# Patient Record
Sex: FEMALE | Race: WHITE | Employment: OTHER | URBAN - NONMETROPOLITAN AREA
[De-identification: names, ages, dates, MRNs, and addresses within clinical notes are randomized per-mention and may not be internally consistent; named-entity substitution may affect disease eponyms.]

---

## 2017-01-31 ENCOUNTER — APPOINTMENT (OUTPATIENT)
Dept: GENERAL RADIOLOGY | Age: 82
DRG: 308 | End: 2017-01-31
Payer: MEDICARE

## 2017-01-31 ENCOUNTER — APPOINTMENT (OUTPATIENT)
Dept: CT IMAGING | Age: 82
DRG: 308 | End: 2017-01-31
Payer: MEDICARE

## 2017-01-31 ENCOUNTER — HOSPITAL ENCOUNTER (INPATIENT)
Age: 82
LOS: 3 days | Discharge: HOME HEALTH | DRG: 308 | End: 2017-02-03
Attending: EMERGENCY MEDICINE | Admitting: INTERNAL MEDICINE
Payer: MEDICARE

## 2017-01-31 DIAGNOSIS — I50.9 ACUTE ON CHRONIC CONGESTIVE HEART FAILURE, UNSPECIFIED CONGESTIVE HEART FAILURE TYPE: Primary | ICD-10-CM

## 2017-01-31 DIAGNOSIS — E87.1 HYPONATREMIA: ICD-10-CM

## 2017-01-31 DIAGNOSIS — I48.0 PAROXYSMAL ATRIAL FIBRILLATION (HCC): ICD-10-CM

## 2017-01-31 PROBLEM — I48.19 PERSISTENT ATRIAL FIBRILLATION (HCC): Status: ACTIVE | Noted: 2017-01-31

## 2017-01-31 LAB
ALBUMIN SERPL-MCNC: 3.5 G/DL (ref 3.5–5.2)
ALP BLD-CCNC: 41 U/L (ref 35–104)
ALT SERPL-CCNC: 33 U/L (ref 5–33)
ANION GAP SERPL CALCULATED.3IONS-SCNC: 14 MMOL/L (ref 7–19)
APTT: 34.9 SEC (ref 26–36.2)
AST SERPL-CCNC: 45 U/L (ref 5–32)
BASOPHILS ABSOLUTE: 0 K/UL (ref 0–0.2)
BASOPHILS RELATIVE PERCENT: 0.2 % (ref 0–1)
BILIRUB SERPL-MCNC: 1.4 MG/DL (ref 0.2–1.2)
BILIRUBIN URINE: NEGATIVE
BLOOD, URINE: NEGATIVE
BUN BLDV-MCNC: 26 MG/DL (ref 8–23)
CALCIUM SERPL-MCNC: 9.4 MG/DL (ref 8.8–10.2)
CHLORIDE BLD-SCNC: 90 MMOL/L (ref 98–111)
CLARITY: CLEAR
CO2: 24 MMOL/L (ref 22–29)
COLOR: YELLOW
CREAT SERPL-MCNC: 1.3 MG/DL (ref 0.5–0.9)
EKG P AXIS: NORMAL DEGREES
EKG P-R INTERVAL: NORMAL MS
EKG Q-T INTERVAL: 316 MS
EKG QRS DURATION: 92 MS
EKG QTC CALCULATION (BAZETT): 407 MS
EKG T AXIS: 59 DEGREES
EOSINOPHILS ABSOLUTE: 0 K/UL (ref 0–0.6)
EOSINOPHILS RELATIVE PERCENT: 0 % (ref 0–5)
GFR NON-AFRICAN AMERICAN: 39
GLOBULIN: 3.3 G/DL
GLUCOSE BLD-MCNC: 106 MG/DL (ref 74–109)
GLUCOSE URINE: NEGATIVE MG/DL
HCT VFR BLD CALC: 41.7 % (ref 37–47)
HEMOGLOBIN: 14.3 G/DL (ref 12–16)
INR BLD: 1.15 (ref 0.88–1.18)
KETONES, URINE: NEGATIVE MG/DL
LEUKOCYTE ESTERASE, URINE: NEGATIVE
LYMPHOCYTES ABSOLUTE: 0.5 K/UL (ref 1.1–4.5)
LYMPHOCYTES RELATIVE PERCENT: 7.3 % (ref 20–40)
MCH RBC QN AUTO: 29.7 PG (ref 27–31)
MCHC RBC AUTO-ENTMCNC: 34.3 G/DL (ref 33–37)
MCV RBC AUTO: 86.5 FL (ref 81–99)
MONOCYTES ABSOLUTE: 0.6 K/UL (ref 0–0.9)
MONOCYTES RELATIVE PERCENT: 10.3 % (ref 0–10)
NEUTROPHILS ABSOLUTE: 5.1 K/UL (ref 1.5–7.5)
NEUTROPHILS RELATIVE PERCENT: 82.2 % (ref 50–65)
NITRITE, URINE: NEGATIVE
PDW BLD-RTO: 15 % (ref 11.5–14.5)
PH UA: 5
PLATELET # BLD: 207 K/UL (ref 130–400)
PMV BLD AUTO: 11.8 FL (ref 7.4–10.4)
POTASSIUM SERPL-SCNC: 4 MMOL/L (ref 3.5–5)
PRO-BNP: ABNORMAL PG/ML (ref 0–1800)
PROTEIN UA: NEGATIVE MG/DL
PROTHROMBIN TIME: 14.7 SEC (ref 12–14.6)
RBC # BLD: 4.82 M/UL (ref 4.2–5.4)
SODIUM BLD-SCNC: 128 MMOL/L (ref 136–145)
SPECIFIC GRAVITY UA: 1.01
T4 FREE: 2.2 NG/ML (ref 0.9–1.7)
TOTAL PROTEIN: 6.8 G/DL (ref 6.6–8.7)
TROPONIN: 0.01 NG/ML (ref 0–0.03)
TSH SERPL DL<=0.05 MIU/L-ACNC: 4.51 UIU/ML (ref 0.27–4.2)
UROBILINOGEN, URINE: 0.2 E.U./DL
WBC # BLD: 6.1 K/UL (ref 4.8–10.8)

## 2017-01-31 PROCEDURE — 93005 ELECTROCARDIOGRAM TRACING: CPT

## 2017-01-31 PROCEDURE — 2700000000 HC OXYGEN THERAPY PER DAY

## 2017-01-31 PROCEDURE — 2500000003 HC RX 250 WO HCPCS: Performed by: EMERGENCY MEDICINE

## 2017-01-31 PROCEDURE — 85730 THROMBOPLASTIN TIME PARTIAL: CPT

## 2017-01-31 PROCEDURE — 84439 ASSAY OF FREE THYROXINE: CPT

## 2017-01-31 PROCEDURE — 96374 THER/PROPH/DIAG INJ IV PUSH: CPT

## 2017-01-31 PROCEDURE — 74150 CT ABDOMEN W/O CONTRAST: CPT

## 2017-01-31 PROCEDURE — 84484 ASSAY OF TROPONIN QUANT: CPT

## 2017-01-31 PROCEDURE — 2580000003 HC RX 258: Performed by: EMERGENCY MEDICINE

## 2017-01-31 PROCEDURE — 6360000002 HC RX W HCPCS: Performed by: INTERNAL MEDICINE

## 2017-01-31 PROCEDURE — 99284 EMERGENCY DEPT VISIT MOD MDM: CPT | Performed by: EMERGENCY MEDICINE

## 2017-01-31 PROCEDURE — 6370000000 HC RX 637 (ALT 250 FOR IP): Performed by: INTERNAL MEDICINE

## 2017-01-31 PROCEDURE — 36415 COLL VENOUS BLD VENIPUNCTURE: CPT

## 2017-01-31 PROCEDURE — 85610 PROTHROMBIN TIME: CPT

## 2017-01-31 PROCEDURE — 99285 EMERGENCY DEPT VISIT HI MDM: CPT

## 2017-01-31 PROCEDURE — 81003 URINALYSIS AUTO W/O SCOPE: CPT

## 2017-01-31 PROCEDURE — 2140000000 HC CCU INTERMEDIATE R&B

## 2017-01-31 PROCEDURE — 84443 ASSAY THYROID STIM HORMONE: CPT

## 2017-01-31 PROCEDURE — 85025 COMPLETE CBC W/AUTO DIFF WBC: CPT

## 2017-01-31 PROCEDURE — 71010 XR CHEST PORTABLE: CPT

## 2017-01-31 PROCEDURE — 83880 ASSAY OF NATRIURETIC PEPTIDE: CPT

## 2017-01-31 PROCEDURE — 99223 1ST HOSP IP/OBS HIGH 75: CPT | Performed by: INTERNAL MEDICINE

## 2017-01-31 PROCEDURE — 80053 COMPREHEN METABOLIC PANEL: CPT

## 2017-01-31 RX ORDER — BUMETANIDE 0.25 MG/ML
1 INJECTION, SOLUTION INTRAMUSCULAR; INTRAVENOUS ONCE
Status: COMPLETED | OUTPATIENT
Start: 2017-01-31 | End: 2017-01-31

## 2017-01-31 RX ORDER — ACETAMINOPHEN 325 MG/1
650 TABLET ORAL EVERY 4 HOURS PRN
Status: DISCONTINUED | OUTPATIENT
Start: 2017-01-31 | End: 2017-02-03 | Stop reason: HOSPADM

## 2017-01-31 RX ORDER — ATORVASTATIN CALCIUM 10 MG/1
10 TABLET, FILM COATED ORAL NIGHTLY
Status: DISCONTINUED | OUTPATIENT
Start: 2017-01-31 | End: 2017-02-03 | Stop reason: HOSPADM

## 2017-01-31 RX ORDER — ONDANSETRON 2 MG/ML
4 INJECTION INTRAMUSCULAR; INTRAVENOUS EVERY 6 HOURS PRN
Status: DISCONTINUED | OUTPATIENT
Start: 2017-01-31 | End: 2017-02-03 | Stop reason: HOSPADM

## 2017-01-31 RX ORDER — LOSARTAN POTASSIUM 50 MG/1
50 TABLET ORAL DAILY
Status: ON HOLD | COMMUNITY
End: 2017-02-03 | Stop reason: HOSPADM

## 2017-01-31 RX ORDER — SODIUM CHLORIDE 0.9 % (FLUSH) 0.9 %
10 SYRINGE (ML) INJECTION EVERY 12 HOURS SCHEDULED
Status: DISCONTINUED | OUTPATIENT
Start: 2017-01-31 | End: 2017-02-03 | Stop reason: HOSPADM

## 2017-01-31 RX ORDER — FUROSEMIDE 20 MG/1
20 TABLET ORAL 2 TIMES DAILY
Status: ON HOLD | COMMUNITY
End: 2017-02-03

## 2017-01-31 RX ORDER — MORPHINE SULFATE 4 MG/ML
4 INJECTION, SOLUTION INTRAMUSCULAR; INTRAVENOUS ONCE
Status: DISCONTINUED | OUTPATIENT
Start: 2017-01-31 | End: 2017-01-31

## 2017-01-31 RX ORDER — DILTIAZEM HYDROCHLORIDE 5 MG/ML
10 INJECTION INTRAVENOUS ONCE
Status: COMPLETED | OUTPATIENT
Start: 2017-01-31 | End: 2017-01-31

## 2017-01-31 RX ORDER — SODIUM CHLORIDE 0.9 % (FLUSH) 0.9 %
10 SYRINGE (ML) INJECTION PRN
Status: DISCONTINUED | OUTPATIENT
Start: 2017-01-31 | End: 2017-02-03 | Stop reason: HOSPADM

## 2017-01-31 RX ORDER — LOSARTAN POTASSIUM 50 MG/1
25 TABLET ORAL DAILY
Status: DISCONTINUED | OUTPATIENT
Start: 2017-02-01 | End: 2017-02-01

## 2017-01-31 RX ADMIN — BUMETANIDE 1 MG: 0.25 INJECTION, SOLUTION INTRAMUSCULAR; INTRAVENOUS at 13:48

## 2017-01-31 RX ADMIN — DILTIAZEM HYDROCHLORIDE 10 MG: 5 INJECTION INTRAVENOUS at 12:48

## 2017-01-31 RX ADMIN — METOPROLOL TARTRATE 25 MG: 25 TABLET ORAL at 20:47

## 2017-01-31 RX ADMIN — ENOXAPARIN SODIUM 30 MG: 30 INJECTION SUBCUTANEOUS at 20:47

## 2017-01-31 RX ADMIN — ATORVASTATIN CALCIUM 10 MG: 10 TABLET, FILM COATED ORAL at 20:56

## 2017-01-31 RX ADMIN — DILTIAZEM HYDROCHLORIDE 5 MG/HR: 5 INJECTION INTRAVENOUS at 12:49

## 2017-01-31 ASSESSMENT — ENCOUNTER SYMPTOMS
DIARRHEA: 0
EYE PAIN: 0
BACK PAIN: 0
SORE THROAT: 0
TROUBLE SWALLOWING: 0
COUGH: 0
CHEST TIGHTNESS: 0
WHEEZING: 0
NAUSEA: 0
BLOOD IN STOOL: 0
VOMITING: 0
SINUS PRESSURE: 0
FACIAL SWELLING: 0
STRIDOR: 0
CONSTIPATION: 0
PHOTOPHOBIA: 0
ABDOMINAL PAIN: 0
SHORTNESS OF BREATH: 1
SPUTUM PRODUCTION: 0
ABDOMINAL DISTENTION: 0
RHINORRHEA: 0

## 2017-01-31 ASSESSMENT — PAIN SCALES - GENERAL: PAINLEVEL_OUTOF10: 0

## 2017-02-01 LAB
ALBUMIN SERPL-MCNC: 2.5 G/DL (ref 3.5–5.2)
ALP BLD-CCNC: 33 U/L (ref 35–104)
ALT SERPL-CCNC: 24 U/L (ref 5–33)
ANION GAP SERPL CALCULATED.3IONS-SCNC: 14 MMOL/L (ref 7–19)
AST SERPL-CCNC: 32 U/L (ref 5–32)
BASOPHILS ABSOLUTE: 0 K/UL (ref 0–0.2)
BASOPHILS RELATIVE PERCENT: 0.4 % (ref 0–1)
BILIRUB SERPL-MCNC: 1 MG/DL (ref 0.2–1.2)
BUN BLDV-MCNC: 25 MG/DL (ref 8–23)
CALCIUM SERPL-MCNC: 8.4 MG/DL (ref 8.8–10.2)
CHLORIDE BLD-SCNC: 92 MMOL/L (ref 98–111)
CHOLESTEROL, TOTAL: 113 MG/DL (ref 160–199)
CO2: 25 MMOL/L (ref 22–29)
CREAT SERPL-MCNC: 1.3 MG/DL (ref 0.5–0.9)
EKG P AXIS: NORMAL DEGREES
EKG P-R INTERVAL: NORMAL MS
EKG Q-T INTERVAL: 392 MS
EKG QRS DURATION: 98 MS
EKG QTC CALCULATION (BAZETT): 451 MS
EKG T AXIS: 85 DEGREES
EOSINOPHILS ABSOLUTE: 0.1 K/UL (ref 0–0.6)
EOSINOPHILS RELATIVE PERCENT: 1.3 % (ref 0–5)
GFR NON-AFRICAN AMERICAN: 39
GLOBULIN: 2.8 G/DL
GLUCOSE BLD-MCNC: 90 MG/DL (ref 74–109)
HCT VFR BLD CALC: 39.2 % (ref 37–47)
HDLC SERPL-MCNC: 43 MG/DL (ref 65–121)
HEMOGLOBIN: 12.4 G/DL (ref 12–16)
INR BLD: 1.31 (ref 0.88–1.18)
LDL CHOLESTEROL CALCULATED: 58 MG/DL
LV EF: 20 %
LVEF MODALITY: NORMAL
LYMPHOCYTES ABSOLUTE: 0.7 K/UL (ref 1.1–4.5)
LYMPHOCYTES RELATIVE PERCENT: 13.6 % (ref 20–40)
MCH RBC QN AUTO: 28.5 PG (ref 27–31)
MCHC RBC AUTO-ENTMCNC: 31.6 G/DL (ref 33–37)
MCV RBC AUTO: 90.1 FL (ref 81–99)
MONOCYTES ABSOLUTE: 0.7 K/UL (ref 0–0.9)
MONOCYTES RELATIVE PERCENT: 13 % (ref 0–10)
NEUTROPHILS ABSOLUTE: 3.9 K/UL (ref 1.5–7.5)
NEUTROPHILS RELATIVE PERCENT: 71.5 % (ref 50–65)
PDW BLD-RTO: 15.2 % (ref 11.5–14.5)
PLATELET # BLD: 192 K/UL (ref 130–400)
PMV BLD AUTO: 11.9 FL (ref 7.4–10.4)
POTASSIUM SERPL-SCNC: 3.4 MMOL/L (ref 3.5–5)
PROTHROMBIN TIME: 16.2 SEC (ref 12–14.6)
RBC # BLD: 4.35 M/UL (ref 4.2–5.4)
SODIUM BLD-SCNC: 131 MMOL/L (ref 136–145)
TOTAL PROTEIN: 5.3 G/DL (ref 6.6–8.7)
TRIGL SERPL-MCNC: 60 MG/DL (ref 150–199)
TROPONIN: <0.01 NG/ML (ref 0–0.03)
WBC # BLD: 5.4 K/UL (ref 4.8–10.8)

## 2017-02-01 PROCEDURE — 6370000000 HC RX 637 (ALT 250 FOR IP): Performed by: INTERNAL MEDICINE

## 2017-02-01 PROCEDURE — 2580000003 HC RX 258: Performed by: INTERNAL MEDICINE

## 2017-02-01 PROCEDURE — 99232 SBSQ HOSP IP/OBS MODERATE 35: CPT | Performed by: INTERNAL MEDICINE

## 2017-02-01 PROCEDURE — 93306 TTE W/DOPPLER COMPLETE: CPT

## 2017-02-01 PROCEDURE — 85025 COMPLETE CBC W/AUTO DIFF WBC: CPT

## 2017-02-01 PROCEDURE — 6360000002 HC RX W HCPCS: Performed by: INTERNAL MEDICINE

## 2017-02-01 PROCEDURE — 80061 LIPID PANEL: CPT

## 2017-02-01 PROCEDURE — G8978 MOBILITY CURRENT STATUS: HCPCS

## 2017-02-01 PROCEDURE — 80053 COMPREHEN METABOLIC PANEL: CPT

## 2017-02-01 PROCEDURE — 97162 PT EVAL MOD COMPLEX 30 MIN: CPT

## 2017-02-01 PROCEDURE — 2500000003 HC RX 250 WO HCPCS: Performed by: INTERNAL MEDICINE

## 2017-02-01 PROCEDURE — G8979 MOBILITY GOAL STATUS: HCPCS

## 2017-02-01 PROCEDURE — 84484 ASSAY OF TROPONIN QUANT: CPT

## 2017-02-01 PROCEDURE — 93005 ELECTROCARDIOGRAM TRACING: CPT

## 2017-02-01 PROCEDURE — 2140000000 HC CCU INTERMEDIATE R&B

## 2017-02-01 PROCEDURE — 36415 COLL VENOUS BLD VENIPUNCTURE: CPT

## 2017-02-01 PROCEDURE — 2700000000 HC OXYGEN THERAPY PER DAY

## 2017-02-01 PROCEDURE — 85610 PROTHROMBIN TIME: CPT

## 2017-02-01 RX ORDER — LANOLIN ALCOHOL/MO/W.PET/CERES
3 CREAM (GRAM) TOPICAL NIGHTLY PRN
Status: DISCONTINUED | OUTPATIENT
Start: 2017-02-01 | End: 2017-02-03 | Stop reason: HOSPADM

## 2017-02-01 RX ORDER — METOPROLOL TARTRATE 50 MG/1
50 TABLET, FILM COATED ORAL 2 TIMES DAILY
Status: DISCONTINUED | OUTPATIENT
Start: 2017-02-01 | End: 2017-02-03 | Stop reason: HOSPADM

## 2017-02-01 RX ORDER — BUMETANIDE 0.25 MG/ML
1 INJECTION, SOLUTION INTRAMUSCULAR; INTRAVENOUS ONCE
Status: COMPLETED | OUTPATIENT
Start: 2017-02-01 | End: 2017-02-01

## 2017-02-01 RX ORDER — POTASSIUM CHLORIDE 20 MEQ/1
40 TABLET, EXTENDED RELEASE ORAL 2 TIMES DAILY
Status: COMPLETED | OUTPATIENT
Start: 2017-02-01 | End: 2017-02-01

## 2017-02-01 RX ADMIN — ATORVASTATIN CALCIUM 10 MG: 10 TABLET, FILM COATED ORAL at 20:38

## 2017-02-01 RX ADMIN — BUMETANIDE 1 MG: 0.25 INJECTION, SOLUTION INTRAMUSCULAR; INTRAVENOUS at 11:04

## 2017-02-01 RX ADMIN — Medication 10 ML: at 20:39

## 2017-02-01 RX ADMIN — ENOXAPARIN SODIUM 30 MG: 30 INJECTION SUBCUTANEOUS at 18:36

## 2017-02-01 RX ADMIN — METOPROLOL TARTRATE 25 MG: 25 TABLET ORAL at 11:04

## 2017-02-01 RX ADMIN — METOPROLOL TARTRATE 50 MG: 50 TABLET ORAL at 20:39

## 2017-02-01 RX ADMIN — MELATONIN 3 MG ORAL TABLET 3 MG: 3 TABLET ORAL at 21:53

## 2017-02-01 RX ADMIN — Medication 10 ML: at 09:06

## 2017-02-01 RX ADMIN — ATORVASTATIN CALCIUM 10 MG: 10 TABLET, FILM COATED ORAL at 11:06

## 2017-02-01 RX ADMIN — METOPROLOL TARTRATE 25 MG: 25 TABLET ORAL at 09:06

## 2017-02-01 RX ADMIN — POTASSIUM CHLORIDE 40 MEQ: 20 TABLET, EXTENDED RELEASE ORAL at 11:04

## 2017-02-01 RX ADMIN — POTASSIUM CHLORIDE 40 MEQ: 20 TABLET, EXTENDED RELEASE ORAL at 20:39

## 2017-02-01 ASSESSMENT — PAIN SCALES - GENERAL
PAINLEVEL_OUTOF10: 0
PAINLEVEL_OUTOF10: 0

## 2017-02-02 LAB
ANION GAP SERPL CALCULATED.3IONS-SCNC: 11 MMOL/L (ref 7–19)
BUN BLDV-MCNC: 22 MG/DL (ref 8–23)
CALCIUM SERPL-MCNC: 8.4 MG/DL (ref 8.8–10.2)
CHLORIDE BLD-SCNC: 96 MMOL/L (ref 98–111)
CO2: 26 MMOL/L (ref 22–29)
CREAT SERPL-MCNC: 1.3 MG/DL (ref 0.5–0.9)
GFR NON-AFRICAN AMERICAN: 39
GLUCOSE BLD-MCNC: 95 MG/DL (ref 74–109)
POTASSIUM SERPL-SCNC: 4 MMOL/L (ref 3.5–5)
PRO-BNP: ABNORMAL PG/ML (ref 0–1800)
SODIUM BLD-SCNC: 133 MMOL/L (ref 136–145)

## 2017-02-02 PROCEDURE — 80048 BASIC METABOLIC PNL TOTAL CA: CPT

## 2017-02-02 PROCEDURE — 97116 GAIT TRAINING THERAPY: CPT

## 2017-02-02 PROCEDURE — 2140000000 HC CCU INTERMEDIATE R&B

## 2017-02-02 PROCEDURE — 94761 N-INVAS EAR/PLS OXIMETRY MLT: CPT

## 2017-02-02 PROCEDURE — 2500000003 HC RX 250 WO HCPCS: Performed by: INTERNAL MEDICINE

## 2017-02-02 PROCEDURE — 6370000000 HC RX 637 (ALT 250 FOR IP): Performed by: INTERNAL MEDICINE

## 2017-02-02 PROCEDURE — 97110 THERAPEUTIC EXERCISES: CPT

## 2017-02-02 PROCEDURE — 99233 SBSQ HOSP IP/OBS HIGH 50: CPT | Performed by: INTERNAL MEDICINE

## 2017-02-02 PROCEDURE — 83880 ASSAY OF NATRIURETIC PEPTIDE: CPT

## 2017-02-02 PROCEDURE — 36415 COLL VENOUS BLD VENIPUNCTURE: CPT

## 2017-02-02 PROCEDURE — 2700000000 HC OXYGEN THERAPY PER DAY

## 2017-02-02 PROCEDURE — 2580000003 HC RX 258: Performed by: INTERNAL MEDICINE

## 2017-02-02 RX ORDER — LISINOPRIL 2.5 MG/1
2.5 TABLET ORAL DAILY
Status: DISCONTINUED | OUTPATIENT
Start: 2017-02-02 | End: 2017-02-03 | Stop reason: HOSPADM

## 2017-02-02 RX ORDER — BUMETANIDE 0.25 MG/ML
1 INJECTION, SOLUTION INTRAMUSCULAR; INTRAVENOUS 2 TIMES DAILY
Status: DISCONTINUED | OUTPATIENT
Start: 2017-02-02 | End: 2017-02-03 | Stop reason: HOSPADM

## 2017-02-02 RX ORDER — POTASSIUM CHLORIDE 20 MEQ/1
20 TABLET, EXTENDED RELEASE ORAL 2 TIMES DAILY
Status: COMPLETED | OUTPATIENT
Start: 2017-02-02 | End: 2017-02-02

## 2017-02-02 RX ADMIN — APIXABAN 2.5 MG: 2.5 TABLET, FILM COATED ORAL at 09:41

## 2017-02-02 RX ADMIN — BUMETANIDE 1 MG: 0.25 INJECTION, SOLUTION INTRAMUSCULAR; INTRAVENOUS at 09:41

## 2017-02-02 RX ADMIN — POTASSIUM CHLORIDE 20 MEQ: 20 TABLET, EXTENDED RELEASE ORAL at 20:30

## 2017-02-02 RX ADMIN — Medication 10 ML: at 20:30

## 2017-02-02 RX ADMIN — BUMETANIDE 1 MG: 0.25 INJECTION, SOLUTION INTRAMUSCULAR; INTRAVENOUS at 20:30

## 2017-02-02 RX ADMIN — Medication 10 ML: at 09:41

## 2017-02-02 RX ADMIN — METOPROLOL TARTRATE 50 MG: 50 TABLET ORAL at 20:30

## 2017-02-02 RX ADMIN — METOPROLOL TARTRATE 50 MG: 50 TABLET ORAL at 09:41

## 2017-02-02 RX ADMIN — POTASSIUM CHLORIDE 20 MEQ: 20 TABLET, EXTENDED RELEASE ORAL at 09:41

## 2017-02-02 RX ADMIN — APIXABAN 2.5 MG: 2.5 TABLET, FILM COATED ORAL at 20:30

## 2017-02-02 RX ADMIN — ATORVASTATIN CALCIUM 10 MG: 10 TABLET, FILM COATED ORAL at 20:31

## 2017-02-02 RX ADMIN — LISINOPRIL 2.5 MG: 2.5 TABLET ORAL at 09:40

## 2017-02-02 ASSESSMENT — PAIN SCALES - GENERAL
PAINLEVEL_OUTOF10: 0

## 2017-02-02 ASSESSMENT — ENCOUNTER SYMPTOMS
SHORTNESS OF BREATH: 1
EYES NEGATIVE: 1
GASTROINTESTINAL NEGATIVE: 1
ORTHOPNEA: 1

## 2017-02-03 ENCOUNTER — APPOINTMENT (OUTPATIENT)
Dept: HOSPICE | Age: 82
DRG: 308 | End: 2017-02-03
Payer: MEDICARE

## 2017-02-03 VITALS
WEIGHT: 122.3 LBS | DIASTOLIC BLOOD PRESSURE: 66 MMHG | HEIGHT: 63 IN | OXYGEN SATURATION: 96 % | HEART RATE: 110 BPM | TEMPERATURE: 97.3 F | BODY MASS INDEX: 21.67 KG/M2 | RESPIRATION RATE: 20 BRPM | SYSTOLIC BLOOD PRESSURE: 106 MMHG

## 2017-02-03 LAB
ANION GAP SERPL CALCULATED.3IONS-SCNC: 14 MMOL/L (ref 7–19)
BUN BLDV-MCNC: 25 MG/DL (ref 8–23)
CALCIUM SERPL-MCNC: 8.6 MG/DL (ref 8.8–10.2)
CHLORIDE BLD-SCNC: 97 MMOL/L (ref 98–111)
CO2: 27 MMOL/L (ref 22–29)
CREAT SERPL-MCNC: 1.5 MG/DL (ref 0.5–0.9)
GFR NON-AFRICAN AMERICAN: 33
GLUCOSE BLD-MCNC: 103 MG/DL (ref 74–109)
POTASSIUM SERPL-SCNC: 4.3 MMOL/L (ref 3.5–5)
SODIUM BLD-SCNC: 138 MMOL/L (ref 136–145)

## 2017-02-03 PROCEDURE — 97116 GAIT TRAINING THERAPY: CPT

## 2017-02-03 PROCEDURE — 80048 BASIC METABOLIC PNL TOTAL CA: CPT

## 2017-02-03 PROCEDURE — 6370000000 HC RX 637 (ALT 250 FOR IP): Performed by: INTERNAL MEDICINE

## 2017-02-03 PROCEDURE — 94762 N-INVAS EAR/PLS OXIMTRY CONT: CPT

## 2017-02-03 PROCEDURE — 36415 COLL VENOUS BLD VENIPUNCTURE: CPT

## 2017-02-03 PROCEDURE — 2500000003 HC RX 250 WO HCPCS: Performed by: INTERNAL MEDICINE

## 2017-02-03 PROCEDURE — 2580000003 HC RX 258: Performed by: INTERNAL MEDICINE

## 2017-02-03 PROCEDURE — 99238 HOSP IP/OBS DSCHRG MGMT 30/<: CPT | Performed by: INTERNAL MEDICINE

## 2017-02-03 PROCEDURE — 99231 SBSQ HOSP IP/OBS SF/LOW 25: CPT | Performed by: UROLOGY

## 2017-02-03 RX ORDER — LISINOPRIL 2.5 MG/1
2.5 TABLET ORAL DAILY
Qty: 30 TABLET | Refills: 3 | Status: SHIPPED | OUTPATIENT
Start: 2017-02-03 | End: 2017-02-17 | Stop reason: ALTCHOICE

## 2017-02-03 RX ORDER — METOPROLOL TARTRATE 50 MG/1
50 TABLET, FILM COATED ORAL 2 TIMES DAILY
Qty: 60 TABLET | Refills: 3 | Status: SHIPPED | OUTPATIENT
Start: 2017-02-03 | End: 2017-02-17 | Stop reason: DRUGHIGH

## 2017-02-03 RX ORDER — FUROSEMIDE 20 MG/1
40 TABLET ORAL DAILY
Qty: 60 TABLET | Refills: 3 | Status: SHIPPED | OUTPATIENT
Start: 2017-02-03 | End: 2017-02-24 | Stop reason: DRUGHIGH

## 2017-02-03 RX ADMIN — Medication 10 ML: at 08:25

## 2017-02-03 RX ADMIN — METOPROLOL TARTRATE 50 MG: 50 TABLET ORAL at 08:24

## 2017-02-03 RX ADMIN — APIXABAN 2.5 MG: 2.5 TABLET, FILM COATED ORAL at 08:25

## 2017-02-03 RX ADMIN — LISINOPRIL 2.5 MG: 2.5 TABLET ORAL at 08:24

## 2017-02-03 RX ADMIN — BUMETANIDE 1 MG: 0.25 INJECTION, SOLUTION INTRAMUSCULAR; INTRAVENOUS at 08:25

## 2017-02-03 ASSESSMENT — PAIN SCALES - GENERAL
PAINLEVEL_OUTOF10: 0
PAINLEVEL_OUTOF10: 0

## 2017-02-03 ASSESSMENT — ENCOUNTER SYMPTOMS
GASTROINTESTINAL NEGATIVE: 1
EYES NEGATIVE: 1
SHORTNESS OF BREATH: 1
ORTHOPNEA: 1

## 2017-02-06 ENCOUNTER — TELEPHONE (OUTPATIENT)
Dept: CARDIOLOGY | Age: 82
End: 2017-02-06

## 2017-02-06 DIAGNOSIS — I50.41 ACUTE COMBINED SYSTOLIC AND DIASTOLIC CONGESTIVE HEART FAILURE (HCC): Primary | ICD-10-CM

## 2017-02-07 ENCOUNTER — TELEPHONE (OUTPATIENT)
Dept: CARDIOLOGY | Age: 82
End: 2017-02-07

## 2017-02-08 ENCOUNTER — TELEPHONE (OUTPATIENT)
Dept: CARDIOLOGY | Age: 82
End: 2017-02-08

## 2017-02-17 ENCOUNTER — OFFICE VISIT (OUTPATIENT)
Dept: CARDIOLOGY | Age: 82
End: 2017-02-17
Payer: MEDICARE

## 2017-02-17 ENCOUNTER — HOSPITAL ENCOUNTER (OUTPATIENT)
Dept: NON INVASIVE DIAGNOSTICS | Age: 82
Discharge: HOME | End: 2017-02-17
Payer: MEDICARE

## 2017-02-17 VITALS
SYSTOLIC BLOOD PRESSURE: 102 MMHG | BODY MASS INDEX: 18.43 KG/M2 | WEIGHT: 104 LBS | HEIGHT: 63 IN | HEART RATE: 143 BPM | DIASTOLIC BLOOD PRESSURE: 80 MMHG

## 2017-02-17 DIAGNOSIS — I50.22 CHRONIC SYSTOLIC CHF (CONGESTIVE HEART FAILURE), NYHA CLASS 3 (HCC): ICD-10-CM

## 2017-02-17 DIAGNOSIS — I48.19 PERSISTENT ATRIAL FIBRILLATION (HCC): ICD-10-CM

## 2017-02-17 DIAGNOSIS — I50.41 ACUTE COMBINED SYSTOLIC AND DIASTOLIC CONGESTIVE HEART FAILURE (HCC): ICD-10-CM

## 2017-02-17 DIAGNOSIS — N18.30 CHRONIC KIDNEY DISEASE, STAGE III (MODERATE) (HCC): ICD-10-CM

## 2017-02-17 DIAGNOSIS — R53.1 WEAKNESS: ICD-10-CM

## 2017-02-17 DIAGNOSIS — I48.19 PERSISTENT ATRIAL FIBRILLATION (HCC): Primary | ICD-10-CM

## 2017-02-17 LAB
ANION GAP SERPL CALCULATED.3IONS-SCNC: 13 MMOL/L (ref 7–19)
BUN BLDV-MCNC: 30 MG/DL (ref 8–23)
CALCIUM SERPL-MCNC: 8.8 MG/DL (ref 8.8–10.2)
CHLORIDE BLD-SCNC: 100 MMOL/L (ref 98–111)
CO2: 28 MMOL/L (ref 22–29)
CREAT SERPL-MCNC: 1.4 MG/DL (ref 0.5–0.9)
GFR NON-AFRICAN AMERICAN: 36
GLUCOSE BLD-MCNC: 103 MG/DL (ref 74–109)
LV EF: 28 %
LVEF MODALITY: NORMAL
POTASSIUM SERPL-SCNC: 3.9 MMOL/L (ref 3.5–5)
SODIUM BLD-SCNC: 141 MMOL/L (ref 136–145)

## 2017-02-17 PROCEDURE — 1090F PRES/ABSN URINE INCON ASSESS: CPT | Performed by: CLINICAL NURSE SPECIALIST

## 2017-02-17 PROCEDURE — 99214 OFFICE O/P EST MOD 30 MIN: CPT | Performed by: CLINICAL NURSE SPECIALIST

## 2017-02-17 PROCEDURE — 1111F DSCHRG MED/CURRENT MED MERGE: CPT | Performed by: CLINICAL NURSE SPECIALIST

## 2017-02-17 PROCEDURE — 93000 ELECTROCARDIOGRAM COMPLETE: CPT | Performed by: CLINICAL NURSE SPECIALIST

## 2017-02-17 PROCEDURE — 1036F TOBACCO NON-USER: CPT | Performed by: CLINICAL NURSE SPECIALIST

## 2017-02-17 PROCEDURE — 4040F PNEUMOC VAC/ADMIN/RCVD: CPT | Performed by: CLINICAL NURSE SPECIALIST

## 2017-02-17 PROCEDURE — G8400 PT W/DXA NO RESULTS DOC: HCPCS | Performed by: CLINICAL NURSE SPECIALIST

## 2017-02-17 PROCEDURE — G8427 DOCREV CUR MEDS BY ELIG CLIN: HCPCS | Performed by: CLINICAL NURSE SPECIALIST

## 2017-02-17 PROCEDURE — 1123F ACP DISCUSS/DSCN MKR DOCD: CPT | Performed by: CLINICAL NURSE SPECIALIST

## 2017-02-17 PROCEDURE — G8419 CALC BMI OUT NRM PARAM NOF/U: HCPCS | Performed by: CLINICAL NURSE SPECIALIST

## 2017-02-17 PROCEDURE — G8484 FLU IMMUNIZE NO ADMIN: HCPCS | Performed by: CLINICAL NURSE SPECIALIST

## 2017-02-17 PROCEDURE — 93306 TTE W/DOPPLER COMPLETE: CPT

## 2017-02-17 RX ORDER — LOSARTAN POTASSIUM 25 MG/1
25 TABLET ORAL DAILY
Qty: 30 TABLET | Refills: 5 | Status: SHIPPED | OUTPATIENT
Start: 2017-02-17 | End: 2017-02-24 | Stop reason: ALTCHOICE

## 2017-02-17 RX ORDER — METOPROLOL TARTRATE 50 MG/1
75 TABLET, FILM COATED ORAL 2 TIMES DAILY
Qty: 90 TABLET | Refills: 5 | Status: SHIPPED | OUTPATIENT
Start: 2017-02-17 | End: 2017-02-24 | Stop reason: DRUGHIGH

## 2017-02-17 RX ORDER — LOSARTAN POTASSIUM 50 MG/1
TABLET ORAL
COMMUNITY
Start: 2017-02-15 | End: 2017-02-17 | Stop reason: DRUGHIGH

## 2017-02-17 ASSESSMENT — ENCOUNTER SYMPTOMS
NAUSEA: 0
ORTHOPNEA: 0
COUGH: 0
HEARTBURN: 0
SHORTNESS OF BREATH: 1
VOMITING: 0
ABDOMINAL PAIN: 0
BLURRED VISION: 0

## 2017-02-24 ENCOUNTER — OFFICE VISIT (OUTPATIENT)
Dept: CARDIOLOGY | Age: 82
End: 2017-02-24
Payer: MEDICARE

## 2017-02-24 ENCOUNTER — TELEPHONE (OUTPATIENT)
Dept: CARDIOLOGY | Age: 82
End: 2017-02-24

## 2017-02-24 VITALS
HEIGHT: 63 IN | WEIGHT: 106.8 LBS | BODY MASS INDEX: 18.92 KG/M2 | HEART RATE: 104 BPM | DIASTOLIC BLOOD PRESSURE: 58 MMHG | SYSTOLIC BLOOD PRESSURE: 100 MMHG

## 2017-02-24 DIAGNOSIS — N18.30 CHRONIC RENAL FAILURE, STAGE 3 (MODERATE) (HCC): ICD-10-CM

## 2017-02-24 DIAGNOSIS — I50.21 ACUTE SYSTOLIC CHF (CONGESTIVE HEART FAILURE), NYHA CLASS 3 (HCC): ICD-10-CM

## 2017-02-24 DIAGNOSIS — I48.19 PERSISTENT ATRIAL FIBRILLATION (HCC): ICD-10-CM

## 2017-02-24 DIAGNOSIS — I48.19 PERSISTENT ATRIAL FIBRILLATION (HCC): Primary | ICD-10-CM

## 2017-02-24 LAB
ANION GAP SERPL CALCULATED.3IONS-SCNC: 16 MMOL/L (ref 7–19)
BUN BLDV-MCNC: 32 MG/DL (ref 8–23)
CALCIUM SERPL-MCNC: 9 MG/DL (ref 8.8–10.2)
CHLORIDE BLD-SCNC: 94 MMOL/L (ref 98–111)
CO2: 26 MMOL/L (ref 22–29)
CREAT SERPL-MCNC: 1.4 MG/DL (ref 0.5–0.9)
GFR NON-AFRICAN AMERICAN: 36
GLUCOSE BLD-MCNC: 63 MG/DL (ref 74–109)
POTASSIUM SERPL-SCNC: 4.7 MMOL/L (ref 3.5–5)
PRO-BNP: ABNORMAL PG/ML (ref 0–1800)
SODIUM BLD-SCNC: 136 MMOL/L (ref 136–145)

## 2017-02-24 PROCEDURE — 1111F DSCHRG MED/CURRENT MED MERGE: CPT | Performed by: CLINICAL NURSE SPECIALIST

## 2017-02-24 PROCEDURE — G8484 FLU IMMUNIZE NO ADMIN: HCPCS | Performed by: CLINICAL NURSE SPECIALIST

## 2017-02-24 PROCEDURE — G8419 CALC BMI OUT NRM PARAM NOF/U: HCPCS | Performed by: CLINICAL NURSE SPECIALIST

## 2017-02-24 PROCEDURE — 4040F PNEUMOC VAC/ADMIN/RCVD: CPT | Performed by: CLINICAL NURSE SPECIALIST

## 2017-02-24 PROCEDURE — 93000 ELECTROCARDIOGRAM COMPLETE: CPT | Performed by: CLINICAL NURSE SPECIALIST

## 2017-02-24 PROCEDURE — G8400 PT W/DXA NO RESULTS DOC: HCPCS | Performed by: CLINICAL NURSE SPECIALIST

## 2017-02-24 PROCEDURE — 1090F PRES/ABSN URINE INCON ASSESS: CPT | Performed by: CLINICAL NURSE SPECIALIST

## 2017-02-24 PROCEDURE — G8427 DOCREV CUR MEDS BY ELIG CLIN: HCPCS | Performed by: CLINICAL NURSE SPECIALIST

## 2017-02-24 PROCEDURE — 1123F ACP DISCUSS/DSCN MKR DOCD: CPT | Performed by: CLINICAL NURSE SPECIALIST

## 2017-02-24 PROCEDURE — 99214 OFFICE O/P EST MOD 30 MIN: CPT | Performed by: CLINICAL NURSE SPECIALIST

## 2017-02-24 PROCEDURE — 1036F TOBACCO NON-USER: CPT | Performed by: CLINICAL NURSE SPECIALIST

## 2017-02-24 RX ORDER — METOPROLOL TARTRATE 100 MG/1
100 TABLET ORAL 2 TIMES DAILY
Qty: 60 TABLET | Refills: 5 | Status: SHIPPED | OUTPATIENT
Start: 2017-02-24 | End: 2017-03-03 | Stop reason: DRUGHIGH

## 2017-02-24 RX ORDER — FUROSEMIDE 20 MG/1
20 TABLET ORAL 2 TIMES DAILY
Qty: 60 TABLET | Refills: 5 | Status: SHIPPED | OUTPATIENT
Start: 2017-02-24 | End: 2017-08-09 | Stop reason: ALTCHOICE

## 2017-02-24 ASSESSMENT — ENCOUNTER SYMPTOMS
ABDOMINAL PAIN: 0
VOMITING: 0
HEARTBURN: 0
COUGH: 0
ORTHOPNEA: 0
BLURRED VISION: 0
SHORTNESS OF BREATH: 1
NAUSEA: 0

## 2017-03-03 ENCOUNTER — OFFICE VISIT (OUTPATIENT)
Dept: CARDIOLOGY | Age: 82
End: 2017-03-03
Payer: MEDICARE

## 2017-03-03 VITALS
HEART RATE: 106 BPM | WEIGHT: 102 LBS | DIASTOLIC BLOOD PRESSURE: 62 MMHG | BODY MASS INDEX: 18.07 KG/M2 | SYSTOLIC BLOOD PRESSURE: 94 MMHG

## 2017-03-03 DIAGNOSIS — I48.19 PERSISTENT ATRIAL FIBRILLATION (HCC): Primary | ICD-10-CM

## 2017-03-03 DIAGNOSIS — I50.22 CHRONIC SYSTOLIC CHF (CONGESTIVE HEART FAILURE), NYHA CLASS 3 (HCC): ICD-10-CM

## 2017-03-03 DIAGNOSIS — N18.30 CHRONIC KIDNEY DISEASE, STAGE III (MODERATE) (HCC): ICD-10-CM

## 2017-03-03 PROCEDURE — G8484 FLU IMMUNIZE NO ADMIN: HCPCS | Performed by: CLINICAL NURSE SPECIALIST

## 2017-03-03 PROCEDURE — 1123F ACP DISCUSS/DSCN MKR DOCD: CPT | Performed by: CLINICAL NURSE SPECIALIST

## 2017-03-03 PROCEDURE — 1111F DSCHRG MED/CURRENT MED MERGE: CPT | Performed by: CLINICAL NURSE SPECIALIST

## 2017-03-03 PROCEDURE — 93000 ELECTROCARDIOGRAM COMPLETE: CPT | Performed by: CLINICAL NURSE SPECIALIST

## 2017-03-03 PROCEDURE — G8419 CALC BMI OUT NRM PARAM NOF/U: HCPCS | Performed by: CLINICAL NURSE SPECIALIST

## 2017-03-03 PROCEDURE — 1036F TOBACCO NON-USER: CPT | Performed by: CLINICAL NURSE SPECIALIST

## 2017-03-03 PROCEDURE — 1090F PRES/ABSN URINE INCON ASSESS: CPT | Performed by: CLINICAL NURSE SPECIALIST

## 2017-03-03 PROCEDURE — G8400 PT W/DXA NO RESULTS DOC: HCPCS | Performed by: CLINICAL NURSE SPECIALIST

## 2017-03-03 PROCEDURE — 4040F PNEUMOC VAC/ADMIN/RCVD: CPT | Performed by: CLINICAL NURSE SPECIALIST

## 2017-03-03 PROCEDURE — 99213 OFFICE O/P EST LOW 20 MIN: CPT | Performed by: CLINICAL NURSE SPECIALIST

## 2017-03-03 PROCEDURE — G8427 DOCREV CUR MEDS BY ELIG CLIN: HCPCS | Performed by: CLINICAL NURSE SPECIALIST

## 2017-03-03 RX ORDER — METOPROLOL TARTRATE 50 MG/1
50 TABLET, FILM COATED ORAL 2 TIMES DAILY
COMMUNITY
End: 2017-03-03 | Stop reason: DRUGHIGH

## 2017-03-03 RX ORDER — LISINOPRIL 2.5 MG/1
TABLET ORAL
COMMUNITY
Start: 2017-02-03 | End: 2017-03-03 | Stop reason: ALTCHOICE

## 2017-03-03 RX ORDER — METOPROLOL TARTRATE 100 MG/1
100 TABLET ORAL 2 TIMES DAILY
Qty: 60 TABLET | Refills: 5 | Status: SHIPPED
Start: 2017-03-03 | End: 2017-10-23 | Stop reason: SDUPTHER

## 2017-03-03 RX ORDER — POTASSIUM CHLORIDE 1.5 G/1.77G
20 POWDER, FOR SOLUTION ORAL 2 TIMES DAILY
COMMUNITY
End: 2017-03-16 | Stop reason: ALTCHOICE

## 2017-03-03 ASSESSMENT — ENCOUNTER SYMPTOMS
HEARTBURN: 0
VOMITING: 0
COUGH: 0
NAUSEA: 0
ABDOMINAL PAIN: 0
SHORTNESS OF BREATH: 1
ORTHOPNEA: 0
BLURRED VISION: 0

## 2017-03-16 ENCOUNTER — PROCEDURE VISIT (OUTPATIENT)
Dept: UROLOGY | Age: 82
End: 2017-03-16
Payer: MEDICARE

## 2017-03-16 VITALS — BODY MASS INDEX: 16.5 KG/M2 | HEIGHT: 65 IN | WEIGHT: 99 LBS | TEMPERATURE: 97.6 F

## 2017-03-16 DIAGNOSIS — R31.9 HEMATURIA: Primary | ICD-10-CM

## 2017-03-16 LAB
BACTERIA URINE, POC: ABNORMAL
BILIRUBIN URINE: 0 MG/DL
BLOOD, URINE: NEGATIVE
CASTS URINE, POC: ABNORMAL
CLARITY: CLEAR
COLOR: YELLOW
CRYSTALS URINE, POC: ABNORMAL
EPI CELLS URINE, POC: ABNORMAL
GLUCOSE URINE: ABNORMAL
KETONES, URINE: NEGATIVE
LEUKOCYTE EST, POC: ABNORMAL
NITRITE, URINE: POSITIVE
PH UA: 6 (ref 4.5–8)
PROTEIN UA: POSITIVE
RBC URINE, POC: ABNORMAL
SPECIFIC GRAVITY UA: 1.01 (ref 1–1.03)
UROBILINOGEN, URINE: NORMAL
WBC URINE, POC: ABNORMAL
YEAST URINE, POC: ABNORMAL

## 2017-03-16 PROCEDURE — 1036F TOBACCO NON-USER: CPT | Performed by: UROLOGY

## 2017-03-16 PROCEDURE — 81000 URINALYSIS NONAUTO W/SCOPE: CPT | Performed by: UROLOGY

## 2017-03-16 PROCEDURE — 52000 CYSTOURETHROSCOPY: CPT | Performed by: UROLOGY

## 2017-03-16 RX ORDER — POTASSIUM CHLORIDE 20 MEQ/1
TABLET, EXTENDED RELEASE ORAL
Refills: 6 | COMMUNITY
Start: 2017-02-24 | End: 2017-03-16 | Stop reason: ALTCHOICE

## 2017-03-16 RX ORDER — SODIUM POLYSTYRENE SULFONATE 15 G/60ML
SUSPENSION ORAL; RECTAL
COMMUNITY
Start: 2017-03-14 | End: 2017-03-16 | Stop reason: ALTCHOICE

## 2017-03-16 ASSESSMENT — ENCOUNTER SYMPTOMS
HEARTBURN: 0
SHORTNESS OF BREATH: 1
NAUSEA: 0
DOUBLE VISION: 0
BLURRED VISION: 0
BACK PAIN: 1
SORE THROAT: 0
COUGH: 1

## 2017-03-20 ENCOUNTER — OFFICE VISIT (OUTPATIENT)
Dept: CARDIOLOGY | Age: 82
End: 2017-03-20
Payer: MEDICARE

## 2017-03-20 VITALS
HEART RATE: 118 BPM | BODY MASS INDEX: 17.72 KG/M2 | HEIGHT: 63 IN | WEIGHT: 100 LBS | DIASTOLIC BLOOD PRESSURE: 70 MMHG | SYSTOLIC BLOOD PRESSURE: 96 MMHG

## 2017-03-20 DIAGNOSIS — I50.22 CHRONIC SYSTOLIC CONGESTIVE HEART FAILURE (HCC): ICD-10-CM

## 2017-03-20 DIAGNOSIS — I48.19 PERSISTENT ATRIAL FIBRILLATION (HCC): Primary | ICD-10-CM

## 2017-03-20 PROCEDURE — 93000 ELECTROCARDIOGRAM COMPLETE: CPT | Performed by: INTERNAL MEDICINE

## 2017-03-20 PROCEDURE — G8427 DOCREV CUR MEDS BY ELIG CLIN: HCPCS | Performed by: INTERNAL MEDICINE

## 2017-03-20 PROCEDURE — G8400 PT W/DXA NO RESULTS DOC: HCPCS | Performed by: INTERNAL MEDICINE

## 2017-03-20 PROCEDURE — 4040F PNEUMOC VAC/ADMIN/RCVD: CPT | Performed by: INTERNAL MEDICINE

## 2017-03-20 PROCEDURE — G8484 FLU IMMUNIZE NO ADMIN: HCPCS | Performed by: INTERNAL MEDICINE

## 2017-03-20 PROCEDURE — 99214 OFFICE O/P EST MOD 30 MIN: CPT | Performed by: INTERNAL MEDICINE

## 2017-03-20 PROCEDURE — 1036F TOBACCO NON-USER: CPT | Performed by: INTERNAL MEDICINE

## 2017-03-20 PROCEDURE — 1123F ACP DISCUSS/DSCN MKR DOCD: CPT | Performed by: INTERNAL MEDICINE

## 2017-03-20 PROCEDURE — G8419 CALC BMI OUT NRM PARAM NOF/U: HCPCS | Performed by: INTERNAL MEDICINE

## 2017-03-20 PROCEDURE — 1090F PRES/ABSN URINE INCON ASSESS: CPT | Performed by: INTERNAL MEDICINE

## 2017-03-21 ENCOUNTER — TELEPHONE (OUTPATIENT)
Dept: CARDIOLOGY | Age: 82
End: 2017-03-21

## 2017-03-22 ENCOUNTER — TELEPHONE (OUTPATIENT)
Dept: CARDIOLOGY | Age: 82
End: 2017-03-22

## 2017-04-25 ENCOUNTER — TELEPHONE (OUTPATIENT)
Dept: CARDIOLOGY | Age: 82
End: 2017-04-25

## 2017-05-01 ENCOUNTER — OFFICE VISIT (OUTPATIENT)
Dept: CARDIOLOGY | Age: 82
End: 2017-05-01
Payer: MEDICARE

## 2017-05-01 ENCOUNTER — HOSPITAL ENCOUNTER (OUTPATIENT)
Dept: NON INVASIVE DIAGNOSTICS | Age: 82
Discharge: HOME | End: 2017-05-01
Payer: MEDICARE

## 2017-05-01 VITALS
HEIGHT: 64 IN | DIASTOLIC BLOOD PRESSURE: 78 MMHG | HEART RATE: 91 BPM | BODY MASS INDEX: 17.93 KG/M2 | WEIGHT: 105 LBS | SYSTOLIC BLOOD PRESSURE: 128 MMHG

## 2017-05-01 DIAGNOSIS — I50.22 CHRONIC SYSTOLIC CONGESTIVE HEART FAILURE (HCC): ICD-10-CM

## 2017-05-01 DIAGNOSIS — I48.19 PERSISTENT ATRIAL FIBRILLATION (HCC): Primary | ICD-10-CM

## 2017-05-01 LAB
LV EF: 35 %
LVEF MODALITY: NORMAL

## 2017-05-01 PROCEDURE — G8400 PT W/DXA NO RESULTS DOC: HCPCS | Performed by: INTERNAL MEDICINE

## 2017-05-01 PROCEDURE — 1090F PRES/ABSN URINE INCON ASSESS: CPT | Performed by: INTERNAL MEDICINE

## 2017-05-01 PROCEDURE — G8419 CALC BMI OUT NRM PARAM NOF/U: HCPCS | Performed by: INTERNAL MEDICINE

## 2017-05-01 PROCEDURE — 99214 OFFICE O/P EST MOD 30 MIN: CPT | Performed by: INTERNAL MEDICINE

## 2017-05-01 PROCEDURE — 93000 ELECTROCARDIOGRAM COMPLETE: CPT | Performed by: INTERNAL MEDICINE

## 2017-05-01 PROCEDURE — 93306 TTE W/DOPPLER COMPLETE: CPT

## 2017-05-01 PROCEDURE — 1123F ACP DISCUSS/DSCN MKR DOCD: CPT | Performed by: INTERNAL MEDICINE

## 2017-05-01 PROCEDURE — 1036F TOBACCO NON-USER: CPT | Performed by: INTERNAL MEDICINE

## 2017-05-01 PROCEDURE — 4040F PNEUMOC VAC/ADMIN/RCVD: CPT | Performed by: INTERNAL MEDICINE

## 2017-05-01 PROCEDURE — G8427 DOCREV CUR MEDS BY ELIG CLIN: HCPCS | Performed by: INTERNAL MEDICINE

## 2017-05-01 RX ORDER — DIGOXIN 125 MCG
125 TABLET ORAL DAILY
Qty: 90 TABLET | Refills: 3 | Status: SHIPPED | OUTPATIENT
Start: 2017-05-01 | End: 2019-06-03 | Stop reason: SDUPTHER

## 2017-05-02 ENCOUNTER — ANTI-COAG VISIT (OUTPATIENT)
Dept: CARDIOLOGY | Age: 82
End: 2017-05-02

## 2017-05-02 DIAGNOSIS — I48.19 PERSISTENT ATRIAL FIBRILLATION (HCC): ICD-10-CM

## 2017-05-02 RX ORDER — WARFARIN SODIUM 5 MG/1
5 TABLET ORAL DAILY
Qty: 90 TABLET | Refills: 3 | Status: SHIPPED | OUTPATIENT
Start: 2017-05-02 | End: 2019-09-12 | Stop reason: SDUPTHER

## 2017-05-04 ENCOUNTER — ANTI-COAG VISIT (OUTPATIENT)
Dept: CARDIOLOGY | Age: 82
End: 2017-05-04
Payer: MEDICARE

## 2017-05-04 ENCOUNTER — TELEPHONE (OUTPATIENT)
Dept: CARDIOLOGY | Age: 82
End: 2017-05-04

## 2017-05-04 DIAGNOSIS — I48.19 PERSISTENT ATRIAL FIBRILLATION (HCC): ICD-10-CM

## 2017-05-04 LAB
INTERNATIONAL NORMALIZATION RATIO, POC: 1.3
PROTHROMBIN TIME, POC: NORMAL

## 2017-05-04 PROCEDURE — 85610 PROTHROMBIN TIME: CPT | Performed by: NURSE PRACTITIONER

## 2017-05-08 ENCOUNTER — TELEPHONE (OUTPATIENT)
Dept: CARDIOLOGY | Age: 82
End: 2017-05-08

## 2017-05-08 ENCOUNTER — ANTI-COAG VISIT (OUTPATIENT)
Dept: CARDIOLOGY | Age: 82
End: 2017-05-08
Payer: MEDICARE

## 2017-05-08 DIAGNOSIS — I48.19 PERSISTENT ATRIAL FIBRILLATION (HCC): ICD-10-CM

## 2017-05-08 DIAGNOSIS — I48.0 PAROXYSMAL ATRIAL FIBRILLATION (HCC): Primary | ICD-10-CM

## 2017-05-08 LAB
INTERNATIONAL NORMALIZATION RATIO, POC: 3.8
PROTHROMBIN TIME, POC: NORMAL

## 2017-05-08 PROCEDURE — 85610 PROTHROMBIN TIME: CPT | Performed by: NURSE PRACTITIONER

## 2017-05-08 PROCEDURE — 1036F TOBACCO NON-USER: CPT | Performed by: NURSE PRACTITIONER

## 2017-05-11 ENCOUNTER — TELEPHONE (OUTPATIENT)
Dept: CARDIOLOGY | Age: 82
End: 2017-05-11

## 2017-05-11 DIAGNOSIS — I48.19 PERSISTENT ATRIAL FIBRILLATION (HCC): ICD-10-CM

## 2017-05-11 DIAGNOSIS — I50.23 ACUTE ON CHRONIC SYSTOLIC CONGESTIVE HEART FAILURE (HCC): Primary | ICD-10-CM

## 2017-05-11 LAB
ANION GAP SERPL CALCULATED.3IONS-SCNC: 14 MMOL/L (ref 7–19)
BUN BLDV-MCNC: 52 MG/DL (ref 8–23)
CALCIUM SERPL-MCNC: 9.5 MG/DL (ref 8.8–10.2)
CHLORIDE BLD-SCNC: 102 MMOL/L (ref 98–111)
CO2: 27 MMOL/L (ref 22–29)
CREAT SERPL-MCNC: 1.8 MG/DL (ref 0.5–0.9)
DIGOXIN LEVEL: 2.4 NG/ML (ref 0.6–1.2)
GFR NON-AFRICAN AMERICAN: 27
GLUCOSE BLD-MCNC: 85 MG/DL (ref 74–109)
POTASSIUM SERPL-SCNC: 4.8 MMOL/L (ref 3.5–5)
SODIUM BLD-SCNC: 143 MMOL/L (ref 136–145)

## 2017-05-15 ENCOUNTER — ANTI-COAG VISIT (OUTPATIENT)
Dept: CARDIOLOGY | Age: 82
End: 2017-05-15
Payer: MEDICARE

## 2017-05-15 DIAGNOSIS — I48.19 PERSISTENT ATRIAL FIBRILLATION (HCC): ICD-10-CM

## 2017-05-15 LAB
INTERNATIONAL NORMALIZATION RATIO, POC: 2.5
PROTHROMBIN TIME, POC: NORMAL

## 2017-05-15 PROCEDURE — 1036F TOBACCO NON-USER: CPT | Performed by: CLINICAL NURSE SPECIALIST

## 2017-05-15 PROCEDURE — 85610 PROTHROMBIN TIME: CPT | Performed by: CLINICAL NURSE SPECIALIST

## 2017-05-17 ENCOUNTER — HOSPITAL ENCOUNTER (OUTPATIENT)
Dept: CARDIAC CATH/INVASIVE PROCEDURES | Age: 82
Setting detail: SPECIMEN
Discharge: HOME | End: 2017-05-17
Payer: MEDICARE

## 2017-05-17 PROCEDURE — 2580000003 HC RX 258

## 2017-05-17 PROCEDURE — 2500000003 HC RX 250 WO HCPCS

## 2017-05-18 ENCOUNTER — TELEPHONE (OUTPATIENT)
Dept: CARDIOLOGY | Age: 82
End: 2017-05-18

## 2017-05-18 DIAGNOSIS — I50.23 ACUTE ON CHRONIC SYSTOLIC CONGESTIVE HEART FAILURE (HCC): ICD-10-CM

## 2017-05-18 LAB
ANION GAP SERPL CALCULATED.3IONS-SCNC: 20 MMOL/L (ref 7–19)
BUN BLDV-MCNC: 56 MG/DL (ref 8–23)
CALCIUM SERPL-MCNC: 10.1 MG/DL (ref 8.8–10.2)
CHLORIDE BLD-SCNC: 101 MMOL/L (ref 98–111)
CO2: 22 MMOL/L (ref 22–29)
CREAT SERPL-MCNC: 1.7 MG/DL (ref 0.5–0.9)
DIGOXIN LEVEL: 0.7 NG/ML (ref 0.6–1.2)
GFR NON-AFRICAN AMERICAN: 29
GLUCOSE BLD-MCNC: 90 MG/DL (ref 74–109)
POTASSIUM SERPL-SCNC: 4.8 MMOL/L (ref 3.5–5)
SODIUM BLD-SCNC: 143 MMOL/L (ref 136–145)

## 2017-05-31 ENCOUNTER — ANTI-COAG VISIT (OUTPATIENT)
Dept: CARDIOLOGY | Age: 82
End: 2017-05-31
Payer: MEDICARE

## 2017-05-31 DIAGNOSIS — I48.19 PERSISTENT ATRIAL FIBRILLATION (HCC): ICD-10-CM

## 2017-05-31 LAB
INTERNATIONAL NORMALIZATION RATIO, POC: 2.4
PROTHROMBIN TIME, POC: NORMAL

## 2017-05-31 PROCEDURE — 85610 PROTHROMBIN TIME: CPT | Performed by: CLINICAL NURSE SPECIALIST

## 2017-05-31 PROCEDURE — 1036F TOBACCO NON-USER: CPT | Performed by: CLINICAL NURSE SPECIALIST

## 2017-06-01 DIAGNOSIS — I48.19 PERSISTENT ATRIAL FIBRILLATION (HCC): ICD-10-CM

## 2017-06-29 ENCOUNTER — OFFICE VISIT (OUTPATIENT)
Dept: CARDIOLOGY | Age: 82
End: 2017-06-29
Payer: MEDICARE

## 2017-06-29 VITALS
SYSTOLIC BLOOD PRESSURE: 132 MMHG | BODY MASS INDEX: 19.67 KG/M2 | HEIGHT: 63 IN | DIASTOLIC BLOOD PRESSURE: 84 MMHG | WEIGHT: 111 LBS

## 2017-06-29 DIAGNOSIS — I48.19 PERSISTENT ATRIAL FIBRILLATION (HCC): Primary | ICD-10-CM

## 2017-06-29 DIAGNOSIS — I50.22 CHRONIC SYSTOLIC CONGESTIVE HEART FAILURE (HCC): ICD-10-CM

## 2017-06-29 LAB
INTERNATIONAL NORMALIZATION RATIO, POC: 1.9
PROTHROMBIN TIME, POC: NORMAL

## 2017-06-29 PROCEDURE — 1090F PRES/ABSN URINE INCON ASSESS: CPT | Performed by: INTERNAL MEDICINE

## 2017-06-29 PROCEDURE — 99214 OFFICE O/P EST MOD 30 MIN: CPT | Performed by: INTERNAL MEDICINE

## 2017-06-29 PROCEDURE — 1036F TOBACCO NON-USER: CPT | Performed by: INTERNAL MEDICINE

## 2017-06-29 PROCEDURE — 85610 PROTHROMBIN TIME: CPT | Performed by: INTERNAL MEDICINE

## 2017-06-29 PROCEDURE — 93000 ELECTROCARDIOGRAM COMPLETE: CPT | Performed by: INTERNAL MEDICINE

## 2017-06-29 PROCEDURE — 4040F PNEUMOC VAC/ADMIN/RCVD: CPT | Performed by: INTERNAL MEDICINE

## 2017-06-29 PROCEDURE — 1123F ACP DISCUSS/DSCN MKR DOCD: CPT | Performed by: INTERNAL MEDICINE

## 2017-06-29 PROCEDURE — G8428 CUR MEDS NOT DOCUMENT: HCPCS | Performed by: INTERNAL MEDICINE

## 2017-06-29 PROCEDURE — G8420 CALC BMI NORM PARAMETERS: HCPCS | Performed by: INTERNAL MEDICINE

## 2017-06-29 PROCEDURE — G8400 PT W/DXA NO RESULTS DOC: HCPCS | Performed by: INTERNAL MEDICINE

## 2017-06-29 RX ORDER — LORATADINE 10 MG/1
TABLET ORAL
Refills: 4 | COMMUNITY
Start: 2017-05-25 | End: 2018-11-20

## 2017-06-29 RX ORDER — CETIRIZINE HYDROCHLORIDE 10 MG/1
TABLET ORAL
Refills: 5 | COMMUNITY
Start: 2017-06-13 | End: 2017-06-29 | Stop reason: ALTCHOICE

## 2017-06-29 RX ORDER — ALLOPURINOL 100 MG/1
50 TABLET ORAL DAILY
Refills: 0 | COMMUNITY
Start: 2017-06-22 | End: 2018-12-06 | Stop reason: SDUPTHER

## 2017-08-03 ENCOUNTER — TELEPHONE (OUTPATIENT)
Dept: CARDIOLOGY | Age: 82
End: 2017-08-03

## 2017-08-09 ENCOUNTER — OFFICE VISIT (OUTPATIENT)
Dept: CARDIOLOGY | Age: 82
End: 2017-08-09
Payer: MEDICARE

## 2017-08-09 VITALS
HEART RATE: 60 BPM | HEIGHT: 61 IN | SYSTOLIC BLOOD PRESSURE: 140 MMHG | DIASTOLIC BLOOD PRESSURE: 70 MMHG | WEIGHT: 109 LBS | BODY MASS INDEX: 20.58 KG/M2

## 2017-08-09 DIAGNOSIS — N18.30 CHRONIC KIDNEY DISEASE, STAGE III (MODERATE) (HCC): ICD-10-CM

## 2017-08-09 DIAGNOSIS — I50.22 CHRONIC SYSTOLIC CONGESTIVE HEART FAILURE (HCC): Primary | ICD-10-CM

## 2017-08-09 DIAGNOSIS — I48.19 PERSISTENT ATRIAL FIBRILLATION (HCC): ICD-10-CM

## 2017-08-09 LAB
INTERNATIONAL NORMALIZATION RATIO, POC: NORMAL
PROTHROMBIN TIME, POC: NORMAL

## 2017-08-09 PROCEDURE — G8400 PT W/DXA NO RESULTS DOC: HCPCS | Performed by: CLINICAL NURSE SPECIALIST

## 2017-08-09 PROCEDURE — G8420 CALC BMI NORM PARAMETERS: HCPCS | Performed by: CLINICAL NURSE SPECIALIST

## 2017-08-09 PROCEDURE — 1036F TOBACCO NON-USER: CPT | Performed by: CLINICAL NURSE SPECIALIST

## 2017-08-09 PROCEDURE — G8427 DOCREV CUR MEDS BY ELIG CLIN: HCPCS | Performed by: CLINICAL NURSE SPECIALIST

## 2017-08-09 PROCEDURE — 1090F PRES/ABSN URINE INCON ASSESS: CPT | Performed by: CLINICAL NURSE SPECIALIST

## 2017-08-09 PROCEDURE — 1123F ACP DISCUSS/DSCN MKR DOCD: CPT | Performed by: CLINICAL NURSE SPECIALIST

## 2017-08-09 PROCEDURE — 99214 OFFICE O/P EST MOD 30 MIN: CPT | Performed by: CLINICAL NURSE SPECIALIST

## 2017-08-09 PROCEDURE — 85610 PROTHROMBIN TIME: CPT | Performed by: CLINICAL NURSE SPECIALIST

## 2017-08-09 PROCEDURE — 4040F PNEUMOC VAC/ADMIN/RCVD: CPT | Performed by: CLINICAL NURSE SPECIALIST

## 2017-08-09 RX ORDER — FUROSEMIDE 20 MG/1
20 TABLET ORAL EVERY OTHER DAY
Status: ON HOLD | COMMUNITY
End: 2019-12-09 | Stop reason: HOSPADM

## 2017-08-09 RX ORDER — CETIRIZINE HYDROCHLORIDE 10 MG/1
5 TABLET ORAL DAILY
COMMUNITY
Start: 2017-07-20 | End: 2018-11-20

## 2017-08-09 ASSESSMENT — ENCOUNTER SYMPTOMS
COUGH: 0
VOMITING: 0
BLURRED VISION: 0
SHORTNESS OF BREATH: 1
HEARTBURN: 0
NAUSEA: 0
ORTHOPNEA: 0

## 2017-09-18 ENCOUNTER — TELEPHONE (OUTPATIENT)
Dept: CARDIOLOGY | Age: 82
End: 2017-09-18

## 2017-09-28 ENCOUNTER — OFFICE VISIT (OUTPATIENT)
Dept: CARDIOLOGY | Age: 82
End: 2017-09-28
Payer: MEDICARE

## 2017-09-28 VITALS
HEIGHT: 64 IN | BODY MASS INDEX: 17.93 KG/M2 | DIASTOLIC BLOOD PRESSURE: 64 MMHG | SYSTOLIC BLOOD PRESSURE: 124 MMHG | WEIGHT: 105 LBS | HEART RATE: 62 BPM

## 2017-09-28 DIAGNOSIS — I50.22 CHRONIC SYSTOLIC CONGESTIVE HEART FAILURE (HCC): ICD-10-CM

## 2017-09-28 DIAGNOSIS — I48.20 CHRONIC ATRIAL FIBRILLATION (HCC): ICD-10-CM

## 2017-09-28 DIAGNOSIS — N18.30 CHRONIC RENAL FAILURE, STAGE 3 (MODERATE) (HCC): ICD-10-CM

## 2017-09-28 DIAGNOSIS — I50.22 CHRONIC SYSTOLIC CHF (CONGESTIVE HEART FAILURE), NYHA CLASS 3 (HCC): Primary | ICD-10-CM

## 2017-09-28 LAB
ANION GAP SERPL CALCULATED.3IONS-SCNC: 15 MMOL/L (ref 7–19)
BUN BLDV-MCNC: 42 MG/DL (ref 8–23)
CALCIUM SERPL-MCNC: 9.9 MG/DL (ref 8.8–10.2)
CHLORIDE BLD-SCNC: 104 MMOL/L (ref 98–111)
CO2: 27 MMOL/L (ref 22–29)
CREAT SERPL-MCNC: 1.7 MG/DL (ref 0.5–0.9)
DIGOXIN LEVEL: 0.9 NG/ML (ref 0.6–1.2)
GFR NON-AFRICAN AMERICAN: 29
GLUCOSE BLD-MCNC: 102 MG/DL (ref 74–109)
INTERNATIONAL NORMALIZATION RATIO, POC: 2.1
POTASSIUM SERPL-SCNC: 4.5 MMOL/L (ref 3.5–5)
PRO-BNP: 4820 PG/ML (ref 0–1800)
PROTHROMBIN TIME, POC: NORMAL
SODIUM BLD-SCNC: 146 MMOL/L (ref 136–145)

## 2017-09-28 PROCEDURE — 1123F ACP DISCUSS/DSCN MKR DOCD: CPT | Performed by: CLINICAL NURSE SPECIALIST

## 2017-09-28 PROCEDURE — 4040F PNEUMOC VAC/ADMIN/RCVD: CPT | Performed by: CLINICAL NURSE SPECIALIST

## 2017-09-28 PROCEDURE — 1090F PRES/ABSN URINE INCON ASSESS: CPT | Performed by: CLINICAL NURSE SPECIALIST

## 2017-09-28 PROCEDURE — 85610 PROTHROMBIN TIME: CPT | Performed by: CLINICAL NURSE SPECIALIST

## 2017-09-28 PROCEDURE — 1036F TOBACCO NON-USER: CPT | Performed by: CLINICAL NURSE SPECIALIST

## 2017-09-28 PROCEDURE — G8400 PT W/DXA NO RESULTS DOC: HCPCS | Performed by: CLINICAL NURSE SPECIALIST

## 2017-09-28 PROCEDURE — 99214 OFFICE O/P EST MOD 30 MIN: CPT | Performed by: CLINICAL NURSE SPECIALIST

## 2017-09-28 PROCEDURE — G8427 DOCREV CUR MEDS BY ELIG CLIN: HCPCS | Performed by: CLINICAL NURSE SPECIALIST

## 2017-09-28 PROCEDURE — G8418 CALC BMI BLW LOW PARAM F/U: HCPCS | Performed by: CLINICAL NURSE SPECIALIST

## 2017-09-28 RX ORDER — DONEPEZIL HYDROCHLORIDE 5 MG/1
2.5 TABLET, FILM COATED ORAL NIGHTLY
COMMUNITY
End: 2019-01-07 | Stop reason: DRUGHIGH

## 2017-09-28 ASSESSMENT — ENCOUNTER SYMPTOMS
NAUSEA: 0
SHORTNESS OF BREATH: 1
BLURRED VISION: 0
HEARTBURN: 0
ORTHOPNEA: 0
VOMITING: 0
COUGH: 0

## 2017-10-23 DIAGNOSIS — I48.19 PERSISTENT ATRIAL FIBRILLATION (HCC): Primary | ICD-10-CM

## 2017-10-23 RX ORDER — METOPROLOL TARTRATE 100 MG/1
TABLET ORAL
Qty: 60 TABLET | Refills: 5 | Status: SHIPPED | OUTPATIENT
Start: 2017-10-23 | End: 2018-10-04 | Stop reason: SDUPTHER

## 2017-10-26 ENCOUNTER — TELEPHONE (OUTPATIENT)
Dept: CARDIOLOGY | Age: 82
End: 2017-10-26

## 2017-10-26 NOTE — TELEPHONE ENCOUNTER
Daughter states patient has a rash on legs. Asked if patient has seen PCP for this. States patient has and was advised to put lotion on the rash. Daughter states rash is getting worse. Advised to call PCP to let him know that it is changing. Understanding voiced.

## 2017-10-31 ENCOUNTER — TELEPHONE (OUTPATIENT)
Dept: CARDIOLOGY | Age: 82
End: 2017-10-31

## 2017-11-01 ENCOUNTER — ANTI-COAG VISIT (OUTPATIENT)
Dept: CARDIOLOGY | Age: 82
End: 2017-11-01
Payer: MEDICARE

## 2017-11-01 DIAGNOSIS — I48.19 PERSISTENT ATRIAL FIBRILLATION (HCC): ICD-10-CM

## 2017-11-01 LAB
INTERNATIONAL NORMALIZATION RATIO, POC: 1.9
PROTHROMBIN TIME, POC: NORMAL

## 2017-11-01 PROCEDURE — 85610 PROTHROMBIN TIME: CPT | Performed by: CLINICAL NURSE SPECIALIST

## 2017-11-30 ENCOUNTER — ANTI-COAG VISIT (OUTPATIENT)
Dept: CARDIOLOGY | Age: 82
End: 2017-11-30
Payer: MEDICARE

## 2017-11-30 DIAGNOSIS — I48.19 PERSISTENT ATRIAL FIBRILLATION (HCC): ICD-10-CM

## 2017-11-30 LAB
INTERNATIONAL NORMALIZATION RATIO, POC: 1.3
PROTHROMBIN TIME, POC: NORMAL

## 2017-11-30 PROCEDURE — 85610 PROTHROMBIN TIME: CPT | Performed by: CLINICAL NURSE SPECIALIST

## 2017-11-30 RX ORDER — MUPIROCIN CALCIUM 20 MG/G
CREAM TOPICAL 3 TIMES DAILY
Status: ON HOLD | COMMUNITY
End: 2019-12-09 | Stop reason: HOSPADM

## 2017-11-30 RX ORDER — AMOXICILLIN AND CLAVULANATE POTASSIUM 875; 125 MG/1; MG/1
1 TABLET, FILM COATED ORAL 2 TIMES DAILY
COMMUNITY
End: 2018-03-29 | Stop reason: ALTCHOICE

## 2017-12-06 ENCOUNTER — ANTI-COAG VISIT (OUTPATIENT)
Dept: CARDIOLOGY | Age: 82
End: 2017-12-06
Payer: MEDICARE

## 2017-12-06 DIAGNOSIS — I48.19 PERSISTENT ATRIAL FIBRILLATION (HCC): ICD-10-CM

## 2017-12-06 LAB
INTERNATIONAL NORMALIZATION RATIO, POC: 1.6
PROTHROMBIN TIME, POC: NORMAL

## 2017-12-06 PROCEDURE — 85610 PROTHROMBIN TIME: CPT | Performed by: CLINICAL NURSE SPECIALIST

## 2017-12-14 ENCOUNTER — OFFICE VISIT (OUTPATIENT)
Dept: CARDIOLOGY | Age: 82
End: 2017-12-14
Payer: MEDICARE

## 2017-12-14 VITALS
DIASTOLIC BLOOD PRESSURE: 82 MMHG | BODY MASS INDEX: 18.95 KG/M2 | HEART RATE: 80 BPM | HEIGHT: 62 IN | SYSTOLIC BLOOD PRESSURE: 124 MMHG | WEIGHT: 103 LBS

## 2017-12-14 DIAGNOSIS — I48.19 PERSISTENT ATRIAL FIBRILLATION (HCC): ICD-10-CM

## 2017-12-14 DIAGNOSIS — I48.19 PERSISTENT ATRIAL FIBRILLATION (HCC): Primary | ICD-10-CM

## 2017-12-14 DIAGNOSIS — I50.22 CHRONIC SYSTOLIC CONGESTIVE HEART FAILURE (HCC): ICD-10-CM

## 2017-12-14 LAB
ANION GAP SERPL CALCULATED.3IONS-SCNC: 12 MMOL/L (ref 7–19)
BUN BLDV-MCNC: 34 MG/DL (ref 8–23)
CALCIUM SERPL-MCNC: 9.4 MG/DL (ref 8.8–10.2)
CHLORIDE BLD-SCNC: 103 MMOL/L (ref 98–111)
CO2: 28 MMOL/L (ref 22–29)
CREAT SERPL-MCNC: 1.5 MG/DL (ref 0.5–0.9)
DIGOXIN LEVEL: 0.4 NG/ML (ref 0.6–1.2)
GFR NON-AFRICAN AMERICAN: 33
GLUCOSE BLD-MCNC: 140 MG/DL (ref 74–109)
INTERNATIONAL NORMALIZATION RATIO, POC: 3.7
POTASSIUM SERPL-SCNC: 3.9 MMOL/L (ref 3.5–5)
PRO-BNP: 3101 PG/ML (ref 0–1800)
PROTHROMBIN TIME, POC: ABNORMAL
SODIUM BLD-SCNC: 143 MMOL/L (ref 136–145)

## 2017-12-14 PROCEDURE — 85610 PROTHROMBIN TIME: CPT | Performed by: INTERNAL MEDICINE

## 2017-12-14 PROCEDURE — 1123F ACP DISCUSS/DSCN MKR DOCD: CPT | Performed by: INTERNAL MEDICINE

## 2017-12-14 PROCEDURE — G8420 CALC BMI NORM PARAMETERS: HCPCS | Performed by: INTERNAL MEDICINE

## 2017-12-14 PROCEDURE — 4040F PNEUMOC VAC/ADMIN/RCVD: CPT | Performed by: INTERNAL MEDICINE

## 2017-12-14 PROCEDURE — 99214 OFFICE O/P EST MOD 30 MIN: CPT | Performed by: INTERNAL MEDICINE

## 2017-12-14 PROCEDURE — G8427 DOCREV CUR MEDS BY ELIG CLIN: HCPCS | Performed by: INTERNAL MEDICINE

## 2017-12-14 PROCEDURE — 1036F TOBACCO NON-USER: CPT | Performed by: INTERNAL MEDICINE

## 2017-12-14 PROCEDURE — 93000 ELECTROCARDIOGRAM COMPLETE: CPT | Performed by: INTERNAL MEDICINE

## 2017-12-14 PROCEDURE — G8484 FLU IMMUNIZE NO ADMIN: HCPCS | Performed by: INTERNAL MEDICINE

## 2017-12-14 PROCEDURE — 1090F PRES/ABSN URINE INCON ASSESS: CPT | Performed by: INTERNAL MEDICINE

## 2017-12-14 PROCEDURE — G8400 PT W/DXA NO RESULTS DOC: HCPCS | Performed by: INTERNAL MEDICINE

## 2017-12-14 RX ORDER — FLUTICASONE PROPIONATE 50 MCG
1 SPRAY, SUSPENSION (ML) NASAL DAILY
COMMUNITY
End: 2018-11-12 | Stop reason: CLARIF

## 2017-12-21 ENCOUNTER — TELEPHONE (OUTPATIENT)
Dept: CARDIOLOGY | Age: 82
End: 2017-12-21

## 2017-12-21 NOTE — TELEPHONE ENCOUNTER
Patient daughter abigail left v/m to see when patient next INR is. Returned call, no answer, left v/m to let daughter know patient had INR on 12/14 and is due for another INR check in 2 weeks from that date. Advised to call appointment line to schedule.

## 2017-12-28 ENCOUNTER — ANTI-COAG VISIT (OUTPATIENT)
Dept: CARDIOLOGY | Age: 82
End: 2017-12-28
Payer: MEDICARE

## 2017-12-28 DIAGNOSIS — I48.19 PERSISTENT ATRIAL FIBRILLATION (HCC): ICD-10-CM

## 2017-12-28 LAB
INTERNATIONAL NORMALIZATION RATIO, POC: 4.5
PROTHROMBIN TIME, POC: NORMAL

## 2017-12-28 PROCEDURE — 85610 PROTHROMBIN TIME: CPT | Performed by: CLINICAL NURSE SPECIALIST

## 2018-01-11 ENCOUNTER — TELEPHONE (OUTPATIENT)
Dept: CARDIOLOGY | Age: 83
End: 2018-01-11

## 2018-01-25 ENCOUNTER — ANTI-COAG VISIT (OUTPATIENT)
Dept: CARDIOLOGY | Age: 83
End: 2018-01-25
Payer: MEDICARE

## 2018-01-25 DIAGNOSIS — I48.19 PERSISTENT ATRIAL FIBRILLATION (HCC): ICD-10-CM

## 2018-01-25 LAB
INTERNATIONAL NORMALIZATION RATIO, POC: 2
PROTHROMBIN TIME, POC: NORMAL

## 2018-01-25 PROCEDURE — 85610 PROTHROMBIN TIME: CPT | Performed by: CLINICAL NURSE SPECIALIST

## 2018-02-28 ENCOUNTER — ANTI-COAG VISIT (OUTPATIENT)
Dept: CARDIOLOGY | Age: 83
End: 2018-02-28
Payer: MEDICARE

## 2018-02-28 DIAGNOSIS — I48.19 PERSISTENT ATRIAL FIBRILLATION (HCC): ICD-10-CM

## 2018-02-28 LAB
INTERNATIONAL NORMALIZATION RATIO, POC: 2
PROTHROMBIN TIME, POC: NORMAL

## 2018-02-28 PROCEDURE — 85610 PROTHROMBIN TIME: CPT | Performed by: NURSE PRACTITIONER

## 2018-03-29 ENCOUNTER — OFFICE VISIT (OUTPATIENT)
Dept: CARDIOLOGY | Age: 83
End: 2018-03-29
Payer: MEDICARE

## 2018-03-29 VITALS
WEIGHT: 114 LBS | SYSTOLIC BLOOD PRESSURE: 110 MMHG | BODY MASS INDEX: 20.85 KG/M2 | DIASTOLIC BLOOD PRESSURE: 52 MMHG | HEART RATE: 77 BPM

## 2018-03-29 DIAGNOSIS — I48.19 PERSISTENT ATRIAL FIBRILLATION (HCC): Primary | ICD-10-CM

## 2018-03-29 DIAGNOSIS — I50.22 CHRONIC SYSTOLIC CHF (CONGESTIVE HEART FAILURE), NYHA CLASS 3 (HCC): ICD-10-CM

## 2018-03-29 LAB
INTERNATIONAL NORMALIZATION RATIO, POC: 2.6
PROTHROMBIN TIME, POC: NORMAL

## 2018-03-29 PROCEDURE — G8420 CALC BMI NORM PARAMETERS: HCPCS | Performed by: CLINICAL NURSE SPECIALIST

## 2018-03-29 PROCEDURE — 1036F TOBACCO NON-USER: CPT | Performed by: CLINICAL NURSE SPECIALIST

## 2018-03-29 PROCEDURE — 93000 ELECTROCARDIOGRAM COMPLETE: CPT | Performed by: CLINICAL NURSE SPECIALIST

## 2018-03-29 PROCEDURE — 99213 OFFICE O/P EST LOW 20 MIN: CPT | Performed by: CLINICAL NURSE SPECIALIST

## 2018-03-29 PROCEDURE — 1123F ACP DISCUSS/DSCN MKR DOCD: CPT | Performed by: CLINICAL NURSE SPECIALIST

## 2018-03-29 PROCEDURE — G8484 FLU IMMUNIZE NO ADMIN: HCPCS | Performed by: CLINICAL NURSE SPECIALIST

## 2018-03-29 PROCEDURE — G8400 PT W/DXA NO RESULTS DOC: HCPCS | Performed by: CLINICAL NURSE SPECIALIST

## 2018-03-29 PROCEDURE — G8427 DOCREV CUR MEDS BY ELIG CLIN: HCPCS | Performed by: CLINICAL NURSE SPECIALIST

## 2018-03-29 PROCEDURE — 4040F PNEUMOC VAC/ADMIN/RCVD: CPT | Performed by: CLINICAL NURSE SPECIALIST

## 2018-03-29 PROCEDURE — 1090F PRES/ABSN URINE INCON ASSESS: CPT | Performed by: CLINICAL NURSE SPECIALIST

## 2018-03-29 PROCEDURE — 85610 PROTHROMBIN TIME: CPT | Performed by: CLINICAL NURSE SPECIALIST

## 2018-03-29 RX ORDER — MONTELUKAST SODIUM 10 MG/1
10 TABLET ORAL NIGHTLY
COMMUNITY
End: 2019-06-18 | Stop reason: SDUPTHER

## 2018-03-29 NOTE — PROGRESS NOTES
9/28/2017    Chronic back pain     Chronic kidney disease, stage III (moderate) 2/17/2017    Chronic systolic CHF (congestive heart failure), NYHA class 3 ([facility] [address] 2/17/2017    Glaucoma     Hearing loss     Hypertension     Osteoarthritis     Osteoporosis     Sinus problem     Wears glasses      Past Surgical History:   Procedure Laterality Date    BREAST BIOPSY      Left-benign    BREAST BIOPSY  feb 2014    Left    HYSTERECTOMY      OVARY SURGERY      tumor removed      Family History   Problem Relation Age of Onset    Asthma Mother     Emphysema Mother     Alzheimer's Disease Sister     Heart Disease Brother     Heart Disease Sister     No Known Problems Sister     Alzheimer's Disease Sister      Social History   Substance Use Topics    Smoking status: Never Smoker    Smokeless tobacco: Never Used    Alcohol use No      Current Outpatient Prescriptions   Medication Sig Dispense Refill    montelukast (SINGULAIR) 10 MG tablet Take 10 mg by mouth nightly      fluticasone (FLONASE) 50 MCG/ACT nasal spray 1 spray by Nasal route daily      mupirocin (BACTROBAN) 2 % cream Apply topically 3 times daily Apply topically 3 times daily.  metoprolol (LOPRESSOR) 100 MG tablet TAKE ONE TABLET BY MOUTH TWO TIMES A DAY.  60 tablet 5    donepezil (ARICEPT) 5 MG tablet Take 5 mg by mouth nightly      furosemide (LASIX) 20 MG tablet Take 20 mg by mouth daily      cetirizine (ZYRTEC) 10 MG tablet Take 10 mg by mouth daily       allopurinol (ZYLOPRIM) 100 MG tablet Take 100 mg by mouth daily   0    CLEAR-ATADINE 10 MG tablet   4    warfarin (COUMADIN) 5 MG tablet Take 1 tablet by mouth daily 90 tablet 3    digoxin (LANOXIN) 125 MCG tablet Take 1 tablet by mouth daily (Patient taking differently: Take 125 mcg by mouth every other day ) 90 tablet 3    atorvastatin (LIPITOR) 10 MG tablet Take 10 mg by mouth daily       vitamin D (CHOLECALCIFEROL) 1000 UNIT TABS tablet Take 1,000 Units by mouth daily - this is for your safety!        Annemarie List, APRN

## 2018-03-29 NOTE — PATIENT INSTRUCTIONS
Continue same coumadin dosing and recheck in 4 weeks  Follow up in 6 mos  Call with any questions or concerns  Follow up with Alessandra Schneider MD for non cardiac problems  Report any new problems  Cardiovascular Fitness-Exercise as tolerated. Strive for 15 minutes of exercise most days of the week. Cardiac / Healthy Diet- low salt  Take extra fluid pill if having extra swelling   Continue current medications as directed  Continue plan of treatment  It is always recommended that you bring your medications bottles with you to each visit - this is for your safety!

## 2018-05-03 ENCOUNTER — ANTI-COAG VISIT (OUTPATIENT)
Dept: CARDIOLOGY | Age: 83
End: 2018-05-03
Payer: MEDICARE

## 2018-05-03 DIAGNOSIS — I48.19 PERSISTENT ATRIAL FIBRILLATION (HCC): ICD-10-CM

## 2018-05-03 LAB
INTERNATIONAL NORMALIZATION RATIO, POC: 2.9
PROTHROMBIN TIME, POC: NORMAL

## 2018-05-03 PROCEDURE — 85610 PROTHROMBIN TIME: CPT | Performed by: CLINICAL NURSE SPECIALIST

## 2018-06-01 ENCOUNTER — ANTI-COAG VISIT (OUTPATIENT)
Dept: CARDIOLOGY | Age: 83
End: 2018-06-01
Payer: MEDICARE

## 2018-06-01 DIAGNOSIS — I48.19 PERSISTENT ATRIAL FIBRILLATION (HCC): ICD-10-CM

## 2018-06-01 LAB
INTERNATIONAL NORMALIZATION RATIO, POC: 2.1
PROTHROMBIN TIME, POC: NORMAL

## 2018-06-01 PROCEDURE — 85610 PROTHROMBIN TIME: CPT | Performed by: CLINICAL NURSE SPECIALIST

## 2018-06-28 ENCOUNTER — ANTI-COAG VISIT (OUTPATIENT)
Dept: CARDIOLOGY | Age: 83
End: 2018-06-28
Payer: MEDICARE

## 2018-06-28 DIAGNOSIS — I48.19 PERSISTENT ATRIAL FIBRILLATION (HCC): Primary | ICD-10-CM

## 2018-06-28 LAB
INTERNATIONAL NORMALIZATION RATIO, POC: 2.4
PROTHROMBIN TIME, POC: NORMAL

## 2018-06-28 PROCEDURE — 85610 PROTHROMBIN TIME: CPT | Performed by: CLINICAL NURSE SPECIALIST

## 2018-08-02 ENCOUNTER — ANTI-COAG VISIT (OUTPATIENT)
Dept: CARDIOLOGY | Age: 83
End: 2018-08-02
Payer: MEDICARE

## 2018-08-02 DIAGNOSIS — I48.19 PERSISTENT ATRIAL FIBRILLATION (HCC): Primary | ICD-10-CM

## 2018-08-02 LAB
INTERNATIONAL NORMALIZATION RATIO, POC: 2.2
PROTHROMBIN TIME, POC: NORMAL

## 2018-08-02 PROCEDURE — 85610 PROTHROMBIN TIME: CPT | Performed by: CLINICAL NURSE SPECIALIST

## 2018-09-06 ENCOUNTER — ANTI-COAG VISIT (OUTPATIENT)
Dept: CARDIOLOGY | Age: 83
End: 2018-09-06
Payer: MEDICARE

## 2018-09-06 DIAGNOSIS — I48.19 PERSISTENT ATRIAL FIBRILLATION (HCC): Primary | ICD-10-CM

## 2018-09-06 LAB
INTERNATIONAL NORMALIZATION RATIO, POC: 1.9
PROTHROMBIN TIME, POC: NORMAL

## 2018-09-06 PROCEDURE — 85610 PROTHROMBIN TIME: CPT | Performed by: CLINICAL NURSE SPECIALIST

## 2018-10-04 ENCOUNTER — OFFICE VISIT (OUTPATIENT)
Dept: CARDIOLOGY | Age: 83
End: 2018-10-04
Payer: MEDICARE

## 2018-10-04 VITALS
DIASTOLIC BLOOD PRESSURE: 80 MMHG | HEIGHT: 64 IN | WEIGHT: 114 LBS | BODY MASS INDEX: 19.46 KG/M2 | SYSTOLIC BLOOD PRESSURE: 138 MMHG | HEART RATE: 82 BPM | RESPIRATION RATE: 16 BRPM

## 2018-10-04 DIAGNOSIS — I48.19 PERSISTENT ATRIAL FIBRILLATION (HCC): Primary | ICD-10-CM

## 2018-10-04 DIAGNOSIS — I50.22 CHRONIC SYSTOLIC CHF (CONGESTIVE HEART FAILURE), NYHA CLASS 3 (HCC): ICD-10-CM

## 2018-10-04 PROBLEM — I50.21 ACUTE SYSTOLIC CHF (CONGESTIVE HEART FAILURE), NYHA CLASS 3 (HCC): Status: RESOLVED | Noted: 2017-02-24 | Resolved: 2018-10-04

## 2018-10-04 LAB
INTERNATIONAL NORMALIZATION RATIO, POC: 2.1
PROTHROMBIN TIME, POC: NORMAL

## 2018-10-04 PROCEDURE — 99213 OFFICE O/P EST LOW 20 MIN: CPT | Performed by: CLINICAL NURSE SPECIALIST

## 2018-10-04 PROCEDURE — 1036F TOBACCO NON-USER: CPT | Performed by: CLINICAL NURSE SPECIALIST

## 2018-10-04 PROCEDURE — G8484 FLU IMMUNIZE NO ADMIN: HCPCS | Performed by: CLINICAL NURSE SPECIALIST

## 2018-10-04 PROCEDURE — G8400 PT W/DXA NO RESULTS DOC: HCPCS | Performed by: CLINICAL NURSE SPECIALIST

## 2018-10-04 PROCEDURE — G8427 DOCREV CUR MEDS BY ELIG CLIN: HCPCS | Performed by: CLINICAL NURSE SPECIALIST

## 2018-10-04 PROCEDURE — 1101F PT FALLS ASSESS-DOCD LE1/YR: CPT | Performed by: CLINICAL NURSE SPECIALIST

## 2018-10-04 PROCEDURE — G8420 CALC BMI NORM PARAMETERS: HCPCS | Performed by: CLINICAL NURSE SPECIALIST

## 2018-10-04 PROCEDURE — 4040F PNEUMOC VAC/ADMIN/RCVD: CPT | Performed by: CLINICAL NURSE SPECIALIST

## 2018-10-04 PROCEDURE — 1123F ACP DISCUSS/DSCN MKR DOCD: CPT | Performed by: CLINICAL NURSE SPECIALIST

## 2018-10-04 PROCEDURE — 85610 PROTHROMBIN TIME: CPT | Performed by: CLINICAL NURSE SPECIALIST

## 2018-10-04 PROCEDURE — 1090F PRES/ABSN URINE INCON ASSESS: CPT | Performed by: CLINICAL NURSE SPECIALIST

## 2018-10-04 RX ORDER — METOPROLOL TARTRATE 50 MG/1
50 TABLET, FILM COATED ORAL 2 TIMES DAILY
Qty: 180 TABLET | Refills: 3 | Status: SHIPPED | OUTPATIENT
Start: 2018-10-04 | End: 2019-06-12 | Stop reason: ALTCHOICE

## 2018-10-04 NOTE — PROGRESS NOTES
 Osteoarthritis     Osteoporosis     Sinus problem     Wears glasses      Past Surgical History:   Procedure Laterality Date    BREAST BIOPSY      Left-benign    BREAST BIOPSY  feb 2014    Left    HYSTERECTOMY      OVARY SURGERY      tumor removed      Family History   Problem Relation Age of Onset    Asthma Mother     Emphysema Mother     Alzheimer's Disease Sister     Heart Disease Brother     Heart Disease Sister     No Known Problems Sister     Alzheimer's Disease Sister      Social History   Substance Use Topics    Smoking status: Never Smoker    Smokeless tobacco: Never Used    Alcohol use No      Current Outpatient Prescriptions   Medication Sig Dispense Refill    montelukast (SINGULAIR) 10 MG tablet Take 10 mg by mouth nightly      fluticasone (FLONASE) 50 MCG/ACT nasal spray 1 spray by Nasal route daily      mupirocin (BACTROBAN) 2 % cream Apply topically 3 times daily Apply topically 3 times daily.  metoprolol (LOPRESSOR) 100 MG tablet TAKE ONE TABLET BY MOUTH TWO TIMES A DAY. (Patient taking differently: TAKE ONE HALF TABLET BY MOUTH TWO TIMES A DAY. ) 60 tablet 5    donepezil (ARICEPT) 5 MG tablet Take 2.5 mg by mouth nightly       furosemide (LASIX) 20 MG tablet Take 20 mg by mouth daily      cetirizine (ZYRTEC) 10 MG tablet Take 5 mg by mouth daily       allopurinol (ZYLOPRIM) 100 MG tablet Take 50 mg by mouth daily   0    CLEAR-ATADINE 10 MG tablet   4    warfarin (COUMADIN) 5 MG tablet Take 1 tablet by mouth daily 90 tablet 3    digoxin (LANOXIN) 125 MCG tablet Take 1 tablet by mouth daily (Patient taking differently: Take 125 mcg by mouth every other day ) 90 tablet 3    atorvastatin (LIPITOR) 10 MG tablet Take 10 mg by mouth daily       vitamin D (CHOLECALCIFEROL) 1000 UNIT TABS tablet Take 1,000 Units by mouth daily      bimatoprost 0.01 % SOLN Place 1 drop into both eyes nightly        No current facility-administered medications for this visit. Allergies: Prolia [denosumab] and Lisinopril    Review of Systems  Constitutional - no significant activity change, appetite change, or unexpected weight change. No fever, chills or diaphoresis. No fatigue. HEENT - no significant rhinorrhea or epistaxis. No tinnitus or significant hearing loss. Eyes - no sudden vision change or amaurosis. Respiratory - no significant wheezing, stridor, apnea or cough. No dyspnea on exertion or shortness of breath. Cardiovascular - no exertional chest pain, orthopnea or PND. No sensation of arrhythmia or slow heart rate. No claudication or leg edema. Gastrointestinal - no abdominal swelling or pain. No blood in stool. No severe constipation, diarrhea, nausea, or vomiting. Genitourinary - no difficulty urinating, dysuria, frequency, or urgency. No flank pain or hematuria. Musculoskeletal -  uses cane. Some unsteadiness at times on feet. .   Skin - no color change or rash. No pallor. No new surgical incision. Neurologic - no speech difficulty, facial asymmetry or lateralizing weakness. No seizures, presyncope, syncope, or significant dizziness. Hematologic - no easy bruising or excessive bleeding. Psychiatric - no severe anxiety or insomnia. No confusion. All other review of systems are negative. Objective  Vital Signs - /80   Pulse 82   Resp 16   Ht 5' 4\" (1.626 m)   Wt 114 lb (51.7 kg)   BMI 19.57 kg/m²   General - Pia is alert, cooperative, and pleasant. Well groomed. No acute distress. Body habitus is thin. HEENT - The head is normocephalic. No circumoral cyanosis. Dentition is normal.   EYES -  No Xanthelasma, no arcus senilis, no conjunctival hemorrhages or discharge. Neck - Supple, without increased jugular venous pressures. No carotid bruits. No mass. Respiratory - Lungs are clear bilaterally. No wheezes or rales. Normal effort without use of accessory muscles. Cardiovascular - Heart has regular rhythm and rate. No murmurs, rubs or gallops. + pedal pulses and no varicosities. Abdominal -  Soft, nontender, nondistended. Bowel sounds are intact. Extremities - No clubbing, cyanosis, or  edema. Musculoskeletal -  No clubbing . No Osler's nodes. Gait- slow and cautious using cane  + kyphosis+ scoliosis. Skin -  no statis ulcers or dermatitis. Neurological - No focal signs are identified. Oriented to person, place and time. Psychiatric -  Appropriate affect and mood. Assessment:     Diagnosis Orders   1. Persistent atrial fibrillation (HCC)  POCT INR   2. Chronic systolic CHF (congestive heart failure), NYHA class 3 (HCC)       Data:  BP Readings from Last 3 Encounters:   10/04/18 138/80   03/29/18 (!) 110/52   12/14/17 124/82    Pulse Readings from Last 3 Encounters:   10/04/18 82   03/29/18 77   12/14/17 80        Wt Readings from Last 3 Encounters:   10/04/18 114 lb (51.7 kg)   03/29/18 114 lb (51.7 kg)   12/14/17 103 lb (46.7 kg)        Patient anticoagulated on Coumadin. INR therapeutic today at 2.1  Blood pressure heart rate controlled. Medical management includes digoxin and beta blocker, Lasix daily and statin  Discussed fall precautions being anticoagulated with age and unsteadiness   States taking medications as prescribed  Stable cardiovascular status. No evidence of overt heart failure, angina   Plan    Continue current Coumadin dosing and recheck in 4 weeks  Follow up in 6 months   Call with any questions or concerns  Follow up with Fariba Diane MD for non cardiac problems  Report any new problems  Cardiovascular Fitness-Exercise as tolerated. Fall precautions  Cardiac / Healthy Diet- eat regulrly  Continue current medications as directed  Continue plan of treatment  It is always recommended that you bring your medications bottles with you to each visit - this is for your safety!        NONI Hayden

## 2018-11-01 ENCOUNTER — ANTI-COAG VISIT (OUTPATIENT)
Dept: CARDIOLOGY | Age: 83
End: 2018-11-01
Payer: MEDICARE

## 2018-11-01 DIAGNOSIS — I48.19 PERSISTENT ATRIAL FIBRILLATION (HCC): Primary | ICD-10-CM

## 2018-11-01 LAB
INTERNATIONAL NORMALIZATION RATIO, POC: 2.1
PROTHROMBIN TIME, POC: NORMAL

## 2018-11-01 PROCEDURE — 85610 PROTHROMBIN TIME: CPT | Performed by: CLINICAL NURSE SPECIALIST

## 2018-11-12 ENCOUNTER — OFFICE VISIT (OUTPATIENT)
Dept: URGENT CARE | Age: 83
End: 2018-11-12
Payer: MEDICARE

## 2018-11-12 ENCOUNTER — TELEPHONE (OUTPATIENT)
Dept: CARDIOLOGY | Age: 83
End: 2018-11-12

## 2018-11-12 ENCOUNTER — HOSPITAL ENCOUNTER (OUTPATIENT)
Dept: GENERAL RADIOLOGY | Age: 83
Discharge: HOME | End: 2018-11-12
Payer: MEDICARE

## 2018-11-12 VITALS
HEIGHT: 64 IN | OXYGEN SATURATION: 96 % | DIASTOLIC BLOOD PRESSURE: 94 MMHG | HEART RATE: 81 BPM | SYSTOLIC BLOOD PRESSURE: 156 MMHG | TEMPERATURE: 98.6 F | WEIGHT: 114 LBS | BODY MASS INDEX: 19.46 KG/M2 | RESPIRATION RATE: 18 BRPM

## 2018-11-12 DIAGNOSIS — R05.9 COUGH: Primary | ICD-10-CM

## 2018-11-12 DIAGNOSIS — R05.9 COUGH: ICD-10-CM

## 2018-11-12 DIAGNOSIS — Z00.00 ENCOUNTER FOR MEDICAL EXAMINATION TO ESTABLISH CARE: ICD-10-CM

## 2018-11-12 PROCEDURE — 1090F PRES/ABSN URINE INCON ASSESS: CPT | Performed by: NURSE PRACTITIONER

## 2018-11-12 PROCEDURE — 1036F TOBACCO NON-USER: CPT | Performed by: NURSE PRACTITIONER

## 2018-11-12 PROCEDURE — 1123F ACP DISCUSS/DSCN MKR DOCD: CPT | Performed by: NURSE PRACTITIONER

## 2018-11-12 PROCEDURE — G8427 DOCREV CUR MEDS BY ELIG CLIN: HCPCS | Performed by: NURSE PRACTITIONER

## 2018-11-12 PROCEDURE — 1101F PT FALLS ASSESS-DOCD LE1/YR: CPT | Performed by: NURSE PRACTITIONER

## 2018-11-12 PROCEDURE — G8420 CALC BMI NORM PARAMETERS: HCPCS | Performed by: NURSE PRACTITIONER

## 2018-11-12 PROCEDURE — G8400 PT W/DXA NO RESULTS DOC: HCPCS | Performed by: NURSE PRACTITIONER

## 2018-11-12 PROCEDURE — G8484 FLU IMMUNIZE NO ADMIN: HCPCS | Performed by: NURSE PRACTITIONER

## 2018-11-12 PROCEDURE — 99214 OFFICE O/P EST MOD 30 MIN: CPT | Performed by: NURSE PRACTITIONER

## 2018-11-12 PROCEDURE — 4040F PNEUMOC VAC/ADMIN/RCVD: CPT | Performed by: NURSE PRACTITIONER

## 2018-11-12 PROCEDURE — 71046 X-RAY EXAM CHEST 2 VIEWS: CPT

## 2018-11-12 RX ORDER — FEXOFENADINE HCL 180 MG/1
180 TABLET ORAL DAILY
Qty: 30 TABLET | Refills: 0 | Status: SHIPPED | OUTPATIENT
Start: 2018-11-12 | End: 2018-12-12

## 2018-11-12 RX ORDER — FLUTICASONE PROPIONATE 50 MCG
1 SPRAY, SUSPENSION (ML) NASAL DAILY
Qty: 2 BOTTLE | Refills: 1 | Status: SHIPPED | OUTPATIENT
Start: 2018-11-12 | End: 2020-02-29 | Stop reason: SDUPTHER

## 2018-11-12 ASSESSMENT — ENCOUNTER SYMPTOMS
RHINORRHEA: 0
STRIDOR: 0
WHEEZING: 0
COLOR CHANGE: 0
SORE THROAT: 0
SHORTNESS OF BREATH: 1
ABDOMINAL PAIN: 0
VOICE CHANGE: 0
TROUBLE SWALLOWING: 0
EYES NEGATIVE: 1
CHEST TIGHTNESS: 0
CHOKING: 0
SINUS PRESSURE: 0
COUGH: 1
APNEA: 0
NAUSEA: 0

## 2018-11-12 NOTE — PATIENT INSTRUCTIONS
1. Start back on flonase  2. Stop zyrtec and start allegra  3. Follow up with Dr Jesus Manuel Mosher  4. Return to clinic if symptoms worsen or fail to improve  5. Use humidifier as needed  6.  Increase fluids on average 50oz daily or until urine is clear

## 2018-11-20 ENCOUNTER — OFFICE VISIT (OUTPATIENT)
Dept: PRIMARY CARE CLINIC | Age: 83
End: 2018-11-20
Payer: MEDICARE

## 2018-11-20 VITALS
HEART RATE: 72 BPM | TEMPERATURE: 97.6 F | BODY MASS INDEX: 18.96 KG/M2 | WEIGHT: 113.8 LBS | HEIGHT: 65 IN | DIASTOLIC BLOOD PRESSURE: 83 MMHG | OXYGEN SATURATION: 96 % | SYSTOLIC BLOOD PRESSURE: 143 MMHG

## 2018-11-20 DIAGNOSIS — J43.1 PANLOBULAR EMPHYSEMA (HCC): ICD-10-CM

## 2018-11-20 DIAGNOSIS — I50.22 CHRONIC SYSTOLIC CHF (CONGESTIVE HEART FAILURE), NYHA CLASS 3 (HCC): ICD-10-CM

## 2018-11-20 DIAGNOSIS — I48.20 CHRONIC ATRIAL FIBRILLATION (HCC): ICD-10-CM

## 2018-11-20 DIAGNOSIS — N18.30 CHRONIC RENAL FAILURE, STAGE 3 (MODERATE) (HCC): ICD-10-CM

## 2018-11-20 DIAGNOSIS — R09.89 DECREASED PEDAL PULSES: ICD-10-CM

## 2018-11-20 DIAGNOSIS — J30.89 ALLERGIC RHINITIS DUE TO OTHER ALLERGIC TRIGGER, UNSPECIFIED SEASONALITY: ICD-10-CM

## 2018-11-20 DIAGNOSIS — J44.1 COPD EXACERBATION (HCC): ICD-10-CM

## 2018-11-20 DIAGNOSIS — R60.9 EDEMA, UNSPECIFIED TYPE: ICD-10-CM

## 2018-11-20 DIAGNOSIS — R09.02 HYPOXEMIA: ICD-10-CM

## 2018-11-20 DIAGNOSIS — R06.00 DYSPNEA, UNSPECIFIED TYPE: Primary | ICD-10-CM

## 2018-11-20 DIAGNOSIS — R41.3 DECLINE IN VERBAL MEMORY: ICD-10-CM

## 2018-11-20 LAB
ALBUMIN SERPL-MCNC: 4.2 G/DL (ref 3.5–5.2)
ALP BLD-CCNC: 43 U/L (ref 35–104)
ALT SERPL-CCNC: 20 U/L (ref 5–33)
ANION GAP SERPL CALCULATED.3IONS-SCNC: 13 MMOL/L (ref 7–19)
AST SERPL-CCNC: 40 U/L (ref 5–32)
BASOPHILS ABSOLUTE: 0.1 K/UL (ref 0–0.2)
BASOPHILS RELATIVE PERCENT: 1.4 % (ref 0–1)
BILIRUB SERPL-MCNC: 1 MG/DL (ref 0.2–1.2)
BUN BLDV-MCNC: 33 MG/DL (ref 8–23)
CALCIUM SERPL-MCNC: 10.2 MG/DL (ref 8.8–10.2)
CHLORIDE BLD-SCNC: 104 MMOL/L (ref 98–111)
CO2: 27 MMOL/L (ref 22–29)
CREAT SERPL-MCNC: 1.6 MG/DL (ref 0.5–0.9)
DIGOXIN LEVEL: 0.7 NG/ML (ref 0.6–1.2)
EOSINOPHILS ABSOLUTE: 0.1 K/UL (ref 0–0.6)
EOSINOPHILS RELATIVE PERCENT: 2.3 % (ref 0–5)
GFR NON-AFRICAN AMERICAN: 31
GLUCOSE BLD-MCNC: 96 MG/DL (ref 74–109)
HCT VFR BLD CALC: 46.7 % (ref 37–47)
HEMOGLOBIN: 14.7 G/DL (ref 12–16)
LYMPHOCYTES ABSOLUTE: 1.1 K/UL (ref 1.1–4.5)
LYMPHOCYTES RELATIVE PERCENT: 25 % (ref 20–40)
MCH RBC QN AUTO: 31.3 PG (ref 27–31)
MCHC RBC AUTO-ENTMCNC: 31.5 G/DL (ref 33–37)
MCV RBC AUTO: 99.4 FL (ref 81–99)
MONOCYTES ABSOLUTE: 0.7 K/UL (ref 0–0.9)
MONOCYTES RELATIVE PERCENT: 16.3 % (ref 0–10)
NEUTROPHILS ABSOLUTE: 2.4 K/UL (ref 1.5–7.5)
NEUTROPHILS RELATIVE PERCENT: 54.8 % (ref 50–65)
PDW BLD-RTO: 13.3 % (ref 11.5–14.5)
PLATELET # BLD: 151 K/UL (ref 130–400)
PMV BLD AUTO: 12 FL (ref 9.4–12.3)
POTASSIUM SERPL-SCNC: 4.6 MMOL/L (ref 3.5–5)
PRO-BNP: 5337 PG/ML (ref 0–1800)
RBC # BLD: 4.7 M/UL (ref 4.2–5.4)
SODIUM BLD-SCNC: 144 MMOL/L (ref 136–145)
TOTAL PROTEIN: 8.4 G/DL (ref 6.6–8.7)
URIC ACID, SERUM: 7.7 MG/DL (ref 2.4–5.7)
VITAMIN B-12: 502 PG/ML (ref 211–946)
WBC # BLD: 4.4 K/UL (ref 4.8–10.8)

## 2018-11-20 PROCEDURE — 1036F TOBACCO NON-USER: CPT | Performed by: NURSE PRACTITIONER

## 2018-11-20 PROCEDURE — G8427 DOCREV CUR MEDS BY ELIG CLIN: HCPCS | Performed by: NURSE PRACTITIONER

## 2018-11-20 PROCEDURE — 4040F PNEUMOC VAC/ADMIN/RCVD: CPT | Performed by: NURSE PRACTITIONER

## 2018-11-20 PROCEDURE — 99214 OFFICE O/P EST MOD 30 MIN: CPT | Performed by: NURSE PRACTITIONER

## 2018-11-20 PROCEDURE — 1123F ACP DISCUSS/DSCN MKR DOCD: CPT | Performed by: NURSE PRACTITIONER

## 2018-11-20 PROCEDURE — 1101F PT FALLS ASSESS-DOCD LE1/YR: CPT | Performed by: NURSE PRACTITIONER

## 2018-11-20 PROCEDURE — 93000 ELECTROCARDIOGRAM COMPLETE: CPT | Performed by: NURSE PRACTITIONER

## 2018-11-20 PROCEDURE — G8484 FLU IMMUNIZE NO ADMIN: HCPCS | Performed by: NURSE PRACTITIONER

## 2018-11-20 PROCEDURE — G8400 PT W/DXA NO RESULTS DOC: HCPCS | Performed by: NURSE PRACTITIONER

## 2018-11-20 PROCEDURE — G8926 SPIRO NO PERF OR DOC: HCPCS | Performed by: NURSE PRACTITIONER

## 2018-11-20 PROCEDURE — 1090F PRES/ABSN URINE INCON ASSESS: CPT | Performed by: NURSE PRACTITIONER

## 2018-11-20 PROCEDURE — 3023F SPIROM DOC REV: CPT | Performed by: NURSE PRACTITIONER

## 2018-11-20 PROCEDURE — G8420 CALC BMI NORM PARAMETERS: HCPCS | Performed by: NURSE PRACTITIONER

## 2018-11-20 RX ORDER — DOXYCYCLINE HYCLATE 100 MG/1
100 CAPSULE ORAL 2 TIMES DAILY
Qty: 20 CAPSULE | Refills: 0 | Status: SHIPPED | OUTPATIENT
Start: 2018-11-20 | End: 2018-11-30

## 2018-11-20 RX ORDER — METHYLPREDNISOLONE 4 MG/1
TABLET ORAL
Qty: 1 KIT | Refills: 0 | Status: SHIPPED | OUTPATIENT
Start: 2018-11-20 | End: 2018-11-26

## 2018-11-20 ASSESSMENT — ENCOUNTER SYMPTOMS
VOMITING: 0
BLOOD IN STOOL: 0
SORE THROAT: 1
CONSTIPATION: 0
CHEST TIGHTNESS: 1
DIARRHEA: 1
TROUBLE SWALLOWING: 1
VOICE CHANGE: 0
WHEEZING: 0
COUGH: 1
RHINORRHEA: 1
SHORTNESS OF BREATH: 0
ABDOMINAL PAIN: 0
NAUSEA: 0
EYE REDNESS: 0

## 2018-11-20 ASSESSMENT — PATIENT HEALTH QUESTIONNAIRE - PHQ9
1. LITTLE INTEREST OR PLEASURE IN DOING THINGS: 0
SUM OF ALL RESPONSES TO PHQ QUESTIONS 1-9: 0
SUM OF ALL RESPONSES TO PHQ QUESTIONS 1-9: 0
SUM OF ALL RESPONSES TO PHQ9 QUESTIONS 1 & 2: 0
2. FEELING DOWN, DEPRESSED OR HOPELESS: 0

## 2018-11-20 NOTE — PROGRESS NOTES
Endocrine: Positive for cold intolerance and polyuria. Negative for polydipsia and polyphagia. Genitourinary: Negative for dysuria, frequency and urgency. Musculoskeletal: Positive for arthralgias (bilateral knee pain). Negative for myalgias. Skin: Negative for rash and wound. Neurological: Positive for tremors. Negative for dizziness, speech difficulty, weakness, light-headedness and headaches. Psychiatric/Behavioral: Positive for agitation. Negative for confusion, self-injury and suicidal ideas. The patient is not nervous/anxious. Objective:     Physical Exam   Constitutional: She appears well-developed and well-nourished. No distress. HENT:   Head: Normocephalic and atraumatic. Right Ear: External ear normal. Decreased hearing is noted. Left Ear: External ear normal. Decreased hearing is noted. Nose: Nose normal.   Mouth/Throat: Oropharynx is clear and moist. No oropharyngeal exudate. Pt wears hearing aids. Eyes: Pupils are equal, round, and reactive to light. Conjunctivae are normal. Right eye exhibits no discharge. Left eye exhibits no discharge. Neck: Normal range of motion. Neck supple. Cardiovascular: Normal rate and intact distal pulses. An irregularly irregular rhythm present. No murmur heard. Pulses:       Posterior tibial pulses are 1+ on the right side, and 1+ on the left side. Decreased pedal pulses 1+   Pulmonary/Chest: Effort normal. No stridor. No respiratory distress. She has decreased breath sounds. She has wheezes in the right lower field. She has no rales. She exhibits no tenderness. Right breast exhibits no inverted nipple, no mass, no nipple discharge, no skin change and no tenderness. Left breast exhibits no inverted nipple, no mass, no nipple discharge, no skin change and no tenderness. Abdominal: Soft. Bowel sounds are normal. She exhibits no distension. There is no tenderness. Musculoskeletal: Normal range of motion.  She exhibits edema (1+ in

## 2018-11-27 LAB
VITAMIN D2 AND D3, TOTAL: 96.2 NG/ML (ref 30–80)
VITAMIN D2, 25 HYDROXY: <1 NG/ML
VITAMIN D3,25 HYDROXY: 96.2 NG/ML

## 2018-11-28 PROBLEM — R60.9 EDEMA: Status: ACTIVE | Noted: 2018-11-28

## 2018-11-28 PROBLEM — J43.1 PANLOBULAR EMPHYSEMA (HCC): Status: ACTIVE | Noted: 2018-11-28

## 2018-11-28 PROBLEM — R09.89 DECREASED PEDAL PULSES: Status: ACTIVE | Noted: 2018-11-28

## 2018-11-28 PROBLEM — R41.3 DECLINE IN VERBAL MEMORY: Status: ACTIVE | Noted: 2018-11-28

## 2018-11-28 PROBLEM — R09.02 HYPOXEMIA: Status: ACTIVE | Noted: 2018-11-28

## 2018-11-28 PROBLEM — J30.9 ALLERGIC RHINITIS: Status: ACTIVE | Noted: 2018-11-28

## 2018-11-28 PROBLEM — J44.1 COPD EXACERBATION (HCC): Status: ACTIVE | Noted: 2018-11-28

## 2018-11-28 PROBLEM — R06.00 DYSPNEA: Status: ACTIVE | Noted: 2018-11-28

## 2018-11-29 ENCOUNTER — ANTI-COAG VISIT (OUTPATIENT)
Dept: CARDIOLOGY | Age: 83
End: 2018-11-29
Payer: MEDICARE

## 2018-11-29 DIAGNOSIS — I48.19 PERSISTENT ATRIAL FIBRILLATION (HCC): Primary | ICD-10-CM

## 2018-11-29 LAB
INTERNATIONAL NORMALIZATION RATIO, POC: 2.9
PROTHROMBIN TIME, POC: NORMAL

## 2018-11-29 PROCEDURE — 85610 PROTHROMBIN TIME: CPT | Performed by: NURSE PRACTITIONER

## 2018-11-30 ENCOUNTER — TELEPHONE (OUTPATIENT)
Dept: PRIMARY CARE CLINIC | Age: 83
End: 2018-11-30

## 2018-11-30 NOTE — TELEPHONE ENCOUNTER
Vitamin D 25 Hydrox, D2 & D3     Notes recorded by NONI De Souza on 11/28/2018 at 6:24 PM CST  Stop vitamin d. Comprehensive Metabolic Panel   Uric Acid   Brain Natriuretic Peptide  CBC Auto Differential     NONI De Souza             Critical results return to discuss. Contacted pt's daughter and informed of above who verbalized understanding. Made pt appt to return to office next week to discuss. Daughter agreed.  PP, LPN

## 2018-12-03 ENCOUNTER — HOSPITAL ENCOUNTER (OUTPATIENT)
Dept: VASCULAR LAB | Age: 83
Discharge: HOME | End: 2018-12-03
Payer: MEDICARE

## 2018-12-03 DIAGNOSIS — R09.89 DECREASED PEDAL PULSES: ICD-10-CM

## 2018-12-03 DIAGNOSIS — R60.9 EDEMA, UNSPECIFIED TYPE: ICD-10-CM

## 2018-12-03 PROCEDURE — 93923 UPR/LXTR ART STDY 3+ LVLS: CPT

## 2018-12-06 ENCOUNTER — OFFICE VISIT (OUTPATIENT)
Dept: PRIMARY CARE CLINIC | Age: 83
End: 2018-12-06
Payer: MEDICARE

## 2018-12-06 VITALS
HEART RATE: 76 BPM | WEIGHT: 109 LBS | OXYGEN SATURATION: 98 % | HEIGHT: 65 IN | DIASTOLIC BLOOD PRESSURE: 72 MMHG | SYSTOLIC BLOOD PRESSURE: 123 MMHG | BODY MASS INDEX: 18.16 KG/M2 | TEMPERATURE: 96.9 F

## 2018-12-06 DIAGNOSIS — N18.9 CHRONIC KIDNEY DISEASE, UNSPECIFIED CKD STAGE: Primary | ICD-10-CM

## 2018-12-06 DIAGNOSIS — R71.8 ELEVATED MCV: ICD-10-CM

## 2018-12-06 DIAGNOSIS — Z63.8 FAMILY DISCORD: ICD-10-CM

## 2018-12-06 DIAGNOSIS — J43.1 PANLOBULAR EMPHYSEMA (HCC): ICD-10-CM

## 2018-12-06 DIAGNOSIS — R26.89 DECREASED MOBILITY: ICD-10-CM

## 2018-12-06 DIAGNOSIS — I50.9 ACUTE ON CHRONIC CONGESTIVE HEART FAILURE, UNSPECIFIED HEART FAILURE TYPE (HCC): ICD-10-CM

## 2018-12-06 DIAGNOSIS — I48.19 PERSISTENT ATRIAL FIBRILLATION (HCC): ICD-10-CM

## 2018-12-06 DIAGNOSIS — M10.9 GOUT, UNSPECIFIED CAUSE, UNSPECIFIED CHRONICITY, UNSPECIFIED SITE: ICD-10-CM

## 2018-12-06 PROCEDURE — G8419 CALC BMI OUT NRM PARAM NOF/U: HCPCS | Performed by: NURSE PRACTITIONER

## 2018-12-06 PROCEDURE — G8400 PT W/DXA NO RESULTS DOC: HCPCS | Performed by: NURSE PRACTITIONER

## 2018-12-06 PROCEDURE — 99497 ADVNCD CARE PLAN 30 MIN: CPT | Performed by: NURSE PRACTITIONER

## 2018-12-06 PROCEDURE — 3023F SPIROM DOC REV: CPT | Performed by: NURSE PRACTITIONER

## 2018-12-06 PROCEDURE — 99214 OFFICE O/P EST MOD 30 MIN: CPT | Performed by: NURSE PRACTITIONER

## 2018-12-06 PROCEDURE — 4040F PNEUMOC VAC/ADMIN/RCVD: CPT | Performed by: NURSE PRACTITIONER

## 2018-12-06 PROCEDURE — G8926 SPIRO NO PERF OR DOC: HCPCS | Performed by: NURSE PRACTITIONER

## 2018-12-06 PROCEDURE — 1090F PRES/ABSN URINE INCON ASSESS: CPT | Performed by: NURSE PRACTITIONER

## 2018-12-06 PROCEDURE — 1101F PT FALLS ASSESS-DOCD LE1/YR: CPT | Performed by: NURSE PRACTITIONER

## 2018-12-06 PROCEDURE — G8427 DOCREV CUR MEDS BY ELIG CLIN: HCPCS | Performed by: NURSE PRACTITIONER

## 2018-12-06 PROCEDURE — 1036F TOBACCO NON-USER: CPT | Performed by: NURSE PRACTITIONER

## 2018-12-06 PROCEDURE — G8484 FLU IMMUNIZE NO ADMIN: HCPCS | Performed by: NURSE PRACTITIONER

## 2018-12-06 PROCEDURE — 1123F ACP DISCUSS/DSCN MKR DOCD: CPT | Performed by: NURSE PRACTITIONER

## 2018-12-06 RX ORDER — ALLOPURINOL 100 MG/1
100 TABLET ORAL DAILY
Qty: 30 TABLET | Refills: 1 | Status: ON HOLD | OUTPATIENT
Start: 2018-12-06 | End: 2021-09-02 | Stop reason: HOSPADM

## 2018-12-06 SDOH — SOCIAL STABILITY - SOCIAL INSECURITY: OTHER SPECIFIED PROBLEMS RELATED TO PRIMARY SUPPORT GROUP: Z63.8

## 2018-12-06 ASSESSMENT — ENCOUNTER SYMPTOMS
TROUBLE SWALLOWING: 1
EYE REDNESS: 0
WHEEZING: 0
CONSTIPATION: 0
CHEST TIGHTNESS: 1
SORE THROAT: 1
COUGH: 1
SHORTNESS OF BREATH: 0
ABDOMINAL PAIN: 0
NAUSEA: 0
RHINORRHEA: 1
BLOOD IN STOOL: 0
VOMITING: 0
DIARRHEA: 1
VOICE CHANGE: 0

## 2018-12-06 NOTE — PROGRESS NOTES
Social History Main Topics    Smoking status: Never Smoker    Smokeless tobacco: Never Used    Alcohol use No    Drug use: No    Sexual activity: No     Other Topics Concern    None     Social History Narrative    None       Family History   Problem Relation Age of Onset    Asthma Mother     Emphysema Mother     Alzheimer's Disease Sister     Heart Disease Brother     Heart Disease Sister     No Known Problems Sister     Alzheimer's Disease Sister        Current Outpatient Prescriptions   Medication Sig Dispense Refill    allopurinol (ZYLOPRIM) 100 MG tablet Take 1 tablet by mouth daily 30 tablet 1    tiotropium (SPIRIVA RESPIMAT) 1.25 MCG/ACT AERS inhaler Inhale 2 puffs into the lungs daily 1 Inhaler 1    fexofenadine (ALLEGRA) 180 MG tablet Take 1 tablet by mouth daily 30 tablet 0    fluticasone (FLONASE) 50 MCG/ACT nasal spray 1 spray by Each Nare route daily 1 Spray in each nostril 2 Bottle 1    metoprolol (LOPRESSOR) 50 MG tablet Take 1 tablet by mouth 2 times daily 180 tablet 3    montelukast (SINGULAIR) 10 MG tablet Take 10 mg by mouth nightly      mupirocin (BACTROBAN) 2 % cream Apply topically 3 times daily Apply topically 3 times daily.  donepezil (ARICEPT) 5 MG tablet Take 2.5 mg by mouth nightly       furosemide (LASIX) 20 MG tablet Take 20 mg by mouth daily      warfarin (COUMADIN) 5 MG tablet Take 1 tablet by mouth daily 90 tablet 3    digoxin (LANOXIN) 125 MCG tablet Take 1 tablet by mouth daily (Patient taking differently: Take 125 mcg by mouth every other day ) 90 tablet 3    atorvastatin (LIPITOR) 10 MG tablet Take 10 mg by mouth daily       bimatoprost 0.01 % SOLN Place 1 drop into both eyes nightly        No current facility-administered medications for this visit.         Allergies   Allergen Reactions    Prolia [Denosumab] Other (See Comments)     after had shot got blood in urine--not sure if associated    Lisinopril      cough       Lab Review   Anti-coag nipple, no mass, no nipple discharge, no skin change and no tenderness. Left breast exhibits no inverted nipple, no mass, no nipple discharge, no skin change and no tenderness. Abdominal: Soft. Bowel sounds are normal. She exhibits no distension. There is no tenderness. Musculoskeletal: Normal range of motion. She exhibits edema (1+ in lower extremities. ). She exhibits no tenderness or deformity. Back:    Neurological: She is alert. She has normal reflexes. No cranial nerve deficit. Coordination normal.   Oriented to person, and place, cannot recall last holiday or who the . Skin: Skin is warm and dry. No rash noted. She is not diaphoretic. There is erythema. Psychiatric: She has a normal mood and affect. Her behavior is normal. Thought content normal.   Nursing note and vitals reviewed. /72   Pulse 76   Temp 96.9 °F (36.1 °C)   Ht 5' 5\" (1.651 m)   Wt 109 lb (49.4 kg)   SpO2 98%   BMI 18.14 kg/m²     Assessment:      Diagnosis Orders   1. Chronic kidney disease, unspecified CKD stage  [facility]- Anish Vallecillo MD ([facility])    Internal Referral to [address] [place] [address] Pancho Billings   2. Persistent atrial fibrillation [facility] Ant Christie MD    Internal Referral to [address] [place] [address] Pancho Billings   3. Acute on chronic congestive heart failure, unspecified heart failure type [facility] Ant Christie MD    Internal Referral to [address] [place] [address] Pancho Billings   4. Panlobular emphysema [facility])  Internal Referral to Home Health   5. Gout, unspecified cause, unspecified chronicity, unspecified site  [place] [facility]- Anish Vallecillo MD ([facility])    allopurinol (ZYLOPRIM) 100 MG tablet    Internal Referral to Home Health   6. Elevated MCV  Vitamin B12    Internal Referral to Home Health   7. Decreased mobility  Internal Referral to Home Health   8.  Family discord       No results found for this visit on

## 2018-12-07 DIAGNOSIS — I99.9 VASCULAR ABNORMALITY: Primary | ICD-10-CM

## 2018-12-07 NOTE — PROGRESS NOTES
Refer to vascular. Results are abnormal. Based on ankle-brachial indices and doppler waveforms, the patient has   relatively normal flow to the bilateral lower extremity arterial system at   rest. Note made of low great toe pressures which may be indicative or foot   level disease.

## 2018-12-10 PROBLEM — Z63.8 FAMILY DISCORD: Status: ACTIVE | Noted: 2018-12-10

## 2018-12-11 ENCOUNTER — TELEPHONE (OUTPATIENT)
Dept: PRIMARY CARE CLINIC | Age: 83
End: 2018-12-11

## 2018-12-11 ENCOUNTER — TELEPHONE (OUTPATIENT)
Dept: PHARMACY | Facility: CLINIC | Age: 83
End: 2018-12-11

## 2018-12-11 ENCOUNTER — OFFICE VISIT (OUTPATIENT)
Dept: CARDIOLOGY | Age: 83
End: 2018-12-11
Payer: MEDICARE

## 2018-12-11 VITALS
BODY MASS INDEX: 18.33 KG/M2 | SYSTOLIC BLOOD PRESSURE: 138 MMHG | WEIGHT: 110 LBS | HEART RATE: 70 BPM | DIASTOLIC BLOOD PRESSURE: 86 MMHG | HEIGHT: 65 IN

## 2018-12-11 DIAGNOSIS — I10 ESSENTIAL HYPERTENSION: ICD-10-CM

## 2018-12-11 DIAGNOSIS — I50.22 CHRONIC SYSTOLIC CONGESTIVE HEART FAILURE (HCC): ICD-10-CM

## 2018-12-11 DIAGNOSIS — R53.83 OTHER FATIGUE: ICD-10-CM

## 2018-12-11 DIAGNOSIS — I48.19 PERSISTENT ATRIAL FIBRILLATION (HCC): Primary | ICD-10-CM

## 2018-12-11 PROCEDURE — 99213 OFFICE O/P EST LOW 20 MIN: CPT | Performed by: NURSE PRACTITIONER

## 2018-12-11 PROCEDURE — 93000 ELECTROCARDIOGRAM COMPLETE: CPT | Performed by: NURSE PRACTITIONER

## 2018-12-11 RX ORDER — NITROGLYCERIN 0.4 MG/1
0.4 TABLET SUBLINGUAL EVERY 5 MIN PRN
Qty: 25 TABLET | Refills: 3 | Status: ON HOLD | OUTPATIENT
Start: 2018-12-11 | End: 2021-09-02 | Stop reason: HOSPADM

## 2018-12-18 ENCOUNTER — TELEPHONE (OUTPATIENT)
Dept: PRIMARY CARE CLINIC | Age: 83
End: 2018-12-18

## 2018-12-21 ENCOUNTER — ANTI-COAG VISIT (OUTPATIENT)
Dept: CARDIOLOGY | Age: 83
End: 2018-12-21

## 2018-12-21 DIAGNOSIS — I48.19 PERSISTENT ATRIAL FIBRILLATION (HCC): ICD-10-CM

## 2018-12-21 LAB — INR BLD: 2.6

## 2018-12-26 ENCOUNTER — HOSPITAL ENCOUNTER (OUTPATIENT)
Dept: NON INVASIVE DIAGNOSTICS | Age: 83
Discharge: HOME | End: 2018-12-26
Payer: MEDICARE

## 2018-12-26 ENCOUNTER — TELEPHONE (OUTPATIENT)
Dept: PRIMARY CARE CLINIC | Age: 83
End: 2018-12-26

## 2018-12-26 DIAGNOSIS — I50.22 CHRONIC SYSTOLIC CONGESTIVE HEART FAILURE (HCC): ICD-10-CM

## 2018-12-26 PROBLEM — I10 ESSENTIAL HYPERTENSION: Status: ACTIVE | Noted: 2018-12-26

## 2018-12-26 PROBLEM — R53.83 OTHER FATIGUE: Status: ACTIVE | Noted: 2018-12-26

## 2018-12-26 LAB
LV EF: 60 %
LVEF MODALITY: NORMAL

## 2018-12-26 PROCEDURE — 93306 TTE W/DOPPLER COMPLETE: CPT

## 2018-12-26 NOTE — TELEPHONE ENCOUNTER
Dale Vazquez from [facility] called and reports that the information Christine Hills was faxing last week did not all come through and needs information faxed again regarding referral

## 2018-12-28 ENCOUNTER — OFFICE VISIT (OUTPATIENT)
Dept: PRIMARY CARE CLINIC | Age: 83
End: 2018-12-28
Payer: MEDICARE

## 2018-12-28 VITALS
DIASTOLIC BLOOD PRESSURE: 62 MMHG | BODY MASS INDEX: 18.99 KG/M2 | TEMPERATURE: 97.4 F | HEIGHT: 65 IN | WEIGHT: 114 LBS | SYSTOLIC BLOOD PRESSURE: 114 MMHG | OXYGEN SATURATION: 97 % | HEART RATE: 80 BPM

## 2018-12-28 DIAGNOSIS — I50.9 ACUTE ON CHRONIC CONGESTIVE HEART FAILURE, UNSPECIFIED HEART FAILURE TYPE (HCC): ICD-10-CM

## 2018-12-28 DIAGNOSIS — R06.00 DYSPNEA, UNSPECIFIED TYPE: ICD-10-CM

## 2018-12-28 DIAGNOSIS — R60.9 EDEMA, UNSPECIFIED TYPE: ICD-10-CM

## 2018-12-28 DIAGNOSIS — J44.1 COPD EXACERBATION (HCC): ICD-10-CM

## 2018-12-28 DIAGNOSIS — R41.3 DECLINE IN VERBAL MEMORY: ICD-10-CM

## 2018-12-28 DIAGNOSIS — N18.30 CHRONIC KIDNEY DISEASE, STAGE III (MODERATE) (HCC): ICD-10-CM

## 2018-12-28 DIAGNOSIS — J30.89 ALLERGIC RHINITIS DUE TO OTHER ALLERGIC TRIGGER, UNSPECIFIED SEASONALITY: ICD-10-CM

## 2018-12-28 DIAGNOSIS — R53.83 OTHER FATIGUE: ICD-10-CM

## 2018-12-28 DIAGNOSIS — R09.02 HYPOXEMIA: ICD-10-CM

## 2018-12-28 DIAGNOSIS — R09.89 DECREASED PEDAL PULSES: ICD-10-CM

## 2018-12-28 DIAGNOSIS — J43.1 PANLOBULAR EMPHYSEMA (HCC): Primary | ICD-10-CM

## 2018-12-28 DIAGNOSIS — Z63.8 FAMILY DISCORD: ICD-10-CM

## 2018-12-28 PROCEDURE — G8926 SPIRO NO PERF OR DOC: HCPCS | Performed by: NURSE PRACTITIONER

## 2018-12-28 PROCEDURE — 99497 ADVNCD CARE PLAN 30 MIN: CPT | Performed by: NURSE PRACTITIONER

## 2018-12-28 PROCEDURE — G8484 FLU IMMUNIZE NO ADMIN: HCPCS | Performed by: NURSE PRACTITIONER

## 2018-12-28 PROCEDURE — G8400 PT W/DXA NO RESULTS DOC: HCPCS | Performed by: NURSE PRACTITIONER

## 2018-12-28 PROCEDURE — 1101F PT FALLS ASSESS-DOCD LE1/YR: CPT | Performed by: NURSE PRACTITIONER

## 2018-12-28 PROCEDURE — 1090F PRES/ABSN URINE INCON ASSESS: CPT | Performed by: NURSE PRACTITIONER

## 2018-12-28 PROCEDURE — 3023F SPIROM DOC REV: CPT | Performed by: NURSE PRACTITIONER

## 2018-12-28 PROCEDURE — 1123F ACP DISCUSS/DSCN MKR DOCD: CPT | Performed by: NURSE PRACTITIONER

## 2018-12-28 PROCEDURE — 1036F TOBACCO NON-USER: CPT | Performed by: NURSE PRACTITIONER

## 2018-12-28 PROCEDURE — 4040F PNEUMOC VAC/ADMIN/RCVD: CPT | Performed by: NURSE PRACTITIONER

## 2018-12-28 PROCEDURE — G8427 DOCREV CUR MEDS BY ELIG CLIN: HCPCS | Performed by: NURSE PRACTITIONER

## 2018-12-28 PROCEDURE — G8420 CALC BMI NORM PARAMETERS: HCPCS | Performed by: NURSE PRACTITIONER

## 2018-12-28 PROCEDURE — 99214 OFFICE O/P EST MOD 30 MIN: CPT | Performed by: NURSE PRACTITIONER

## 2018-12-28 SDOH — SOCIAL STABILITY - SOCIAL INSECURITY: OTHER SPECIFIED PROBLEMS RELATED TO PRIMARY SUPPORT GROUP: Z63.8

## 2018-12-28 ASSESSMENT — ENCOUNTER SYMPTOMS
SHORTNESS OF BREATH: 0
CHEST TIGHTNESS: 1
CONSTIPATION: 0
RHINORRHEA: 1
COUGH: 1
DIARRHEA: 0
VOMITING: 0
NAUSEA: 0
VOICE CHANGE: 0
TROUBLE SWALLOWING: 1
EYE REDNESS: 0
SORE THROAT: 0
BLOOD IN STOOL: 0
WHEEZING: 0
ABDOMINAL PAIN: 0

## 2018-12-31 ENCOUNTER — TELEPHONE (OUTPATIENT)
Dept: PRIMARY CARE CLINIC | Age: 83
End: 2018-12-31

## 2019-01-03 PROCEDURE — G0008 ADMIN INFLUENZA VIRUS VAC: HCPCS | Performed by: NURSE PRACTITIONER

## 2019-01-03 PROCEDURE — 90686 IIV4 VACC NO PRSV 0.5 ML IM: CPT | Performed by: NURSE PRACTITIONER

## 2019-01-07 ENCOUNTER — ANTI-COAG VISIT (OUTPATIENT)
Dept: PRIMARY CARE CLINIC | Age: 84
End: 2019-01-07

## 2019-01-07 DIAGNOSIS — I48.19 PERSISTENT ATRIAL FIBRILLATION (HCC): ICD-10-CM

## 2019-01-07 LAB — INR BLD: 1.8

## 2019-01-07 RX ORDER — DONEPEZIL HYDROCHLORIDE 5 MG/1
5 TABLET, FILM COATED ORAL NIGHTLY
Qty: 30 TABLET | Refills: 0 | Status: SHIPPED
Start: 2019-01-07 | End: 2019-08-14 | Stop reason: SDUPTHER

## 2019-01-08 ENCOUNTER — OFFICE VISIT (OUTPATIENT)
Dept: VASCULAR SURGERY | Age: 84
End: 2019-01-08
Payer: MEDICARE

## 2019-01-08 VITALS
OXYGEN SATURATION: 99 % | DIASTOLIC BLOOD PRESSURE: 70 MMHG | HEART RATE: 86 BPM | SYSTOLIC BLOOD PRESSURE: 120 MMHG | RESPIRATION RATE: 18 BRPM

## 2019-01-08 DIAGNOSIS — I73.9 PVD (PERIPHERAL VASCULAR DISEASE) (HCC): ICD-10-CM

## 2019-01-08 PROCEDURE — 4040F PNEUMOC VAC/ADMIN/RCVD: CPT | Performed by: NURSE PRACTITIONER

## 2019-01-08 PROCEDURE — 1036F TOBACCO NON-USER: CPT | Performed by: NURSE PRACTITIONER

## 2019-01-08 PROCEDURE — G8427 DOCREV CUR MEDS BY ELIG CLIN: HCPCS | Performed by: NURSE PRACTITIONER

## 2019-01-08 PROCEDURE — G8420 CALC BMI NORM PARAMETERS: HCPCS | Performed by: NURSE PRACTITIONER

## 2019-01-08 PROCEDURE — 1101F PT FALLS ASSESS-DOCD LE1/YR: CPT | Performed by: NURSE PRACTITIONER

## 2019-01-08 PROCEDURE — G8400 PT W/DXA NO RESULTS DOC: HCPCS | Performed by: NURSE PRACTITIONER

## 2019-01-08 PROCEDURE — 1090F PRES/ABSN URINE INCON ASSESS: CPT | Performed by: NURSE PRACTITIONER

## 2019-01-08 PROCEDURE — G8482 FLU IMMUNIZE ORDER/ADMIN: HCPCS | Performed by: NURSE PRACTITIONER

## 2019-01-08 PROCEDURE — 1123F ACP DISCUSS/DSCN MKR DOCD: CPT | Performed by: NURSE PRACTITIONER

## 2019-01-08 PROCEDURE — 99213 OFFICE O/P EST LOW 20 MIN: CPT | Performed by: NURSE PRACTITIONER

## 2019-01-08 RX ORDER — FEXOFENADINE HCL 180 MG/1
180 TABLET ORAL EVERY MORNING
COMMUNITY
End: 2021-01-04 | Stop reason: ALTCHOICE

## 2019-01-09 PROBLEM — I73.9 PVD (PERIPHERAL VASCULAR DISEASE) (HCC): Status: ACTIVE | Noted: 2019-01-09

## 2019-01-11 ENCOUNTER — OFFICE VISIT (OUTPATIENT)
Dept: CARDIOLOGY | Age: 84
End: 2019-01-11
Payer: MEDICARE

## 2019-01-11 VITALS
SYSTOLIC BLOOD PRESSURE: 122 MMHG | DIASTOLIC BLOOD PRESSURE: 84 MMHG | WEIGHT: 111.4 LBS | HEIGHT: 64 IN | BODY MASS INDEX: 19.02 KG/M2

## 2019-01-11 DIAGNOSIS — I50.9 ACUTE ON CHRONIC CONGESTIVE HEART FAILURE, UNSPECIFIED HEART FAILURE TYPE (HCC): ICD-10-CM

## 2019-01-11 DIAGNOSIS — R06.00 DYSPNEA, UNSPECIFIED TYPE: ICD-10-CM

## 2019-01-11 DIAGNOSIS — I48.19 PERSISTENT ATRIAL FIBRILLATION (HCC): Primary | ICD-10-CM

## 2019-01-11 PROCEDURE — 99213 OFFICE O/P EST LOW 20 MIN: CPT | Performed by: NURSE PRACTITIONER

## 2019-01-11 PROCEDURE — 93000 ELECTROCARDIOGRAM COMPLETE: CPT | Performed by: NURSE PRACTITIONER

## 2019-01-18 ENCOUNTER — ANTI-COAG VISIT (OUTPATIENT)
Dept: CARDIOLOGY | Age: 84
End: 2019-01-18

## 2019-01-18 DIAGNOSIS — I48.19 PERSISTENT ATRIAL FIBRILLATION (HCC): ICD-10-CM

## 2019-01-18 LAB — INR BLD: 2.1

## 2019-01-23 ENCOUNTER — HOSPITAL ENCOUNTER (OUTPATIENT)
Dept: NON INVASIVE DIAGNOSTICS | Age: 84
Discharge: HOME | End: 2019-01-23
Payer: MEDICARE

## 2019-01-23 ENCOUNTER — HOSPITAL ENCOUNTER (OUTPATIENT)
Dept: ULTRASOUND IMAGING | Age: 84
Discharge: HOME | End: 2019-01-23
Payer: MEDICARE

## 2019-01-23 DIAGNOSIS — R06.00 DYSPNEA, UNSPECIFIED TYPE: ICD-10-CM

## 2019-01-23 DIAGNOSIS — N18.30 CHRONIC KIDNEY DISEASE, STAGE III (MODERATE) (HCC): ICD-10-CM

## 2019-01-23 LAB
LV EF: 48 %
LVEF MODALITY: NORMAL

## 2019-01-23 PROCEDURE — 76770 US EXAM ABDO BACK WALL COMP: CPT

## 2019-01-23 PROCEDURE — 93306 TTE W/DOPPLER COMPLETE: CPT

## 2019-01-24 ENCOUNTER — ANTI-COAG VISIT (OUTPATIENT)
Dept: CARDIOLOGY | Age: 84
End: 2019-01-24

## 2019-01-24 DIAGNOSIS — I48.19 PERSISTENT ATRIAL FIBRILLATION (HCC): ICD-10-CM

## 2019-01-24 LAB — INR BLD: 2.2

## 2019-01-25 ENCOUNTER — ANTI-COAG VISIT (OUTPATIENT)
Dept: CARDIOLOGY | Age: 84
End: 2019-01-25

## 2019-01-25 DIAGNOSIS — I48.19 PERSISTENT ATRIAL FIBRILLATION (HCC): ICD-10-CM

## 2019-01-25 LAB — INR BLD: 2.4

## 2019-01-30 ENCOUNTER — OFFICE VISIT (OUTPATIENT)
Dept: PRIMARY CARE CLINIC | Age: 84
End: 2019-01-30
Payer: MEDICARE

## 2019-01-30 VITALS
TEMPERATURE: 97.3 F | HEART RATE: 72 BPM | WEIGHT: 107.8 LBS | BODY MASS INDEX: 18.4 KG/M2 | SYSTOLIC BLOOD PRESSURE: 116 MMHG | OXYGEN SATURATION: 99 % | HEIGHT: 64 IN | DIASTOLIC BLOOD PRESSURE: 84 MMHG

## 2019-01-30 DIAGNOSIS — R53.1 WEAKNESS: ICD-10-CM

## 2019-01-30 DIAGNOSIS — I48.19 PERSISTENT ATRIAL FIBRILLATION (HCC): ICD-10-CM

## 2019-01-30 DIAGNOSIS — N18.30 CHRONIC RENAL FAILURE, STAGE 3 (MODERATE) (HCC): ICD-10-CM

## 2019-01-30 DIAGNOSIS — I50.22 CHRONIC SYSTOLIC CHF (CONGESTIVE HEART FAILURE), NYHA CLASS 3 (HCC): ICD-10-CM

## 2019-01-30 DIAGNOSIS — J30.89 ALLERGIC RHINITIS DUE TO OTHER ALLERGIC TRIGGER, UNSPECIFIED SEASONALITY: ICD-10-CM

## 2019-01-30 DIAGNOSIS — J43.1 PANLOBULAR EMPHYSEMA (HCC): Primary | ICD-10-CM

## 2019-01-30 DIAGNOSIS — I73.9 PVD (PERIPHERAL VASCULAR DISEASE) (HCC): ICD-10-CM

## 2019-01-30 DIAGNOSIS — R63.4 WEIGHT LOSS: ICD-10-CM

## 2019-01-30 PROCEDURE — 99497 ADVNCD CARE PLAN 30 MIN: CPT | Performed by: NURSE PRACTITIONER

## 2019-01-30 PROCEDURE — G8926 SPIRO NO PERF OR DOC: HCPCS | Performed by: NURSE PRACTITIONER

## 2019-01-30 PROCEDURE — 1036F TOBACCO NON-USER: CPT | Performed by: NURSE PRACTITIONER

## 2019-01-30 PROCEDURE — 4040F PNEUMOC VAC/ADMIN/RCVD: CPT | Performed by: NURSE PRACTITIONER

## 2019-01-30 PROCEDURE — 1123F ACP DISCUSS/DSCN MKR DOCD: CPT | Performed by: NURSE PRACTITIONER

## 2019-01-30 PROCEDURE — G8400 PT W/DXA NO RESULTS DOC: HCPCS | Performed by: NURSE PRACTITIONER

## 2019-01-30 PROCEDURE — 99215 OFFICE O/P EST HI 40 MIN: CPT | Performed by: NURSE PRACTITIONER

## 2019-01-30 PROCEDURE — 1101F PT FALLS ASSESS-DOCD LE1/YR: CPT | Performed by: NURSE PRACTITIONER

## 2019-01-30 PROCEDURE — 3023F SPIROM DOC REV: CPT | Performed by: NURSE PRACTITIONER

## 2019-01-30 PROCEDURE — G8420 CALC BMI NORM PARAMETERS: HCPCS | Performed by: NURSE PRACTITIONER

## 2019-01-30 PROCEDURE — 1090F PRES/ABSN URINE INCON ASSESS: CPT | Performed by: NURSE PRACTITIONER

## 2019-01-30 PROCEDURE — G8482 FLU IMMUNIZE ORDER/ADMIN: HCPCS | Performed by: NURSE PRACTITIONER

## 2019-01-30 PROCEDURE — G8427 DOCREV CUR MEDS BY ELIG CLIN: HCPCS | Performed by: NURSE PRACTITIONER

## 2019-01-30 ASSESSMENT — ENCOUNTER SYMPTOMS
VOICE CHANGE: 0
BLOOD IN STOOL: 0
RHINORRHEA: 0
DIARRHEA: 0
NAUSEA: 0
CONSTIPATION: 0
VOMITING: 0
ABDOMINAL PAIN: 0
CHEST TIGHTNESS: 0
EYE REDNESS: 0
SORE THROAT: 0
COUGH: 1
SHORTNESS OF BREATH: 0
WHEEZING: 0
TROUBLE SWALLOWING: 0

## 2019-02-08 ENCOUNTER — ANTI-COAG VISIT (OUTPATIENT)
Dept: CARDIOLOGY | Age: 84
End: 2019-02-08

## 2019-02-08 DIAGNOSIS — I48.19 PERSISTENT ATRIAL FIBRILLATION (HCC): ICD-10-CM

## 2019-02-08 LAB — INR BLD: 2.7

## 2019-02-15 ENCOUNTER — OFFICE VISIT (OUTPATIENT)
Dept: CARDIOLOGY | Age: 84
End: 2019-02-15
Payer: MEDICARE

## 2019-02-15 VITALS
WEIGHT: 110 LBS | SYSTOLIC BLOOD PRESSURE: 118 MMHG | BODY MASS INDEX: 18.33 KG/M2 | DIASTOLIC BLOOD PRESSURE: 62 MMHG | HEIGHT: 65 IN | HEART RATE: 68 BPM

## 2019-02-15 DIAGNOSIS — I10 ESSENTIAL HYPERTENSION: ICD-10-CM

## 2019-02-15 DIAGNOSIS — I50.22 CHRONIC SYSTOLIC CHF (CONGESTIVE HEART FAILURE), NYHA CLASS 3 (HCC): ICD-10-CM

## 2019-02-15 DIAGNOSIS — I48.20 CHRONIC ATRIAL FIBRILLATION (HCC): ICD-10-CM

## 2019-02-15 DIAGNOSIS — R07.89 OTHER CHEST PAIN: Primary | ICD-10-CM

## 2019-02-15 DIAGNOSIS — J44.1 COPD EXACERBATION (HCC): ICD-10-CM

## 2019-02-15 PROCEDURE — 99213 OFFICE O/P EST LOW 20 MIN: CPT | Performed by: NURSE PRACTITIONER

## 2019-02-15 RX ORDER — ISOSORBIDE MONONITRATE 30 MG/1
30 TABLET, EXTENDED RELEASE ORAL DAILY
Qty: 30 TABLET | Refills: 3 | Status: SHIPPED | OUTPATIENT
Start: 2019-02-15 | End: 2019-06-27 | Stop reason: SDUPTHER

## 2019-02-21 ENCOUNTER — OFFICE VISIT (OUTPATIENT)
Dept: PRIMARY CARE CLINIC | Age: 84
End: 2019-02-21
Payer: MEDICARE

## 2019-02-21 VITALS
SYSTOLIC BLOOD PRESSURE: 102 MMHG | DIASTOLIC BLOOD PRESSURE: 70 MMHG | BODY MASS INDEX: 18.64 KG/M2 | WEIGHT: 112 LBS | TEMPERATURE: 98.2 F | HEART RATE: 103 BPM | RESPIRATION RATE: 20 BRPM | OXYGEN SATURATION: 98 %

## 2019-02-21 DIAGNOSIS — R04.0 BLEEDING NOSE: ICD-10-CM

## 2019-02-21 DIAGNOSIS — Z79.01 ANTICOAGULATED: ICD-10-CM

## 2019-02-21 DIAGNOSIS — J06.9 UPPER RESPIRATORY TRACT INFECTION, UNSPECIFIED TYPE: Primary | ICD-10-CM

## 2019-02-21 DIAGNOSIS — J34.89 NASAL LESION: ICD-10-CM

## 2019-02-21 DIAGNOSIS — H66.90 ACUTE OTITIS MEDIA, UNSPECIFIED OTITIS MEDIA TYPE: ICD-10-CM

## 2019-02-21 PROCEDURE — 99213 OFFICE O/P EST LOW 20 MIN: CPT | Performed by: NURSE PRACTITIONER

## 2019-02-21 PROCEDURE — 1101F PT FALLS ASSESS-DOCD LE1/YR: CPT | Performed by: NURSE PRACTITIONER

## 2019-02-21 PROCEDURE — G8482 FLU IMMUNIZE ORDER/ADMIN: HCPCS | Performed by: NURSE PRACTITIONER

## 2019-02-21 PROCEDURE — G8420 CALC BMI NORM PARAMETERS: HCPCS | Performed by: NURSE PRACTITIONER

## 2019-02-21 PROCEDURE — G8427 DOCREV CUR MEDS BY ELIG CLIN: HCPCS | Performed by: NURSE PRACTITIONER

## 2019-02-21 PROCEDURE — G8400 PT W/DXA NO RESULTS DOC: HCPCS | Performed by: NURSE PRACTITIONER

## 2019-02-21 PROCEDURE — 1123F ACP DISCUSS/DSCN MKR DOCD: CPT | Performed by: NURSE PRACTITIONER

## 2019-02-21 PROCEDURE — 4040F PNEUMOC VAC/ADMIN/RCVD: CPT | Performed by: NURSE PRACTITIONER

## 2019-02-21 PROCEDURE — 1090F PRES/ABSN URINE INCON ASSESS: CPT | Performed by: NURSE PRACTITIONER

## 2019-02-21 PROCEDURE — 1036F TOBACCO NON-USER: CPT | Performed by: NURSE PRACTITIONER

## 2019-02-21 RX ORDER — DOXYCYCLINE HYCLATE 100 MG/1
100 CAPSULE ORAL 2 TIMES DAILY
Qty: 20 CAPSULE | Refills: 0 | Status: SHIPPED | OUTPATIENT
Start: 2019-02-21 | End: 2019-03-03

## 2019-02-21 RX ORDER — METHYLPREDNISOLONE 4 MG/1
TABLET ORAL
Qty: 1 KIT | Refills: 0 | Status: SHIPPED | OUTPATIENT
Start: 2019-02-21 | End: 2019-02-27

## 2019-02-21 ASSESSMENT — ENCOUNTER SYMPTOMS
CONSTIPATION: 0
WHEEZING: 0
COUGH: 1
ABDOMINAL PAIN: 0
VOMITING: 0
EYE REDNESS: 0
DIARRHEA: 0
CHEST TIGHTNESS: 0
TROUBLE SWALLOWING: 0
SORE THROAT: 0
VOICE CHANGE: 0
BLOOD IN STOOL: 0
SHORTNESS OF BREATH: 0
NAUSEA: 0
RHINORRHEA: 1

## 2019-02-21 ASSESSMENT — PATIENT HEALTH QUESTIONNAIRE - PHQ9
SUM OF ALL RESPONSES TO PHQ9 QUESTIONS 1 & 2: 0
SUM OF ALL RESPONSES TO PHQ QUESTIONS 1-9: 0
SUM OF ALL RESPONSES TO PHQ QUESTIONS 1-9: 0
2. FEELING DOWN, DEPRESSED OR HOPELESS: 0
1. LITTLE INTEREST OR PLEASURE IN DOING THINGS: 0

## 2019-02-22 ENCOUNTER — ANTI-COAG VISIT (OUTPATIENT)
Dept: CARDIOLOGY | Age: 84
End: 2019-02-22

## 2019-02-22 DIAGNOSIS — I48.19 PERSISTENT ATRIAL FIBRILLATION (HCC): ICD-10-CM

## 2019-02-22 PROBLEM — R07.89 OTHER CHEST PAIN: Status: ACTIVE | Noted: 2019-02-22

## 2019-02-22 LAB — INR BLD: 1.9

## 2019-03-08 ENCOUNTER — ANTI-COAG VISIT (OUTPATIENT)
Dept: CARDIOLOGY | Age: 84
End: 2019-03-08

## 2019-03-08 DIAGNOSIS — I48.19 PERSISTENT ATRIAL FIBRILLATION (HCC): ICD-10-CM

## 2019-03-08 LAB — INR BLD: 3.7

## 2019-03-08 RX ORDER — ALLOPURINOL 100 MG/1
100 TABLET ORAL
COMMUNITY
Start: 2018-12-06

## 2019-03-08 RX ORDER — MONTELUKAST SODIUM 10 MG/1
10 TABLET ORAL
COMMUNITY

## 2019-03-08 RX ORDER — METOPROLOL TARTRATE 50 MG/1
25 TABLET, FILM COATED ORAL 2 TIMES DAILY
COMMUNITY
Start: 2018-10-04

## 2019-03-08 RX ORDER — WARFARIN SODIUM 2.5 MG/1
2.5 TABLET ORAL
COMMUNITY
Start: 2017-05-02

## 2019-03-08 RX ORDER — MUPIROCIN CALCIUM 20 MG/G
CREAM TOPICAL
COMMUNITY
End: 2019-03-14

## 2019-03-08 RX ORDER — FLUTICASONE PROPIONATE 50 MCG
1 SPRAY, SUSPENSION (ML) NASAL
COMMUNITY
Start: 2018-11-12

## 2019-03-08 RX ORDER — DONEPEZIL HYDROCHLORIDE 5 MG/1
5 TABLET, FILM COATED ORAL DAILY
COMMUNITY
Start: 2019-01-07

## 2019-03-08 RX ORDER — FUROSEMIDE 20 MG/1
20 TABLET ORAL TAKE AS DIRECTED
COMMUNITY

## 2019-03-08 RX ORDER — NITROGLYCERIN 0.4 MG/1
0.4 TABLET SUBLINGUAL
COMMUNITY
Start: 2018-12-11

## 2019-03-08 RX ORDER — ATORVASTATIN CALCIUM 10 MG/1
10 TABLET, FILM COATED ORAL
COMMUNITY
Start: 2016-01-05

## 2019-03-08 RX ORDER — DIGOXIN 125 MCG
125 TABLET ORAL
COMMUNITY
Start: 2017-05-01

## 2019-03-12 PROBLEM — J43.9 PULMONARY EMPHYSEMA (HCC): Status: ACTIVE | Noted: 2019-03-12

## 2019-03-14 ENCOUNTER — OFFICE VISIT (OUTPATIENT)
Dept: PULMONOLOGY | Facility: CLINIC | Age: 84
End: 2019-03-14

## 2019-03-14 VITALS
HEART RATE: 84 BPM | HEIGHT: 61 IN | SYSTOLIC BLOOD PRESSURE: 122 MMHG | BODY MASS INDEX: 20.77 KG/M2 | WEIGHT: 110 LBS | DIASTOLIC BLOOD PRESSURE: 78 MMHG | OXYGEN SATURATION: 98 %

## 2019-03-14 DIAGNOSIS — J96.11 CHRONIC RESPIRATORY FAILURE WITH HYPOXIA (HCC): ICD-10-CM

## 2019-03-14 DIAGNOSIS — R06.02 SHORTNESS OF BREATH: Primary | ICD-10-CM

## 2019-03-14 DIAGNOSIS — J44.9 STAGE 1 MILD COPD BY GOLD CLASSIFICATION (HCC): ICD-10-CM

## 2019-03-14 DIAGNOSIS — J43.9 PULMONARY EMPHYSEMA, UNSPECIFIED EMPHYSEMA TYPE (HCC): ICD-10-CM

## 2019-03-14 DIAGNOSIS — J43.9 PULMONARY EMPHYSEMA, UNSPECIFIED EMPHYSEMA TYPE (HCC): Primary | ICD-10-CM

## 2019-03-14 DIAGNOSIS — R63.6 UNDERWEIGHT: ICD-10-CM

## 2019-03-14 LAB
FEV1/FVC: 65.99 %
FEV1: NORMAL LITERS
FVC VOL RESPIRATORY: NORMAL LITERS
TLC: NORMAL LITERS

## 2019-03-14 PROCEDURE — 36415 COLL VENOUS BLD VENIPUNCTURE: CPT | Performed by: NURSE PRACTITIONER

## 2019-03-14 PROCEDURE — 94010 BREATHING CAPACITY TEST: CPT | Performed by: NURSE PRACTITIONER

## 2019-03-14 PROCEDURE — 94664 DEMO&/EVAL PT USE INHALER: CPT | Performed by: NURSE PRACTITIONER

## 2019-03-14 PROCEDURE — 99204 OFFICE O/P NEW MOD 45 MIN: CPT | Performed by: NURSE PRACTITIONER

## 2019-03-14 PROCEDURE — 94727 GAS DIL/WSHOT DETER LNG VOL: CPT | Performed by: NURSE PRACTITIONER

## 2019-03-14 RX ORDER — ISOSORBIDE MONONITRATE 30 MG/1
TABLET, EXTENDED RELEASE ORAL
Refills: 3 | COMMUNITY
Start: 2019-02-15

## 2019-03-14 RX ORDER — FEXOFENADINE HCL 180 MG/1
180 TABLET ORAL ONCE AS NEEDED
COMMUNITY

## 2019-03-14 ASSESSMENT — PULMONARY FUNCTION TESTS: FEV1/FVC: 65.99

## 2019-03-14 NOTE — PATIENT INSTRUCTIONS
Chronic Obstructive Pulmonary Disease  Chronic obstructive pulmonary disease (COPD) is a long-term (chronic) lung problem. When you have COPD, it is hard for air to get in and out of your lungs. Usually the condition gets worse over time, and your lungs will never return to normal. There are things you can do to keep yourself as healthy as possible.  · Your doctor may treat your condition with:  ? Medicines.  ? Oxygen.  ? Lung surgery.  · Your doctor may also recommend:  ? Rehabilitation. This includes steps to make your body work better. It may involve a team of specialists.  ? Quitting smoking, if you smoke.  ? Exercise and changes to your diet.  ? Comfort measures (palliative care).    Follow these instructions at home:  Medicines  · Take over-the-counter and prescription medicines only as told by your doctor.  · Talk to your doctor before taking any cough or allergy medicines. You may need to avoid medicines that cause your lungs to be dry.  Lifestyle  · If you smoke, stop. Smoking makes the problem worse. If you need help quitting, ask your doctor.  · Avoid being around things that make your breathing worse. This may include smoke, chemicals, and fumes.  · Stay active, but remember to rest as well.  · Learn and use tips on how to relax.  · Make sure you get enough sleep. Most adults need at least 7 hours of sleep every night.  · Eat healthy foods. Eat smaller meals more often. Rest before meals.  Controlled breathing  Learn and use tips on how to control your breathing as told by your doctor. Try:  · Breathing in (inhaling) through your nose for 1 second. Then, pucker your lips and breath out (exhale) through your lips for 2 seconds.  · Putting one hand on your belly (abdomen). Breathe in slowly through your nose for 1 second. Your hand on your belly should move out. Pucker your lips and breathe out slowly through your lips. Your hand on your belly should move in as you breathe out.    Controlled  coughing  Learn and use controlled coughing to clear mucus from your lungs. Follow these steps:  1. Lean your head a little forward.  2. Breathe in deeply.  3. Try to hold your breath for 3 seconds.  4. Keep your mouth slightly open while coughing 2 times.  5. Spit any mucus out into a tissue.  6. Rest and do the steps again 1 or 2 times as needed.    General instructions  · Make sure you get all the shots (vaccines) that your doctor recommends. Ask your doctor about a flu shot and a pneumonia shot.  · Use oxygen therapy and pulmonary rehabilitation if told by your doctor. If you need home oxygen therapy, ask your doctor if you should buy a tool to measure your oxygen level (oximeter).  · Make a COPD action plan with your doctor. This helps you to know what to do if you feel worse than usual.  · Manage any other conditions you have as told by your doctor.  · Avoid going outside when it is very hot, cold, or humid.  · Avoid people who have a sickness you can catch (contagious).  · Keep all follow-up visits as told by your doctor. This is important.  Contact a doctor if:  · You cough up more mucus than usual.  · There is a change in the color or thickness of the mucus.  · It is harder to breathe than usual.  · Your breathing is faster than usual.  · You have trouble sleeping.  · You need to use your medicines more often than usual.  · You have trouble doing your normal activities such as getting dressed or walking around the house.  Get help right away if:  · You have shortness of breath while resting.  · You have shortness of breath that stops you from:  ? Being able to talk.  ? Doing normal activities.  · Your chest hurts for longer than 5 minutes.  · Your skin color is more blue than usual.  · Your pulse oximeter shows that you have low oxygen for longer than 5 minutes.  · You have a fever.  · You feel too tired to breathe normally.  Summary  · Chronic obstructive pulmonary disease (COPD) is a long-term lung  problem.  · The way your lungs work will never return to normal. Usually the condition gets worse over time. There are things you can do to keep yourself as healthy as possible.  · Take over-the-counter and prescription medicines only as told by your doctor.  · If you smoke, stop. Smoking makes the problem worse.  This information is not intended to replace advice given to you by your health care provider. Make sure you discuss any questions you have with your health care provider.  Document Released: 06/05/2009 Document Revised: 01/22/2018 Document Reviewed: 01/22/2018  [employer] Interactive Patient Education © 2019 [employer]
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
## 2019-03-19 ENCOUNTER — RESULTS ENCOUNTER (OUTPATIENT)
Dept: PULMONOLOGY | Facility: CLINIC | Age: 84
End: 2019-03-19

## 2019-03-19 ENCOUNTER — OFFICE VISIT (OUTPATIENT)
Dept: CARDIOLOGY | Age: 84
End: 2019-03-19
Payer: MEDICARE

## 2019-03-19 VITALS
BODY MASS INDEX: 20.2 KG/M2 | SYSTOLIC BLOOD PRESSURE: 108 MMHG | HEIGHT: 61 IN | WEIGHT: 107 LBS | HEART RATE: 77 BPM | DIASTOLIC BLOOD PRESSURE: 62 MMHG

## 2019-03-19 DIAGNOSIS — J43.9 PULMONARY EMPHYSEMA, UNSPECIFIED EMPHYSEMA TYPE (HCC): ICD-10-CM

## 2019-03-19 DIAGNOSIS — I10 ESSENTIAL HYPERTENSION: ICD-10-CM

## 2019-03-19 DIAGNOSIS — I48.19 PERSISTENT ATRIAL FIBRILLATION (HCC): Primary | ICD-10-CM

## 2019-03-19 DIAGNOSIS — I50.22 CHRONIC SYSTOLIC CONGESTIVE HEART FAILURE (HCC): ICD-10-CM

## 2019-03-19 PROCEDURE — 93000 ELECTROCARDIOGRAM COMPLETE: CPT | Performed by: NURSE PRACTITIONER

## 2019-03-19 PROCEDURE — 99213 OFFICE O/P EST LOW 20 MIN: CPT | Performed by: NURSE PRACTITIONER

## 2019-03-22 ENCOUNTER — ANTI-COAG VISIT (OUTPATIENT)
Dept: CARDIOLOGY | Age: 84
End: 2019-03-22

## 2019-03-22 DIAGNOSIS — I48.19 PERSISTENT ATRIAL FIBRILLATION (HCC): ICD-10-CM

## 2019-03-22 LAB — INR BLD: 2.6

## 2019-04-03 DIAGNOSIS — J44.9 STAGE 1 MILD COPD BY GOLD CLASSIFICATION (HCC): ICD-10-CM

## 2019-04-05 ENCOUNTER — ANTI-COAG VISIT (OUTPATIENT)
Dept: CARDIOLOGY | Age: 84
End: 2019-04-05

## 2019-04-05 DIAGNOSIS — I48.19 PERSISTENT ATRIAL FIBRILLATION (HCC): ICD-10-CM

## 2019-04-05 LAB — INR BLD: 1.7

## 2019-04-18 ENCOUNTER — ANTI-COAG VISIT (OUTPATIENT)
Dept: CARDIOLOGY | Age: 84
End: 2019-04-18

## 2019-04-18 DIAGNOSIS — I48.19 PERSISTENT ATRIAL FIBRILLATION (HCC): ICD-10-CM

## 2019-04-18 LAB — INR BLD: 2.1

## 2019-04-19 ENCOUNTER — PROCEDURE VISIT (OUTPATIENT)
Dept: PULMONOLOGY | Facility: CLINIC | Age: 84
End: 2019-04-19

## 2019-04-19 ENCOUNTER — OFFICE VISIT (OUTPATIENT)
Dept: PULMONOLOGY | Facility: CLINIC | Age: 84
End: 2019-04-19

## 2019-04-19 ENCOUNTER — HOSPITAL ENCOUNTER (OUTPATIENT)
Dept: GENERAL RADIOLOGY | Age: 84
Discharge: HOME | End: 2019-04-19
Payer: MEDICARE

## 2019-04-19 VITALS
DIASTOLIC BLOOD PRESSURE: 60 MMHG | BODY MASS INDEX: 20.01 KG/M2 | HEART RATE: 72 BPM | WEIGHT: 106 LBS | OXYGEN SATURATION: 97 % | SYSTOLIC BLOOD PRESSURE: 110 MMHG | HEIGHT: 61 IN

## 2019-04-19 DIAGNOSIS — R06.02 SHORTNESS OF BREATH: Primary | ICD-10-CM

## 2019-04-19 DIAGNOSIS — J44.9 OBSTRUCTIVE CHRONIC BRONCHITIS WITHOUT EXACERBATION (HCC): ICD-10-CM

## 2019-04-19 DIAGNOSIS — J96.11 CHRONIC RESPIRATORY FAILURE WITH HYPOXIA (HCC): ICD-10-CM

## 2019-04-19 DIAGNOSIS — J43.9 PULMONARY EMPHYSEMA, UNSPECIFIED EMPHYSEMA TYPE (HCC): ICD-10-CM

## 2019-04-19 DIAGNOSIS — R63.6 UNDERWEIGHT: ICD-10-CM

## 2019-04-19 DIAGNOSIS — J44.9 STAGE 1 MILD COPD BY GOLD CLASSIFICATION (HCC): Primary | ICD-10-CM

## 2019-04-19 DIAGNOSIS — J44.9 STAGE 1 MILD COPD BY GOLD CLASSIFICATION (HCC): ICD-10-CM

## 2019-04-19 LAB
DIFF CAP.CO: NORMAL ML/MMHG SEC
FEV1/FVC: NORMAL %
FEV1: NORMAL LITERS
FVC VOL RESPIRATORY: NORMAL LITERS

## 2019-04-19 PROCEDURE — 71046 X-RAY EXAM CHEST 2 VIEWS: CPT

## 2019-04-19 PROCEDURE — 94729 DIFFUSING CAPACITY: CPT | Performed by: NURSE PRACTITIONER

## 2019-04-19 PROCEDURE — 94010 BREATHING CAPACITY TEST: CPT | Performed by: NURSE PRACTITIONER

## 2019-04-19 PROCEDURE — 94727 GAS DIL/WSHOT DETER LNG VOL: CPT | Performed by: NURSE PRACTITIONER

## 2019-04-19 PROCEDURE — 99214 OFFICE O/P EST MOD 30 MIN: CPT | Performed by: NURSE PRACTITIONER

## 2019-04-19 NOTE — PROGRESS NOTES
DEMETRIA Jorgensen  [facility]   [place] [address]  Phone: [phone]  Fax: [phone]     Neelam Calle is a 85 y.o. female.   : 1933  CC:   Chief Complaint   Patient presents with   • Shortness of Breath      HPI: Neelam Calle is a pleasant 85 y.o. female. The patient is here today for follow up of shortness of breath.  At the patient's last office visit she was found to have COPD Gold stage I and emphysema.  She has known chronic respiratory failure with hypoxia.  The patient utilizes continuous portable oxygen.  He is doing well with it and wishes to continue it.  For COPD/emphysema the patient is using Spiriva.  She feels that she is benefiting from this and wishes to continue it.  She is very hard of hearing, but she reports no increasing shortness of breath, no fever, no chills, no cough with purulent sputum.  She is accompanied by her son who states that he believes she is doing better.  Alpha-1 reviewed and shows MM phenotype.  They were unable to have her chest x-ray completed and we will do this today.   the patient's PCP is Paulie Smith MD.    The following portions of the patient's history were reviewed and updated as appropriate: allergies, current medications, past family history, past medical history, past social history, past surgical history and problem list.  Past Medical History:   Diagnosis Date   • Allergic rhinitis    • CHF (congestive heart failure) (CMS/[facility])    • CHF (congestive heart failure) (CMS/[facility])    • COPD (chronic obstructive pulmonary disease) (CMS/[facility])    • Dyspnea    • Edema    • GERD (gastroesophageal reflux disease)    • Hematuria    • Hyperlipidemia    • Hypoxemia    • Kidney disease    • Renal failure    • Weakness      Family History   Problem Relation Age of Onset   • Asthma Mother    • Emphysema Mother      Social History     Socioeconomic History   • Marital status: Unknown     Spouse  "name: Not on file   • Number of children: Not on file   • Years of education: Not on file   • Highest education level: Not on file   Tobacco Use   • Smoking status: Never Smoker   • Smokeless tobacco: Never Used   Substance and Sexual Activity   • Alcohol use: No     Frequency: Never   • Drug use: No   • Sexual activity: Defer     Review of Systems   Constitutional: Negative for chills and fever.   HENT: Negative for congestion.    Eyes: Negative for blurred vision.   Respiratory: Negative for cough and shortness of breath.    Cardiovascular: Positive for leg swelling (mild). Negative for chest pain.   Gastrointestinal: Negative for diarrhea, nausea and vomiting.   Endocrine: Negative for cold intolerance and heat intolerance.   Genitourinary: Negative for dysuria.   Musculoskeletal: Negative for arthralgias.   Skin: Negative for rash.   Neurological: Negative for dizziness, weakness and light-headedness.   Hematological: Does not bruise/bleed easily.   Psychiatric/Behavioral: Negative for agitation. The patient is not nervous/anxious.      /60   Pulse 72   Ht 154.9 cm (61\")   Wt 48.1 kg (106 lb)   SpO2 97% Comment: 2l  Breastfeeding? No   BMI 20.03 kg/m²   Physical Exam   Constitutional: She is oriented to person, place, and time. She appears well-developed. She appears cachectic. No distress. Nasal cannula in place.   HENT:   Head: Normocephalic and atraumatic.   Eyes: Conjunctivae and EOM are normal. Pupils are equal, round, and reactive to light. No scleral icterus.   Neck: Normal range of motion. Neck supple.   Cardiovascular: Normal rate, regular rhythm and normal heart sounds. Exam reveals no friction rub.   No murmur heard.  Pulmonary/Chest: Effort normal and breath sounds normal. No respiratory distress. She has no wheezes. She has no rales.   Abdominal: Soft. Bowel sounds are normal. She exhibits no distension. There is no tenderness.   Musculoskeletal: Normal range of motion. She exhibits " edema.   ROLLATER  KYPHOSIS    Neurological: She is alert and oriented to person, place, and time.   Skin: Skin is warm and dry.   Psychiatric: She has a normal mood and affect. Her behavior is normal. Judgment and thought content normal.   Nursing note and vitals reviewed.    Pulmonary Functions Testing Results:  FEV1   Date Value Ref Range Status   04/19/2019 94% liters Final     FVC   Date Value Ref Range Status   04/19/2019 110% liters Final     FEV1/FVC   Date Value Ref Range Status   04/19/2019 64.03% % Final     TLC   Date Value Ref Range Status   03/14/2019 127% liters Final     DLCO   Date Value Ref Range Status   04/19/2019 35% ml/mmHg sec Final     My PFT Interpretation: Spirometry shows mild obstructive disease and severe small airway disease.  Diffusion shows a severe diffusion impairment and moderate when corrected for alveolar volume.  Imaging: None to review today    Assessment and Plan:   Neelam was seen today for shortness of breath.    Diagnoses and all orders for this visit:    Shortness of breath  Improved.  Stage 1 mild COPD by GOLD classification (CMS/HCC)  -     Pulmonary Function Test  -     XR Chest 2 View; Future  Continue Spiriva.  The patient is benefiting from it and wishes to continue it.  Pulmonary emphysema, unspecified emphysema type (CMS/HCC)  Continue current treatment regimen.  Chronic respiratory failure with hypoxia (CMS/HCC)  Continue portable oxygen.  The patient is benefiting from it and wishes to continue it.  Underweight  Diet education provided.    Health maintenance:   Influenza vaccine: yes   Pneumovax 23: yes   Prevnar 13: no   Patient's Body mass index is 20.03 kg/m². BMI is below normal parameters. Recommendations include: diet education .    Follow up: 6 months   Angeline Silverman, DEMETRIA  4/19/2019  4:07 PM    Please note that portions of this note were completed with a voice recognition program.

## 2019-04-19 NOTE — PATIENT INSTRUCTIONS
High-Protein and High-Calorie Diet  Eating high-protein and high-calorie foods can help you to gain weight, heal after an injury, and recover after an illness or surgery.  What is my plan?  The specific amount of daily protein and calories you need depends on:  · Your body weight.  · The reason this diet is recommended for you.    Generally, a high-protein, high-calorie diet involves:  · Eating 250-500 extra calories each day.  · Making sure that 10-35% of your daily calories come from protein.    Talk to your health care provider about how much protein and how many calories you need each day. Follow the diet as directed by your health care provider.  What do I need to know about this diet?  · Ask your health care provider if you should take a nutritional supplement.  · Try to eat six small meals each day instead of three large meals.  · Eat a balanced diet, including one food that is high in protein at each meal.  · Keep nutritious snacks handy, such as nuts, trail mixes, dried fruit, and yogurt.  · If you have kidney disease or diabetes, eating too much protein may put extra stress on your kidneys. Talk to your health care provider if you have either of those conditions.  What are some high-protein foods?  Grains  Quinoa. Bulgur wheat.  Vegetables  Soybeans. Peas.  Meats and Other Protein Sources  Beef, pork, and poultry. Fish and seafood. Eggs. Tofu. Textured vegetable protein (TVP). Peanut butter. Nuts and seeds. Dried beans. Protein powders.  Dairy  Whole milk. Whole-milk yogurt. Powdered milk. Cheese. Cottage Cheese. Eggnog.  Beverages  High-protein supplement drinks. Soy milk.  Other  Protein bars.  The items listed above may not be a complete list of recommended foods or beverages. Contact your dietitian for more options.  What are some high-calorie foods?  Grains  Pasta. Quick breads. Muffins. Pancakes. Ready-to-eat cereal.  Vegetables  Vegetables cooked in oil or butter. Fried potatoes.  Fruits  Dried  fruit. Fruit leather. Canned fruit in syrup. Fruit juice. Avocados.  Meats and Other Protein Sources  Peanut butter. Nuts and seeds.  Dairy  Heavy cream. Whipped cream. Cream cheese. Sour cream. Ice cream. Custard. Pudding.  Beverages  Meal-replacement beverages. Nutrition shakes. Fruit juice. Sugar-sweetened soft drinks.  Condiments  Salad dressing. Mayonnaise. Raghu sauce. Fruit preserves or jelly. Honey. Syrup.  Sweets/Desserts  Cake. Cookies. Pie. Pastries. Candy bars. Chocolate.  Fats and Oils  Butter or margarine. Oil. Gravy.  Other  Meal-replacement bars.  The items listed above may not be a complete list of recommended foods or beverages. Contact your dietitian for more options.  What are some tips for including high-protein and high-calorie foods in my diet?  · Add whole milk, half-and-half, or heavy cream to cereal, pudding, soup, or hot cocoa.  · Add whole milk to instant breakfast drinks.  · Add peanut butter to oatmeal or smoothies.  · Add powdered milk to baked goods, smoothies, or milkshakes.  · Add powdered milk, cream, or butter to mashed potatoes.  · Add cheese to cooked vegetables.  · Make whole-milk yogurt parfaits. Top them with granola, fruit, or nuts.  · Add cottage cheese to your fruit.  · Add avocados, cheese, or both to sandwiches or salads.  · Add meat, poultry, or seafood to rice, pasta, casseroles, salads, and soups.  · Use mayonnaise when making egg salad, chicken salad, or tuna salad.  · Use peanut butter as a topping for pretzels, celery, or crackers.  · Add beans to casseroles, dips, and spreads.  · Add pureed beans to sauces and soups.  · Replace calorie-free drinks with calorie-containing drinks, such as milk and fruit juice.  This information is not intended to replace advice given to you by your health care provider. Make sure you discuss any questions you have with your health care provider.  Document Released: 12/18/2006 Document Revised: 05/25/2017 Document Reviewed:  06/02/2015  [employer] Interactive Patient Education © 2018 [employer]  Chronic Obstructive Pulmonary Disease  Chronic obstructive pulmonary disease (COPD) is a long-term (chronic) lung problem. When you have COPD, it is hard for air to get in and out of your lungs. Usually the condition gets worse over time, and your lungs will never return to normal. There are things you can do to keep yourself as healthy as possible.  · Your doctor may treat your condition with:  ? Medicines.  ? Oxygen.  ? Lung surgery.  · Your doctor may also recommend:  ? Rehabilitation. This includes steps to make your body work better. It may involve a team of specialists.  ? Quitting smoking, if you smoke.  ? Exercise and changes to your diet.  ? Comfort measures (palliative care).    Follow these instructions at home:  Medicines  · Take over-the-counter and prescription medicines only as told by your doctor.  · Talk to your doctor before taking any cough or allergy medicines. You may need to avoid medicines that cause your lungs to be dry.  Lifestyle  · If you smoke, stop. Smoking makes the problem worse. If you need help quitting, ask your doctor.  · Avoid being around things that make your breathing worse. This may include smoke, chemicals, and fumes.  · Stay active, but remember to rest as well.  · Learn and use tips on how to relax.  · Make sure you get enough sleep. Most adults need at least 7 hours of sleep every night.  · Eat healthy foods. Eat smaller meals more often. Rest before meals.  Controlled breathing  Learn and use tips on how to control your breathing as told by your doctor. Try:  · Breathing in (inhaling) through your nose for 1 second. Then, pucker your lips and breath out (exhale) through your lips for 2 seconds.  · Putting one hand on your belly (abdomen). Breathe in slowly through your nose for 1 second. Your hand on your belly should move out. Pucker your lips and breathe out slowly through your lips. Your hand on  your belly should move in as you breathe out.    Controlled coughing  Learn and use controlled coughing to clear mucus from your lungs. Follow these steps:  1. Lean your head a little forward.  2. Breathe in deeply.  3. Try to hold your breath for 3 seconds.  4. Keep your mouth slightly open while coughing 2 times.  5. Spit any mucus out into a tissue.  6. Rest and do the steps again 1 or 2 times as needed.    General instructions  · Make sure you get all the shots (vaccines) that your doctor recommends. Ask your doctor about a flu shot and a pneumonia shot.  · Use oxygen therapy and pulmonary rehabilitation if told by your doctor. If you need home oxygen therapy, ask your doctor if you should buy a tool to measure your oxygen level (oximeter).  · Make a COPD action plan with your doctor. This helps you to know what to do if you feel worse than usual.  · Manage any other conditions you have as told by your doctor.  · Avoid going outside when it is very hot, cold, or humid.  · Avoid people who have a sickness you can catch (contagious).  · Keep all follow-up visits as told by your doctor. This is important.  Contact a doctor if:  · You cough up more mucus than usual.  · There is a change in the color or thickness of the mucus.  · It is harder to breathe than usual.  · Your breathing is faster than usual.  · You have trouble sleeping.  · You need to use your medicines more often than usual.  · You have trouble doing your normal activities such as getting dressed or walking around the house.  Get help right away if:  · You have shortness of breath while resting.  · You have shortness of breath that stops you from:  ? Being able to talk.  ? Doing normal activities.  · Your chest hurts for longer than 5 minutes.  · Your skin color is more blue than usual.  · Your pulse oximeter shows that you have low oxygen for longer than 5 minutes.  · You have a fever.  · You feel too tired to breathe normally.  Summary  · Chronic  obstructive pulmonary disease (COPD) is a long-term lung problem.  · The way your lungs work will never return to normal. Usually the condition gets worse over time. There are things you can do to keep yourself as healthy as possible.  · Take over-the-counter and prescription medicines only as told by your doctor.  · If you smoke, stop. Smoking makes the problem worse.  This information is not intended to replace advice given to you by your health care provider. Make sure you discuss any questions you have with your health care provider.  Document Released: 06/05/2009 Document Revised: 01/22/2018 Document Reviewed: 01/22/2018  [employer] Interactive Patient Education © 2019 [employer]

## 2019-04-22 DIAGNOSIS — J44.9 STAGE 1 MILD COPD BY GOLD CLASSIFICATION (HCC): ICD-10-CM

## 2019-04-30 ENCOUNTER — OFFICE VISIT (OUTPATIENT)
Dept: PRIMARY CARE CLINIC | Age: 84
End: 2019-04-30
Payer: MEDICARE

## 2019-04-30 VITALS
HEIGHT: 61 IN | WEIGHT: 111 LBS | DIASTOLIC BLOOD PRESSURE: 78 MMHG | SYSTOLIC BLOOD PRESSURE: 118 MMHG | HEART RATE: 64 BPM | BODY MASS INDEX: 20.96 KG/M2 | TEMPERATURE: 98.2 F | OXYGEN SATURATION: 99 %

## 2019-04-30 DIAGNOSIS — R71.8 ELEVATED MCV: ICD-10-CM

## 2019-04-30 DIAGNOSIS — Z78.0 POST-MENOPAUSAL: ICD-10-CM

## 2019-04-30 DIAGNOSIS — N18.30 CHRONIC KIDNEY DISEASE, STAGE III (MODERATE) (HCC): ICD-10-CM

## 2019-04-30 DIAGNOSIS — E67.3 VITAMIN D INTOXICATION: ICD-10-CM

## 2019-04-30 DIAGNOSIS — J43.1 PANLOBULAR EMPHYSEMA (HCC): Primary | ICD-10-CM

## 2019-04-30 DIAGNOSIS — I50.22 CHRONIC SYSTOLIC CHF (CONGESTIVE HEART FAILURE), NYHA CLASS 3 (HCC): ICD-10-CM

## 2019-04-30 DIAGNOSIS — Z23 NEED FOR PROPHYLACTIC VACCINATION AND INOCULATION AGAINST VARICELLA: ICD-10-CM

## 2019-04-30 DIAGNOSIS — Z79.01 ANTICOAGULATED: ICD-10-CM

## 2019-04-30 DIAGNOSIS — Z23 NEED FOR PROPHYLACTIC VACCINATION AGAINST STREPTOCOCCUS PNEUMONIAE (PNEUMOCOCCUS): ICD-10-CM

## 2019-04-30 DIAGNOSIS — I48.19 PERSISTENT ATRIAL FIBRILLATION (HCC): ICD-10-CM

## 2019-04-30 LAB
ALBUMIN SERPL-MCNC: 4.1 G/DL (ref 3.5–5.2)
ALP BLD-CCNC: 44 U/L (ref 35–104)
ALT SERPL-CCNC: 15 U/L (ref 5–33)
ANION GAP SERPL CALCULATED.3IONS-SCNC: 17 MMOL/L (ref 7–19)
AST SERPL-CCNC: 33 U/L (ref 5–32)
BILIRUB SERPL-MCNC: 0.8 MG/DL (ref 0.2–1.2)
BUN BLDV-MCNC: 38 MG/DL (ref 8–23)
CALCIUM SERPL-MCNC: 10.3 MG/DL (ref 8.8–10.2)
CHLORIDE BLD-SCNC: 103 MMOL/L (ref 98–111)
CO2: 26 MMOL/L (ref 22–29)
CREAT SERPL-MCNC: 1.3 MG/DL (ref 0.5–0.9)
DIGOXIN LEVEL: 0.8 NG/ML (ref 0.6–1.2)
GFR NON-AFRICAN AMERICAN: 39
GLUCOSE BLD-MCNC: 100 MG/DL (ref 74–109)
HCT VFR BLD CALC: 40.6 % (ref 37–47)
HEMOGLOBIN: 12.8 G/DL (ref 12–16)
MCH RBC QN AUTO: 32.4 PG (ref 27–31)
MCHC RBC AUTO-ENTMCNC: 31.5 G/DL (ref 33–37)
MCV RBC AUTO: 102.8 FL (ref 81–99)
PDW BLD-RTO: 13.8 % (ref 11.5–14.5)
PLATELET # BLD: 138 K/UL (ref 130–400)
PMV BLD AUTO: 12 FL (ref 9.4–12.3)
POTASSIUM SERPL-SCNC: 5.3 MMOL/L (ref 3.5–5)
PRO-BNP: 4575 PG/ML (ref 0–1800)
RBC # BLD: 3.95 M/UL (ref 4.2–5.4)
SODIUM BLD-SCNC: 146 MMOL/L (ref 136–145)
TOTAL PROTEIN: 7.6 G/DL (ref 6.6–8.7)
VITAMIN B-12: 503 PG/ML (ref 211–946)
VITAMIN D 25-HYDROXY: 49.5 NG/ML
WBC # BLD: 5.4 K/UL (ref 4.8–10.8)

## 2019-04-30 PROCEDURE — 99214 OFFICE O/P EST MOD 30 MIN: CPT | Performed by: NURSE PRACTITIONER

## 2019-04-30 PROCEDURE — 1036F TOBACCO NON-USER: CPT | Performed by: NURSE PRACTITIONER

## 2019-04-30 PROCEDURE — G8926 SPIRO NO PERF OR DOC: HCPCS | Performed by: NURSE PRACTITIONER

## 2019-04-30 PROCEDURE — 3023F SPIROM DOC REV: CPT | Performed by: NURSE PRACTITIONER

## 2019-04-30 PROCEDURE — G8420 CALC BMI NORM PARAMETERS: HCPCS | Performed by: NURSE PRACTITIONER

## 2019-04-30 PROCEDURE — 4040F PNEUMOC VAC/ADMIN/RCVD: CPT | Performed by: NURSE PRACTITIONER

## 2019-04-30 PROCEDURE — 1123F ACP DISCUSS/DSCN MKR DOCD: CPT | Performed by: NURSE PRACTITIONER

## 2019-04-30 PROCEDURE — G8427 DOCREV CUR MEDS BY ELIG CLIN: HCPCS | Performed by: NURSE PRACTITIONER

## 2019-04-30 PROCEDURE — 1090F PRES/ABSN URINE INCON ASSESS: CPT | Performed by: NURSE PRACTITIONER

## 2019-04-30 PROCEDURE — G8400 PT W/DXA NO RESULTS DOC: HCPCS | Performed by: NURSE PRACTITIONER

## 2019-04-30 ASSESSMENT — ENCOUNTER SYMPTOMS
VOICE CHANGE: 0
SORE THROAT: 0
SHORTNESS OF BREATH: 1
NAUSEA: 0
EYE REDNESS: 0
VOMITING: 0
TROUBLE SWALLOWING: 0
CONSTIPATION: 0
DIARRHEA: 0
CHEST TIGHTNESS: 0
BLOOD IN STOOL: 0
COUGH: 0
ABDOMINAL PAIN: 0
RHINORRHEA: 0
WHEEZING: 0

## 2019-04-30 NOTE — PROGRESS NOTES
[facility] PRIMARY CARE  [address] [place] [address]  [id number]  [place] [address]  Dept: [phone]  Dept Fax: [phone]  Loc: [phone]        Cristobal Wang is a 80 y.o. female who presents today for her medical conditions/ complaints as noted below. Cristobal Wang is c/o 3 Month Follow-Up (panlobular emphysema, CHF, CKD, PVD, weight loss, weakness, uri, oitis media, anticoagulatoin, nose bleed, nasal lesion) and Medication Check (Begin mupirocin bid, doxy, medrol dose pack)        Chief Complaint   Patient presents with    3 Month Follow-Up     panlobular emphysema, CHF, CKD, PVD, weight loss, weakness, uri, oitis media, anticoagulatoin, nose bleed, nasal lesion    Medication Check     Begin mupirocin bid, doxy, medrol dose pack       HPI:     HPI    Patient here for one-month follow-up. Patient is followed by multiple specialists such as cardiology, nephrology, and vascular. Cardiology notes: Notes from Vinnie CHENEY  \"OFFICE VISIT:  3/19/2019  Cristobal Wang - : 1933  Reason For Visit:  Bladimir Johnson is a 80 y.o. female who is here for Atrial Fibrillation  1. Persistent atrial fibrillation ([address]    2. Essential hypertension    3. Chronic systolic congestive heart failure [facility])       Patient presents to clinic today for 1 month follow-up. Patient has a history of congestive heart failure, chronic atrial fib, hypertension, COPD. On last clinic appointment on 2/15/2019 Imdur was added. Patient denies any complaints of chest pain, pressure or tightness. She is on oxygen. F/U in 3 months with cardiology. \"    Pulmonology: Miah CHENEY  Shortness of breath  Improved. Stage 1 mild COPD by GOLD classification (CMS/HCC)  - Pulmonary Function Test  - XR Chest 2 View; Future  Continue Spiriva. The patient is benefiting from it and wishes to continue it. Pulmonary emphysema, unspecified emphysema type (CMS/HCC)  Continue current treatment regimen.   Chronic respiratory failure with hypoxia (CMS/HCC)  Continue portable oxygen. The patient is benefiting from it and wishes to continue it. Underweight  Diet education provided. FU in 6 months. Nephrology: Son with pt today and states that pt will follow up with nephrology 28th of may. I do not have any progress notes in the computer in regards to appointment with Dr. Lilliana sAtudillo. Patient reports that they have been compliant with taking medications as directed. Past Medical History:   Diagnosis Date    Acute systolic CHF (congestive heart failure), NYHA class 3 (HCC) 2/24/2017    Allergic rhinitis     Anticoagulant long-term use     coumadin for atrial fib    Atrial fibrillation (HCC)     Chronic atrial fibrillation (HCC) 9/28/2017    Chronic back pain     Chronic kidney disease     Chronic kidney disease, stage III (moderate) ([facility]) 2/17/2017    Chronic systolic CHF (congestive heart failure), NYHA class 3 ([address] 2/17/2017    GERD (gastroesophageal reflux disease)     Glaucoma     Hearing loss     Hypertension     Osteoarthritis     Osteoporosis     Panlobular emphysema ([address] 11/28/2018    Sinus problem     Wears glasses        Past Surgical History:   Procedure Laterality Date    BREAST BIOPSY      Left-benign    BREAST BIOPSY  feb 2014    Left    HYSTERECTOMY      OVARY SURGERY      tumor removed        Social History     Socioeconomic History    Marital status:       Spouse name: None    Number of children: None    Years of education: None    Highest education level: None   Occupational History    None   Social Needs    Financial resource strain: None    Food insecurity:     Worry: None     Inability: None    Transportation needs:     Medical: None     Non-medical: None   Tobacco Use    Smoking status: Never Smoker    Smokeless tobacco: Never Used   Substance and Sexual Activity    Alcohol use: No    Drug use: No    Sexual activity: Never   Lifestyle    Physical activity:     Days per week: None     Minutes per session: None    Stress: None   Relationships    Social connections:     Talks on phone: None     Gets together: None     Attends Voodoo service: None     Active member of club or organization: None     Attends meetings of clubs or organizations: None     Relationship status: None    Intimate partner violence:     Fear of current or ex partner: None     Emotionally abused: None     Physically abused: None     Forced sexual activity: None   Other Topics Concern    None   Social History Narrative    None       Family History   Problem Relation Age of Onset    Asthma Mother     Emphysema Mother     Alzheimer's Disease Sister     Heart Disease Brother     Heart Disease Sister     No Known Problems Sister     Alzheimer's Disease Sister        Current Outpatient Medications   Medication Sig Dispense Refill    zoster recombinant adjuvanted vaccine (SHINGRIX) 50 MCG/0.5ML SUSR injection Inject 0.5 mLs into the muscle once for 1 dose 50 MCG IM then repeat 2-6 months.  1 each 1    tiotropium (SPIRIVA RESPIMAT) 2.5 MCG/ACT AERS inhaler Inhale 2 puffs into the lungs daily 1 Inhaler 2    isosorbide mononitrate (IMDUR) 30 MG extended release tablet Take 1 tablet by mouth daily 30 tablet 3    fexofenadine (ALLEGRA) 180 MG tablet Take 180 mg by mouth daily      donepezil (ARICEPT) 5 MG tablet Take 1 tablet by mouth nightly 30 tablet 0    nitroGLYCERIN (NITROSTAT) 0.4 MG SL tablet Place 1 tablet under the tongue every 5 minutes as needed for Chest pain 25 tablet 3    allopurinol (ZYLOPRIM) 100 MG tablet Take 1 tablet by mouth daily 30 tablet 1    fluticasone (FLONASE) 50 MCG/ACT nasal spray 1 spray by Each Nare route daily 1 Spray in each nostril 2 Bottle 1    metoprolol (LOPRESSOR) 50 MG tablet Take 1 tablet by mouth 2 times daily 180 tablet 3    montelukast (SINGULAIR) 10 MG tablet Take 10 mg by mouth nightly      mupirocin (BACTROBAN) 2 % cream Apply topically 3 times daily Apply topically 3 times daily.  furosemide (LASIX) 20 MG tablet Take 20 mg by mouth daily      warfarin (COUMADIN) 5 MG tablet Take 1 tablet by mouth daily 90 tablet 3    digoxin (LANOXIN) 125 MCG tablet Take 1 tablet by mouth daily (Patient taking differently: Take 125 mcg by mouth every other day ) 90 tablet 3    atorvastatin (LIPITOR) 10 MG tablet Take 10 mg by mouth daily       bimatoprost 0.01 % SOLN Place 1 drop into both eyes nightly        No current facility-administered medications for this visit. Allergies   Allergen Reactions    Denosumab Other (See Comments)     after had shot got blood in urine--not sure if associated      Lisinopril Other (See Comments)     cough         Lab Review not applicable  notapplicable    Subjective:   Review of Systems   Constitutional: Positive for fatigue. Negative for activity change, appetite change, fever and unexpected weight change. HENT: Negative for congestion, ear pain, nosebleeds, rhinorrhea, sore throat, trouble swallowing and voice change. Nose bleed   Eyes: Negative for redness and visual disturbance. Respiratory: Positive for shortness of breath. Negative for cough, chest tightness and wheezing. Cardiovascular: Negative for chest pain, palpitations and leg swelling. Gastrointestinal: Negative for abdominal pain, blood in stool, constipation, diarrhea, nausea and vomiting. Endocrine: Negative for polydipsia, polyphagia and polyuria. Genitourinary: Negative for dysuria, frequency and urgency. Musculoskeletal: Negative for myalgias. Skin: Negative for rash and wound. Neurological: Negative for dizziness, speech difficulty, light-headedness and headaches. Psychiatric/Behavioral: Negative for agitation, confusion, self-injury and suicidal ideas. The patient is not nervous/anxious. Objective:     Physical Exam   Constitutional: She appears well-developed and well-nourished. No distress. prophylactic vaccination against Streptococcus pneumoniae (pneumococcus)     4. Need for prophylactic vaccination and inoculation against varicella  zoster recombinant adjuvanted vaccine (SHINGRIX) 50 MCG/0.5ML SUSR injection   5. Anticoagulated     6. Chronic systolic CHF (congestive heart failure), NYHA class 3 (HCC)  Brain Natriuretic Peptide    Digoxin Level   7. Elevated MCV  Vitamin B12   8. Persistent atrial fibrillation ([address]     9. Chronic kidney disease, stage III (moderate) (HCC)  CBC    Comprehensive Metabolic Panel   10. Vitamin D intoxication  Vitamin D 25 Hydroxy     No results found for this visit on 04/30/19. Plan:     1. Panlobular emphysema ([address]    2. Post-menopausal    3. Need for prophylactic vaccination against Streptococcus pneumoniae (pneumococcus)    4. Need for prophylactic vaccination and inoculation against varicella    5. Anticoagulated    6. Chronic systolic CHF (congestive heart failure), NYHA class 3 (HCC)    7. Elevated MCV    8. Persistent atrial fibrillation ([address]    9. Chronic kidney disease, stage III (moderate) ([facility])    10. Vitamin D intoxication      Return in about 3 months (around 7/30/2019) for 30 min appointment, medication recheck. Orders Placed This Encounter   Procedures    DEXA Bone Density Axial Skeleton     Standing Status:   Future     Standing Expiration Date:   4/30/2020     Order Specific Question:   Reason for exam:     Answer:   osteoperosis.     Vitamin D 25 Hydroxy     Standing Status:   Future     Number of Occurrences:   1     Standing Expiration Date:   4/30/2020    CBC     Standing Status:   Future     Number of Occurrences:   1     Standing Expiration Date:   4/29/2020    Comprehensive Metabolic Panel     Standing Status:   Future     Number of Occurrences:   1     Standing Expiration Date:   4/30/2020    Vitamin B12     Standing Status:   Future     Number of Occurrences:   1     Standing Expiration Date:   6/3/2020    Brain Natriuretic Peptide     Standing Status:   Future     Number of Occurrences:   1     Standing Expiration Date:   4/30/2020    Digoxin Level     Standing Status:   Future     Number of Occurrences:   1     Standing Expiration Date:   4/30/2020     Order Specific Question:   Dose Schedule & Time of Last Dose? Answer:   unknown     Orders Placed This Encounter   Medications    zoster recombinant adjuvanted vaccine (SHINGRIX) 50 MCG/0.5ML SUSR injection     Sig: Inject 0.5 mLs into the muscle once for 1 dose 50 MCG IM then repeat 2-6 months. Dispense:  1 each     Refill:  1    tiotropium (SPIRIVA RESPIMAT) 2.5 MCG/ACT AERS inhaler     Sig: Inhale 2 puffs into the lungs daily     Dispense:  1 Inhaler     Refill:  2       Patient Instructions   Have labs on the 2nd floor. I will follow up in 3 [facility]. Patient/family given educational materials - see patient instructions. Discussed use, benefit, and side effects of prescribed medications. All patient/family questions answered and voiced understanding. Instructed to continue current medications, diet and exercise. Pt/family agreed with treatment plan. Follow up as directed and sooner if needed. Patient/ family instructed that is symptoms worsen or persist they are to contact office or report to nearest ER. They voice understanding and agreement with this plan.      Electronically signed by NONI Doss on 4/30/2019 at 11:07 AM

## 2019-05-02 ENCOUNTER — TELEPHONE (OUTPATIENT)
Dept: PRIMARY CARE CLINIC | Age: 84
End: 2019-05-02

## 2019-05-02 ENCOUNTER — ANTI-COAG VISIT (OUTPATIENT)
Dept: CARDIOLOGY | Age: 84
End: 2019-05-02

## 2019-05-02 DIAGNOSIS — I48.19 PERSISTENT ATRIAL FIBRILLATION (HCC): ICD-10-CM

## 2019-05-02 LAB — INR BLD: 1.7

## 2019-05-02 RX ORDER — SODIUM POLYSTYRENE SULFONATE 15 G/60ML
15 SUSPENSION ORAL; RECTAL ONCE
Qty: 1 BOTTLE | Refills: 3 | Status: ON HOLD | OUTPATIENT
Start: 2019-05-02 | End: 2019-12-07

## 2019-05-02 NOTE — TELEPHONE ENCOUNTER
Horacio Bansal, NONI Pickering Rife             Critical results pt needs to return to discuss. Pt potassium is too high at 5.3.  Pt needs kayexalate      Contacted pt's daughter and gave results above. Made pt appt to return to office tomorrow 5-3-19 at 10:20. Sent in kayexalate to pt's preferred pharmacy on file.  PP, LPN

## 2019-05-03 ENCOUNTER — OFFICE VISIT (OUTPATIENT)
Dept: PRIMARY CARE CLINIC | Age: 84
End: 2019-05-03
Payer: MEDICARE

## 2019-05-03 VITALS
OXYGEN SATURATION: 98 % | WEIGHT: 112 LBS | DIASTOLIC BLOOD PRESSURE: 84 MMHG | SYSTOLIC BLOOD PRESSURE: 140 MMHG | HEIGHT: 61 IN | TEMPERATURE: 97.6 F | HEART RATE: 66 BPM | BODY MASS INDEX: 21.14 KG/M2

## 2019-05-03 DIAGNOSIS — J43.1 PANLOBULAR EMPHYSEMA (HCC): ICD-10-CM

## 2019-05-03 DIAGNOSIS — Z23 NEED FOR PROPHYLACTIC VACCINATION AGAINST STREPTOCOCCUS PNEUMONIAE (PNEUMOCOCCUS): Primary | ICD-10-CM

## 2019-05-03 DIAGNOSIS — J44.1 COPD EXACERBATION (HCC): ICD-10-CM

## 2019-05-03 DIAGNOSIS — Z78.0 POST-MENOPAUSAL: ICD-10-CM

## 2019-05-03 DIAGNOSIS — E87.5 HYPERKALEMIA: ICD-10-CM

## 2019-05-03 DIAGNOSIS — Z23 NEED FOR PROPHYLACTIC VACCINATION AND INOCULATION AGAINST VARICELLA: ICD-10-CM

## 2019-05-03 PROCEDURE — 1036F TOBACCO NON-USER: CPT | Performed by: NURSE PRACTITIONER

## 2019-05-03 PROCEDURE — G8400 PT W/DXA NO RESULTS DOC: HCPCS | Performed by: NURSE PRACTITIONER

## 2019-05-03 PROCEDURE — 1090F PRES/ABSN URINE INCON ASSESS: CPT | Performed by: NURSE PRACTITIONER

## 2019-05-03 PROCEDURE — 99213 OFFICE O/P EST LOW 20 MIN: CPT | Performed by: NURSE PRACTITIONER

## 2019-05-03 PROCEDURE — G8926 SPIRO NO PERF OR DOC: HCPCS | Performed by: NURSE PRACTITIONER

## 2019-05-03 PROCEDURE — G8420 CALC BMI NORM PARAMETERS: HCPCS | Performed by: NURSE PRACTITIONER

## 2019-05-03 PROCEDURE — G0009 ADMIN PNEUMOCOCCAL VACCINE: HCPCS | Performed by: NURSE PRACTITIONER

## 2019-05-03 PROCEDURE — 4040F PNEUMOC VAC/ADMIN/RCVD: CPT | Performed by: NURSE PRACTITIONER

## 2019-05-03 PROCEDURE — 1123F ACP DISCUSS/DSCN MKR DOCD: CPT | Performed by: NURSE PRACTITIONER

## 2019-05-03 PROCEDURE — 90732 PPSV23 VACC 2 YRS+ SUBQ/IM: CPT | Performed by: NURSE PRACTITIONER

## 2019-05-03 PROCEDURE — G8427 DOCREV CUR MEDS BY ELIG CLIN: HCPCS | Performed by: NURSE PRACTITIONER

## 2019-05-03 PROCEDURE — 3023F SPIROM DOC REV: CPT | Performed by: NURSE PRACTITIONER

## 2019-05-03 RX ORDER — SODIUM POLYSTYRENE SULFONATE 15 G/60ML
15 SUSPENSION ORAL; RECTAL ONCE
Qty: 1 BOTTLE | Refills: 3 | Status: ON HOLD | OUTPATIENT
Start: 2019-05-03 | End: 2019-12-07

## 2019-05-03 NOTE — PROGRESS NOTES
After obtaining consent, and per orders of NONI Loving, injection of pneumo given in Right deltoid by David Chowdhury LPN. Patient instructed to remain in clinic for 20 minutes afterwards, and to report any adverse reaction to me immediately.

## 2019-05-03 NOTE — PROGRESS NOTES
[facility]  [address] [place], [place] [id number]  [id number]  Dept: [phone]  Dept Fax: [phone]  Loc: [phone]        Jabari Nava is a 80 y.o. female who presents today for her medical conditions/ complaints as noted below. Jabari Nava is c/o Discuss Labs and Other (hyperkalemia)        Chief Complaint   Patient presents with    Discuss Labs    Other     hyperkalemia       HPI:     HPI    Labs: Pt K+ is elevated at 5.3, pt creat 1.3, and gfr is 39. She is currently on lasix 20mg daily. She is not on supplimental K+ or other mediction that causes overt problems with elevation in K+. Plan to treat with kayexalate and repeat cmp on Monday. COPD:  Pt states that she cannot afford spiriva. She states that it was greater than 200 dollars. Discussed with pt that she needs to contact her supplemental insurance provider and discuss what medications in that medication class they will cover. Son is in the room with pt and he will also call and try to obtain book containing list of medications that will be approoved. Patient reports that they have been compliant with taking medications as directed.      Past Medical History:   Diagnosis Date    Acute systolic CHF (congestive heart failure), NYHA class 3 (HCC) 2/24/2017    Allergic rhinitis     Anticoagulant long-term use     coumadin for atrial fib    Atrial fibrillation (HCC)     Chronic atrial fibrillation (HCC) 9/28/2017    Chronic back pain     Chronic kidney disease     Chronic kidney disease, stage III (moderate) (HCC) 2/17/2017    Chronic systolic CHF (congestive heart failure), NYHA class 3 ([address] 2/17/2017    GERD (gastroesophageal reflux disease)     Glaucoma     Hearing loss     Hypertension     Osteoarthritis     Osteoporosis     Panlobular emphysema ([address] 11/28/2018    Sinus problem     Wears glasses        Past Surgical History:   Procedure Laterality Date    BREAST extended release tablet Take 1 tablet by mouth daily 30 tablet 3    fexofenadine (ALLEGRA) 180 MG tablet Take 180 mg by mouth daily      donepezil (ARICEPT) 5 MG tablet Take 1 tablet by mouth nightly 30 tablet 0    nitroGLYCERIN (NITROSTAT) 0.4 MG SL tablet Place 1 tablet under the tongue every 5 minutes as needed for Chest pain 25 tablet 3    allopurinol (ZYLOPRIM) 100 MG tablet Take 1 tablet by mouth daily 30 tablet 1    fluticasone (FLONASE) 50 MCG/ACT nasal spray 1 spray by Each Nare route daily 1 Spray in each nostril 2 Bottle 1    metoprolol (LOPRESSOR) 50 MG tablet Take 1 tablet by mouth 2 times daily 180 tablet 3    montelukast (SINGULAIR) 10 MG tablet Take 10 mg by mouth nightly      mupirocin (BACTROBAN) 2 % cream Apply topically 3 times daily Apply topically 3 times daily.  furosemide (LASIX) 20 MG tablet Take 20 mg by mouth daily      warfarin (COUMADIN) 5 MG tablet Take 1 tablet by mouth daily 90 tablet 3    digoxin (LANOXIN) 125 MCG tablet Take 1 tablet by mouth daily (Patient taking differently: Take 125 mcg by mouth every other day ) 90 tablet 3    atorvastatin (LIPITOR) 10 MG tablet Take 10 mg by mouth daily       bimatoprost 0.01 % SOLN Place 1 drop into both eyes nightly       sodium polystyrene (SPS) 15 GM/60ML suspension Take 60 mLs by mouth once for 1 dose 1 Bottle 3     No current facility-administered medications for this visit. Allergies   Allergen Reactions    Denosumab Other (See Comments)     after had shot got blood in urine--not sure if associated      Lisinopril Other (See Comments)     cough         Lab Review not applicable  notapplicable    Subjective:   Review of Systems   Constitutional: Positive for fatigue. Negative for activity change, appetite change, fever and unexpected weight change. HENT: Negative for congestion, ear pain, nosebleeds, rhinorrhea, sore throat, trouble swallowing and voice change.          Nose bleed   Eyes: Negative for redness and visual disturbance. Respiratory: Positive for shortness of breath. Negative for cough, chest tightness and wheezing. Cardiovascular: Negative for chest pain, palpitations and leg swelling. Gastrointestinal: Negative for abdominal pain, blood in stool, constipation, diarrhea, nausea and vomiting. Endocrine: Negative for polydipsia, polyphagia and polyuria. Genitourinary: Negative for dysuria, frequency and urgency. Musculoskeletal: Negative for myalgias. Skin: Negative for rash and wound. Neurological: Negative for dizziness, speech difficulty, light-headedness and headaches. Psychiatric/Behavioral: Negative for agitation, confusion, self-injury and suicidal ideas. The patient is not nervous/anxious. Objective:     Physical Exam   Constitutional: She appears well-developed and well-nourished. No distress. HENT:   Head: Normocephalic and atraumatic. Right Ear: External ear normal. Decreased hearing is noted. Left Ear: External ear normal. Decreased hearing is noted. Nose: Nose normal.   Mouth/Throat: Oropharynx is clear and moist. No oropharyngeal exudate. Pt wears hearing aids. Eyes: Pupils are equal, round, and reactive to light. Conjunctivae are normal. Right eye exhibits no discharge. Left eye exhibits no discharge. Neck: Normal range of motion. Neck supple. Cardiovascular: Normal rate and intact distal pulses. An irregularly irregular rhythm present. No murmur heard. Pulses:       Posterior tibial pulses are 1+ on the right side, and 1+ on the left side. Decreased pedal pulses 1+   Pulmonary/Chest: Effort normal. No stridor. No respiratory distress. She has decreased breath sounds. She has no wheezes. She has no rales. She exhibits no tenderness. Right breast exhibits no inverted nipple, no mass, no nipple discharge, no skin change and no tenderness. Left breast exhibits no inverted nipple, no mass, no nipple discharge, no skin change and no tenderness. Abdominal: Soft. Bowel sounds are normal. She exhibits no distension. There is no tenderness. Musculoskeletal: Normal range of motion. She exhibits no edema, tenderness or deformity. Back:    Neurological: She is alert. She has normal reflexes. No cranial nerve deficit. Coordination normal.   Skin: Skin is warm and dry. No rash noted. She is not diaphoretic. There is erythema. Psychiatric: She has a normal mood and affect. Her behavior is normal. Thought content normal.   Nursing note and vitals reviewed. BP (!) 140/84   Pulse 66   Temp 97.6 °F (36.4 °C)   Ht 5' 1\" (1.549 m)   Wt 112 lb (50.8 kg)   SpO2 98%   BMI 21.16 kg/m²     Assessment:      Diagnosis Orders   1. Need for prophylactic vaccination against Streptococcus pneumoniae (pneumococcus)  Pneumococcal polysaccharide vaccine 23-valent PPSV23   2. Post-menopausal  DEXA Bone Density Axial Skeleton   3. Need for prophylactic vaccination and inoculation against varicella  zoster recombinant adjuvanted vaccine (SHINGRIX) 50 MCG/0.5ML SUSR injection   4. Panlobular emphysema (HCC)  Tiotropium Bromide-Olodaterol 2.5-2.5 MCG/ACT AERS   5. COPD exacerbation (HCC)  Tiotropium Bromide-Olodaterol 2.5-2.5 MCG/ACT AERS   6. Hyperkalemia  sodium polystyrene (SPS) 15 GM/60ML suspension    Comprehensive Metabolic Panel     No results found for this visit on 05/03/19. Plan:     1. Need for prophylactic vaccination against Streptococcus pneumoniae (pneumococcus)    2. Post-menopausal    3. Need for prophylactic vaccination and inoculation against varicella    4. Panlobular emphysema ([address]    5. COPD exacerbation ([address]    6. Hyperkalemia      Return in about 1 month (around 6/3/2019), or if symptoms worsen or fail to improve.   Orders Placed This Encounter   Procedures    DEXA Bone Density Axial Skeleton     Standing Status:   Future     Standing Expiration Date:   5/3/2020    Pneumococcal polysaccharide vaccine 23-valent PPSV23    Comprehensive Metabolic Panel     Standing Status:   Future     Standing Expiration Date:   5/3/2020     Orders Placed This Encounter   Medications    zoster recombinant adjuvanted vaccine [facility]) 50 MCG/0.5ML SUSR injection     Sig: Inject 0.5 mLs into the muscle once for 1 dose 50 MCG IM then repeat 2-6 months. Dispense:  1 each     Refill:  1    Tiotropium Bromide-Olodaterol 2.5-2.5 MCG/ACT AERS     Sig: Inhale 2 Inhalers into the lungs daily     Dispense:  1 Inhaler     Refill:  1    sodium polystyrene (SPS) 15 GM/60ML suspension     Sig: Take 60 mLs by mouth once for 1 dose     Dispense:  1 Bottle     Refill:  3       Patient Instructions   Have cmp drawn on Monday. Take kayexalate today    Discuss with pharmacy if stiolto is covered. If it is not covered I will prescribe something different. Patient/family given educational materials - see patient instructions. Discussed use, benefit, and side effects of prescribed medications. All patient/family questions answered and voiced understanding. Instructed to continue current medications, diet and exercise. Pt/family agreed with treatment plan. Follow up as directed and sooner if needed. Patient/ family instructed that is symptoms worsen or persist they are to contact office or report to nearest ER. They voice understanding and agreement with this plan.      Electronically signed by NONI Cordero on 5/6/2019 at 11:25 AM

## 2019-05-06 ASSESSMENT — ENCOUNTER SYMPTOMS
DIARRHEA: 0
SHORTNESS OF BREATH: 1
CHEST TIGHTNESS: 0
WHEEZING: 0
BLOOD IN STOOL: 0
VOMITING: 0
RHINORRHEA: 0
EYE REDNESS: 0
TROUBLE SWALLOWING: 0
SORE THROAT: 0
NAUSEA: 0
CONSTIPATION: 0
ABDOMINAL PAIN: 0
VOICE CHANGE: 0
COUGH: 0

## 2019-05-08 ENCOUNTER — TELEPHONE (OUTPATIENT)
Dept: PRIMARY CARE CLINIC | Age: 84
End: 2019-05-08

## 2019-05-08 DIAGNOSIS — E87.5 HYPERKALEMIA: ICD-10-CM

## 2019-05-08 LAB
ALBUMIN SERPL-MCNC: 3.6 G/DL (ref 3.5–5.2)
ALP BLD-CCNC: 35 U/L (ref 35–104)
ALT SERPL-CCNC: 11 U/L (ref 5–33)
ANION GAP SERPL CALCULATED.3IONS-SCNC: 11 MMOL/L (ref 7–19)
AST SERPL-CCNC: 24 U/L (ref 5–32)
BILIRUB SERPL-MCNC: 0.6 MG/DL (ref 0.2–1.2)
BUN BLDV-MCNC: 33 MG/DL (ref 8–23)
CALCIUM SERPL-MCNC: 9.6 MG/DL (ref 8.8–10.2)
CHLORIDE BLD-SCNC: 104 MMOL/L (ref 98–111)
CO2: 27 MMOL/L (ref 22–29)
CREAT SERPL-MCNC: 1.3 MG/DL (ref 0.5–0.9)
GFR NON-AFRICAN AMERICAN: 39
GLUCOSE BLD-MCNC: 91 MG/DL (ref 74–109)
POTASSIUM SERPL-SCNC: 4.8 MMOL/L (ref 3.5–5)
SODIUM BLD-SCNC: 142 MMOL/L (ref 136–145)
TOTAL PROTEIN: 7 G/DL (ref 6.6–8.7)

## 2019-05-08 NOTE — TELEPHONE ENCOUNTER
----- Message from NONI Flor sent at 5/8/2019  2:22 PM CDT -----  Results are abnormal. Potassium has improved. Drink water to help with kidney function.

## 2019-05-08 NOTE — TELEPHONE ENCOUNTER
Tried to call patient, was not able to speak with anyone on the home number, no answer on the mobile number.

## 2019-05-09 ENCOUNTER — TELEPHONE (OUTPATIENT)
Dept: PRIMARY CARE CLINIC | Age: 84
End: 2019-05-09

## 2019-05-09 NOTE — TELEPHONE ENCOUNTER
Result Notes for Comprehensive Metabolic Panel     Notes recorded by NONI Castro on 5/8/2019 at 2:22 PM CDT  Results are abnormal. Potassium has improved. Drink water to help with kidney function. Attempted to contact pt's daughter (on [employer]) no answer, left vm requesting return call to office in regards to test results.  PP, LPN

## 2019-05-09 NOTE — TELEPHONE ENCOUNTER
Result Notes for Comprehensive Metabolic Panel      Notes recorded by NONI Nieves on 5/8/2019 at 2:22 PM CDT  Results are abnormal. Potassium has improved. Drink water to help with kidney function. Pt's daughter contacted office back in regards to results above. Gave results above. Daughter verbalized understanding of all.  PP, LPN

## 2019-05-10 ENCOUNTER — HOSPITAL ENCOUNTER (OUTPATIENT)
Dept: GENERAL RADIOLOGY | Age: 84
Discharge: HOME | End: 2019-05-10
Payer: MEDICARE

## 2019-05-10 ENCOUNTER — OFFICE VISIT (OUTPATIENT)
Dept: URGENT CARE | Age: 84
End: 2019-05-10
Payer: MEDICARE

## 2019-05-10 VITALS
SYSTOLIC BLOOD PRESSURE: 122 MMHG | BODY MASS INDEX: 20.97 KG/M2 | HEART RATE: 93 BPM | DIASTOLIC BLOOD PRESSURE: 62 MMHG | OXYGEN SATURATION: 98 % | TEMPERATURE: 99.5 F | WEIGHT: 111 LBS | RESPIRATION RATE: 20 BRPM

## 2019-05-10 DIAGNOSIS — J43.1 PANLOBULAR EMPHYSEMA (HCC): ICD-10-CM

## 2019-05-10 DIAGNOSIS — I48.20 ATRIAL FIBRILLATION, CHRONIC (HCC): ICD-10-CM

## 2019-05-10 DIAGNOSIS — R05.8 COUGH WITH SPUTUM: ICD-10-CM

## 2019-05-10 DIAGNOSIS — R05.8 COUGH WITH SPUTUM: Primary | ICD-10-CM

## 2019-05-10 PROBLEM — I48.19 PERSISTENT ATRIAL FIBRILLATION (HCC): Status: RESOLVED | Noted: 2017-01-31 | Resolved: 2019-05-10

## 2019-05-10 PROCEDURE — 99213 OFFICE O/P EST LOW 20 MIN: CPT | Performed by: NURSE PRACTITIONER

## 2019-05-10 PROCEDURE — 71046 X-RAY EXAM CHEST 2 VIEWS: CPT

## 2019-05-10 RX ORDER — CEPHALEXIN 500 MG/1
500 CAPSULE ORAL 2 TIMES DAILY
Qty: 14 CAPSULE | Refills: 0 | Status: SHIPPED | OUTPATIENT
Start: 2019-05-10 | End: 2019-05-17

## 2019-05-10 ASSESSMENT — ENCOUNTER SYMPTOMS
SHORTNESS OF BREATH: 0
EYES NEGATIVE: 1
RHINORRHEA: 1
SORE THROAT: 1
SINUS PRESSURE: 1
BACK PAIN: 0
SINUS PAIN: 1
COUGH: 1
GASTROINTESTINAL NEGATIVE: 1
ABDOMINAL PAIN: 0
ALLERGIC/IMMUNOLOGIC NEGATIVE: 1

## 2019-05-10 NOTE — PATIENT INSTRUCTIONS
Plenty of fluids, drink more water  OTC Tylenol or Motrin as needed  Keflex 500 mg po bid for 7 days  Check PT/INR at home on 5-20-19 son given instructions to do so    Patient Education        Chronic Obstructive Pulmonary Disease (COPD): Care Instructions  Your Care Instructions    Chronic obstructive pulmonary disease (COPD) is a general term for a group of lung diseases, including emphysema and chronic bronchitis. People with COPD have decreased airflow in and out of the lungs, which makes it hard to breathe. The airways also can get clogged with thick mucus. Cigarette smoking is a major cause of COPD. Although there is no cure for COPD, you can slow its progress. Following your treatment plan and taking care of yourself can help you feel better and live longer. Follow-up care is a key part of your treatment and safety. Be sure to make and go to all appointments, and call your doctor if you are having problems. It's also a good idea to know your test results and keep a list of the medicines you take. How can you care for yourself at home?   Staying healthy    · Do not smoke. This is the most important step you can take to prevent more damage to your lungs. If you need help quitting, talk to your doctor about stop-smoking programs and medicines. These can increase your chances of quitting for good.     · Avoid colds and flu. Get a pneumococcal vaccine shot. If you have had one before, ask your doctor whether you need a second dose. Get the flu vaccine every fall. If you must be around people with colds or the flu, wash your hands often.     · Avoid secondhand smoke, air pollution, and high altitudes. Also avoid cold, dry air and hot, humid air. Stay at home with your windows closed when air pollution is bad.    Medicines and oxygen therapy    · Take your medicines exactly as prescribed.  Call your doctor if you think you are having a problem with your medicine.     · You may be taking medicines such

## 2019-05-10 NOTE — PROGRESS NOTES
[address] [id number] [facility] Korey Mcfarlane [address]  Dept: [phone]  Loc: [phone]    Cristobal Wang is a 80 y.o. female who presents today for her medical conditions/complaintsas noted below. Cristobal Wang is c/o of Headache; Pharyngitis; and Cough        HPI:     YULIET Johnson is here today with complaint of headaches, pharyngitis and cough with white, yellow sputum. She denies fever, chills or chest pain. Her son is here and was seen on Monday with similar symptoms and he wanted her checked for bronchitis since she has significant hx of COPD and is on 02 2L NC.       Past Medical History:   Diagnosis Date    Acute systolic CHF (congestive heart failure), NYHA class 3 ([facility]) 2/24/2017    Allergic rhinitis     Anticoagulant long-term use     coumadin for atrial fib    Atrial fibrillation (HCC)     Chronic atrial fibrillation ([facility]) 9/28/2017    Chronic back pain     Chronic kidney disease     Chronic kidney disease, stage III (moderate) ([facility]) 2/17/2017    Chronic systolic CHF (congestive heart failure), NYHA class 3 ([address] 2/17/2017    GERD (gastroesophageal reflux disease)     Glaucoma     Hearing loss     Hypertension     Osteoarthritis     Osteoporosis     Panlobular emphysema ([address] 11/28/2018    Sinus problem     Wears glasses      Past Surgical History:   Procedure Laterality Date    BREAST BIOPSY      Left-benign    BREAST BIOPSY  feb 2014    Left    HYSTERECTOMY      OVARY SURGERY      tumor removed        Family History   Problem Relation Age of Onset    Asthma Mother     Emphysema Mother     Alzheimer's Disease Sister     Heart Disease Brother     Heart Disease Sister     No Known Problems Sister     Alzheimer's Disease Sister        Social History     Tobacco Use    Smoking status: Never Smoker    Smokeless tobacco: Never Used   Substance Use Topics    Alcohol use: No      Current Outpatient Medications Medication Sig Dispense Refill    cephALEXin (KEFLEX) 500 MG capsule Take 1 capsule by mouth 2 times daily for 7 days 14 capsule 0    Tiotropium Bromide-Olodaterol 2.5-2.5 MCG/ACT AERS Inhale 2 Inhalers into the lungs daily 1 Inhaler 1    tiotropium (SPIRIVA RESPIMAT) 2.5 MCG/ACT AERS inhaler Inhale 2 puffs into the lungs daily 1 Inhaler 2    isosorbide mononitrate (IMDUR) 30 MG extended release tablet Take 1 tablet by mouth daily 30 tablet 3    fexofenadine (ALLEGRA) 180 MG tablet Take 180 mg by mouth daily      donepezil (ARICEPT) 5 MG tablet Take 1 tablet by mouth nightly 30 tablet 0    nitroGLYCERIN (NITROSTAT) 0.4 MG SL tablet Place 1 tablet under the tongue every 5 minutes as needed for Chest pain 25 tablet 3    fluticasone (FLONASE) 50 MCG/ACT nasal spray 1 spray by Each Nare route daily 1 Spray in each nostril 2 Bottle 1    metoprolol (LOPRESSOR) 50 MG tablet Take 1 tablet by mouth 2 times daily 180 tablet 3    montelukast (SINGULAIR) 10 MG tablet Take 10 mg by mouth nightly      mupirocin (BACTROBAN) 2 % cream Apply topically 3 times daily Apply topically 3 times daily.  furosemide (LASIX) 20 MG tablet Take 20 mg by mouth daily      warfarin (COUMADIN) 5 MG tablet Take 1 tablet by mouth daily 90 tablet 3    digoxin (LANOXIN) 125 MCG tablet Take 1 tablet by mouth daily (Patient taking differently: Take 125 mcg by mouth every other day ) 90 tablet 3    atorvastatin (LIPITOR) 10 MG tablet Take 10 mg by mouth daily       bimatoprost 0.01 % SOLN Place 1 drop into both eyes nightly       sodium polystyrene (SPS) 15 GM/60ML suspension Take 60 mLs by mouth once for 1 dose 1 Bottle 3    sodium polystyrene (SPS) 15 GM/60ML suspension Take 60 mLs by mouth once for 1 dose 1 Bottle 3    allopurinol (ZYLOPRIM) 100 MG tablet Take 1 tablet by mouth daily 30 tablet 1     No current facility-administered medications for this visit.       Allergies   Allergen Reactions    Denosumab Other (See Comments)     after had shot got blood in urine--not sure if associated      Lisinopril Other (See Comments)     cough         Health Maintenance   Topic Date Due    Shingles Vaccine (1 of 2) 07/02/1983    DEXA (modify frequency per FRAX score)  07/02/1998    Pneumococcal 65+ years Vaccine (2 of 2 - PCV13) 05/03/2020    Potassium monitoring  05/08/2020    Creatinine monitoring  05/08/2020    DTaP/Tdap/Td vaccine (2 - Td) 10/01/2020    Flu vaccine  Completed    Colon cancer screen colonoscopy  Completed       Subjective:     Review of Systems   Constitutional: Negative for activity change, appetite change, chills and fever. HENT: Positive for congestion, hearing loss, postnasal drip, rhinorrhea, sinus pressure, sinus pain and sore throat. Negative for ear discharge and ear pain. Hard of hearing    Eyes: Negative. Respiratory: Positive for cough. Negative for shortness of breath. Productive cough white/yellow   Cardiovascular: Negative. Gastrointestinal: Negative. Negative for abdominal pain. Endocrine: Negative. Genitourinary: Negative. Negative for dysuria, frequency and urgency. Musculoskeletal: Negative. Negative for arthralgias, back pain and myalgias. Skin: Negative. Negative for rash. Allergic/Immunologic: Negative. Neurological: Negative. Negative for weakness and headaches. Hematological: Negative. Psychiatric/Behavioral: Negative.        :Objective      Physical Exam   Constitutional: She is oriented to person, place, and time. Vital signs are normal. She appears cachectic. She appears ill. No distress. Chronically ill   HENT:   Head: Normocephalic. Right Ear: Hearing, tympanic membrane, external ear and ear canal normal.   Left Ear: Hearing, tympanic membrane, external ear and ear canal normal.   Nose: Rhinorrhea present. Right sinus exhibits no maxillary sinus tenderness and no frontal sinus tenderness.  Left sinus exhibits no maxillary sinus tenderness and no frontal sinus tenderness. Mouth/Throat: Uvula is midline and mucous membranes are normal. Posterior oropharyngeal erythema present. No oropharyngeal exudate. Hard of hearing- hearing aid left ear  Thick white post nasal drip   Eyes: Pupils are equal, round, and reactive to light. Conjunctivae are normal. Right eye exhibits no discharge. Left eye exhibits no discharge. Neck: Trachea normal and full passive range of motion without pain. Cardiovascular: Normal rate, S1 normal, S2 normal and normal heart sounds. An irregularly irregular rhythm present. Exam reveals no gallop and no friction rub. No murmur heard. Pulmonary/Chest: Effort normal. No respiratory distress. She has decreased breath sounds in the right lower field and the left lower field. She has no wheezes. She has no rhonchi. She has no rales. She exhibits no tenderness. Abdominal: Soft. Normal appearance and bowel sounds are normal.   Musculoskeletal: Normal range of motion. She exhibits no edema, tenderness or deformity. Lymphadenopathy:        Head (right side): No tonsillar, no preauricular and no posterior auricular adenopathy present. Head (left side): No tonsillar, no preauricular and no posterior auricular adenopathy present. She has no cervical adenopathy. Neurological: She is alert and oriented to person, place, and time. No cranial nerve deficit or sensory deficit. Skin: Skin is warm, dry and intact. Capillary refill takes less than 2 seconds. No rash noted. No erythema. No pallor. Psychiatric: She has a normal mood and affect. Her speech is normal and behavior is normal.   Nursing note and vitals reviewed. /62   Pulse 93   Temp 99.5 °F (37.5 °C)   Resp 20   Wt 111 lb (50.3 kg)   SpO2 98%   BMI 20.97 kg/m²     :Assessment       Diagnosis Orders   1. Cough with sputum  XR CHEST STANDARD (2 VW)   2. Panlobular emphysema (HCC)  XR CHEST STANDARD (2 VW)   3.  Atrial fibrillation, chronic [facility])         :Plan      Orders Placed This Encounter   Procedures    XR CHEST STANDARD (2 VW)     Standing Status:   Future     Number of Occurrences:   1     Standing Expiration Date:   5/10/2020     Order Specific Question:   Reason for exam:     Answer:   cough with sputum   Chest x ray shows  . Emphysema with chronic peribronchial thickening. Stable   cardiomegaly. No convincing acute pulmonary process. Return if symptoms worsen or fail to improve. Orders Placed This Encounter   Medications    cephALEXin (KEFLEX) 500 MG capsule     Sig: Take 1 capsule by mouth 2 times daily for 7 days     Dispense:  14 capsule     Refill:  0        Patient Instructions     Plenty of fluids, drink more water  OTC Tylenol or Motrin as needed  Keflex 500 mg po bid for 7 days  Check PT/INR at home on 5-20-19 son given instructions to do so    Patient Education        Chronic Obstructive Pulmonary Disease (COPD): Care Instructions  Your Care Instructions    Chronic obstructive pulmonary disease (COPD) is a general term for a group of lung diseases, including emphysema and chronic bronchitis. People with COPD have decreased airflow in and out of the lungs, which makes it hard to breathe. The airways also can get clogged with thick mucus. Cigarette smoking is a major cause of COPD. Although there is no cure for COPD, you can slow its progress. Following your treatment plan and taking care of yourself can help you feel better and live longer. Follow-up care is a key part of your treatment and safety. Be sure to make and go to all appointments, and call your doctor if you are having problems. It's also a good idea to know your test results and keep a list of the medicines you take. How can you care for yourself at home?   Staying healthy    · Do not smoke. This is the most important step you can take to prevent more damage to your lungs.  If you need help quitting, talk to your doctor about stop-smoking programs and medicines. These can increase your chances of quitting for good.     · Avoid colds and flu. Get a pneumococcal vaccine shot. If you have had one before, ask your doctor whether you need a second dose. Get the flu vaccine every fall. If you must be around people with colds or the flu, wash your hands often.     · Avoid secondhand smoke, air pollution, and high altitudes. Also avoid cold, dry air and hot, humid air. Stay at home with your windows closed when air pollution is bad.    Medicines and oxygen therapy    · Take your medicines exactly as prescribed. Call your doctor if you think you are having a problem with your medicine.     · You may be taking medicines such as:  ? Bronchodilators. These help open your airways and make breathing easier. Bronchodilators are either short-acting (work for 6 to 9 hours) or long-acting (work for 24 hours). You inhale most bronchodilators, so they start to act quickly. Always carry your quick-relief inhaler with you in case you need it while you are away from home. ? Corticosteroids (prednisone, budesonide). These reduce airway inflammation. They come in pill or inhaled form. You must take these medicines every day for them to work well.     · A spacer may help you get more inhaled medicine to your lungs. Ask your doctor or pharmacist if a spacer is right for you. If it is, ask how to use it properly.     · Do not take any vitamins, over-the-counter medicine, or herbal products without talking to your doctor first.     · If your doctor prescribed antibiotics, take them as directed. Do not stop taking them just because you feel better. You need to take the full course of antibiotics.     · Oxygen therapy boosts the amount of oxygen in your blood and helps you breathe easier. Use the flow rate your doctor has recommended, and do not change it without talking to your doctor first.   Activity    · Get regular exercise. Walking is an easy way to get exercise.  Start out slowly, and [email or web address] and sign in to your [employer] account. Enter J059 in the [facility] box to learn more about \"Chronic Obstructive Pulmonary Disease (COPD): Care Instructions. \"     If you do not have an account, please click on the \"Sign Up Now\" link. Current as of: September 5, 2018  Content Version: 12.0  © 9729-3999 Healthwise, [employer]. Care instructions adapted under license by [facility] ([facility]). If you have questions about a medical condition or this instruction, always ask your healthcare professional. [address] [address] any warranty or liability for your use of this information. Patient given educational materials- see patient instructions. Discussed use, benefit, and side effects of prescribedmedications. All patient questions answered. Pt voiced understanding.        Electronically signed by NONI Raymond CNP on 5/10/2019 at 2:41 PM

## 2019-05-16 ENCOUNTER — ANTI-COAG VISIT (OUTPATIENT)
Dept: CARDIOLOGY | Age: 84
End: 2019-05-16

## 2019-05-16 LAB — INR BLD: 1.6

## 2019-05-23 LAB
ALBUMIN SERPL-MCNC: 3.7 G/DL (ref 3.5–5.2)
ALP BLD-CCNC: 43 U/L (ref 35–104)
ALT SERPL-CCNC: 14 U/L (ref 5–33)
ANION GAP SERPL CALCULATED.3IONS-SCNC: 11 MMOL/L (ref 7–19)
AST SERPL-CCNC: 27 U/L (ref 5–32)
BACTERIA: NEGATIVE /HPF
BASOPHILS ABSOLUTE: 0.1 K/UL (ref 0–0.2)
BASOPHILS RELATIVE PERCENT: 1 % (ref 0–1)
BILIRUB SERPL-MCNC: 0.4 MG/DL (ref 0.2–1.2)
BILIRUBIN URINE: NEGATIVE
BLOOD, URINE: NEGATIVE
BUN BLDV-MCNC: 25 MG/DL (ref 8–23)
CALCIUM SERPL-MCNC: 9.8 MG/DL (ref 8.8–10.2)
CHLORIDE BLD-SCNC: 107 MMOL/L (ref 98–111)
CLARITY: CLEAR
CO2: 26 MMOL/L (ref 22–29)
COLOR: YELLOW
CREAT SERPL-MCNC: 1.3 MG/DL (ref 0.5–0.9)
CREATININE URINE: 100.3 MG/DL (ref 4.2–622)
EOSINOPHILS ABSOLUTE: 0.2 K/UL (ref 0–0.6)
EOSINOPHILS RELATIVE PERCENT: 2.9 % (ref 0–5)
EPITHELIAL CELLS, UA: 2 /HPF (ref 0–5)
GFR NON-AFRICAN AMERICAN: 39
GLUCOSE BLD-MCNC: 97 MG/DL (ref 74–109)
GLUCOSE URINE: NEGATIVE MG/DL
HCT VFR BLD CALC: 37.2 % (ref 37–47)
HEMOGLOBIN: 11.5 G/DL (ref 12–16)
HYALINE CASTS: 5 /HPF (ref 0–8)
KETONES, URINE: NEGATIVE MG/DL
LEUKOCYTE ESTERASE, URINE: NEGATIVE
LYMPHOCYTES ABSOLUTE: 1.2 K/UL (ref 1.1–4.5)
LYMPHOCYTES RELATIVE PERCENT: 21.2 % (ref 20–40)
MAGNESIUM: 2.3 MG/DL (ref 1.6–2.4)
MCH RBC QN AUTO: 31.6 PG (ref 27–31)
MCHC RBC AUTO-ENTMCNC: 30.9 G/DL (ref 33–37)
MCV RBC AUTO: 102.2 FL (ref 81–99)
MONOCYTES ABSOLUTE: 0.8 K/UL (ref 0–0.9)
MONOCYTES RELATIVE PERCENT: 13.3 % (ref 0–10)
NEUTROPHILS ABSOLUTE: 3.6 K/UL (ref 1.5–7.5)
NEUTROPHILS RELATIVE PERCENT: 61.4 % (ref 50–65)
NITRITE, URINE: NEGATIVE
PARATHYROID HORMONE INTACT: 102.2 PG/ML (ref 15–65)
PDW BLD-RTO: 13.6 % (ref 11.5–14.5)
PH UA: 6 (ref 5–8)
PHOSPHORUS: 3.4 MG/DL (ref 2.5–4.5)
PLATELET # BLD: 193 K/UL (ref 130–400)
PMV BLD AUTO: 11.4 FL (ref 9.4–12.3)
POTASSIUM SERPL-SCNC: 4.8 MMOL/L (ref 3.5–5)
PROTEIN PROTEIN: 70 MG/DL (ref 15–45)
PROTEIN UA: 100 MG/DL
RBC # BLD: 3.64 M/UL (ref 4.2–5.4)
RBC UA: 1 /HPF (ref 0–4)
SODIUM BLD-SCNC: 144 MMOL/L (ref 136–145)
SPECIFIC GRAVITY UA: 1.02 (ref 1–1.03)
TOTAL PROTEIN: 7.3 G/DL (ref 6.6–8.7)
URIC ACID, SERUM: 5.6 MG/DL (ref 2.4–5.7)
URINE REFLEX TO CULTURE: ABNORMAL
UROBILINOGEN, URINE: 0.2 E.U./DL
VITAMIN D 25-HYDROXY: 50.1 NG/ML
WBC # BLD: 5.8 K/UL (ref 4.8–10.8)
WBC UA: 1 /HPF (ref 0–5)

## 2019-05-28 ENCOUNTER — TELEPHONE (OUTPATIENT)
Dept: PRIMARY CARE CLINIC | Age: 84
End: 2019-05-28

## 2019-05-28 NOTE — TELEPHONE ENCOUNTER
[address] called stating that they needed refills on Singulair 10mg 1 daily and Allopurinol 100mg 1 daily. Verbal ok given to refill medication.

## 2019-05-30 ENCOUNTER — ANTI-COAG VISIT (OUTPATIENT)
Dept: CARDIOLOGY | Age: 84
End: 2019-05-30

## 2019-05-30 LAB — INR BLD: 1.2

## 2019-06-03 ENCOUNTER — HOSPITAL ENCOUNTER (OUTPATIENT)
Dept: WOMENS IMAGING | Age: 84
Discharge: HOME | End: 2019-06-03
Payer: MEDICARE

## 2019-06-03 ENCOUNTER — ANTI-COAG VISIT (OUTPATIENT)
Dept: CARDIOLOGY | Age: 84
End: 2019-06-03

## 2019-06-03 DIAGNOSIS — Z78.0 POST-MENOPAUSAL: ICD-10-CM

## 2019-06-03 LAB — INR BLD: 2.5

## 2019-06-03 PROCEDURE — 77080 DXA BONE DENSITY AXIAL: CPT

## 2019-06-03 RX ORDER — DIGOXIN 125 MCG
125 TABLET ORAL EVERY OTHER DAY
Qty: 45 TABLET | Refills: 5 | Status: SHIPPED | OUTPATIENT
Start: 2019-06-03 | End: 2019-12-20

## 2019-06-05 ENCOUNTER — TELEPHONE (OUTPATIENT)
Dept: PRIMARY CARE CLINIC | Age: 84
End: 2019-06-05

## 2019-06-05 RX ORDER — ALENDRONATE SODIUM 70 MG/1
70 TABLET ORAL
Qty: 4 TABLET | Refills: 1 | Status: SHIPPED | OUTPATIENT
Start: 2019-06-05 | End: 2019-07-26 | Stop reason: SDUPTHER

## 2019-06-05 NOTE — TELEPHONE ENCOUNTER
Result Notes for DEXA BONE DENSITY 2 SITES     Notes recorded by NONI Mckeon on 6/4/2019 at 3:37 PM CDT  Results are abnormal. Begin fosamax 70mg weekly. Gave above results to pt's daughter. Sent in medication per provider to pt's preferred pharmacy. Daughter agreed and verbalized understanding of all.  PP, LPN

## 2019-06-10 ENCOUNTER — OFFICE VISIT (OUTPATIENT)
Dept: URGENT CARE | Age: 84
End: 2019-06-10
Payer: MEDICARE

## 2019-06-10 VITALS
WEIGHT: 103.8 LBS | OXYGEN SATURATION: 95 % | SYSTOLIC BLOOD PRESSURE: 116 MMHG | HEART RATE: 102 BPM | HEIGHT: 64 IN | DIASTOLIC BLOOD PRESSURE: 72 MMHG | BODY MASS INDEX: 17.72 KG/M2 | RESPIRATION RATE: 18 BRPM | TEMPERATURE: 99.8 F

## 2019-06-10 DIAGNOSIS — B02.9 HERPES ZOSTER WITHOUT COMPLICATION: Primary | ICD-10-CM

## 2019-06-10 PROCEDURE — 99213 OFFICE O/P EST LOW 20 MIN: CPT | Performed by: NURSE PRACTITIONER

## 2019-06-10 RX ORDER — ACYCLOVIR 400 MG/1
400 TABLET ORAL
Qty: 35 TABLET | Refills: 0 | Status: SHIPPED | OUTPATIENT
Start: 2019-06-10 | End: 2019-06-17

## 2019-06-10 ASSESSMENT — ENCOUNTER SYMPTOMS
EYES NEGATIVE: 1
SHORTNESS OF BREATH: 0
BACK PAIN: 0
SORE THROAT: 0
COLOR CHANGE: 1
ALLERGIC/IMMUNOLOGIC NEGATIVE: 1
ABDOMINAL PAIN: 0
SINUS PRESSURE: 0
COUGH: 0

## 2019-06-10 NOTE — PATIENT INSTRUCTIONS
Take all medication as prescribed  Follow-up with PCP for unresolved or worsening symptoms  Patient Education        Shingles: Care Instructions  Your Care Instructions    Shingles (herpes zoster) causes pain and a blistered rash. The rash can appear anywhere on the body but will be on only one side of the body, the left or right. It will be in a band, a strip, or a small area. The pain can be very severe. Shingles can also cause tingling or itching in the area of the rash. The blisters scab over after a few days and heal in 2 to 4 weeks. Medicines can help you feel better and may help prevent more serious problems caused by shingles. Shingles is caused by the same virus that causes chickenpox. When you have chickenpox, the virus gets into your nerve roots and stays there (becomes dormant) long after you get over the chickenpox. If the virus becomes active again, it can cause shingles. Follow-up care is a key part of your treatment and safety. Be sure to make and go to all appointments, and call your doctor if you are having problems. It's also a good idea to know your test results and keep a list of the medicines you take. How can you care for yourself at home? · Be safe with medicines. Take your medicines exactly as prescribed. Call your doctor if you think you are having a problem with your medicine. Antiviral medicine helps you get better faster. · Try not to scratch or pick at the blisters. They will crust over and fall off on their own if you leave them alone. · Put cool, wet cloths on the area to relieve pain and itching. You can also use calamine lotion. Try not to use so much lotion that it cakes and is hard to get off. · Put cornstarch or baking soda on the sores to help dry them out so they heal faster. · Do not use thick ointment, such as petroleum jelly, on the sores. This will keep them from drying and healing. · To help remove loose crusts, soak them in tap water.  This can help decrease oozing, and dry and soothe the skin. · Take an over-the-counter pain medicine, such as acetaminophen (Tylenol), ibuprofen (Advil, Motrin), or naproxen (Aleve). Read and follow all instructions on the label. · Avoid close contact with people until the blisters have healed. It is very important for you to avoid contact with anyone who has never had chickenpox or the chickenpox vaccine. Pregnant women, young babies, and anyone else who has a hard time fighting infection (such as someone with HIV, diabetes, or cancer) is especially at risk. When should you call for help? Call your doctor now or seek immediate medical care if:    · You have a new or higher fever.     · You have a severe headache and a stiff neck.     · You lose the ability to think clearly.     · The rash spreads to your forehead, nose, eyes, or eyelids.     · You have eye pain, or your vision gets worse.     · You have new pain in your face, or you cannot move the muscles in your face.     · Blisters spread to new parts of your body.    Watch closely for changes in your health, and be sure to contact your doctor if:    · The rash has not healed after 2 to 4 weeks.     · You still have pain after the rash has healed. Where can you learn more? Go to https://[email or web address] org and sign in to your [employer] account. Ruben Hampton in the [facility] box to learn more about \"Shingles: Care Instructions. \"     If you do not have an account, please click on the \"Sign Up Now\" link. Current as of: July 30, 2018  Content Version: 12.0  © 5489-6432 Healthwise, [employer]. Care instructions adapted under license by [facility] ([facility]). If you have questions about a medical condition or this instruction, always ask your healthcare professional. [address] any warranty or liability for your use of this information.

## 2019-06-10 NOTE — PROGRESS NOTES
[address] [place] [address]  Dept: [phone]  Loc: [phone]    Leanne Vegas is a 80 y.o. female who presents today for her medical conditions/complaintsas noted below. Leanne Vegas is c/o of Sore (feet and right leg)        HPI:     HPI   Wisam Espinosa is here today with complaint of rash and bumps to her right leg and foot. She is complaining of pain in her leg more at night. She is on Coumadin and has poor blood flow to her lower extremities according to her daughter who is with her today. She has had the rash for 2-3 days but it really has not spread anywhere else. She is due to see her PCP on Wednesday this week.      Past Medical History:   Diagnosis Date    Acute systolic CHF (congestive heart failure), NYHA class 3 ([facility]) 2/24/2017    Allergic rhinitis     Anticoagulant long-term use     coumadin for atrial fib    Atrial fibrillation (HCC)     Chronic atrial fibrillation (HCC) 9/28/2017    Chronic back pain     Chronic kidney disease     Chronic kidney disease, stage III (moderate) (HCC) 2/17/2017    Chronic systolic CHF (congestive heart failure), NYHA class 3 ([address] 2/17/2017    GERD (gastroesophageal reflux disease)     Glaucoma     Hearing loss     Hypertension     Osteoarthritis     Osteoporosis     Panlobular emphysema ([address] 11/28/2018    Sinus problem     Wears glasses      Past Surgical History:   Procedure Laterality Date    BREAST BIOPSY      Left-benign    BREAST BIOPSY  feb 2014    Left    HYSTERECTOMY      OVARY SURGERY      tumor removed        Family History   Problem Relation Age of Onset    Asthma Mother     Emphysema Mother     Alzheimer's Disease Sister     Heart Disease Brother     Heart Disease Sister     No Known Problems Sister     Alzheimer's Disease Sister        Social History     Tobacco Use    Smoking status: Never Smoker    Smokeless tobacco: Never Used   Substance Use Topics    Alcohol use: No      Current Outpatient Medications   Medication Sig Dispense Refill    acyclovir (ZOVIRAX) 400 MG tablet Take 1 tablet by mouth 5 times daily for 7 days 35 tablet 0    alendronate (FOSAMAX) 70 MG tablet Take 1 tablet by mouth every 7 days 4 tablet 1    digoxin (LANOXIN) 125 MCG tablet Take 1 tablet by mouth every other day 45 tablet 5    isosorbide mononitrate (IMDUR) 30 MG extended release tablet Take 1 tablet by mouth daily 30 tablet 3    fexofenadine (ALLEGRA) 180 MG tablet Take 180 mg by mouth daily      donepezil (ARICEPT) 5 MG tablet Take 1 tablet by mouth nightly 30 tablet 0    nitroGLYCERIN (NITROSTAT) 0.4 MG SL tablet Place 1 tablet under the tongue every 5 minutes as needed for Chest pain 25 tablet 3    fluticasone (FLONASE) 50 MCG/ACT nasal spray 1 spray by Each Nare route daily 1 Spray in each nostril 2 Bottle 1    metoprolol (LOPRESSOR) 50 MG tablet Take 1 tablet by mouth 2 times daily 180 tablet 3    montelukast (SINGULAIR) 10 MG tablet Take 10 mg by mouth nightly      mupirocin (BACTROBAN) 2 % cream Apply topically 3 times daily Apply topically 3 times daily.  furosemide (LASIX) 20 MG tablet Take 20 mg by mouth daily      warfarin (COUMADIN) 5 MG tablet Take 1 tablet by mouth daily 90 tablet 3    atorvastatin (LIPITOR) 10 MG tablet Take 10 mg by mouth daily       bimatoprost 0.01 % SOLN Place 1 drop into both eyes nightly       sodium polystyrene (SPS) 15 GM/60ML suspension Take 60 mLs by mouth once for 1 dose 1 Bottle 3    sodium polystyrene (SPS) 15 GM/60ML suspension Take 60 mLs by mouth once for 1 dose 1 Bottle 3    tiotropium (SPIRIVA RESPIMAT) 2.5 MCG/ACT AERS inhaler Inhale 2 puffs into the lungs daily 1 Inhaler 2    allopurinol (ZYLOPRIM) 100 MG tablet Take 1 tablet by mouth daily 30 tablet 1     No current facility-administered medications for this visit.       Allergies   Allergen Reactions    Denosumab Other (See Comments)     after had shot got blood in urine--not sure if associated      Lisinopril Other (See Comments)     cough         Health Maintenance   Topic Date Due    Shingles Vaccine (1 of 2) 07/02/1983    Pneumococcal 65+ years Vaccine (2 of 2 - PCV13) 05/03/2020    Potassium monitoring  05/23/2020    Creatinine monitoring  05/23/2020    DTaP/Tdap/Td vaccine (2 - Td) 10/01/2020    DEXA (modify frequency per FRAX score)  Completed    Flu vaccine  Completed    Colon cancer screen colonoscopy  Completed       Subjective:     Review of Systems   Constitutional: Negative for activity change, appetite change, chills and fever. HENT: Negative for congestion, ear discharge, sinus pressure and sore throat. Eyes: Negative. Respiratory: Negative for cough and shortness of breath. Cardiovascular: Negative. Gastrointestinal: Negative for abdominal pain. Endocrine: Negative. Genitourinary: Negative for dysuria, frequency and urgency. Musculoskeletal: Negative for arthralgias, back pain and myalgias. Skin: Positive for color change and rash. Allergic/Immunologic: Negative. Neurological: Negative for weakness and headaches. Hematological: Bruises/bleeds easily. Psychiatric/Behavioral: Negative.        :Objective      Physical Exam   Constitutional: She is oriented to person, place, and time. Vital signs are normal. She appears well-developed. She appears cachectic. She is cooperative. Non-toxic appearance. She appears ill. No distress. Chronically ill  Poor nutritional status   HENT:   Head: Normocephalic. Right Ear: External ear normal.   Left Ear: External ear normal.   Nose: Nose normal.   Mouth/Throat: Mucous membranes are normal.   Eyes: Pupils are equal, round, and reactive to light. Conjunctivae and lids are normal. Right eye exhibits no discharge. Left eye exhibits no discharge. Neck: Trachea normal, normal range of motion, full passive range of motion without pain and phonation normal. Neck supple. No tracheal deviation present. Cardiovascular: Normal rate, S1 normal, S2 normal and normal heart sounds. An irregularly irregular rhythm present. Exam reveals no gallop and no friction rub. No murmur heard. Pulmonary/Chest: Effort normal and breath sounds normal. No respiratory distress. She has no wheezes. She has no rhonchi. She has no rales. She exhibits no tenderness. Abdominal: Soft. Normal appearance and bowel sounds are normal.   Musculoskeletal: Normal range of motion. She exhibits no edema, tenderness or deformity. Lymphadenopathy:     She has no cervical adenopathy. Neurological: She is alert and oriented to person, place, and time. She has normal strength. No cranial nerve deficit or sensory deficit. Skin: Skin is warm, dry and intact. Capillary refill takes less than 2 seconds. Lesion and rash noted. Rash is vesicular. There is erythema. No cyanosis. No pallor. Scattered red vesicular rash down right shin area and to top of right foot    Psychiatric: She has a normal mood and affect. Her speech is normal and behavior is normal.   Nursing note and vitals reviewed. /72   Pulse 102   Temp 99.8 °F (37.7 °C) (Oral)   Resp 18   Ht 5' 4\" (1.626 m)   Wt 103 lb 12.8 oz (47.1 kg)   SpO2 95%   BMI 17.82 kg/m²     :Assessment       Diagnosis Orders   1. Herpes zoster without complication  acyclovir (ZOVIRAX) 400 MG tablet       :Plan    No orders of the defined types were placed in this encounter. Return if symptoms worsen or fail to improve.     Orders Placed This Encounter   Medications    acyclovir (ZOVIRAX) 400 MG tablet     Sig: Take 1 tablet by mouth 5 times daily for 7 days     Dispense:  35 tablet     Refill:  0        Patient Instructions     Take all medication as prescribed  Follow-up with PCP for unresolved or worsening symptoms  Patient Education        Shingles: Care Instructions  Your Care Instructions    Shingles (herpes zoster) causes pain and a blistered rash. The rash can appear anywhere on the body but will be on only one side of the body, the left or right. It will be in a band, a strip, or a small area. The pain can be very severe. Shingles can also cause tingling or itching in the area of the rash. The blisters scab over after a few days and heal in 2 to 4 weeks. Medicines can help you feel better and may help prevent more serious problems caused by shingles. Shingles is caused by the same virus that causes chickenpox. When you have chickenpox, the virus gets into your nerve roots and stays there (becomes dormant) long after you get over the chickenpox. If the virus becomes active again, it can cause shingles. Follow-up care is a key part of your treatment and safety. Be sure to make and go to all appointments, and call your doctor if you are having problems. It's also a good idea to know your test results and keep a list of the medicines you take. How can you care for yourself at home? · Be safe with medicines. Take your medicines exactly as prescribed. Call your doctor if you think you are having a problem with your medicine. Antiviral medicine helps you get better faster. · Try not to scratch or pick at the blisters. They will crust over and fall off on their own if you leave them alone. · Put cool, wet cloths on the area to relieve pain and itching. You can also use calamine lotion. Try not to use so much lotion that it cakes and is hard to get off. · Put cornstarch or baking soda on the sores to help dry them out so they heal faster. · Do not use thick ointment, such as petroleum jelly, on the sores. This will keep them from drying and healing. · To help remove loose crusts, soak them in tap water. This can help decrease oozing, and dry and soothe the skin. · Take an over-the-counter pain medicine, such as acetaminophen (Tylenol), ibuprofen (Advil, Motrin), or naproxen (Aleve). Read and follow all instructions on the label.   · Avoid close contact with people until the blisters have healed. It is very important for you to avoid contact with anyone who has never had chickenpox or the chickenpox vaccine. Pregnant women, young babies, and anyone else who has a hard time fighting infection (such as someone with HIV, diabetes, or cancer) is especially at risk. When should you call for help? Call your doctor now or seek immediate medical care if:    · You have a new or higher fever.     · You have a severe headache and a stiff neck.     · You lose the ability to think clearly.     · The rash spreads to your forehead, nose, eyes, or eyelids.     · You have eye pain, or your vision gets worse.     · You have new pain in your face, or you cannot move the muscles in your face.     · Blisters spread to new parts of your body.    Watch closely for changes in your health, and be sure to contact your doctor if:    · The rash has not healed after 2 to 4 weeks.     · You still have pain after the rash has healed. Where can you learn more? Go to [email or web address] and sign in to your [employer] account. Celsa Germaine in the [facility] box to learn more about \"Shingles: Care Instructions. \"     If you do not have an account, please click on the \"Sign Up Now\" link. Current as of: July 30, 2018  Content Version: 12.0  © 9836-0654 [employer]wise, [employer]. Care instructions adapted under license by [facility] ([facility]). If you have questions about a medical condition or this instruction, always ask your healthcare professional. [place] [address] any warranty or liability for your use of this information. Patient given educational materials- see patient instructions. Discussed use, benefit, and side effects of prescribedmedications. All patient questions answered. Pt voiced understanding.        Electronically signed by NONI Darnell CNP on 6/10/2019 at 2:30 PM

## 2019-06-12 ENCOUNTER — APPOINTMENT (OUTPATIENT)
Dept: GENERAL RADIOLOGY | Age: 84
End: 2019-06-12
Payer: MEDICARE

## 2019-06-12 ENCOUNTER — HOSPITAL ENCOUNTER (EMERGENCY)
Age: 84
Discharge: HOME | End: 2019-06-12
Attending: EMERGENCY MEDICINE
Payer: MEDICARE

## 2019-06-12 ENCOUNTER — OFFICE VISIT (OUTPATIENT)
Dept: PRIMARY CARE CLINIC | Age: 84
End: 2019-06-12
Payer: MEDICARE

## 2019-06-12 VITALS
HEART RATE: 88 BPM | WEIGHT: 103 LBS | SYSTOLIC BLOOD PRESSURE: 88 MMHG | OXYGEN SATURATION: 98 % | BODY MASS INDEX: 19.45 KG/M2 | DIASTOLIC BLOOD PRESSURE: 62 MMHG | TEMPERATURE: 98 F | HEIGHT: 61 IN

## 2019-06-12 VITALS
OXYGEN SATURATION: 95 % | HEART RATE: 84 BPM | DIASTOLIC BLOOD PRESSURE: 92 MMHG | RESPIRATION RATE: 15 BRPM | BODY MASS INDEX: 19.46 KG/M2 | TEMPERATURE: 99 F | WEIGHT: 103 LBS | SYSTOLIC BLOOD PRESSURE: 156 MMHG

## 2019-06-12 DIAGNOSIS — E86.0 DEHYDRATION: Primary | ICD-10-CM

## 2019-06-12 DIAGNOSIS — I48.20 ATRIAL FIBRILLATION, CHRONIC (HCC): ICD-10-CM

## 2019-06-12 DIAGNOSIS — R41.0 CONFUSION: ICD-10-CM

## 2019-06-12 DIAGNOSIS — E78.00 HYPERCHOLESTEREMIA: ICD-10-CM

## 2019-06-12 DIAGNOSIS — B02.29 OTHER POSTHERPETIC NERVOUS SYSTEM INVOLVEMENT: ICD-10-CM

## 2019-06-12 DIAGNOSIS — I48.20 ATRIAL FIBRILLATION, CHRONIC (HCC): Primary | ICD-10-CM

## 2019-06-12 DIAGNOSIS — I50.22 CHRONIC SYSTOLIC CHF (CONGESTIVE HEART FAILURE), NYHA CLASS 3 (HCC): ICD-10-CM

## 2019-06-12 DIAGNOSIS — R53.83 OTHER FATIGUE: ICD-10-CM

## 2019-06-12 DIAGNOSIS — R19.7 DIARRHEA, UNSPECIFIED TYPE: ICD-10-CM

## 2019-06-12 LAB
ALBUMIN SERPL-MCNC: 4 G/DL (ref 3.5–5.2)
ALBUMIN SERPL-MCNC: 4.3 G/DL (ref 3.5–5.2)
ALP BLD-CCNC: 37 U/L (ref 35–104)
ALP BLD-CCNC: 38 U/L (ref 35–104)
ALT SERPL-CCNC: 11 U/L (ref 5–33)
ALT SERPL-CCNC: 13 U/L (ref 5–33)
ANION GAP SERPL CALCULATED.3IONS-SCNC: 11 MMOL/L (ref 7–19)
ANION GAP SERPL CALCULATED.3IONS-SCNC: 11 MMOL/L (ref 7–19)
ANION GAP SERPL CALCULATED.3IONS-SCNC: 12 MMOL/L (ref 7–19)
AST SERPL-CCNC: 27 U/L (ref 5–32)
AST SERPL-CCNC: 28 U/L (ref 5–32)
ATYPICAL LYMPHOCYTE RELATIVE PERCENT: 13 % (ref 0–8)
BASOPHILS ABSOLUTE: 0.1 K/UL (ref 0–0.2)
BASOPHILS ABSOLUTE: 0.1 K/UL (ref 0–0.2)
BASOPHILS RELATIVE PERCENT: 1 % (ref 0–1)
BASOPHILS RELATIVE PERCENT: 1.4 % (ref 0–1)
BILIRUB SERPL-MCNC: 0.6 MG/DL (ref 0.2–1.2)
BILIRUB SERPL-MCNC: 0.7 MG/DL (ref 0.2–1.2)
BILIRUBIN URINE: NEGATIVE
BLOOD, URINE: NEGATIVE
BUN BLDV-MCNC: 55 MG/DL (ref 8–23)
BUN BLDV-MCNC: 58 MG/DL (ref 8–23)
BUN BLDV-MCNC: 61 MG/DL (ref 8–23)
CALCIUM SERPL-MCNC: 10.2 MG/DL (ref 8.8–10.2)
CALCIUM SERPL-MCNC: 8.6 MG/DL (ref 8.8–10.2)
CALCIUM SERPL-MCNC: 9.9 MG/DL (ref 8.8–10.2)
CHLORIDE BLD-SCNC: 100 MMOL/L (ref 98–111)
CHLORIDE BLD-SCNC: 102 MMOL/L (ref 98–111)
CHLORIDE BLD-SCNC: 99 MMOL/L (ref 98–111)
CLARITY: CLEAR
CO2: 26 MMOL/L (ref 22–29)
CO2: 30 MMOL/L (ref 22–29)
CO2: 31 MMOL/L (ref 22–29)
COLOR: YELLOW
CREAT SERPL-MCNC: 1.7 MG/DL (ref 0.5–0.9)
CREAT SERPL-MCNC: 2.1 MG/DL (ref 0.5–0.9)
CREAT SERPL-MCNC: 2.2 MG/DL (ref 0.5–0.9)
DIGOXIN LEVEL: 1 NG/ML (ref 0.6–1.2)
EOSINOPHILS ABSOLUTE: 0.1 K/UL (ref 0–0.6)
EOSINOPHILS ABSOLUTE: 0.28 K/UL (ref 0–0.6)
EOSINOPHILS RELATIVE PERCENT: 2.2 % (ref 0–5)
EOSINOPHILS RELATIVE PERCENT: 5 % (ref 0–5)
GFR NON-AFRICAN AMERICAN: 21
GFR NON-AFRICAN AMERICAN: 22
GFR NON-AFRICAN AMERICAN: 29
GLUCOSE BLD-MCNC: 104 MG/DL (ref 74–109)
GLUCOSE BLD-MCNC: 119 MG/DL (ref 74–109)
GLUCOSE BLD-MCNC: 97 MG/DL (ref 74–109)
GLUCOSE URINE: NEGATIVE MG/DL
HCT VFR BLD CALC: 41.7 % (ref 37–47)
HCT VFR BLD CALC: 44.8 % (ref 37–47)
HEMOGLOBIN: 13.2 G/DL (ref 12–16)
HEMOGLOBIN: 14.1 G/DL (ref 12–16)
INR BLD: 2.2 (ref 0.88–1.18)
INR BLD: 2.41 (ref 0.88–1.18)
KETONES, URINE: NEGATIVE MG/DL
LEUKOCYTE ESTERASE, URINE: NEGATIVE
LYMPHOCYTES ABSOLUTE: 1.6 K/UL (ref 1.1–4.5)
LYMPHOCYTES ABSOLUTE: 1.6 K/UL (ref 1.1–4.5)
LYMPHOCYTES RELATIVE PERCENT: 16 % (ref 20–40)
LYMPHOCYTES RELATIVE PERCENT: 32.5 % (ref 20–40)
MCH RBC QN AUTO: 30.8 PG (ref 27–31)
MCH RBC QN AUTO: 31.2 PG (ref 27–31)
MCHC RBC AUTO-ENTMCNC: 31.5 G/DL (ref 33–37)
MCHC RBC AUTO-ENTMCNC: 31.7 G/DL (ref 33–37)
MCV RBC AUTO: 97.8 FL (ref 81–99)
MCV RBC AUTO: 98.6 FL (ref 81–99)
MONOCYTES ABSOLUTE: 0.8 K/UL (ref 0–0.9)
MONOCYTES ABSOLUTE: 0.8 K/UL (ref 0–0.9)
MONOCYTES RELATIVE PERCENT: 14 % (ref 0–10)
MONOCYTES RELATIVE PERCENT: 16.2 % (ref 0–10)
NEUTROPHILS ABSOLUTE: 2.4 K/UL (ref 1.5–7.5)
NEUTROPHILS ABSOLUTE: 2.8 K/UL (ref 1.5–7.5)
NEUTROPHILS RELATIVE PERCENT: 47.5 % (ref 50–65)
NEUTROPHILS RELATIVE PERCENT: 51 % (ref 50–65)
NITRITE, URINE: NEGATIVE
PDW BLD-RTO: 13.8 % (ref 11.5–14.5)
PDW BLD-RTO: 14 % (ref 11.5–14.5)
PH UA: 6 (ref 5–8)
PLATELET # BLD: 124 K/UL (ref 130–400)
PLATELET # BLD: 126 K/UL (ref 130–400)
PLATELET SLIDE REVIEW: ABNORMAL
PMV BLD AUTO: 12 FL (ref 9.4–12.3)
PMV BLD AUTO: 12.2 FL (ref 9.4–12.3)
POTASSIUM REFLEX MAGNESIUM: 4.3 MMOL/L (ref 3.5–5)
POTASSIUM SERPL-SCNC: 4.2 MMOL/L (ref 3.5–5)
POTASSIUM SERPL-SCNC: 5 MMOL/L (ref 3.5–5)
PROTEIN UA: NEGATIVE MG/DL
PROTHROMBIN TIME: 23.7 SEC (ref 12–14.6)
PROTHROMBIN TIME: 25.5 SEC (ref 12–14.6)
RBC # BLD: 4.23 M/UL (ref 4.2–5.4)
RBC # BLD: 4.58 M/UL (ref 4.2–5.4)
REASON FOR REJECTION: NORMAL
REJECTED TEST: NORMAL
SODIUM BLD-SCNC: 139 MMOL/L (ref 136–145)
SODIUM BLD-SCNC: 141 MMOL/L (ref 136–145)
SODIUM BLD-SCNC: 142 MMOL/L (ref 136–145)
SPECIFIC GRAVITY UA: 1.01 (ref 1–1.03)
TOTAL PROTEIN: 7.3 G/DL (ref 6.6–8.7)
TOTAL PROTEIN: 8.3 G/DL (ref 6.6–8.7)
URINE REFLEX TO CULTURE: NORMAL
UROBILINOGEN, URINE: 0.2 E.U./DL
WBC # BLD: 5 K/UL (ref 4.8–10.8)
WBC # BLD: 5.5 K/UL (ref 4.8–10.8)

## 2019-06-12 PROCEDURE — 36415 COLL VENOUS BLD VENIPUNCTURE: CPT

## 2019-06-12 PROCEDURE — 96361 HYDRATE IV INFUSION ADD-ON: CPT

## 2019-06-12 PROCEDURE — 80162 ASSAY OF DIGOXIN TOTAL: CPT

## 2019-06-12 PROCEDURE — 1090F PRES/ABSN URINE INCON ASSESS: CPT | Performed by: NURSE PRACTITIONER

## 2019-06-12 PROCEDURE — 99284 EMERGENCY DEPT VISIT MOD MDM: CPT

## 2019-06-12 PROCEDURE — G8427 DOCREV CUR MEDS BY ELIG CLIN: HCPCS | Performed by: NURSE PRACTITIONER

## 2019-06-12 PROCEDURE — 71045 X-RAY EXAM CHEST 1 VIEW: CPT

## 2019-06-12 PROCEDURE — G8420 CALC BMI NORM PARAMETERS: HCPCS | Performed by: NURSE PRACTITIONER

## 2019-06-12 PROCEDURE — 4040F PNEUMOC VAC/ADMIN/RCVD: CPT | Performed by: NURSE PRACTITIONER

## 2019-06-12 PROCEDURE — 80053 COMPREHEN METABOLIC PANEL: CPT

## 2019-06-12 PROCEDURE — 1036F TOBACCO NON-USER: CPT | Performed by: NURSE PRACTITIONER

## 2019-06-12 PROCEDURE — 2580000003 HC RX 258: Performed by: EMERGENCY MEDICINE

## 2019-06-12 PROCEDURE — 1123F ACP DISCUSS/DSCN MKR DOCD: CPT | Performed by: NURSE PRACTITIONER

## 2019-06-12 PROCEDURE — 85610 PROTHROMBIN TIME: CPT

## 2019-06-12 PROCEDURE — 99284 EMERGENCY DEPT VISIT MOD MDM: CPT | Performed by: EMERGENCY MEDICINE

## 2019-06-12 PROCEDURE — 99214 OFFICE O/P EST MOD 30 MIN: CPT | Performed by: NURSE PRACTITIONER

## 2019-06-12 PROCEDURE — G8399 PT W/DXA RESULTS DOCUMENT: HCPCS | Performed by: NURSE PRACTITIONER

## 2019-06-12 PROCEDURE — 81003 URINALYSIS AUTO W/O SCOPE: CPT

## 2019-06-12 PROCEDURE — 96360 HYDRATION IV INFUSION INIT: CPT

## 2019-06-12 PROCEDURE — 85025 COMPLETE CBC W/AUTO DIFF WBC: CPT

## 2019-06-12 RX ORDER — ACETAMINOPHEN AND CODEINE PHOSPHATE 300; 30 MG/1; MG/1
1 TABLET ORAL EVERY 4 HOURS PRN
Qty: 18 TABLET | Refills: 0 | Status: SHIPPED | OUTPATIENT
Start: 2019-06-12 | End: 2019-06-15

## 2019-06-12 RX ORDER — 0.9 % SODIUM CHLORIDE 0.9 %
500 INTRAVENOUS SOLUTION INTRAVENOUS ONCE
Status: COMPLETED | OUTPATIENT
Start: 2019-06-12 | End: 2019-06-12

## 2019-06-12 RX ORDER — METOPROLOL SUCCINATE 25 MG/1
25 TABLET, EXTENDED RELEASE ORAL NIGHTLY
Qty: 30 TABLET | Refills: 5 | Status: SHIPPED | OUTPATIENT
Start: 2019-06-12 | End: 2019-06-18 | Stop reason: SDUPTHER

## 2019-06-12 RX ORDER — PREDNISONE 50 MG/1
50 TABLET ORAL DAILY
Qty: 5 TABLET | Refills: 0 | Status: SHIPPED | OUTPATIENT
Start: 2019-06-12 | End: 2019-06-17

## 2019-06-12 RX ORDER — ATORVASTATIN CALCIUM 10 MG/1
10 TABLET, FILM COATED ORAL DAILY
Qty: 30 TABLET | Refills: 5 | Status: SHIPPED | OUTPATIENT
Start: 2019-06-12 | End: 2019-12-09 | Stop reason: SDUPTHER

## 2019-06-12 RX ADMIN — SODIUM CHLORIDE 500 ML: 9 INJECTION, SOLUTION INTRAVENOUS at 15:58

## 2019-06-12 RX ADMIN — SODIUM CHLORIDE 500 ML: 9 INJECTION, SOLUTION INTRAVENOUS at 17:00

## 2019-06-12 ASSESSMENT — ENCOUNTER SYMPTOMS
VOMITING: 0
DIFFICULTY BREATHING: 0
ABDOMINAL PAIN: 0
NAUSEA: 0
VISUAL CHANGE: 0

## 2019-06-12 NOTE — PATIENT INSTRUCTIONS
Begin prednisone 50mg daily for 6 days. Stop metoprolol twice daily. Begin metoprolol 25mg at bedtime. Continue acyclovir. I will order home health to begin seeing pt.

## 2019-06-12 NOTE — ED PROVIDER NOTES
[facility] EMERGENCY DEPT  eMERGENCY dEPARTMENT eNCOUnter      Pt Name: Juan Vaz  MRN: [id number]  [place] 7/2/1933  Date of evaluation: 6/12/2019  Provider: Park Bowers MD    [address] [place] [address]       Chief Complaint   Patient presents with    Altered Mental Status         HISTORY OF PRESENT ILLNESS   (Location/Symptom, Timing/Onset,Context/Setting, Quality, Duration, Modifying Factors, Severity)  Note limiting factors. Juan Vaz is a 80 y.o. female who presents to the emergency department      The history is provided by the patient. Altered Mental Status   Presenting symptoms: confusion (has dementia, family feels a little more so, PCP feels \"dehydrated\". Less p.o. since suffering zoster outbreak.)    Severity:  Mild  Most recent episode:  2 days ago  Progression:  Waxing and waning  Chronicity:  New  Context: dementia, recent illness and recent infection    Associated symptoms: rash (right lower leg)    Associated symptoms: no abdominal pain, no difficulty breathing, no fever, no headaches, no nausea, no slurred speech, no visual change, no vomiting and no weakness        NursingNotes were reviewed. REVIEW OF SYSTEMS    (2-9 systems for level 4, 10 or more for level 5)     Review of Systems   Constitutional: Negative for fever. Gastrointestinal: Negative for abdominal pain, nausea and vomiting. Skin: Positive for rash (right lower leg). Neurological: Negative for weakness and headaches. Psychiatric/Behavioral: Positive for confusion (has dementia, family feels a little more so, PCP feels \"dehydrated\". Less p.o. since suffering zoster outbreak. ). Except as noted above the remainder of the review of systems was reviewed and negative.        PAST MEDICAL HISTORY     Past Medical History:   Diagnosis Date    Acute systolic CHF (congestive heart failure), NYHA class 3 ([facility]) 2/24/2017    Allergic rhinitis     Anticoagulant long-term use     coumadin for atrial fib    [facility]     [facility] 9/28/2017    Chronic back pain     Chronic kidney disease     Chronic kidney disease, stage III (moderate) ([facility]) 2/17/2017    Chronic systolic CHF (congestive heart failure), NYHA class 3 ([facility]) 2/17/2017    GERD (gastroesophageal reflux disease)     Glaucoma     Hearing loss     Hypertension     Osteoarthritis     Osteoporosis     Panlobular emphysema ([facility] 11/28/2018    Shingles     Sinus problem     Wears glasses          SURGICALHISTORY       Past Surgical History:   Procedure Laterality Date    BREAST BIOPSY      Left-benign    BREAST BIOPSY  feb 2014    Left    HYSTERECTOMY      OVARY SURGERY      tumor removed          CURRENT MEDICATIONS       Discharge Medication List as of 6/12/2019  7:54 PM      CONTINUE these medications which have NOT CHANGED    Details   predniSONE (DELTASONE) 50 MG tablet Take 1 tablet by mouth daily for 5 days, Disp-5 tablet, R-0Normal      acetaminophen-codeine (TYLENOL/CODEINE #3) 300-30 MG per tablet Take 1 tablet by mouth every 4 hours as needed for Pain for up to 3 days. Intended supply: 3 days.  Take lowest dose possible to manage pain, Disp-18 tablet, R-0Print      atorvastatin (LIPITOR) 10 MG tablet Take 1 tablet by mouth daily, Disp-30 tablet, R-5Normal      metoprolol succinate (TOPROL XL) 25 MG extended release tablet Take 1 tablet by mouth nightly, Disp-30 tablet, R-5Normal      acyclovir (ZOVIRAX) 400 MG tablet Take 1 tablet by mouth 5 times daily for 7 days, Disp-35 tablet, R-0Normal      alendronate (FOSAMAX) 70 MG tablet Take 1 tablet by mouth every 7 days, Disp-4 tablet, R-1Normal      digoxin (LANOXIN) 125 MCG tablet Take 1 tablet by mouth every other day, Disp-45 tablet, R-5Normal      sodium polystyrene (SPS) 15 GM/60ML suspension Take 60 mLs by mouth once for 1 dose, Disp-1 Bottle, R-3Normal      sodium polystyrene (SPS) 15 GM/60ML suspension Take 60 mLs by mouth once for 1 dose, Disp-1 Bottle, R-3Normal      tiotropium (SPIRIVA RESPIMAT) 2.5 MCG/ACT AERS inhaler Inhale 2 puffs into the lungs daily, Disp-1 Inhaler, R-2Normal      isosorbide mononitrate (IMDUR) 30 MG extended release tablet Take 1 tablet by mouth daily, Disp-30 tablet, R-3Normal      fexofenadine (ALLEGRA) 180 MG tablet Take 180 mg by mouth dailyHistorical Med      donepezil (ARICEPT) 5 MG tablet Take 1 tablet by mouth nightly, Disp-30 tablet, R-0NO PRINT      nitroGLYCERIN (NITROSTAT) 0.4 MG SL tablet Place 1 tablet under the tongue every 5 minutes as needed for Chest pain, Disp-25 tablet, R-3Normal      allopurinol (ZYLOPRIM) 100 MG tablet Take 1 tablet by mouth daily, Disp-30 tablet, R-1Normal      fluticasone (FLONASE) 50 MCG/ACT nasal spray 1 spray by Each Nare route daily 1 Spray in each nostril, Disp-2 Bottle, R-1Normal      montelukast (SINGULAIR) 10 MG tablet Take 10 mg by mouth nightlyHistorical Med      mupirocin (BACTROBAN) 2 % cream Apply topically 3 times daily Apply topically 3 times daily. , Topical, 3 TIMES DAILY, Historical Med      furosemide (LASIX) 20 MG tablet Take 20 mg by mouth dailyHistorical Med      warfarin (COUMADIN) 5 MG tablet Take 1 tablet by mouth daily, Disp-90 tablet, R-3Normal      bimatoprost 0.01 % SOLN Place 1 drop into both eyes nightly              ALLERGIES     Denosumab and Lisinopril    FAMILY HISTORY       Family History   Problem Relation Age of Onset    Asthma Mother     Emphysema Mother     Alzheimer's Disease Sister     Heart Disease Brother     Heart Disease Sister     No Known Problems Sister     Alzheimer's Disease Sister           SOCIAL HISTORY       Social History     Socioeconomic History    Marital status:       Spouse name: None    Number of children: None    Years of education: None    Highest education level: None   Occupational History    None   Social Needs    Financial resource strain: None    Food insecurity:     Worry: None     Inability: None    Transportation needs:     Medical: None     Non-medical: None   Tobacco Use    Smoking status: Never Smoker    Smokeless tobacco: Never Used   Substance and Sexual Activity    Alcohol use: No    Drug use: No    Sexual activity: Never   Lifestyle    Physical activity:     Days per week: None     Minutes per session: None    Stress: None   Relationships    Social connections:     Talks on phone: None     Gets together: None     Attends Latter day service: None     Active member of club or organization: None     Attends meetings of clubs or organizations: None     Relationship status: None    Intimate partner violence:     Fear of current or ex partner: None     Emotionally abused: None     Physically abused: None     Forced sexual activity: None   Other Topics Concern    None   Social History Narrative    None       SCREENINGS      @FLOW([id number])@      PHYSICAL EXAM    (up to 7 for level 4, 8 or more for level 5)     ED Triage Vitals [06/12/19 1500]   BP Temp Temp src Pulse Resp SpO2 Height Weight   108/74 98.9 °F (37.2 °C) -- 90 16 95 % -- 103 lb (46.7 kg)       Physical Exam   Constitutional: She is oriented to person, place, and time. She appears well-developed and well-nourished. No distress. HENT:   Tongue midline, mucous mm sl. dry   Eyes: Pupils are equal, round, and reactive to light. Conjunctivae and EOM are normal.   Neck: Normal range of motion. Neck supple. Cardiovascular: Normal rate, regular rhythm and normal heart sounds. Pulmonary/Chest: Effort normal and breath sounds normal.   Musculoskeletal: She exhibits no edema. Vesicular rash RLE   Neurological: She is alert and oriented to person, place, and time. No cranial nerve deficit. No drift, finger to nose intact bilaterally   Skin: Skin is warm and dry. Capillary refill takes 2 to 3 seconds. She is not diaphoretic. Psychiatric: She has a normal mood and affect. Nursing note and vitals reviewed.       DIAGNOSTIC RESULTS     EKG: All EKG's are interpreted by the Emergency Department Physician who either signs or Co-signsthis chart in the absence of a cardiologist.        RADIOLOGY:   Grace Dukes such as CT, Ultrasound and MRI are read by the radiologist. Plain radiographic images are visualized and preliminarily interpreted by the emergency physician with the below findings:        Interpretation per the Radiologist below, if available at the time ofthis note:    XR CHEST PORTABLE   Final Result   No acute cardiopulmonary findings. Signed by Dr Parth Lo on 6/12/2019 4:30 PM            ED BEDSIDE ULTRASOUND:   Performed by ED Physician - none    LABS:  Labs Reviewed   CBC WITH AUTO DIFFERENTIAL - Abnormal; Notable for the following components:       Result Value    MCH 31.2 (*)     MCHC 31.7 (*)     Platelets 284 (*)     Neutrophils % 47.5 (*)     Monocytes % 16.2 (*)     Basophils % 1.4 (*)     All other components within normal limits   COMPREHENSIVE METABOLIC PANEL W/ REFLEX TO MG FOR LOW K - Abnormal; Notable for the following components:    CO2 30 (*)     Glucose 119 (*)     BUN 61 (*)     CREATININE 2.1 (*)     GFR Non- 22 (*)     All other components within normal limits   PROTIME-INR - Abnormal; Notable for the following components:    Protime 25.5 (*)     INR 2.41 (*)     All other components within normal limits   BASIC METABOLIC PANEL - Abnormal; Notable for the following components:    BUN 55 (*)     CREATININE 1.7 (*)     GFR Non-African American 29 (*)     Calcium 8.6 (*)     All other components within normal limits   URINE RT REFLEX TO CULTURE   DIGOXIN LEVEL   SPECIMEN REJECTION       All other labs were within normal range or not returned as of this dictation.     EMERGENCY DEPARTMENT COURSE and DIFFERENTIAL DIAGNOSIS/MDM:   Vitals:    Vitals:    06/12/19 1500 06/12/19 1932   BP: 108/74 (!) 156/92   Pulse: 90 84   Resp: 16 15   Temp: 98.9 °F (37.2 °C) 99 °F (37.2 °C)   SpO2: 95% 95%   Weight: 103

## 2019-06-12 NOTE — ED NOTES
Straight cathed patient with Patria PCA at bedside assisting.       Lauren Douglas RN  06/12/19 0308

## 2019-06-12 NOTE — PROGRESS NOTES
BIOPSY      Left-benign    BREAST BIOPSY  feb 2014    Left    HYSTERECTOMY      OVARY SURGERY      tumor removed        Social History     Socioeconomic History    Marital status:      Spouse name: None    Number of children: None    Years of education: None    Highest education level: None   Occupational History    None   Social Needs    Financial resource strain: None    Food insecurity:     Worry: None     Inability: None    Transportation needs:     Medical: None     Non-medical: None   Tobacco Use    Smoking status: Never Smoker    Smokeless tobacco: Never Used   Substance and Sexual Activity    Alcohol use: No    Drug use: No    Sexual activity: Never   Lifestyle    Physical activity:     Days per week: None     Minutes per session: None    Stress: None   Relationships    Social connections:     Talks on phone: None     Gets together: None     Attends Zoroastrian service: None     Active member of club or organization: None     Attends meetings of clubs or organizations: None     Relationship status: None    Intimate partner violence:     Fear of current or ex partner: None     Emotionally abused: None     Physically abused: None     Forced sexual activity: None   Other Topics Concern    None   Social History Narrative    None       Family History   Problem Relation Age of Onset    Asthma Mother     Emphysema Mother     Alzheimer's Disease Sister     Heart Disease Brother     Heart Disease Sister     No Known Problems Sister     Alzheimer's Disease Sister        Current Outpatient Medications   Medication Sig Dispense Refill    predniSONE (DELTASONE) 50 MG tablet Take 1 tablet by mouth daily for 5 days 5 tablet 0    acetaminophen-codeine (TYLENOL/CODEINE #3) 300-30 MG per tablet Take 1 tablet by mouth every 4 hours as needed for Pain for up to 3 days. Intended supply: 3 days.  Take lowest dose possible to manage pain 18 tablet 0    atorvastatin (LIPITOR) 10 MG tablet Take 1 tablet by mouth daily 30 tablet 5    metoprolol succinate (TOPROL XL) 25 MG extended release tablet Take 1 tablet by mouth nightly 30 tablet 5    acyclovir (ZOVIRAX) 400 MG tablet Take 1 tablet by mouth 5 times daily for 7 days 35 tablet 0    alendronate (FOSAMAX) 70 MG tablet Take 1 tablet by mouth every 7 days 4 tablet 1    digoxin (LANOXIN) 125 MCG tablet Take 1 tablet by mouth every other day 45 tablet 5    tiotropium (SPIRIVA RESPIMAT) 2.5 MCG/ACT AERS inhaler Inhale 2 puffs into the lungs daily 1 Inhaler 2    isosorbide mononitrate (IMDUR) 30 MG extended release tablet Take 1 tablet by mouth daily 30 tablet 3    donepezil (ARICEPT) 5 MG tablet Take 1 tablet by mouth nightly 30 tablet 0    nitroGLYCERIN (NITROSTAT) 0.4 MG SL tablet Place 1 tablet under the tongue every 5 minutes as needed for Chest pain 25 tablet 3    allopurinol (ZYLOPRIM) 100 MG tablet Take 1 tablet by mouth daily 30 tablet 1    fluticasone (FLONASE) 50 MCG/ACT nasal spray 1 spray by Each Nare route daily 1 Spray in each nostril (Patient taking differently: 1 spray by Each Nostril route daily as needed 1 Spray in each nostril) 2 Bottle 1    montelukast (SINGULAIR) 10 MG tablet Take 10 mg by mouth nightly      furosemide (LASIX) 20 MG tablet Take 20 mg by mouth daily      warfarin (COUMADIN) 5 MG tablet Take 1 tablet by mouth daily 90 tablet 3    bimatoprost 0.01 % SOLN Place 1 drop into both eyes nightly       sodium polystyrene (SPS) 15 GM/60ML suspension Take 60 mLs by mouth once for 1 dose 1 Bottle 3    sodium polystyrene (SPS) 15 GM/60ML suspension Take 60 mLs by mouth once for 1 dose 1 Bottle 3    fexofenadine (ALLEGRA) 180 MG tablet Take 180 mg by mouth daily      mupirocin (BACTROBAN) 2 % cream Apply topically 3 times daily Apply topically 3 times daily. No current facility-administered medications for this visit.         Allergies   Allergen Reactions    Denosumab Other (See Comments)     after had shot got skin change and no tenderness. Abdominal: Soft. Bowel sounds are normal. She exhibits no distension. There is no tenderness. Musculoskeletal: Normal range of motion. She exhibits no edema, tenderness or deformity. Neurological: She is alert and oriented to person, place, and time. She has normal reflexes. No cranial nerve deficit. Coordination normal.   Skin: Skin is warm and dry. Rash noted. She is not diaphoretic. No erythema. Psychiatric: She has a normal mood and affect. Her behavior is normal. Thought content normal.   Nursing note and vitals reviewed. BP 88/62 (Site: Left Upper Arm, Position: Sitting, Cuff Size: Small Adult)   Pulse 88   Temp 98 °F (36.7 °C)   Ht 5' 1\" (1.549 m)   Wt 103 lb (46.7 kg)   SpO2 98%   BMI 19.46 kg/m²     Assessment:      Diagnosis Orders   1. Atrial fibrillation, chronic (HCC)  Comprehensive Metabolic Panel    CBC Auto Differential    Protime-INR   2. Other postherpetic nervous system involvement  predniSONE (DELTASONE) 50 MG tablet    acetaminophen-codeine (TYLENOL/CODEINE #3) 300-30 MG per tablet    Comprehensive Metabolic Panel    CBC Auto Differential   3. Hypercholesteremia  atorvastatin (LIPITOR) 10 MG tablet   4. Chronic systolic CHF (congestive heart failure), NYHA class 3 ([facility])  metoprolol succinate (TOPROL XL) 25 MG extended release tablet   5. Diarrhea, unspecified type  Comprehensive Metabolic Panel    CBC Auto Differential   6. Other fatigue     7. Confusion  Comprehensive Metabolic Panel    CBC Auto Differential     No results found for this visit on 06/12/19. Plan:     1. Atrial fibrillation, chronic (HCC)    2. Other postherpetic nervous system involvement    3. Hypercholesteremia    4. Chronic systolic CHF (congestive heart failure), NYHA class 3 ([facility] [address]    5. Diarrhea, unspecified type    6. Other fatigue    7. Confusion      Return in about 1 week (around 6/19/2019) for 30 min appointment, medication recheck.   Orders Placed This Encounter   Procedures    Comprehensive Metabolic Panel     Standing Status:   Future     Number of Occurrences:   1     Standing Expiration Date:   7/16/2020    CBC Auto Differential     Standing Status:   Future     Number of Occurrences:   1     Standing Expiration Date:   7/16/2020    Protime-INR     Standing Status:   Future     Number of Occurrences:   1     Standing Expiration Date:   6/12/2020     Order Specific Question:   Daily Coumadin Dose? Answer:   5 mg     Orders Placed This Encounter   Medications    predniSONE (DELTASONE) 50 MG tablet     Sig: Take 1 tablet by mouth daily for 5 days     Dispense:  5 tablet     Refill:  0    acetaminophen-codeine (TYLENOL/CODEINE #3) 300-30 MG per tablet     Sig: Take 1 tablet by mouth every 4 hours as needed for Pain for up to 3 days. Intended supply: 3 days. Take lowest dose possible to manage pain     Dispense:  18 tablet     Refill:  0     Reduce doses taken as pain becomes manageable    atorvastatin (LIPITOR) 10 MG tablet     Sig: Take 1 tablet by mouth daily     Dispense:  30 tablet     Refill:  5    metoprolol succinate (TOPROL XL) 25 MG extended release tablet     Sig: Take 1 tablet by mouth nightly     Dispense:  30 tablet     Refill:  5       Patient Instructions   Begin prednisone 50mg daily for 6 days. Stop metoprolol twice daily. Begin metoprolol 25mg at bedtime. Continue acyclovir. I will order home health to begin seeing pt. Patient/family given educational materials - see patient instructions. Discussed use, benefit, and side effects of prescribed medications. All patient/family questions answered and voiced understanding. Instructed to continue current medications, diet and exercise. Pt/family agreed with treatment plan. Follow up as directed and sooner if needed. Patient/ family instructed that is symptoms worsen or persist they are to contact office or report to nearest ER.   They voice understanding and agreement with this plan.      Electronically signed by NONI Galindo on 6/15/2019 at 12:12 PM

## 2019-06-15 ASSESSMENT — ENCOUNTER SYMPTOMS
ALLERGIC/IMMUNOLOGIC NEGATIVE: 1
SORE THROAT: 0
SINUS PRESSURE: 0
ABDOMINAL PAIN: 0
EYES NEGATIVE: 1
COLOR CHANGE: 1
BACK PAIN: 0
SHORTNESS OF BREATH: 0
COUGH: 0

## 2019-06-18 ENCOUNTER — OFFICE VISIT (OUTPATIENT)
Dept: PRIMARY CARE CLINIC | Age: 84
End: 2019-06-18
Payer: MEDICARE

## 2019-06-18 VITALS
HEIGHT: 61 IN | HEART RATE: 86 BPM | DIASTOLIC BLOOD PRESSURE: 82 MMHG | TEMPERATURE: 97 F | BODY MASS INDEX: 20.2 KG/M2 | OXYGEN SATURATION: 95 % | WEIGHT: 107 LBS | SYSTOLIC BLOOD PRESSURE: 132 MMHG

## 2019-06-18 DIAGNOSIS — J30.89 ALLERGIC RHINITIS DUE TO OTHER ALLERGIC TRIGGER, UNSPECIFIED SEASONALITY: ICD-10-CM

## 2019-06-18 DIAGNOSIS — K21.00 GASTROESOPHAGEAL REFLUX DISEASE WITH ESOPHAGITIS: Primary | ICD-10-CM

## 2019-06-18 DIAGNOSIS — I50.22 CHRONIC SYSTOLIC CHF (CONGESTIVE HEART FAILURE), NYHA CLASS 3 (HCC): ICD-10-CM

## 2019-06-18 DIAGNOSIS — R11.2 NAUSEA AND VOMITING, INTRACTABILITY OF VOMITING NOT SPECIFIED, UNSPECIFIED VOMITING TYPE: ICD-10-CM

## 2019-06-18 DIAGNOSIS — K12.1 STOMATITIS: ICD-10-CM

## 2019-06-18 DIAGNOSIS — K21.00 GASTROESOPHAGEAL REFLUX DISEASE WITH ESOPHAGITIS: ICD-10-CM

## 2019-06-18 DIAGNOSIS — N19 RENAL FAILURE, UNSPECIFIED CHRONICITY: ICD-10-CM

## 2019-06-18 LAB
ALBUMIN SERPL-MCNC: 4.2 G/DL (ref 3.5–5.2)
ALP BLD-CCNC: 32 U/L (ref 35–104)
ALT SERPL-CCNC: 15 U/L (ref 5–33)
ANION GAP SERPL CALCULATED.3IONS-SCNC: 11 MMOL/L (ref 7–19)
AST SERPL-CCNC: 25 U/L (ref 5–32)
BASOPHILS ABSOLUTE: 0 K/UL (ref 0–0.2)
BASOPHILS RELATIVE PERCENT: 0.1 % (ref 0–1)
BILIRUB SERPL-MCNC: 0.7 MG/DL (ref 0.2–1.2)
BUN BLDV-MCNC: 52 MG/DL (ref 8–23)
CALCIUM SERPL-MCNC: 10 MG/DL (ref 8.8–10.2)
CHLORIDE BLD-SCNC: 98 MMOL/L (ref 98–111)
CO2: 29 MMOL/L (ref 22–29)
CREAT SERPL-MCNC: 1.4 MG/DL (ref 0.5–0.9)
EOSINOPHILS ABSOLUTE: 0 K/UL (ref 0–0.6)
EOSINOPHILS RELATIVE PERCENT: 0.2 % (ref 0–5)
GFR NON-AFRICAN AMERICAN: 36
GLUCOSE BLD-MCNC: 108 MG/DL (ref 74–109)
HCT VFR BLD CALC: 41.8 % (ref 37–47)
HEMOGLOBIN: 13.2 G/DL (ref 12–16)
LYMPHOCYTES ABSOLUTE: 1 K/UL (ref 1.1–4.5)
LYMPHOCYTES RELATIVE PERCENT: 12.6 % (ref 20–40)
MCH RBC QN AUTO: 30.7 PG (ref 27–31)
MCHC RBC AUTO-ENTMCNC: 31.6 G/DL (ref 33–37)
MCV RBC AUTO: 97.2 FL (ref 81–99)
MONOCYTES ABSOLUTE: 0.7 K/UL (ref 0–0.9)
MONOCYTES RELATIVE PERCENT: 8.7 % (ref 0–10)
NEUTROPHILS ABSOLUTE: 6.3 K/UL (ref 1.5–7.5)
NEUTROPHILS RELATIVE PERCENT: 77.8 % (ref 50–65)
PDW BLD-RTO: 14 % (ref 11.5–14.5)
PLATELET # BLD: 173 K/UL (ref 130–400)
PMV BLD AUTO: 11.7 FL (ref 9.4–12.3)
POTASSIUM SERPL-SCNC: 4.7 MMOL/L (ref 3.5–5)
RBC # BLD: 4.3 M/UL (ref 4.2–5.4)
SODIUM BLD-SCNC: 138 MMOL/L (ref 136–145)
TOTAL PROTEIN: 7.7 G/DL (ref 6.6–8.7)
WBC # BLD: 8 K/UL (ref 4.8–10.8)

## 2019-06-18 PROCEDURE — 1036F TOBACCO NON-USER: CPT | Performed by: NURSE PRACTITIONER

## 2019-06-18 PROCEDURE — G8420 CALC BMI NORM PARAMETERS: HCPCS | Performed by: NURSE PRACTITIONER

## 2019-06-18 PROCEDURE — 99213 OFFICE O/P EST LOW 20 MIN: CPT | Performed by: NURSE PRACTITIONER

## 2019-06-18 PROCEDURE — 1123F ACP DISCUSS/DSCN MKR DOCD: CPT | Performed by: NURSE PRACTITIONER

## 2019-06-18 PROCEDURE — 4040F PNEUMOC VAC/ADMIN/RCVD: CPT | Performed by: NURSE PRACTITIONER

## 2019-06-18 PROCEDURE — G8427 DOCREV CUR MEDS BY ELIG CLIN: HCPCS | Performed by: NURSE PRACTITIONER

## 2019-06-18 PROCEDURE — 1090F PRES/ABSN URINE INCON ASSESS: CPT | Performed by: NURSE PRACTITIONER

## 2019-06-18 PROCEDURE — G8399 PT W/DXA RESULTS DOCUMENT: HCPCS | Performed by: NURSE PRACTITIONER

## 2019-06-18 RX ORDER — ONDANSETRON 4 MG/1
4 TABLET, FILM COATED ORAL ONCE
Status: COMPLETED | OUTPATIENT
Start: 2019-06-18 | End: 2019-06-18

## 2019-06-18 RX ORDER — MONTELUKAST SODIUM 10 MG/1
10 TABLET ORAL NIGHTLY
Qty: 30 TABLET | Refills: 5 | Status: SHIPPED | OUTPATIENT
Start: 2019-06-18 | End: 2020-01-21 | Stop reason: SDUPTHER

## 2019-06-18 RX ORDER — METOPROLOL SUCCINATE 25 MG/1
25 TABLET, EXTENDED RELEASE ORAL NIGHTLY
Qty: 30 TABLET | Refills: 5 | Status: SHIPPED | OUTPATIENT
Start: 2019-06-18 | End: 2019-06-19 | Stop reason: DRUGHIGH

## 2019-06-18 RX ORDER — PREDNISONE 10 MG/1
TABLET ORAL
Refills: 0 | COMMUNITY
Start: 2019-06-12 | End: 2019-06-19 | Stop reason: ALTCHOICE

## 2019-06-18 RX ORDER — ONDANSETRON 4 MG/1
4 TABLET, FILM COATED ORAL 3 TIMES DAILY PRN
Qty: 30 TABLET | Refills: 0 | Status: SHIPPED | OUTPATIENT
Start: 2019-06-18 | End: 2021-01-04 | Stop reason: ALTCHOICE

## 2019-06-18 RX ORDER — FAMOTIDINE 20 MG/1
20 TABLET, FILM COATED ORAL 2 TIMES DAILY
Qty: 60 TABLET | Refills: 5 | Status: ON HOLD | OUTPATIENT
Start: 2019-06-18 | End: 2019-12-09 | Stop reason: SDUPTHER

## 2019-06-18 RX ADMIN — ONDANSETRON 4 MG: 4 TABLET, FILM COATED ORAL at 14:28

## 2019-06-18 ASSESSMENT — ENCOUNTER SYMPTOMS
ALLERGIC/IMMUNOLOGIC NEGATIVE: 1
COUGH: 0
BACK PAIN: 0
VOMITING: 1
SORE THROAT: 1
SINUS PRESSURE: 0
SHORTNESS OF BREATH: 0
EYES NEGATIVE: 1
ABDOMINAL PAIN: 0
COLOR CHANGE: 1
TROUBLE SWALLOWING: 1

## 2019-06-18 NOTE — PROGRESS NOTES
[facility]  [address] [place] [address]  [id number]  [phone]  Dept: [phone]  Dept Fax: [phone]  Loc: [phone]        Jersey Ríos is a 80 y.o. female who presents today for her medical conditions/ complaints as noted below. Jersey Ríos is c/o Follow-up (a fib, hypercholesterolemia, CHF, diarrhea, confusion, fatigue); Medication Check (begin prednisone and metoprolol 25 qhs, stop metoprolol bid, cont acyclovir); and Nausea & Vomiting        Chief Complaint   Patient presents with    Follow-up     a fib, hypercholesterolemia, CHF, diarrhea, confusion, fatigue    Medication Check     begin prednisone and metoprolol 25 qhs, stop metoprolol bid, cont acyclovir    Nausea & Vomiting       HPI:     HPI    Daughter states that pt is having episodes of nausea and vomiting. Pt also notes that she has had some diarrhea. She has also c/o s/t, and trouble swallowing. Patient reports that they have been compliant with taking medications as directed.      Past Medical History:   Diagnosis Date    Acute systolic CHF (congestive heart failure), NYHA class 3 (HCC) 2/24/2017    Allergic rhinitis     Anticoagulant long-term use     coumadin for atrial fib    Atrial fibrillation (HCC)     Chronic atrial fibrillation (HCC) 9/28/2017    Chronic back pain     Chronic kidney disease     Chronic kidney disease, stage III (moderate) (HCC) 2/17/2017    Chronic systolic CHF (congestive heart failure), NYHA class 3 ([address] 2/17/2017    GERD (gastroesophageal reflux disease)     Glaucoma     Hearing loss     Hypertension     Osteoarthritis     Osteoporosis     Panlobular emphysema ([address] 11/28/2018    Shingles     Sinus problem     Wears glasses        Past Surgical History:   Procedure Laterality Date    BREAST BIOPSY      Left-benign    BREAST BIOPSY  feb 2014    Left    HYSTERECTOMY      OVARY SURGERY      tumor removed        Social History (LIPITOR) 10 MG tablet Take 1 tablet by mouth daily 30 tablet 5    alendronate (FOSAMAX) 70 MG tablet Take 1 tablet by mouth every 7 days 4 tablet 1    digoxin (LANOXIN) 125 MCG tablet Take 1 tablet by mouth every other day 45 tablet 5    tiotropium (SPIRIVA RESPIMAT) 2.5 MCG/ACT AERS inhaler Inhale 2 puffs into the lungs daily 1 Inhaler 2    isosorbide mononitrate (IMDUR) 30 MG extended release tablet Take 1 tablet by mouth daily 30 tablet 3    donepezil (ARICEPT) 5 MG tablet Take 1 tablet by mouth nightly 30 tablet 0    nitroGLYCERIN (NITROSTAT) 0.4 MG SL tablet Place 1 tablet under the tongue every 5 minutes as needed for Chest pain 25 tablet 3    allopurinol (ZYLOPRIM) 100 MG tablet Take 1 tablet by mouth daily 30 tablet 1    fluticasone (FLONASE) 50 MCG/ACT nasal spray 1 spray by Each Nare route daily 1 Spray in each nostril (Patient taking differently: 1 spray by Each Nostril route daily as needed 1 Spray in each nostril) 2 Bottle 1    mupirocin (BACTROBAN) 2 % cream Apply topically 3 times daily Apply topically 3 times daily.       furosemide (LASIX) 20 MG tablet Take 20 mg by mouth 2 times daily       warfarin (COUMADIN) 5 MG tablet Take 1 tablet by mouth daily (Patient taking differently: Take 5 mg by mouth daily Takes whole tablet Wednesday and Friday and half tablet all other days.) 90 tablet 3    bimatoprost 0.01 % SOLN Place 1 drop into both eyes nightly       metoprolol tartrate (LOPRESSOR) 50 MG tablet Take 25 mg by mouth nightly      metoprolol succinate (TOPROL XL) 25 MG extended release tablet Take 25 mg by mouth nightly      OXYGEN Inhale 2 L into the lungs continuous      sodium polystyrene (SPS) 15 GM/60ML suspension Take 60 mLs by mouth once for 1 dose 1 Bottle 3    sodium polystyrene (SPS) 15 GM/60ML suspension Take 60 mLs by mouth once for 1 dose 1 Bottle 3    fexofenadine (ALLEGRA) 180 MG tablet Take 180 mg by mouth daily       No current facility-administered medications for this visit. Allergies   Allergen Reactions    Denosumab Other (See Comments)     after had shot got blood in urine--not sure if associated      Lisinopril Other (See Comments)     cough         Lab Review not applicable  notapplicable    Subjective:   Review of Systems   Constitutional: Positive for unexpected weight change. Negative for activity change, appetite change, chills, fatigue and fever. HENT: Positive for sore throat and trouble swallowing. Negative for congestion, ear discharge and sinus pressure. Eyes: Negative. Respiratory: Negative for cough and shortness of breath. Cardiovascular: Negative. Gastrointestinal: Positive for vomiting. Negative for abdominal pain. Endocrine: Negative. Genitourinary: Negative for dysuria, frequency and urgency. Musculoskeletal: Negative for arthralgias, back pain and myalgias. Skin: Positive for color change and rash. Allergic/Immunologic: Negative. Neurological: Negative for weakness and headaches. Confusion per daughter. Hematological: Bruises/bleeds easily. Psychiatric/Behavioral: Negative. Objective:     Physical Exam   Constitutional: She is oriented to person, place, and time. She appears well-developed and well-nourished. No distress. HENT:   Head: Normocephalic and atraumatic. Right Ear: External ear normal.   Left Ear: External ear normal.   Nose: Nose normal.   Mouth/Throat: Posterior oropharyngeal erythema present. No oropharyngeal exudate. Eyes: Pupils are equal, round, and reactive to light. Conjunctivae are normal. Right eye exhibits no discharge. Left eye exhibits no discharge. Neck: Normal range of motion. Neck supple. Cardiovascular: Normal rate, regular rhythm, normal heart sounds and intact distal pulses. No murmur heard. Pulmonary/Chest: Effort normal and breath sounds normal. No stridor. No respiratory distress. She has no wheezes. She has no rales.  She exhibits no tenderness. Right breast exhibits no inverted nipple, no mass, no nipple discharge, no skin change and no tenderness. Left breast exhibits no inverted nipple, no mass, no nipple discharge, no skin change and no tenderness. Abdominal: Soft. Bowel sounds are normal. She exhibits no distension. There is no tenderness. Musculoskeletal: Normal range of motion. She exhibits no edema, tenderness or deformity. Neurological: She is alert and oriented to person, place, and time. She has normal reflexes. No cranial nerve deficit. Coordination normal.   Skin: Skin is warm and dry. Rash noted. She is not diaphoretic. No erythema. Psychiatric: She has a normal mood and affect. Her behavior is normal. Thought content normal.   Nursing note and vitals reviewed. /82   Pulse 86   Temp 97 °F (36.1 °C)   Ht 5' 1\" (1.549 m)   Wt 107 lb (48.5 kg)   SpO2 95%   BMI 20.22 kg/m²     Assessment:      Diagnosis Orders   1. Gastroesophageal reflux disease with esophagitis  Comprehensive Metabolic Panel    famotidine (PEPCID) 20 MG tablet   2. Nausea and vomiting, intractability of vomiting not specified, unspecified vomiting type  Comprehensive Metabolic Panel    CBC Auto Differential    ondansetron (ZOFRAN) 4 MG tablet   3. Renal failure, unspecified chronicity  Comprehensive Metabolic Panel    CBC Auto Differential   4. Chronic systolic CHF (congestive heart failure), NYHA class 3 ([facility])  DISCONTINUED: metoprolol succinate (TOPROL XL) 25 MG extended release tablet   5. Stomatitis  Magic Mouthwash (MIRACLE MOUTHWASH)   6. Allergic rhinitis due to other allergic trigger, unspecified seasonality  montelukast (SINGULAIR) 10 MG tablet     No results found for this visit on 06/18/19. Plan:     1. Gastroesophageal reflux disease with esophagitis    2. Nausea and vomiting, intractability of vomiting not specified, unspecified vomiting type    3. Renal failure, unspecified chronicity    4.  Chronic systolic CHF (congestive heart failure), NYHA class 3 (HCC)    5. Stomatitis    6. Allergic rhinitis due to other allergic trigger, unspecified seasonality      Return in about 1 month (around 7/18/2019). Orders Placed This Encounter   Procedures    Comprehensive Metabolic Panel     Standing Status:   Future     Number of Occurrences:   1     Standing Expiration Date:   6/18/2020    CBC Auto Differential     Standing Status:   Future     Number of Occurrences:   1     Standing Expiration Date:   7/22/2020     Orders Placed This Encounter   Medications    ondansetron (ZOFRAN) tablet 4 mg    famotidine (PEPCID) 20 MG tablet     Sig: Take 1 tablet by mouth 2 times daily     Dispense:  60 tablet     Refill:  5    ondansetron (ZOFRAN) 4 MG tablet     Sig: Take 1 tablet by mouth 3 times daily as needed for Nausea or Vomiting     Dispense:  30 tablet     Refill:  0    montelukast (SINGULAIR) 10 MG tablet     Sig: Take 1 tablet by mouth nightly     Dispense:  30 tablet     Refill:  5    DISCONTD: metoprolol succinate (TOPROL XL) 25 MG extended release tablet     Sig: Take 1 tablet by mouth nightly     Dispense:  30 tablet     Refill:  5    Magic Mouthwash (MIRACLE MOUTHWASH)     Sig: Swish and spit 5 mLs 4 times daily as needed for Irritation     Dispense:  1 Bottle     Refill:  0       Patient Instructions   Have labs on the 2nd floor. Begin pepcid 20mg twice daily for acid reflux. Avoid eating 2 hours before bed. Take zofran as needed for nausea. Swish and spit mouth wash four times daily after meals. Patient/family given educational materials - see patient instructions. Discussed use, benefit, and side effects of prescribed medications. All patient/family questions answered and voiced understanding. Instructed to continue current medications, diet and exercise. Pt/family agreed with treatment plan. Follow up as directed and sooner if needed.      Patient/ family instructed that is symptoms worsen or persist they are to contact office or report to nearest ER. They voice understanding and agreement with this plan.      Electronically signed by NONI Gordon on 6/24/2019 at 2:47 PM

## 2019-06-18 NOTE — PATIENT INSTRUCTIONS
Have labs on the 2nd floor. Begin pepcid 20mg twice daily for acid reflux. Avoid eating 2 hours before bed. Take zofran as needed for nausea. Swish and spit mouth wash four times daily after meals.

## 2019-06-19 ENCOUNTER — OFFICE VISIT (OUTPATIENT)
Dept: CARDIOLOGY | Age: 84
End: 2019-06-19
Payer: MEDICARE

## 2019-06-19 VITALS
WEIGHT: 105 LBS | HEIGHT: 62 IN | DIASTOLIC BLOOD PRESSURE: 70 MMHG | HEART RATE: 80 BPM | BODY MASS INDEX: 19.32 KG/M2 | SYSTOLIC BLOOD PRESSURE: 100 MMHG

## 2019-06-19 DIAGNOSIS — I48.20 CHRONIC ATRIAL FIBRILLATION (HCC): Primary | ICD-10-CM

## 2019-06-19 PROCEDURE — 4040F PNEUMOC VAC/ADMIN/RCVD: CPT | Performed by: INTERNAL MEDICINE

## 2019-06-19 PROCEDURE — 93000 ELECTROCARDIOGRAM COMPLETE: CPT | Performed by: INTERNAL MEDICINE

## 2019-06-19 PROCEDURE — 1036F TOBACCO NON-USER: CPT | Performed by: INTERNAL MEDICINE

## 2019-06-19 PROCEDURE — G8427 DOCREV CUR MEDS BY ELIG CLIN: HCPCS | Performed by: INTERNAL MEDICINE

## 2019-06-19 PROCEDURE — 1090F PRES/ABSN URINE INCON ASSESS: CPT | Performed by: INTERNAL MEDICINE

## 2019-06-19 PROCEDURE — 99212 OFFICE O/P EST SF 10 MIN: CPT | Performed by: INTERNAL MEDICINE

## 2019-06-19 PROCEDURE — 1123F ACP DISCUSS/DSCN MKR DOCD: CPT | Performed by: INTERNAL MEDICINE

## 2019-06-19 PROCEDURE — G8399 PT W/DXA RESULTS DOCUMENT: HCPCS | Performed by: INTERNAL MEDICINE

## 2019-06-19 PROCEDURE — G8420 CALC BMI NORM PARAMETERS: HCPCS | Performed by: INTERNAL MEDICINE

## 2019-06-19 RX ORDER — METOPROLOL TARTRATE 50 MG/1
50 TABLET, FILM COATED ORAL 2 TIMES DAILY
COMMUNITY
End: 2019-11-19 | Stop reason: SDUPTHER

## 2019-06-19 RX ORDER — METOPROLOL SUCCINATE 25 MG/1
25 TABLET, EXTENDED RELEASE ORAL NIGHTLY
COMMUNITY
End: 2019-10-18 | Stop reason: ALTCHOICE

## 2019-06-21 ENCOUNTER — TELEPHONE (OUTPATIENT)
Dept: PRIMARY CARE CLINIC | Age: 84
End: 2019-06-21

## 2019-06-21 NOTE — TELEPHONE ENCOUNTER
Result Notes for Comprehensive Metabolic Panel     Notes recorded by NONI Parekr on 6/19/2019 at 6:48 AM CDT  Results are abnormal. Kidney function is improving.  We need to check on her diatherix results. Pt has got to drink more gatoraide/ water to help with dehydration. Contacted pt's daughter and gave results and recommendations above. Informed per NONI Christianson that diatherix results came back negative. Pt's daughter verbalized understanding and agreed to all.  PP, LPN

## 2019-06-24 PROBLEM — N19 RENAL FAILURE: Status: ACTIVE | Noted: 2019-06-24

## 2019-06-24 PROBLEM — R11.2 NAUSEA AND VOMITING: Status: ACTIVE | Noted: 2019-06-24

## 2019-06-24 PROBLEM — K21.00 GASTROESOPHAGEAL REFLUX DISEASE WITH ESOPHAGITIS: Status: ACTIVE | Noted: 2019-06-24

## 2019-06-24 PROBLEM — K12.1 STOMATITIS: Status: ACTIVE | Noted: 2019-06-24

## 2019-06-25 ENCOUNTER — OFFICE VISIT (OUTPATIENT)
Dept: PRIMARY CARE CLINIC | Age: 84
End: 2019-06-25
Payer: MEDICARE

## 2019-06-25 VITALS
TEMPERATURE: 98.6 F | SYSTOLIC BLOOD PRESSURE: 116 MMHG | OXYGEN SATURATION: 94 % | HEART RATE: 84 BPM | WEIGHT: 103 LBS | DIASTOLIC BLOOD PRESSURE: 72 MMHG | BODY MASS INDEX: 18.84 KG/M2

## 2019-06-25 DIAGNOSIS — N30.01 ACUTE CYSTITIS WITH HEMATURIA: Primary | ICD-10-CM

## 2019-06-25 LAB
APPEARANCE FLUID: ABNORMAL
BILIRUBIN, POC: ABNORMAL
BLOOD URINE, POC: ABNORMAL
CLARITY, POC: ABNORMAL
COLOR, POC: ABNORMAL
GLUCOSE URINE, POC: ABNORMAL
KETONES, POC: ABNORMAL
LEUKOCYTE EST, POC: ABNORMAL
NITRITE, POC: POSITIVE
PH, POC: 6.5
PROTEIN, POC: 30
SPECIFIC GRAVITY, POC: 1.02
UROBILINOGEN, POC: 0.2

## 2019-06-25 PROCEDURE — G8420 CALC BMI NORM PARAMETERS: HCPCS | Performed by: NURSE PRACTITIONER

## 2019-06-25 PROCEDURE — 1036F TOBACCO NON-USER: CPT | Performed by: NURSE PRACTITIONER

## 2019-06-25 PROCEDURE — 1090F PRES/ABSN URINE INCON ASSESS: CPT | Performed by: NURSE PRACTITIONER

## 2019-06-25 PROCEDURE — 81002 URINALYSIS NONAUTO W/O SCOPE: CPT | Performed by: NURSE PRACTITIONER

## 2019-06-25 PROCEDURE — G8399 PT W/DXA RESULTS DOCUMENT: HCPCS | Performed by: NURSE PRACTITIONER

## 2019-06-25 PROCEDURE — 99213 OFFICE O/P EST LOW 20 MIN: CPT | Performed by: NURSE PRACTITIONER

## 2019-06-25 PROCEDURE — 1123F ACP DISCUSS/DSCN MKR DOCD: CPT | Performed by: NURSE PRACTITIONER

## 2019-06-25 PROCEDURE — G8427 DOCREV CUR MEDS BY ELIG CLIN: HCPCS | Performed by: NURSE PRACTITIONER

## 2019-06-25 PROCEDURE — 4040F PNEUMOC VAC/ADMIN/RCVD: CPT | Performed by: NURSE PRACTITIONER

## 2019-06-25 RX ORDER — NITROFURANTOIN 25; 75 MG/1; MG/1
100 CAPSULE ORAL 2 TIMES DAILY
Qty: 14 CAPSULE | Refills: 0 | Status: SHIPPED | OUTPATIENT
Start: 2019-06-25 | End: 2019-07-02

## 2019-06-25 ASSESSMENT — ENCOUNTER SYMPTOMS
DIARRHEA: 1
RESPIRATORY NEGATIVE: 1
VOMITING: 0
NAUSEA: 0
ABDOMINAL PAIN: 0

## 2019-06-25 NOTE — PROGRESS NOTES
[address] [address]     Phone:  [phone]  Fax:  [phone]      Logan Ramirez is a 80 y.o. female who presents today for her medical conditions/complaints as noted below. Logan Ramirez is c/o of Dysuria (Started yesterday.) and Diarrhea      Chief Complaint   Patient presents with    Dysuria     Started yesterday.  Diarrhea       HPI:     HPI    Logan Ramirez presents today for dysuria for one day. She does not drink water often. She is also having issues with diarrhea that started this morning. She denies fevers, hematuria, vaginal pain. She is hard of hearing and a poor historian. Her daughter is with her today. She is on coumadin and this is regulated by PCP.      Past Medical History:   Diagnosis Date    Acute systolic CHF (congestive heart failure), NYHA class 3 (HCC) 2/24/2017    Allergic rhinitis     Anticoagulant long-term use     coumadin for atrial fib    Atrial fibrillation (HCC)     Chronic atrial fibrillation (HCC) 9/28/2017    Chronic back pain     Chronic kidney disease     Chronic kidney disease, stage III (moderate) (HCC) 2/17/2017    Chronic systolic CHF (congestive heart failure), NYHA class 3 ([address] 2/17/2017    GERD (gastroesophageal reflux disease)     Glaucoma     Hearing loss     Hypertension     Osteoarthritis     Osteoporosis     Panlobular emphysema ([address] 11/28/2018    Shingles     Sinus problem     Wears glasses         Past Surgical History:   Procedure Laterality Date    BREAST BIOPSY      Left-benign    BREAST BIOPSY  feb 2014    Left    HYSTERECTOMY      OVARY SURGERY      tumor removed        Social History     Tobacco Use    Smoking status: Never Smoker    Smokeless tobacco: Never Used   Substance Use Topics    Alcohol use: No        Current Outpatient Medications   Medication Sig Dispense Refill    nitrofurantoin, macrocrystal-monohydrate, (MACROBID) 100 MG capsule Take 1 capsule by mouth 2 times daily for 7 days 14 capsule 0    metoprolol succinate (TOPROL XL) 25 MG extended release tablet Take 25 mg by mouth nightly      OXYGEN Inhale 2 L into the lungs continuous      ACETAMINOPHEN-CODEINE #3 PO Take by mouth every 4 hours as needed      famotidine (PEPCID) 20 MG tablet Take 1 tablet by mouth 2 times daily 60 tablet 5    ondansetron (ZOFRAN) 4 MG tablet Take 1 tablet by mouth 3 times daily as needed for Nausea or Vomiting 30 tablet 0    montelukast (SINGULAIR) 10 MG tablet Take 1 tablet by mouth nightly 30 tablet 5    Magic Mouthwash (MIRACLE MOUTHWASH) Swish and spit 5 mLs 4 times daily as needed for Irritation 1 Bottle 0    atorvastatin (LIPITOR) 10 MG tablet Take 1 tablet by mouth daily 30 tablet 5    alendronate (FOSAMAX) 70 MG tablet Take 1 tablet by mouth every 7 days 4 tablet 1    digoxin (LANOXIN) 125 MCG tablet Take 1 tablet by mouth every other day 45 tablet 5    isosorbide mononitrate (IMDUR) 30 MG extended release tablet Take 1 tablet by mouth daily 30 tablet 3    fexofenadine (ALLEGRA) 180 MG tablet Take 180 mg by mouth daily      donepezil (ARICEPT) 5 MG tablet Take 1 tablet by mouth nightly 30 tablet 0    nitroGLYCERIN (NITROSTAT) 0.4 MG SL tablet Place 1 tablet under the tongue every 5 minutes as needed for Chest pain 25 tablet 3    allopurinol (ZYLOPRIM) 100 MG tablet Take 1 tablet by mouth daily 30 tablet 1    fluticasone (FLONASE) 50 MCG/ACT nasal spray 1 spray by Each Nare route daily 1 Spray in each nostril (Patient taking differently: 1 spray by Each Nostril route daily as needed 1 Spray in each nostril) 2 Bottle 1    mupirocin (BACTROBAN) 2 % cream Apply topically 3 times daily Apply topically 3 times daily.       furosemide (LASIX) 20 MG tablet Take 20 mg by mouth 2 times daily       warfarin (COUMADIN) 5 MG tablet Take 1 tablet by mouth daily (Patient taking differently: Take 5 mg by mouth daily Takes whole tablet Wednesday and Friday and half tablet all other days.) 90 tablet 3    bimatoprost 0.01 % SOLN Place 1 drop into both eyes nightly       metoprolol tartrate (LOPRESSOR) 50 MG tablet Take 25 mg by mouth nightly      sodium polystyrene (SPS) 15 GM/60ML suspension Take 60 mLs by mouth once for 1 dose 1 Bottle 3    sodium polystyrene (SPS) 15 GM/60ML suspension Take 60 mLs by mouth once for 1 dose 1 Bottle 3    tiotropium (SPIRIVA RESPIMAT) 2.5 MCG/ACT AERS inhaler Inhale 2 puffs into the lungs daily 1 Inhaler 2     No current facility-administered medications for this visit. Allergies   Allergen Reactions    Denosumab Other (See Comments)     after had shot got blood in urine--not sure if associated      Lisinopril Other (See Comments)     cough         Family History   Problem Relation Age of Onset    Asthma Mother     Emphysema Mother     Alzheimer's Disease Sister     Heart Disease Brother     Heart Disease Sister     No Known Problems Sister     Alzheimer's Disease Sister                Review of Systems   Constitutional: Negative for fever. Respiratory: Negative. Cardiovascular: Negative. Gastrointestinal: Positive for diarrhea. Negative for abdominal pain, nausea and vomiting. Genitourinary: Positive for dysuria. Negative for decreased urine volume, difficulty urinating, frequency, hematuria, urgency, vaginal bleeding, vaginal discharge and vaginal pain. Objective:     Physical Exam   Constitutional: She is oriented to person, place, and time. She appears well-developed. Hard of hearing. HENT:   Head: Normocephalic and atraumatic. Eyes: Pupils are equal, round, and reactive to light. Neck: Normal range of motion. Cardiovascular: Normal rate. Pulmonary/Chest: Effort normal. No respiratory distress. Abdominal: Soft. Bowel sounds are normal. She exhibits no distension. There is no tenderness. Musculoskeletal: She exhibits no edema. Lumbar back: She exhibits decreased range of motion. Uses wheelchair. Kyphotic. Neurological: She is alert and oriented to person, place, and time. Skin: Skin is warm and dry. No rash noted. Nursing note and vitals reviewed. /72   Pulse 84   Temp 98.6 °F (37 °C) (Temporal)   Wt 103 lb (46.7 kg)   SpO2 94%   BMI 18.84 kg/m²     Assessment:      Diagnosis Orders   1. Acute cystitis with hematuria  POCT Urinalysis no Micro    Urine Culture    Urine Culture    nitrofurantoin, macrocrystal-monohydrate, (MACROBID) 100 MG capsule       Results for orders placed or performed in visit on 06/25/19   POCT Urinalysis no Micro   Result Value Ref Range    Color, UA      Clarity, UA hazy     Glucose, UA POC neg     Bilirubin, UA neg     Ketones, UA neg     Spec Grav, UA 1.020     Blood, UA POC large     pH, UA 6.5     Protein, UA POC 30     Urobilinogen, UA 0.2     Leukocytes, UA moderate     Nitrite, UA positive     Appearance, Fluid Cloudy (A) Clear, Slightly Cloudy       Plan: Will treat UTI with macrobid. Sent for culture. Check INR every 2-3 days and call office due to patient being on Coumadin. Return if symptoms worsen or fail to improve, for As scheduled. Orders Placed This Encounter   Procedures    Urine Culture     Standing Status:   Future     Number of Occurrences:   1     Standing Expiration Date:   6/25/2020     Order Specific Question:   Specify (ex-cath, midstream, cysto, etc)? Answer:   midstream    POCT Urinalysis no Micro       Orders Placed This Encounter   Medications    nitrofurantoin, macrocrystal-monohydrate, (MACROBID) 100 MG capsule     Sig: Take 1 capsule by mouth 2 times daily for 7 days     Dispense:  14 capsule     Refill:  0        Patient offered educational materials - see patient instructions for any instruction needed. Discussed use, benefit, and side effects of prescribed medications. All patient questions answered. Instructed to continue current medications, diet and exercise. Patient agreed with treatment plan. Follow up as directed. Patient was advised to go to the ED if condition ever becomes emergent.          Electronically signed by NONI Worthy on 6/25/2019 at 3:11 PM

## 2019-06-26 ENCOUNTER — ANTI-COAG VISIT (OUTPATIENT)
Dept: CARDIOLOGY | Age: 84
End: 2019-06-26

## 2019-06-26 DIAGNOSIS — I48.20 ATRIAL FIBRILLATION, CHRONIC (HCC): Primary | ICD-10-CM

## 2019-06-26 DIAGNOSIS — R53.83 OTHER FATIGUE: ICD-10-CM

## 2019-06-26 DIAGNOSIS — R41.0 CONFUSION: ICD-10-CM

## 2019-06-26 DIAGNOSIS — J44.1 COPD EXACERBATION (HCC): ICD-10-CM

## 2019-06-26 DIAGNOSIS — J43.1 PANLOBULAR EMPHYSEMA (HCC): ICD-10-CM

## 2019-06-26 LAB — INR BLD: 3.1

## 2019-06-26 NOTE — PROGRESS NOTES
Placed order for home health, contacted daughter and informed who verbalized understanding and agreed.

## 2019-06-27 ENCOUNTER — TELEPHONE (OUTPATIENT)
Dept: PRIMARY CARE CLINIC | Age: 84
End: 2019-06-27

## 2019-06-27 ENCOUNTER — TELEPHONE (OUTPATIENT)
Dept: INTERNAL MEDICINE CLINIC | Age: 84
End: 2019-06-27

## 2019-06-27 LAB
ORGANISM: ABNORMAL
URINE CULTURE, ROUTINE: ABNORMAL
URINE CULTURE, ROUTINE: ABNORMAL

## 2019-06-27 RX ORDER — ISOSORBIDE MONONITRATE 30 MG/1
30 TABLET, EXTENDED RELEASE ORAL DAILY
Qty: 30 TABLET | Refills: 3 | Status: SHIPPED | OUTPATIENT
Start: 2019-06-27 | End: 2019-10-28 | Stop reason: SDUPTHER

## 2019-06-27 RX ORDER — CEPHALEXIN 250 MG/1
250 CAPSULE ORAL 2 TIMES DAILY
Qty: 14 CAPSULE | Refills: 0 | Status: SHIPPED | OUTPATIENT
Start: 2019-06-27 | End: 2019-07-04

## 2019-06-27 NOTE — TELEPHONE ENCOUNTER
There is a  referral entered but I did not get notified , just making sure that is for [facility] before I send it   Please advise

## 2019-07-01 ENCOUNTER — ANTI-COAG VISIT (OUTPATIENT)
Dept: CARDIOLOGY | Age: 84
End: 2019-07-01

## 2019-07-01 ENCOUNTER — TELEPHONE (OUTPATIENT)
Dept: INTERNAL MEDICINE CLINIC | Age: 84
End: 2019-07-01

## 2019-07-01 LAB — INR BLD: 1.6

## 2019-07-09 ENCOUNTER — OFFICE VISIT (OUTPATIENT)
Dept: PRIMARY CARE CLINIC | Age: 84
End: 2019-07-09
Payer: MEDICARE

## 2019-07-09 VITALS
BODY MASS INDEX: 18.88 KG/M2 | WEIGHT: 102.6 LBS | HEIGHT: 62 IN | OXYGEN SATURATION: 97 % | SYSTOLIC BLOOD PRESSURE: 122 MMHG | HEART RATE: 97 BPM | TEMPERATURE: 98.8 F | DIASTOLIC BLOOD PRESSURE: 70 MMHG

## 2019-07-09 DIAGNOSIS — B02.9 HERPES ZOSTER WITHOUT COMPLICATION: ICD-10-CM

## 2019-07-09 DIAGNOSIS — N30.01 ACUTE CYSTITIS WITH HEMATURIA: Primary | ICD-10-CM

## 2019-07-09 LAB
APPEARANCE FLUID: ABNORMAL
BILIRUBIN, POC: ABNORMAL
BLOOD URINE, POC: ABNORMAL
CLARITY, POC: ABNORMAL
COLOR, POC: YELLOW
GLUCOSE URINE, POC: ABNORMAL
KETONES, POC: ABNORMAL
LEUKOCYTE EST, POC: ABNORMAL
NITRITE, POC: ABNORMAL
PH, POC: 5.5
PROTEIN, POC: 100
SPECIFIC GRAVITY, POC: 1.02
UROBILINOGEN, POC: 0.2

## 2019-07-09 PROCEDURE — G8420 CALC BMI NORM PARAMETERS: HCPCS | Performed by: NURSE PRACTITIONER

## 2019-07-09 PROCEDURE — 81002 URINALYSIS NONAUTO W/O SCOPE: CPT | Performed by: NURSE PRACTITIONER

## 2019-07-09 PROCEDURE — G8427 DOCREV CUR MEDS BY ELIG CLIN: HCPCS | Performed by: NURSE PRACTITIONER

## 2019-07-09 PROCEDURE — 1036F TOBACCO NON-USER: CPT | Performed by: NURSE PRACTITIONER

## 2019-07-09 PROCEDURE — 4040F PNEUMOC VAC/ADMIN/RCVD: CPT | Performed by: NURSE PRACTITIONER

## 2019-07-09 PROCEDURE — 99213 OFFICE O/P EST LOW 20 MIN: CPT | Performed by: NURSE PRACTITIONER

## 2019-07-09 PROCEDURE — 1123F ACP DISCUSS/DSCN MKR DOCD: CPT | Performed by: NURSE PRACTITIONER

## 2019-07-09 PROCEDURE — 1090F PRES/ABSN URINE INCON ASSESS: CPT | Performed by: NURSE PRACTITIONER

## 2019-07-09 RX ORDER — ESTRADIOL 0.1 MG/G
1 CREAM VAGINAL DAILY
Qty: 1 TUBE | Refills: 3 | Status: SHIPPED | OUTPATIENT
Start: 2019-07-09 | End: 2019-12-13 | Stop reason: ALTCHOICE

## 2019-07-09 RX ORDER — ACYCLOVIR 400 MG/1
400 TABLET ORAL
Qty: 50 TABLET | Refills: 0 | Status: SHIPPED | OUTPATIENT
Start: 2019-07-09 | End: 2019-07-19

## 2019-07-09 RX ORDER — CEPHALEXIN 500 MG/1
500 CAPSULE ORAL 2 TIMES DAILY
Qty: 20 CAPSULE | Refills: 0 | Status: SHIPPED | OUTPATIENT
Start: 2019-07-09 | End: 2019-07-19

## 2019-07-09 ASSESSMENT — ENCOUNTER SYMPTOMS
WHEEZING: 0
SHORTNESS OF BREATH: 0
COUGH: 0

## 2019-07-09 NOTE — PROGRESS NOTES
[address] [address]     Phone:  [phone]  Fax:  [phone]      Nawaf Reinoso is a 80 y.o. female who presents today for her medical conditions/complaints as noted below. Nawaf Reinoso is c/o of Urinary Tract Infection (started burning this morning possible blood) and Herpes Zoster (possible notice last night)      Chief Complaint   Patient presents with    Urinary Tract Infection     started burning this morning possible blood    Herpes Zoster     possible notice last night       HPI:     HPI    Pia Victoria presents today for dysuria. She was treated for a urinary tract infection 2 weeks ago. She states symptoms got better, and woke up today with dysuria and hematuria. She denies fever. Daughter is with patient today. She also states she has a rash on her right inner arm. She notices 2 days ago. She does have a recent history of shingles. She denies fever or drainage from this area. He explains this as being a \"stinging pain. \"  He states it does not itch.     Past Medical History:   Diagnosis Date    Acute systolic CHF (congestive heart failure), NYHA class 3 (HCC) 2/24/2017    Allergic rhinitis     Anticoagulant long-term use     coumadin for atrial fib    Atrial fibrillation (HCC)     Chronic atrial fibrillation (HCC) 9/28/2017    Chronic back pain     Chronic kidney disease     Chronic kidney disease, stage III (moderate) (HCC) 2/17/2017    Chronic systolic CHF (congestive heart failure), NYHA class 3 ([address] 2/17/2017    GERD (gastroesophageal reflux disease)     Glaucoma     Hearing loss     Hypertension     Osteoarthritis     Osteoporosis     Panlobular emphysema ([address] 11/28/2018    Shingles     Sinus problem     Wears glasses         Past Surgical History:   Procedure Laterality Date    BREAST BIOPSY      Left-benign    BREAST BIOPSY  feb 2014    Left    HYSTERECTOMY      OVARY SURGERY      tumor removed        Social

## 2019-07-15 ENCOUNTER — ANTI-COAG VISIT (OUTPATIENT)
Dept: CARDIOLOGY | Age: 84
End: 2019-07-15

## 2019-07-15 LAB — INR BLD: 2.3

## 2019-07-19 ENCOUNTER — OFFICE VISIT (OUTPATIENT)
Dept: PRIMARY CARE CLINIC | Age: 84
End: 2019-07-19
Payer: MEDICARE

## 2019-07-19 VITALS
SYSTOLIC BLOOD PRESSURE: 106 MMHG | HEART RATE: 85 BPM | OXYGEN SATURATION: 99 % | TEMPERATURE: 98.1 F | WEIGHT: 103.2 LBS | DIASTOLIC BLOOD PRESSURE: 74 MMHG | BODY MASS INDEX: 18.99 KG/M2 | HEIGHT: 62 IN

## 2019-07-19 DIAGNOSIS — L30.9 DERMATITIS: Primary | ICD-10-CM

## 2019-07-19 DIAGNOSIS — T14.8XXA ABRASION: ICD-10-CM

## 2019-07-19 DIAGNOSIS — R31.0 GROSS HEMATURIA: ICD-10-CM

## 2019-07-19 LAB
APPEARANCE FLUID: CLEAR
BILIRUBIN, POC: NORMAL
BLOOD URINE, POC: NORMAL
CLARITY, POC: CLEAR
COLOR, POC: YELLOW
GLUCOSE URINE, POC: NORMAL
KETONES, POC: NORMAL
LEUKOCYTE EST, POC: NORMAL
NITRITE, POC: NORMAL
PH, POC: 5.5
PROTEIN, POC: 30
SPECIFIC GRAVITY, POC: 1.02
UROBILINOGEN, POC: 0.2

## 2019-07-19 PROCEDURE — G8427 DOCREV CUR MEDS BY ELIG CLIN: HCPCS | Performed by: NURSE PRACTITIONER

## 2019-07-19 PROCEDURE — 1036F TOBACCO NON-USER: CPT | Performed by: NURSE PRACTITIONER

## 2019-07-19 PROCEDURE — 1090F PRES/ABSN URINE INCON ASSESS: CPT | Performed by: NURSE PRACTITIONER

## 2019-07-19 PROCEDURE — 81002 URINALYSIS NONAUTO W/O SCOPE: CPT | Performed by: NURSE PRACTITIONER

## 2019-07-19 PROCEDURE — G8420 CALC BMI NORM PARAMETERS: HCPCS | Performed by: NURSE PRACTITIONER

## 2019-07-19 PROCEDURE — 1123F ACP DISCUSS/DSCN MKR DOCD: CPT | Performed by: NURSE PRACTITIONER

## 2019-07-19 PROCEDURE — 4040F PNEUMOC VAC/ADMIN/RCVD: CPT | Performed by: NURSE PRACTITIONER

## 2019-07-19 PROCEDURE — 99214 OFFICE O/P EST MOD 30 MIN: CPT | Performed by: NURSE PRACTITIONER

## 2019-07-19 PROCEDURE — 96372 THER/PROPH/DIAG INJ SC/IM: CPT | Performed by: NURSE PRACTITIONER

## 2019-07-19 RX ORDER — TRIAMCINOLONE ACETONIDE 40 MG/ML
40 INJECTION, SUSPENSION INTRA-ARTICULAR; INTRAMUSCULAR ONCE
Status: DISCONTINUED | OUTPATIENT
Start: 2019-07-19 | End: 2019-07-19

## 2019-07-19 RX ORDER — METHYLPREDNISOLONE ACETATE 40 MG/ML
40 INJECTION, SUSPENSION INTRA-ARTICULAR; INTRALESIONAL; INTRAMUSCULAR; SOFT TISSUE ONCE
Status: COMPLETED | OUTPATIENT
Start: 2019-07-19 | End: 2019-07-19

## 2019-07-19 RX ADMIN — METHYLPREDNISOLONE ACETATE 40 MG: 40 INJECTION, SUSPENSION INTRA-ARTICULAR; INTRALESIONAL; INTRAMUSCULAR; SOFT TISSUE at 10:55

## 2019-07-19 ASSESSMENT — ENCOUNTER SYMPTOMS
WHEEZING: 0
COUGH: 0
GASTROINTESTINAL NEGATIVE: 1
SHORTNESS OF BREATH: 0

## 2019-07-19 NOTE — PROGRESS NOTES
[address] [address]     Phone:  [phone]  Fax:  [phone]      Darling Navarrete is a 80 y.o. female who presents today for her medical conditions/complaints as noted below. Darling Navarrete is c/o of Herpes Zoster (itches bad)      Chief Complaint   Patient presents with    Herpes Zoster     itches bad       HPI:     HPI    Roman Lozano presents today for possible shingles. She has had a rash on her right side for several weeks. She has been treated with acyclovir. This rash is not getting better. Patient states it is pruritic, but has no drainage or pain. She has been afebrile. She has used calamine on this area with no relief. She also has an abrasion to her left upper head that she has had for several weeks. She states she has had this since she had a permanent place in her hair 2 weeks ago. She denies falling or hitting her head. She states this abrasion will not go away. She denies drainage from this area. She states this does not hurt. Her daughter is with her, and states that she may still have blood in her urine. Patient denies hematuria, dysuria, or any other urinary symptoms. She has recently been treated with Macrobid and Keflex. Patient states no issues with urinating.     Past Medical History:   Diagnosis Date    Acute systolic CHF (congestive heart failure), NYHA class 3 ([facility]) 2/24/2017    Allergic rhinitis     Anticoagulant long-term use     coumadin for atrial fib    Atrial fibrillation (HCC)     Chronic atrial fibrillation (HCC) 9/28/2017    Chronic back pain     Chronic kidney disease     Chronic kidney disease, stage III (moderate) ([facility]) 2/17/2017    Chronic systolic CHF (congestive heart failure), NYHA class 3 ([address] 2/17/2017    GERD (gastroesophageal reflux disease)     Glaucoma     Hearing loss     Hypertension     Osteoarthritis     Osteoporosis     Panlobular emphysema ([address] 11/28/2018    Shingles     Sinus

## 2019-07-22 ENCOUNTER — TELEPHONE (OUTPATIENT)
Dept: PRIMARY CARE CLINIC | Age: 84
End: 2019-07-22

## 2019-07-26 ENCOUNTER — TELEPHONE (OUTPATIENT)
Dept: PRIMARY CARE CLINIC | Age: 84
End: 2019-07-26

## 2019-07-29 ENCOUNTER — ANTI-COAG VISIT (OUTPATIENT)
Dept: CARDIOLOGY | Age: 84
End: 2019-07-29

## 2019-07-29 LAB — INR BLD: 3

## 2019-08-01 ENCOUNTER — OFFICE VISIT (OUTPATIENT)
Dept: PRIMARY CARE CLINIC | Age: 84
End: 2019-08-01
Payer: MEDICARE

## 2019-08-01 ENCOUNTER — CARE COORDINATION (OUTPATIENT)
Dept: CARE COORDINATION | Age: 84
End: 2019-08-01

## 2019-08-01 VITALS
TEMPERATURE: 97.2 F | BODY MASS INDEX: 19.83 KG/M2 | HEIGHT: 60 IN | OXYGEN SATURATION: 98 % | SYSTOLIC BLOOD PRESSURE: 106 MMHG | WEIGHT: 101 LBS | HEART RATE: 66 BPM | DIASTOLIC BLOOD PRESSURE: 68 MMHG

## 2019-08-01 DIAGNOSIS — Z78.9 DECREASED ACTIVITIES OF DAILY LIVING (ADL): ICD-10-CM

## 2019-08-01 DIAGNOSIS — F43.20 BEREAVEMENT REACTION: ICD-10-CM

## 2019-08-01 DIAGNOSIS — J43.1 PANLOBULAR EMPHYSEMA (HCC): ICD-10-CM

## 2019-08-01 DIAGNOSIS — R63.4 WEIGHT LOSS: ICD-10-CM

## 2019-08-01 DIAGNOSIS — I50.22 CHRONIC SYSTOLIC CHF (CONGESTIVE HEART FAILURE), NYHA CLASS 3 (HCC): ICD-10-CM

## 2019-08-01 DIAGNOSIS — R41.0 CONFUSION: ICD-10-CM

## 2019-08-01 DIAGNOSIS — Z63.4 BEREAVEMENT REACTION: ICD-10-CM

## 2019-08-01 DIAGNOSIS — Z63.8 FAMILY DISCORD: ICD-10-CM

## 2019-08-01 DIAGNOSIS — R62.7 FAILURE TO THRIVE IN ADULT: Primary | ICD-10-CM

## 2019-08-01 PROCEDURE — G8427 DOCREV CUR MEDS BY ELIG CLIN: HCPCS | Performed by: NURSE PRACTITIONER

## 2019-08-01 PROCEDURE — G8926 SPIRO NO PERF OR DOC: HCPCS | Performed by: NURSE PRACTITIONER

## 2019-08-01 PROCEDURE — G8420 CALC BMI NORM PARAMETERS: HCPCS | Performed by: NURSE PRACTITIONER

## 2019-08-01 PROCEDURE — 99497 ADVNCD CARE PLAN 30 MIN: CPT | Performed by: NURSE PRACTITIONER

## 2019-08-01 PROCEDURE — 1123F ACP DISCUSS/DSCN MKR DOCD: CPT | Performed by: NURSE PRACTITIONER

## 2019-08-01 PROCEDURE — 99213 OFFICE O/P EST LOW 20 MIN: CPT | Performed by: NURSE PRACTITIONER

## 2019-08-01 PROCEDURE — 1036F TOBACCO NON-USER: CPT | Performed by: NURSE PRACTITIONER

## 2019-08-01 PROCEDURE — 1090F PRES/ABSN URINE INCON ASSESS: CPT | Performed by: NURSE PRACTITIONER

## 2019-08-01 PROCEDURE — 4040F PNEUMOC VAC/ADMIN/RCVD: CPT | Performed by: NURSE PRACTITIONER

## 2019-08-01 PROCEDURE — 3023F SPIROM DOC REV: CPT | Performed by: NURSE PRACTITIONER

## 2019-08-01 SDOH — SOCIAL STABILITY - SOCIAL INSECURITY: OTHER SPECIFIED PROBLEMS RELATED TO PRIMARY SUPPORT GROUP: Z63.8

## 2019-08-01 SDOH — SOCIAL STABILITY - SOCIAL INSECURITY: DISSAPEARANCE AND DEATH OF FAMILY MEMBER: Z63.4

## 2019-08-01 ASSESSMENT — ENCOUNTER SYMPTOMS
ALLERGIC/IMMUNOLOGIC NEGATIVE: 1
COUGH: 0
BACK PAIN: 0
SORE THROAT: 0
COLOR CHANGE: 0
SINUS PRESSURE: 0
SHORTNESS OF BREATH: 0
TROUBLE SWALLOWING: 0
EYES NEGATIVE: 1
ABDOMINAL PAIN: 0

## 2019-08-01 NOTE — PROGRESS NOTES
[facility]  [address] [place] [address]  [id number]  [address] [address]  Dept: [phone]  Dept Fax: [phone]  Loc: [phone]        Sg Schneider is a 80 y.o. female who presents today for her medical conditions/ complaints as noted below. Sg Schneider is c/o Follow-up (a-fib, chf, hypercholesterolemia, diarreha, fatigue, confusion, GERD, nausea, stomatitis, renal failure, hematuria, shingles, abrasion); Medication Check (begin prednisone, [id number] qhs; cont valtrex, stop metoprolol BID); and Abrasion (head-not improving with cream)        Chief Complaint   Patient presents with    Follow-up     a-fib, chf, hypercholesterolemia, diarreha, fatigue, confusion, GERD, nausea, stomatitis, renal failure, hematuria, shingles, abrasion    Medication Check     begin prednisone, [id number] qhs; cont valtrex, stop metoprolol BID    Abrasion     head-not improving with cream       HPI:     HPI  8/1/19: Camilla Reynolds, pt's daughter here with her today. She states that the pt has not been eating due to not having any food. She states that her sister is living with her mother along with her son. She states that the sister and the son have been eating the pt food and using resources. When asked who was fixing the patients meals the daughter replied, \"I have taken her some soup. \" She states that her grandson recently passed away due to renal failure and Araceli pt's other daughter has not been caring for her mother. She states that her brother will come and check on his mother but does not bring her any meals. Pt has lost more weight than previous visit. Pt states that she is not hungry. Pt daughter Camilla Reynolds is very upset for she thinks that the siblings are taking money from their mother and then her mother is unable to purchas additional food. Pt down from 107 in June to 101 today. Patient reports that they have been compliant with taking medications as directed.      Past Lesion to left forehead, slowly healing   Allergic/Immunologic: Negative. Neurological: Negative for weakness and headaches. Confusion per daughter. Hematological: Bruises/bleeds easily. Psychiatric/Behavioral: Negative. Objective:     Physical Exam   Constitutional: She is oriented to person, place, and time. She appears well-developed and well-nourished. No distress. HENT:   Head: Normocephalic and atraumatic. Right Ear: External ear normal.   Left Ear: External ear normal.   Nose: Nose normal.   Mouth/Throat: Posterior oropharyngeal erythema present. No oropharyngeal exudate. Eyes: Pupils are equal, round, and reactive to light. Conjunctivae are normal. Right eye exhibits no discharge. Left eye exhibits no discharge. Neck: Normal range of motion. Neck supple. Cardiovascular: Normal rate, regular rhythm, normal heart sounds and intact distal pulses. No murmur heard. Pulmonary/Chest: Effort normal and breath sounds normal. No stridor. No respiratory distress. She has no wheezes. She has no rales. She exhibits no tenderness. Right breast exhibits no inverted nipple, no mass, no nipple discharge, no skin change and no tenderness. Left breast exhibits no inverted nipple, no mass, no nipple discharge, no skin change and no tenderness. Abdominal: Soft. Bowel sounds are normal. She exhibits no distension. There is no tenderness. Musculoskeletal: Normal range of motion. She exhibits no edema, tenderness or deformity. Neurological: She is alert and oriented to person, place, and time. She has normal reflexes. No cranial nerve deficit. Coordination normal.   Skin: Skin is warm and dry. No rash noted. She is not diaphoretic. No erythema. Psychiatric: She has a normal mood and affect. Her behavior is normal. Thought content normal.   Nursing note and vitals reviewed.     /68   Pulse 66   Temp 97.2 °F (36.2 °C)   Ht 5' (1.524 m)   Wt 101 lb (45.8 kg)   SpO2 98% Referral to Home Health     Referral Priority:   Urgent     Referral Type:   Eval and Treat     Referral Reason:   Specialty Services Required     Number of Visits Requested:   1    OK ADVANCED CARE PLAN FACE TO FACE, 1ST 30MIN     No orders of the defined types were placed in this encounter. Patient Instructions   We will send culture on wound to right scalp hairline. Patient/family given educational materials - see patient instructions. Discussed use, benefit, and side effects of prescribed medications. All patient/family questions answered and voiced understanding. Instructed to continue current medications, diet and exercise. Pt/family agreed with treatment plan. Follow up as directed and sooner if needed. Patient/ family instructed that is symptoms worsen or persist they are to contact office or report to nearest ER. They voice understanding and agreement with this plan.      Electronically signed by NONI Reynoso on 8/1/2019 at 2:47 PM

## 2019-08-02 DIAGNOSIS — Z22.322 MRSA (METHICILLIN RESISTANT STAPH AUREUS) CULTURE POSITIVE: Primary | ICD-10-CM

## 2019-08-05 ENCOUNTER — TELEPHONE (OUTPATIENT)
Dept: INTERNAL MEDICINE CLINIC | Age: 84
End: 2019-08-05

## 2019-08-12 ENCOUNTER — TELEPHONE (OUTPATIENT)
Dept: PRIMARY CARE CLINIC | Age: 84
End: 2019-08-12

## 2019-08-12 ENCOUNTER — ANTI-COAG VISIT (OUTPATIENT)
Dept: CARDIOLOGY | Age: 84
End: 2019-08-12

## 2019-08-12 LAB — INR BLD: 1.9

## 2019-08-14 RX ORDER — DONEPEZIL HYDROCHLORIDE 5 MG/1
5 TABLET, FILM COATED ORAL NIGHTLY
Qty: 30 TABLET | Refills: 1 | Status: SHIPPED | OUTPATIENT
Start: 2019-08-14 | End: 2019-10-28 | Stop reason: SDUPTHER

## 2019-09-03 ENCOUNTER — ANTI-COAG VISIT (OUTPATIENT)
Dept: CARDIOLOGY | Age: 84
End: 2019-09-03

## 2019-09-03 LAB — INR BLD: 2.2

## 2019-09-05 DIAGNOSIS — J44.1 COPD EXACERBATION (HCC): ICD-10-CM

## 2019-09-05 DIAGNOSIS — J43.1 PANLOBULAR EMPHYSEMA (HCC): ICD-10-CM

## 2019-09-05 RX ORDER — TIOTROPIUM BROMIDE 18 UG/1
CAPSULE ORAL; RESPIRATORY (INHALATION)
Qty: 30 CAPSULE | Refills: 3 | Status: ON HOLD | OUTPATIENT
Start: 2019-09-05 | End: 2019-12-09 | Stop reason: HOSPADM

## 2019-09-06 ENCOUNTER — OFFICE VISIT (OUTPATIENT)
Dept: PRIMARY CARE CLINIC | Age: 84
End: 2019-09-06
Payer: MEDICARE

## 2019-09-06 VITALS
DIASTOLIC BLOOD PRESSURE: 68 MMHG | WEIGHT: 103 LBS | BODY MASS INDEX: 18.25 KG/M2 | HEART RATE: 68 BPM | OXYGEN SATURATION: 98 % | HEIGHT: 63 IN | TEMPERATURE: 97.8 F | SYSTOLIC BLOOD PRESSURE: 98 MMHG

## 2019-09-06 DIAGNOSIS — R62.7 FAILURE TO THRIVE IN ADULT: ICD-10-CM

## 2019-09-06 DIAGNOSIS — I50.22 CHRONIC SYSTOLIC CHF (CONGESTIVE HEART FAILURE), NYHA CLASS 3 (HCC): ICD-10-CM

## 2019-09-06 DIAGNOSIS — Z78.9 DECREASED ACTIVITIES OF DAILY LIVING (ADL): ICD-10-CM

## 2019-09-06 DIAGNOSIS — R41.0 CONFUSION: ICD-10-CM

## 2019-09-06 DIAGNOSIS — F99 ABNORMAL MINI-MENTAL STATUS EXAM: ICD-10-CM

## 2019-09-06 DIAGNOSIS — R63.4 WEIGHT LOSS: ICD-10-CM

## 2019-09-06 DIAGNOSIS — R62.7 FAILURE TO THRIVE IN ADULT: Primary | ICD-10-CM

## 2019-09-06 DIAGNOSIS — R79.9 ABNORMAL FINDING OF BLOOD CHEMISTRY: ICD-10-CM

## 2019-09-06 DIAGNOSIS — N18.30 CHRONIC KIDNEY DISEASE, STAGE III (MODERATE) (HCC): ICD-10-CM

## 2019-09-06 DIAGNOSIS — Z63.4 BEREAVEMENT REACTION: ICD-10-CM

## 2019-09-06 DIAGNOSIS — R53.83 OTHER FATIGUE: ICD-10-CM

## 2019-09-06 DIAGNOSIS — F43.20 BEREAVEMENT REACTION: ICD-10-CM

## 2019-09-06 DIAGNOSIS — Z63.8 FAMILY DISCORD: ICD-10-CM

## 2019-09-06 LAB
ALBUMIN SERPL-MCNC: 4 G/DL (ref 3.5–5.2)
ALP BLD-CCNC: 39 U/L (ref 35–104)
ALT SERPL-CCNC: 15 U/L (ref 5–33)
ANION GAP SERPL CALCULATED.3IONS-SCNC: 12 MMOL/L (ref 7–19)
AST SERPL-CCNC: 29 U/L (ref 5–32)
BASOPHILS ABSOLUTE: 0.1 K/UL (ref 0–0.2)
BASOPHILS RELATIVE PERCENT: 1.1 % (ref 0–1)
BILIRUB SERPL-MCNC: 0.7 MG/DL (ref 0.2–1.2)
BILIRUBIN URINE: NEGATIVE
BLOOD, URINE: NEGATIVE
BUN BLDV-MCNC: 35 MG/DL (ref 8–23)
CALCIUM SERPL-MCNC: 9.7 MG/DL (ref 8.8–10.2)
CHLORIDE BLD-SCNC: 104 MMOL/L (ref 98–111)
CLARITY: CLEAR
CO2: 27 MMOL/L (ref 22–29)
COLOR: YELLOW
CREAT SERPL-MCNC: 1.7 MG/DL (ref 0.5–0.9)
EOSINOPHILS ABSOLUTE: 0.1 K/UL (ref 0–0.6)
EOSINOPHILS RELATIVE PERCENT: 1.3 % (ref 0–5)
GFR NON-AFRICAN AMERICAN: 28
GLUCOSE BLD-MCNC: 89 MG/DL (ref 74–109)
GLUCOSE URINE: NEGATIVE MG/DL
HBA1C MFR BLD: 5.3 % (ref 4–6)
HCT VFR BLD CALC: 40.3 % (ref 37–47)
HEMOGLOBIN: 12.7 G/DL (ref 12–16)
IMMATURE GRANULOCYTES #: 0 K/UL
KETONES, URINE: NEGATIVE MG/DL
LEUKOCYTE ESTERASE, URINE: NEGATIVE
LYMPHOCYTES ABSOLUTE: 0.9 K/UL (ref 1.1–4.5)
LYMPHOCYTES RELATIVE PERCENT: 19.7 % (ref 20–40)
MCH RBC QN AUTO: 31.6 PG (ref 27–31)
MCHC RBC AUTO-ENTMCNC: 31.5 G/DL (ref 33–37)
MCV RBC AUTO: 100.2 FL (ref 81–99)
MONOCYTES ABSOLUTE: 0.7 K/UL (ref 0–0.9)
MONOCYTES RELATIVE PERCENT: 15.1 % (ref 0–10)
NEUTROPHILS ABSOLUTE: 3 K/UL (ref 1.5–7.5)
NEUTROPHILS RELATIVE PERCENT: 62.6 % (ref 50–65)
NITRITE, URINE: NEGATIVE
PDW BLD-RTO: 15.2 % (ref 11.5–14.5)
PH UA: 5.5 (ref 5–8)
PLATELET # BLD: 155 K/UL (ref 130–400)
PMV BLD AUTO: 11.7 FL (ref 9.4–12.3)
POTASSIUM SERPL-SCNC: 3.9 MMOL/L (ref 3.5–5)
PROTEIN UA: NEGATIVE MG/DL
RBC # BLD: 4.02 M/UL (ref 4.2–5.4)
SODIUM BLD-SCNC: 143 MMOL/L (ref 136–145)
SPECIFIC GRAVITY UA: 1.01 (ref 1–1.03)
T4 FREE: 1.6 NG/DL (ref 0.9–1.7)
TOTAL PROTEIN: 7.2 G/DL (ref 6.6–8.7)
TSH SERPL DL<=0.05 MIU/L-ACNC: 2.67 UIU/ML (ref 0.27–4.2)
UROBILINOGEN, URINE: 0.2 E.U./DL
VITAMIN D 25-HYDROXY: 47.2 NG/ML
WBC # BLD: 4.8 K/UL (ref 4.8–10.8)

## 2019-09-06 PROCEDURE — G8427 DOCREV CUR MEDS BY ELIG CLIN: HCPCS | Performed by: NURSE PRACTITIONER

## 2019-09-06 PROCEDURE — 99358 PROLONG SERVICE W/O CONTACT: CPT | Performed by: NURSE PRACTITIONER

## 2019-09-06 PROCEDURE — 99214 OFFICE O/P EST MOD 30 MIN: CPT | Performed by: NURSE PRACTITIONER

## 2019-09-06 PROCEDURE — 1123F ACP DISCUSS/DSCN MKR DOCD: CPT | Performed by: NURSE PRACTITIONER

## 2019-09-06 PROCEDURE — 1036F TOBACCO NON-USER: CPT | Performed by: NURSE PRACTITIONER

## 2019-09-06 PROCEDURE — G8419 CALC BMI OUT NRM PARAM NOF/U: HCPCS | Performed by: NURSE PRACTITIONER

## 2019-09-06 PROCEDURE — 4040F PNEUMOC VAC/ADMIN/RCVD: CPT | Performed by: NURSE PRACTITIONER

## 2019-09-06 PROCEDURE — 1090F PRES/ABSN URINE INCON ASSESS: CPT | Performed by: NURSE PRACTITIONER

## 2019-09-06 RX ORDER — MEGESTROL ACETATE 40 MG/ML
SUSPENSION ORAL
Qty: 1 BOTTLE | Refills: 2 | Status: SHIPPED | OUTPATIENT
Start: 2019-09-06 | End: 2019-11-08 | Stop reason: ALTCHOICE

## 2019-09-06 SDOH — SOCIAL STABILITY - SOCIAL INSECURITY: OTHER SPECIFIED PROBLEMS RELATED TO PRIMARY SUPPORT GROUP: Z63.8

## 2019-09-06 SDOH — SOCIAL STABILITY - SOCIAL INSECURITY: DISSAPEARANCE AND DEATH OF FAMILY MEMBER: Z63.4

## 2019-09-06 ASSESSMENT — ENCOUNTER SYMPTOMS
ALLERGIC/IMMUNOLOGIC NEGATIVE: 1
TROUBLE SWALLOWING: 0
COLOR CHANGE: 0
ABDOMINAL PAIN: 0
BACK PAIN: 0
EYES NEGATIVE: 1
SHORTNESS OF BREATH: 0
COUGH: 0
SORE THROAT: 0
SINUS PRESSURE: 0

## 2019-09-06 NOTE — PROGRESS NOTES
Nursing note and vitals reviewed. BP 98/68   Pulse 68   Temp 97.8 °F (36.6 °C)   Ht 5' 3\" (1.6 m)   Wt 103 lb (46.7 kg)   SpO2 98%   BMI 18.25 kg/m²     Assessment:      Diagnosis Orders   1. Failure to thrive in adult  CBC Auto Differential    Comprehensive Metabolic Panel    Urinalysis    SD PROLNG E/M SVC BEFORE&/AFTER DIR PT CARE 1ST HR   2. Weight loss  Hemoglobin A1C    SD PROLNG E/M SVC BEFORE&/AFTER DIR PT CARE 1ST HR   3. Bereavement reaction  SD PROLNG E/M SVC BEFORE&/AFTER DIR PT CARE 1ST HR   4. Family discord  SD PROLNG E/M SVC BEFORE&/AFTER DIR PT CARE 1ST HR   5. Confusion  SD PROLNG E/M SVC BEFORE&/AFTER DIR PT CARE 1ST HR   6. Chronic systolic CHF (congestive heart failure), NYHA class 3 ([facility])  SD PROLNG E/M SVC BEFORE&/AFTER DIR PT CARE 1ST HR   7. Chronic kidney disease, stage III (moderate) ([facility])  Vitamin D 25 Hydroxy    SD PROLNG E/M SVC BEFORE&/AFTER DIR PT CARE 1ST HR   8. Decreased activities of daily living (ADL)  TSH without Reflex    T4, Free    Urinalysis    SD PROLNG E/M SVC BEFORE&/AFTER DIR PT CARE 1ST HR   9. Other fatigue  SD PROLNG E/M SVC BEFORE&/AFTER DIR PT CARE 1ST HR   10. Abnormal finding of blood chemistry   Hemoglobin A1C    SD PROLNG E/M SVC BEFORE&/AFTER DIR PT CARE 1ST HR   11. Abnormal mini-mental status exam  SD PROLNG E/M SVC BEFORE&/AFTER DIR PT CARE 1ST HR     No results found for this visit on 09/06/19. Plan:        1. Failure to thrive in adult    2. Weight loss    3. Bereavement reaction    4. Family discord    5. Confusion    6. Chronic systolic CHF (congestive heart failure), NYHA class 3 ([address]    7. Decreased activities of daily living (ADL)    8. Panlobular emphysema ([address]      Prolonged Visit (12363 >30-75 min; 70411 >75 min)  An additional separate amount of time was spent after the visit in regards to the patient's 45 paperwork. Total time spent in non-face to face completion of tasks on patient's behalf was 45 minutes.       Discussion: Occurrences:   1     Standing Expiration Date:   10/10/2020    Hemoglobin A1C     Standing Status:   Future     Number of Occurrences:   1     Standing Expiration Date:   10/10/2020    TSH without Reflex     Standing Status:   Future     Number of Occurrences:   1     Standing Expiration Date:   10/10/2020    T4, Free     Standing Status:   Future     Number of Occurrences:   1     Standing Expiration Date:   10/10/2020    Urinalysis     Standing Status:   Future     Number of Occurrences:   1     Standing Expiration Date:   10/10/2020    Vitamin D 25 Hydroxy     Standing Status:   Future     Number of Occurrences:   1     Standing Expiration Date:   9/6/2020    SC PROLNG E/M SVC BEFORE&/AFTER DIR PT CARE 1ST HR     Orders Placed This Encounter   Medications    megestrol (MEGACE ORAL) 40 MG/ML suspension     Sig: Take 240mg bid. Dispense:  1 Bottle     Refill:  2       Patient Instructions   Megace 240mg twice daily to help with appetitive. Patient/family given educational materials - see patient instructions. Discussed use, benefit, and side effects of prescribed medications. All patient/family questions answered and voiced understanding. Instructed to continue current medications, diet and exercise. Pt/family agreed with treatment plan. Follow up as directed and sooner if needed. Patient/ family instructed that is symptoms worsen or persist they are to contact office or report to nearest ER. They voice understanding and agreement with this plan.      Electronically signed by NONI Brooke on 9/6/2019 at 2:47 PM

## 2019-09-10 ENCOUNTER — TELEPHONE (OUTPATIENT)
Dept: PRIMARY CARE CLINIC | Age: 84
End: 2019-09-10

## 2019-09-10 DIAGNOSIS — R79.9 ABNORMAL FINDING OF BLOOD CHEMISTRY: Primary | ICD-10-CM

## 2019-09-10 NOTE — TELEPHONE ENCOUNTER
Nichole Hawk, NONI  Maegan Boot             Results are abnormal. Pt needs b12 level. Pt also dehydrated and needs to drink more water. Contacted pt's daughter and informed of results above. Pt's daughter agreed and verbalized understanding. Ordered additional blood work.  PP, LPN

## 2019-09-11 ENCOUNTER — ANTI-COAG VISIT (OUTPATIENT)
Dept: CARDIOLOGY | Age: 84
End: 2019-09-11

## 2019-09-11 LAB — INR BLD: 2.4

## 2019-09-12 RX ORDER — WARFARIN SODIUM 5 MG/1
5 TABLET ORAL DAILY
Qty: 90 TABLET | Refills: 3 | Status: SHIPPED | OUTPATIENT
Start: 2019-09-12 | End: 2019-12-13 | Stop reason: ALTCHOICE

## 2019-09-17 ENCOUNTER — ANTI-COAG VISIT (OUTPATIENT)
Dept: CARDIOLOGY | Age: 84
End: 2019-09-17

## 2019-09-17 LAB — INR BLD: 4

## 2019-09-18 LAB
ALBUMIN SERPL-MCNC: 3.9 G/DL (ref 3.5–5.2)
ALP BLD-CCNC: 28 U/L (ref 35–104)
ALT SERPL-CCNC: 14 U/L (ref 5–33)
ANION GAP SERPL CALCULATED.3IONS-SCNC: 12 MMOL/L (ref 7–19)
AST SERPL-CCNC: 21 U/L (ref 5–32)
BASOPHILS ABSOLUTE: 0.1 K/UL (ref 0–0.2)
BASOPHILS RELATIVE PERCENT: 0.6 % (ref 0–1)
BILIRUB SERPL-MCNC: 0.8 MG/DL (ref 0.2–1.2)
BILIRUBIN URINE: NEGATIVE
BLOOD, URINE: NEGATIVE
BUN BLDV-MCNC: 39 MG/DL (ref 8–23)
CALCIUM SERPL-MCNC: 9.6 MG/DL (ref 8.8–10.2)
CHLORIDE BLD-SCNC: 105 MMOL/L (ref 98–111)
CLARITY: CLEAR
CO2: 25 MMOL/L (ref 22–29)
COLOR: YELLOW
CREAT SERPL-MCNC: 1.9 MG/DL (ref 0.5–0.9)
CREATININE URINE: 36.5 MG/DL (ref 4.2–622)
EOSINOPHILS ABSOLUTE: 0.1 K/UL (ref 0–0.6)
EOSINOPHILS RELATIVE PERCENT: 0.6 % (ref 0–5)
GFR NON-AFRICAN AMERICAN: 25
GLUCOSE BLD-MCNC: 97 MG/DL (ref 74–109)
GLUCOSE URINE: NEGATIVE MG/DL
HCT VFR BLD CALC: 41 % (ref 37–47)
HEMOGLOBIN: 13.1 G/DL (ref 12–16)
IMMATURE GRANULOCYTES #: 0 K/UL
KETONES, URINE: NEGATIVE MG/DL
LEUKOCYTE ESTERASE, URINE: NEGATIVE
LYMPHOCYTES ABSOLUTE: 1.5 K/UL (ref 1.1–4.5)
LYMPHOCYTES RELATIVE PERCENT: 18.4 % (ref 20–40)
MAGNESIUM: 2.2 MG/DL (ref 1.6–2.4)
MCH RBC QN AUTO: 31.6 PG (ref 27–31)
MCHC RBC AUTO-ENTMCNC: 32 G/DL (ref 33–37)
MCV RBC AUTO: 98.8 FL (ref 81–99)
MONOCYTES ABSOLUTE: 0.9 K/UL (ref 0–0.9)
MONOCYTES RELATIVE PERCENT: 10.8 % (ref 0–10)
NEUTROPHILS ABSOLUTE: 5.4 K/UL (ref 1.5–7.5)
NEUTROPHILS RELATIVE PERCENT: 69.2 % (ref 50–65)
NITRITE, URINE: NEGATIVE
PARATHYROID HORMONE INTACT: 161 PG/ML (ref 15–65)
PDW BLD-RTO: 15 % (ref 11.5–14.5)
PH UA: 6 (ref 5–8)
PHOSPHORUS: 3 MG/DL (ref 2.5–4.5)
PLATELET # BLD: 203 K/UL (ref 130–400)
PMV BLD AUTO: 11.5 FL (ref 9.4–12.3)
POTASSIUM SERPL-SCNC: 4.4 MMOL/L (ref 3.5–5)
PROTEIN PROTEIN: 8 MG/DL (ref 15–45)
PROTEIN UA: NEGATIVE MG/DL
RBC # BLD: 4.15 M/UL (ref 4.2–5.4)
SODIUM BLD-SCNC: 142 MMOL/L (ref 136–145)
SPECIFIC GRAVITY UA: 1.01 (ref 1–1.03)
TOTAL PROTEIN: 7.1 G/DL (ref 6.6–8.7)
URIC ACID, SERUM: 10.4 MG/DL (ref 2.4–5.7)
UROBILINOGEN, URINE: 0.2 E.U./DL
VITAMIN D 25-HYDROXY: 42.9 NG/ML
WBC # BLD: 7.9 K/UL (ref 4.8–10.8)

## 2019-09-26 LAB — INR BLD: 2.5

## 2019-09-27 ENCOUNTER — ANTI-COAG VISIT (OUTPATIENT)
Dept: CARDIOLOGY | Age: 84
End: 2019-09-27

## 2019-10-18 ENCOUNTER — OFFICE VISIT (OUTPATIENT)
Dept: PRIMARY CARE CLINIC | Age: 84
End: 2019-10-18
Payer: MEDICARE

## 2019-10-18 VITALS
TEMPERATURE: 97.7 F | HEIGHT: 63 IN | HEART RATE: 77 BPM | SYSTOLIC BLOOD PRESSURE: 138 MMHG | BODY MASS INDEX: 18.78 KG/M2 | WEIGHT: 106 LBS | OXYGEN SATURATION: 96 % | DIASTOLIC BLOOD PRESSURE: 83 MMHG

## 2019-10-18 DIAGNOSIS — Z79.01 CHRONIC ANTICOAGULATION: ICD-10-CM

## 2019-10-18 DIAGNOSIS — F41.9 ANXIETY: ICD-10-CM

## 2019-10-18 DIAGNOSIS — R60.9 EDEMA, UNSPECIFIED TYPE: ICD-10-CM

## 2019-10-18 DIAGNOSIS — R62.7 FAILURE TO THRIVE IN ADULT: ICD-10-CM

## 2019-10-18 DIAGNOSIS — J43.1 PANLOBULAR EMPHYSEMA (HCC): ICD-10-CM

## 2019-10-18 DIAGNOSIS — R00.0 HEART RATE FAST: Primary | ICD-10-CM

## 2019-10-18 DIAGNOSIS — I48.20 ATRIAL FIBRILLATION, CHRONIC (HCC): ICD-10-CM

## 2019-10-18 DIAGNOSIS — T50.901A ACCIDENTAL MEDICATION ERROR, INITIAL ENCOUNTER: ICD-10-CM

## 2019-10-18 PROCEDURE — 1123F ACP DISCUSS/DSCN MKR DOCD: CPT | Performed by: NURSE PRACTITIONER

## 2019-10-18 PROCEDURE — 1090F PRES/ABSN URINE INCON ASSESS: CPT | Performed by: NURSE PRACTITIONER

## 2019-10-18 PROCEDURE — 4040F PNEUMOC VAC/ADMIN/RCVD: CPT | Performed by: NURSE PRACTITIONER

## 2019-10-18 PROCEDURE — 93000 ELECTROCARDIOGRAM COMPLETE: CPT | Performed by: NURSE PRACTITIONER

## 2019-10-18 PROCEDURE — 3023F SPIROM DOC REV: CPT | Performed by: NURSE PRACTITIONER

## 2019-10-18 PROCEDURE — G8427 DOCREV CUR MEDS BY ELIG CLIN: HCPCS | Performed by: NURSE PRACTITIONER

## 2019-10-18 PROCEDURE — 99214 OFFICE O/P EST MOD 30 MIN: CPT | Performed by: NURSE PRACTITIONER

## 2019-10-18 PROCEDURE — G8420 CALC BMI NORM PARAMETERS: HCPCS | Performed by: NURSE PRACTITIONER

## 2019-10-18 PROCEDURE — 1036F TOBACCO NON-USER: CPT | Performed by: NURSE PRACTITIONER

## 2019-10-18 PROCEDURE — G8482 FLU IMMUNIZE ORDER/ADMIN: HCPCS | Performed by: NURSE PRACTITIONER

## 2019-10-18 PROCEDURE — G8926 SPIRO NO PERF OR DOC: HCPCS | Performed by: NURSE PRACTITIONER

## 2019-10-18 ASSESSMENT — ENCOUNTER SYMPTOMS
SORE THROAT: 0
TROUBLE SWALLOWING: 0
ABDOMINAL PAIN: 0
SINUS PRESSURE: 0
BACK PAIN: 0
COLOR CHANGE: 0
ALLERGIC/IMMUNOLOGIC NEGATIVE: 1
EYES NEGATIVE: 1
SHORTNESS OF BREATH: 0
COUGH: 0

## 2019-10-20 PROBLEM — T50.901A ACCIDENTAL MEDICATION ERROR: Status: ACTIVE | Noted: 2019-10-20

## 2019-10-20 PROBLEM — Z79.01 CHRONIC ANTICOAGULATION: Status: ACTIVE | Noted: 2019-10-20

## 2019-10-20 PROBLEM — R00.0 HEART RATE FAST: Status: ACTIVE | Noted: 2019-10-20

## 2019-10-21 ENCOUNTER — ANTI-COAG VISIT (OUTPATIENT)
Dept: PRIMARY CARE CLINIC | Age: 84
End: 2019-10-21
Payer: MEDICARE

## 2019-10-21 ENCOUNTER — NURSE TRIAGE (OUTPATIENT)
Dept: CALL CENTER | Facility: HOSPITAL | Age: 84
End: 2019-10-21

## 2019-10-21 DIAGNOSIS — I48.20 ATRIAL FIBRILLATION, CHRONIC (HCC): ICD-10-CM

## 2019-10-21 LAB — INR BLD: 7.9

## 2019-10-21 PROCEDURE — 93793 ANTICOAG MGMT PT WARFARIN: CPT | Performed by: NURSE PRACTITIONER

## 2019-10-21 NOTE — TELEPHONE ENCOUNTER
"Told to page provider, if unable to speak with someone within 30 minutes then go to ER.     Reason for Disposition  • Nursing judgment or information in reference    Additional Information  • Negative: Nursing judgment, per information in Reference  • Negative: Information only call about a Well Adult (no illness or injury)  • Negative: Nursing judgment or information in reference  • Negative: Nursing judgment or information in reference  • Negative: Nursing judgment or information in reference  • Negative: Nursing judgment or information in reference  • Negative: Nursing judgment or information in reference    Answer Assessment - Initial Assessment Questions  1. REASON FOR CALL: \"What is your main concern right now?\"      Critically high INR  2. ONSET: \"When did the ___ start?\"      Today  3. SEVERITY: \"How bad is the ___?\"      Unknown  4. FEVER: \"Do you have a fever?\"      Is not sure  5. OTHER SYMPTOMS: \"Do you have any other new symptoms?\"      Feels tired  6. INTERVENTIONS AND RESPONSE: \"What have you done so far to try to make this better? What medications have you used?\"      Called provider and left message  7. PREGNANCY: \"Is there any chance you are pregnant?\"      NO    Protocols used: NO GUIDELINE AVAILABLE-ADULT-AH      "

## 2019-10-23 ENCOUNTER — ANTI-COAG VISIT (OUTPATIENT)
Dept: CARDIOLOGY | Age: 84
End: 2019-10-23

## 2019-10-23 LAB — INR BLD: 6

## 2019-10-24 DIAGNOSIS — J34.89 NASAL LESION: ICD-10-CM

## 2019-10-25 ENCOUNTER — ANTI-COAG VISIT (OUTPATIENT)
Dept: CARDIOLOGY | Age: 84
End: 2019-10-25

## 2019-10-25 LAB — INR BLD: 2.6

## 2019-10-28 RX ORDER — ISOSORBIDE MONONITRATE 30 MG/1
30 TABLET, EXTENDED RELEASE ORAL DAILY
Qty: 30 TABLET | Refills: 5 | Status: SHIPPED | OUTPATIENT
Start: 2019-10-28 | End: 2020-04-13

## 2019-10-28 RX ORDER — DONEPEZIL HYDROCHLORIDE 5 MG/1
5 TABLET, FILM COATED ORAL NIGHTLY
Qty: 30 TABLET | Refills: 1 | Status: SHIPPED | OUTPATIENT
Start: 2019-10-28 | End: 2020-01-14

## 2019-11-01 PROCEDURE — 90653 IIV ADJUVANT VACCINE IM: CPT | Performed by: NURSE PRACTITIONER

## 2019-11-01 PROCEDURE — G0008 ADMIN INFLUENZA VIRUS VAC: HCPCS | Performed by: NURSE PRACTITIONER

## 2019-11-05 NOTE — PROGRESS NOTES
DEMETRIA Jorgensen  [facility]   Respiratory Disease Clinic  [address] [place] [address]  Phone: [phone]  Fax: [phone]     Neelam Calle is a 86 y.o. female.   : 1933  CC:   Chief Complaint   Patient presents with   • Shortness of Breath      HPI: Neelam Calle is a pleasant 86 y.o. female. The patient is here today for follow up of shortness of breath.  COPD Gold stage I and emphysema.  She has known chronic respiratory failure with hypoxia.  The patient utilizes continuous portable oxygen. She is doing well with it and wishes to continue it.  For COPD/emphysema the patient is using Spiriva.  Daughter states that her diuretics have been adjusted per nephrology.  No increasing shortness of breath, no fever, no chills, no cough with purulent sputum.   The patient's PCP is Paulie Smith MD.    The following portions of the patient's history were reviewed and updated as appropriate: allergies, current medications, past family history, past medical history, past social history, past surgical history and problem list.  Past Medical History:   Diagnosis Date   • Allergic rhinitis    • CHF (congestive heart failure) (CMS/[facility])    • CHF (congestive heart failure) (CMS/HCC)    • COPD (chronic obstructive pulmonary disease) (CMS/[facility])    • Dyspnea    • Edema    • GERD (gastroesophageal reflux disease)    • Hematuria    • Hyperlipidemia    • Hypoxemia    • Kidney disease    • Renal failure    • Weakness      Family History   Problem Relation Age of Onset   • Asthma Mother    • Emphysema Mother      Social History     Socioeconomic History   • Marital status: Unknown     Spouse name: Not on file   • Number of children: Not on file   • Years of education: Not on file   • Highest education level: Not on file   Tobacco Use   • Smoking status: Never Smoker   • Smokeless tobacco: Never Used   Substance and Sexual Activity   • Alcohol use: No     Frequency: Never   • Drug use: No  "  • Sexual activity: Defer     Review of Systems   Constitutional: Negative for chills and fever.   HENT: Negative for congestion.    Eyes: Negative for blurred vision.   Respiratory: Negative for cough and shortness of breath.    Cardiovascular: Negative for chest pain.   Gastrointestinal: Negative for diarrhea, nausea and vomiting.   Endocrine: Negative for cold intolerance and heat intolerance.   Genitourinary: Negative for dysuria.   Musculoskeletal: Negative for arthralgias.   Skin: Negative for rash.   Neurological: Negative for dizziness, weakness and light-headedness.   Hematological: Does not bruise/bleed easily.   Psychiatric/Behavioral: Negative for agitation. The patient is not nervous/anxious.      /80   Pulse 86   Ht 154.9 cm (61\")   Wt 48.5 kg (107 lb)   SpO2 91% Comment: RA  Breastfeeding? No   BMI 20.22 kg/m²   Physical Exam   Constitutional: She is oriented to person, place, and time. She appears well-developed and well-nourished. No distress.   Frail, elderly, underweight   HENT:   Head: Normocephalic and atraumatic.   Right Ear: Decreased hearing is noted.   Left Ear: Decreased hearing is noted.   Eyes: Conjunctivae and EOM are normal. Pupils are equal, round, and reactive to light. No scleral icterus.   Neck: Normal range of motion. Neck supple.   Cardiovascular: Normal rate, regular rhythm and normal heart sounds. Exam reveals no friction rub.   No murmur heard.  Pulmonary/Chest: Effort normal and breath sounds normal. No respiratory distress. She has no wheezes. She has no rales.   Abdominal: Soft. Bowel sounds are normal. She exhibits no distension. There is no tenderness.   Musculoskeletal: Normal range of motion. She exhibits edema.   Ambulates with Rollator   Neurological: She is alert and oriented to person, place, and time.   Skin: Skin is warm and dry.   Psychiatric: She has a normal mood and affect. Her behavior is normal. Judgment and thought content normal.   Nursing note " and vitals reviewed.    Pulmonary Functions Testing Results:  No notes on file  FEV1   Date Value Ref Range Status   04/19/2019 94% liters Final     FVC   Date Value Ref Range Status   04/19/2019 110% liters Final     FEV1/FVC   Date Value Ref Range Status   04/19/2019 64.03% % Final     TLC   Date Value Ref Range Status   03/14/2019 127% liters Final     DLCO   Date Value Ref Range Status   04/19/2019 35% ml/mmHg sec Final     My PFT Interpretation: None to review today  Imaging: None to review today    Assessment and Plan:   Neelam was seen today for shortness of breath.    Diagnoses and all orders for this visit:    Stage 1 mild COPD by GOLD classification (CMS/[facility])  Continue Spiriva.  The patient is benefiting from it and wishes to continue it.  Pulmonary emphysema, unspecified emphysema type (CMS/HCC)  Continue current treatment regimen.  Chronic respiratory failure with hypoxia (CMS/[facility])  Continue chronic oxygen therapy.  The patient is benefiting from it and wishes to continue it.  Allergic rhinitis, unspecified seasonality, unspecified trigger  Continue Singulair, Allegra, and Flonase.    Health maintenance:   Influenza vaccine: yes  Pneumovax 23: yes  Prevnar 13: yes   Patient's Body mass index is 20.22 kg/m². BMI is below normal parameters. Recommendations include: defer to pcp.    Follow up: 6 months  Angeline Silverman, DEMETRIA  11/11/2019  5:25 PM    Please note that portions of this note were completed with a voice recognition program.

## 2019-11-08 ENCOUNTER — OFFICE VISIT (OUTPATIENT)
Dept: PRIMARY CARE CLINIC | Age: 84
End: 2019-11-08
Payer: MEDICARE

## 2019-11-08 VITALS
OXYGEN SATURATION: 95 % | SYSTOLIC BLOOD PRESSURE: 139 MMHG | DIASTOLIC BLOOD PRESSURE: 80 MMHG | HEIGHT: 63 IN | HEART RATE: 62 BPM | WEIGHT: 106 LBS | TEMPERATURE: 97.2 F | BODY MASS INDEX: 18.78 KG/M2

## 2019-11-08 DIAGNOSIS — R00.2 PALPITATIONS: ICD-10-CM

## 2019-11-08 DIAGNOSIS — R79.89 ELEVATED SERUM FREE T4 LEVEL: ICD-10-CM

## 2019-11-08 DIAGNOSIS — F41.9 ANXIETY: ICD-10-CM

## 2019-11-08 DIAGNOSIS — H66.90 ACUTE OTITIS MEDIA, UNSPECIFIED OTITIS MEDIA TYPE: ICD-10-CM

## 2019-11-08 DIAGNOSIS — N18.30 CHRONIC RENAL FAILURE, STAGE 3 (MODERATE) (HCC): ICD-10-CM

## 2019-11-08 DIAGNOSIS — R41.0 CONFUSION: ICD-10-CM

## 2019-11-08 DIAGNOSIS — E87.1 HYPONATREMIA: ICD-10-CM

## 2019-11-08 DIAGNOSIS — I50.9 ACUTE ON CHRONIC CONGESTIVE HEART FAILURE, UNSPECIFIED HEART FAILURE TYPE (HCC): Primary | ICD-10-CM

## 2019-11-08 DIAGNOSIS — I48.91 ATRIAL FIBRILLATION, UNSPECIFIED TYPE (HCC): ICD-10-CM

## 2019-11-08 DIAGNOSIS — R53.1 WEAKNESS: ICD-10-CM

## 2019-11-08 DIAGNOSIS — R63.4 WEIGHT LOSS: ICD-10-CM

## 2019-11-08 DIAGNOSIS — I50.9 ACUTE ON CHRONIC CONGESTIVE HEART FAILURE, UNSPECIFIED HEART FAILURE TYPE (HCC): ICD-10-CM

## 2019-11-08 LAB
ALBUMIN SERPL-MCNC: 4.4 G/DL (ref 3.5–5.2)
ALP BLD-CCNC: 24 U/L (ref 35–104)
ALT SERPL-CCNC: 11 U/L (ref 5–33)
ANION GAP SERPL CALCULATED.3IONS-SCNC: 14 MMOL/L (ref 7–19)
APPEARANCE FLUID: ABNORMAL
AST SERPL-CCNC: 21 U/L (ref 5–32)
BASOPHILS ABSOLUTE: 0.1 K/UL (ref 0–0.2)
BASOPHILS RELATIVE PERCENT: 0.8 % (ref 0–1)
BILIRUB SERPL-MCNC: 0.6 MG/DL (ref 0.2–1.2)
BILIRUBIN, POC: ABNORMAL
BLOOD URINE, POC: ABNORMAL
BUN BLDV-MCNC: 35 MG/DL (ref 8–23)
CALCIUM SERPL-MCNC: 10.1 MG/DL (ref 8.8–10.2)
CHLORIDE BLD-SCNC: 105 MMOL/L (ref 98–111)
CLARITY, POC: CLEAR
CO2: 25 MMOL/L (ref 22–29)
COLOR, POC: ABNORMAL
CREAT SERPL-MCNC: 1.7 MG/DL (ref 0.5–0.9)
DIGOXIN LEVEL: 1.1 NG/ML (ref 0.6–1.2)
EOSINOPHILS ABSOLUTE: 0.2 K/UL (ref 0–0.6)
EOSINOPHILS RELATIVE PERCENT: 2.1 % (ref 0–5)
GFR NON-AFRICAN AMERICAN: 28
GLUCOSE BLD-MCNC: 94 MG/DL (ref 74–109)
GLUCOSE URINE, POC: ABNORMAL
HCT VFR BLD CALC: 47.6 % (ref 37–47)
HEMOGLOBIN: 15.1 G/DL (ref 12–16)
IMMATURE GRANULOCYTES #: 0 K/UL
INR BLD: 1.48 (ref 0.88–1.18)
KETONES, POC: ABNORMAL
LEUKOCYTE EST, POC: ABNORMAL
LYMPHOCYTES ABSOLUTE: 1.5 K/UL (ref 1.1–4.5)
LYMPHOCYTES RELATIVE PERCENT: 16.4 % (ref 20–40)
MCH RBC QN AUTO: 32 PG (ref 27–31)
MCHC RBC AUTO-ENTMCNC: 31.7 G/DL (ref 33–37)
MCV RBC AUTO: 100.8 FL (ref 81–99)
MONOCYTES ABSOLUTE: 1.1 K/UL (ref 0–0.9)
MONOCYTES RELATIVE PERCENT: 11.7 % (ref 0–10)
NEUTROPHILS ABSOLUTE: 6.2 K/UL (ref 1.5–7.5)
NEUTROPHILS RELATIVE PERCENT: 68.6 % (ref 50–65)
NITRITE, POC: ABNORMAL
PDW BLD-RTO: 15 % (ref 11.5–14.5)
PH, POC: 5.5
PLATELET # BLD: 208 K/UL (ref 130–400)
PMV BLD AUTO: 10.8 FL (ref 9.4–12.3)
POTASSIUM SERPL-SCNC: 4.4 MMOL/L (ref 3.5–5)
PROTEIN, POC: ABNORMAL
PROTHROMBIN TIME: 17.2 SEC (ref 12–14.6)
RBC # BLD: 4.72 M/UL (ref 4.2–5.4)
SODIUM BLD-SCNC: 144 MMOL/L (ref 136–145)
SPECIFIC GRAVITY, POC: 1.01
T4 FREE: 1.9 NG/DL (ref 0.9–1.7)
TOTAL PROTEIN: 7.9 G/DL (ref 6.6–8.7)
TSH SERPL DL<=0.05 MIU/L-ACNC: 3.01 UIU/ML (ref 0.27–4.2)
UROBILINOGEN, POC: 0.2
VITAMIN B-12: 353 PG/ML (ref 211–946)
VITAMIN D 25-HYDROXY: 43.1 NG/ML
WBC # BLD: 9 K/UL (ref 4.8–10.8)

## 2019-11-08 PROCEDURE — 1036F TOBACCO NON-USER: CPT | Performed by: NURSE PRACTITIONER

## 2019-11-08 PROCEDURE — G8420 CALC BMI NORM PARAMETERS: HCPCS | Performed by: NURSE PRACTITIONER

## 2019-11-08 PROCEDURE — 99214 OFFICE O/P EST MOD 30 MIN: CPT | Performed by: NURSE PRACTITIONER

## 2019-11-08 PROCEDURE — 81002 URINALYSIS NONAUTO W/O SCOPE: CPT | Performed by: NURSE PRACTITIONER

## 2019-11-08 PROCEDURE — 1123F ACP DISCUSS/DSCN MKR DOCD: CPT | Performed by: NURSE PRACTITIONER

## 2019-11-08 PROCEDURE — 4040F PNEUMOC VAC/ADMIN/RCVD: CPT | Performed by: NURSE PRACTITIONER

## 2019-11-08 PROCEDURE — G8427 DOCREV CUR MEDS BY ELIG CLIN: HCPCS | Performed by: NURSE PRACTITIONER

## 2019-11-08 PROCEDURE — 1090F PRES/ABSN URINE INCON ASSESS: CPT | Performed by: NURSE PRACTITIONER

## 2019-11-08 PROCEDURE — 93000 ELECTROCARDIOGRAM COMPLETE: CPT | Performed by: NURSE PRACTITIONER

## 2019-11-08 PROCEDURE — 96372 THER/PROPH/DIAG INJ SC/IM: CPT | Performed by: NURSE PRACTITIONER

## 2019-11-08 PROCEDURE — G8482 FLU IMMUNIZE ORDER/ADMIN: HCPCS | Performed by: NURSE PRACTITIONER

## 2019-11-08 RX ORDER — CEFPROZIL 250 MG/1
250 TABLET, FILM COATED ORAL 2 TIMES DAILY
Qty: 20 TABLET | Refills: 0 | Status: SHIPPED | OUTPATIENT
Start: 2019-11-08 | End: 2019-11-18

## 2019-11-08 RX ORDER — CEFTRIAXONE 1 G/1
1 INJECTION, POWDER, FOR SOLUTION INTRAMUSCULAR; INTRAVENOUS ONCE
Status: COMPLETED | OUTPATIENT
Start: 2019-11-08 | End: 2019-11-08

## 2019-11-08 RX ADMIN — CEFTRIAXONE 1 G: 1 INJECTION, POWDER, FOR SOLUTION INTRAMUSCULAR; INTRAVENOUS at 12:28

## 2019-11-08 ASSESSMENT — ENCOUNTER SYMPTOMS
TROUBLE SWALLOWING: 0
SHORTNESS OF BREATH: 0
ABDOMINAL PAIN: 0
SORE THROAT: 0
BACK PAIN: 0
EYES NEGATIVE: 1
SINUS PRESSURE: 0
COLOR CHANGE: 0
COUGH: 0
ALLERGIC/IMMUNOLOGIC NEGATIVE: 1

## 2019-11-11 ENCOUNTER — OFFICE VISIT (OUTPATIENT)
Dept: PULMONOLOGY | Facility: CLINIC | Age: 84
End: 2019-11-11

## 2019-11-11 VITALS
SYSTOLIC BLOOD PRESSURE: 126 MMHG | WEIGHT: 107 LBS | OXYGEN SATURATION: 91 % | DIASTOLIC BLOOD PRESSURE: 80 MMHG | BODY MASS INDEX: 20.2 KG/M2 | HEART RATE: 86 BPM | HEIGHT: 61 IN

## 2019-11-11 DIAGNOSIS — J44.9 STAGE 1 MILD COPD BY GOLD CLASSIFICATION (HCC): Primary | ICD-10-CM

## 2019-11-11 DIAGNOSIS — J43.9 PULMONARY EMPHYSEMA, UNSPECIFIED EMPHYSEMA TYPE (HCC): ICD-10-CM

## 2019-11-11 DIAGNOSIS — J96.11 CHRONIC RESPIRATORY FAILURE WITH HYPOXIA (HCC): ICD-10-CM

## 2019-11-11 DIAGNOSIS — J30.9 ALLERGIC RHINITIS, UNSPECIFIED SEASONALITY, UNSPECIFIED TRIGGER: ICD-10-CM

## 2019-11-11 PROBLEM — F41.9 ANXIETY: Status: ACTIVE | Noted: 2019-11-11

## 2019-11-11 PROBLEM — I48.91 ATRIAL FIBRILLATION (HCC): Status: ACTIVE | Noted: 2017-09-28

## 2019-11-11 PROBLEM — H66.90 ACUTE OTITIS MEDIA: Status: ACTIVE | Noted: 2019-11-11

## 2019-11-11 PROCEDURE — 99214 OFFICE O/P EST MOD 30 MIN: CPT | Performed by: NURSE PRACTITIONER

## 2019-11-11 RX ORDER — ALENDRONATE SODIUM 70 MG/1
70 TABLET ORAL
COMMUNITY

## 2019-11-11 RX ORDER — FAMOTIDINE 20 MG/1
10 TABLET, FILM COATED ORAL
COMMUNITY
Start: 2019-06-18

## 2019-11-12 ENCOUNTER — TELEPHONE (OUTPATIENT)
Dept: PRIMARY CARE CLINIC | Age: 84
End: 2019-11-12

## 2019-11-14 ENCOUNTER — OFFICE VISIT (OUTPATIENT)
Dept: PRIMARY CARE CLINIC | Age: 84
End: 2019-11-14
Payer: MEDICARE

## 2019-11-14 VITALS
TEMPERATURE: 98.2 F | BODY MASS INDEX: 20.62 KG/M2 | HEART RATE: 60 BPM | WEIGHT: 105 LBS | HEIGHT: 60 IN | SYSTOLIC BLOOD PRESSURE: 122 MMHG | DIASTOLIC BLOOD PRESSURE: 82 MMHG | OXYGEN SATURATION: 98 %

## 2019-11-14 DIAGNOSIS — G25.0 BENIGN ESSENTIAL TREMOR: ICD-10-CM

## 2019-11-14 DIAGNOSIS — F32.1 CURRENT MODERATE EPISODE OF MAJOR DEPRESSIVE DISORDER WITHOUT PRIOR EPISODE (HCC): ICD-10-CM

## 2019-11-14 DIAGNOSIS — R62.7 FAILURE TO THRIVE IN ADULT: ICD-10-CM

## 2019-11-14 DIAGNOSIS — F02.80 LATE ONSET ALZHEIMER'S DISEASE WITHOUT BEHAVIORAL DISTURBANCE (HCC): ICD-10-CM

## 2019-11-14 DIAGNOSIS — R41.3 MEMORY CHANGE: ICD-10-CM

## 2019-11-14 DIAGNOSIS — G30.1 LATE ONSET ALZHEIMER'S DISEASE WITHOUT BEHAVIORAL DISTURBANCE (HCC): ICD-10-CM

## 2019-11-14 DIAGNOSIS — N18.30 CHRONIC KIDNEY DISEASE, STAGE III (MODERATE) (HCC): Primary | ICD-10-CM

## 2019-11-14 LAB
BACTERIA: NEGATIVE /HPF
BILIRUBIN URINE: NEGATIVE
BLOOD, URINE: NEGATIVE
CLARITY: CLEAR
COLOR: YELLOW
EPITHELIAL CELLS, UA: 2 /HPF (ref 0–5)
GLUCOSE URINE: NEGATIVE MG/DL
HYALINE CASTS: 2 /HPF (ref 0–8)
KETONES, URINE: NEGATIVE MG/DL
LEUKOCYTE ESTERASE, URINE: NEGATIVE
NITRITE, URINE: NEGATIVE
PH UA: 6 (ref 5–8)
PROTEIN UA: 30 MG/DL
RBC UA: 1 /HPF (ref 0–4)
SPECIFIC GRAVITY UA: 1.02 (ref 1–1.03)
URINE REFLEX TO CULTURE: ABNORMAL
UROBILINOGEN, URINE: 0.2 E.U./DL
WBC UA: 2 /HPF (ref 0–5)

## 2019-11-14 PROCEDURE — 99215 OFFICE O/P EST HI 40 MIN: CPT | Performed by: FAMILY MEDICINE

## 2019-11-14 PROCEDURE — 1036F TOBACCO NON-USER: CPT | Performed by: FAMILY MEDICINE

## 2019-11-14 PROCEDURE — 4040F PNEUMOC VAC/ADMIN/RCVD: CPT | Performed by: FAMILY MEDICINE

## 2019-11-14 PROCEDURE — G8427 DOCREV CUR MEDS BY ELIG CLIN: HCPCS | Performed by: FAMILY MEDICINE

## 2019-11-14 PROCEDURE — G8482 FLU IMMUNIZE ORDER/ADMIN: HCPCS | Performed by: FAMILY MEDICINE

## 2019-11-14 PROCEDURE — G8420 CALC BMI NORM PARAMETERS: HCPCS | Performed by: FAMILY MEDICINE

## 2019-11-14 PROCEDURE — 1123F ACP DISCUSS/DSCN MKR DOCD: CPT | Performed by: FAMILY MEDICINE

## 2019-11-14 PROCEDURE — 1090F PRES/ABSN URINE INCON ASSESS: CPT | Performed by: FAMILY MEDICINE

## 2019-11-14 RX ORDER — MEMANTINE HYDROCHLORIDE 5 MG/1
5 TABLET ORAL 2 TIMES DAILY
Qty: 60 TABLET | Refills: 5 | Status: ON HOLD | OUTPATIENT
Start: 2019-11-14 | End: 2019-12-09 | Stop reason: HOSPADM

## 2019-11-14 RX ORDER — MIRTAZAPINE 15 MG/1
15 TABLET, FILM COATED ORAL NIGHTLY
Qty: 30 TABLET | Refills: 3 | Status: SHIPPED | OUTPATIENT
Start: 2019-11-14 | End: 2020-03-13

## 2019-11-14 ASSESSMENT — ENCOUNTER SYMPTOMS
ABDOMINAL PAIN: 0
CONSTIPATION: 0
NAUSEA: 0
CHEST TIGHTNESS: 0
COUGH: 0
SHORTNESS OF BREATH: 0
VOMITING: 0
WHEEZING: 0
DIARRHEA: 0

## 2019-11-16 ENCOUNTER — NURSE TRIAGE (OUTPATIENT)
Dept: CALL CENTER | Facility: HOSPITAL | Age: 84
End: 2019-11-16

## 2019-11-16 ENCOUNTER — APPOINTMENT (OUTPATIENT)
Dept: CT IMAGING | Age: 84
End: 2019-11-16
Payer: MEDICARE

## 2019-11-16 ENCOUNTER — APPOINTMENT (OUTPATIENT)
Dept: GENERAL RADIOLOGY | Age: 84
End: 2019-11-16
Payer: MEDICARE

## 2019-11-16 ENCOUNTER — HOSPITAL ENCOUNTER (EMERGENCY)
Age: 84
Discharge: HOME | End: 2019-11-17
Attending: EMERGENCY MEDICINE
Payer: MEDICARE

## 2019-11-16 DIAGNOSIS — R53.1 GENERAL WEAKNESS: Primary | ICD-10-CM

## 2019-11-16 DIAGNOSIS — R79.1 SUBTHERAPEUTIC INTERNATIONAL NORMALIZED RATIO (INR): ICD-10-CM

## 2019-11-16 LAB
ALBUMIN SERPL-MCNC: 3.5 G/DL (ref 3.5–5.2)
ALP BLD-CCNC: 23 U/L (ref 35–104)
ALT SERPL-CCNC: 12 U/L (ref 5–33)
ANION GAP SERPL CALCULATED.3IONS-SCNC: 12 MMOL/L (ref 7–19)
AST SERPL-CCNC: 21 U/L (ref 5–32)
BASOPHILS ABSOLUTE: 0.1 K/UL (ref 0–0.2)
BASOPHILS RELATIVE PERCENT: 0.7 % (ref 0–1)
BILIRUB SERPL-MCNC: 0.3 MG/DL (ref 0.2–1.2)
BILIRUBIN URINE: NEGATIVE
BLOOD, URINE: NEGATIVE
BUN BLDV-MCNC: 37 MG/DL (ref 8–23)
CALCIUM SERPL-MCNC: 9.6 MG/DL (ref 8.8–10.2)
CHLORIDE BLD-SCNC: 107 MMOL/L (ref 98–111)
CLARITY: CLEAR
CO2: 23 MMOL/L (ref 22–29)
COLOR: YELLOW
CREAT SERPL-MCNC: 1.8 MG/DL (ref 0.5–0.9)
DIGOXIN LEVEL: 0.8 NG/ML (ref 0.6–1.2)
EOSINOPHILS ABSOLUTE: 0.2 K/UL (ref 0–0.6)
EOSINOPHILS RELATIVE PERCENT: 3.3 % (ref 0–5)
GFR NON-AFRICAN AMERICAN: 27
GLUCOSE BLD-MCNC: 103 MG/DL (ref 74–109)
GLUCOSE URINE: NEGATIVE MG/DL
HCT VFR BLD CALC: 45.4 % (ref 37–47)
HEMOGLOBIN: 14.4 G/DL (ref 12–16)
IMMATURE GRANULOCYTES #: 0 K/UL
INR BLD: 1.66 (ref 0.88–1.18)
KETONES, URINE: NEGATIVE MG/DL
LEUKOCYTE ESTERASE, URINE: NEGATIVE
LYMPHOCYTES ABSOLUTE: 1.4 K/UL (ref 1.1–4.5)
LYMPHOCYTES RELATIVE PERCENT: 20.7 % (ref 20–40)
MCH RBC QN AUTO: 32.7 PG (ref 27–31)
MCHC RBC AUTO-ENTMCNC: 31.7 G/DL (ref 33–37)
MCV RBC AUTO: 103.2 FL (ref 81–99)
MONOCYTES ABSOLUTE: 0.8 K/UL (ref 0–0.9)
MONOCYTES RELATIVE PERCENT: 11.4 % (ref 0–10)
NEUTROPHILS ABSOLUTE: 4.4 K/UL (ref 1.5–7.5)
NEUTROPHILS RELATIVE PERCENT: 63.5 % (ref 50–65)
NITRITE, URINE: NEGATIVE
PDW BLD-RTO: 15.1 % (ref 11.5–14.5)
PH UA: 6 (ref 5–8)
PLATELET # BLD: 147 K/UL (ref 130–400)
PMV BLD AUTO: 10.6 FL (ref 9.4–12.3)
POTASSIUM SERPL-SCNC: 4.4 MMOL/L (ref 3.5–5)
PROTEIN UA: ABNORMAL MG/DL
PROTHROMBIN TIME: 18.9 SEC (ref 12–14.6)
RBC # BLD: 4.4 M/UL (ref 4.2–5.4)
SODIUM BLD-SCNC: 142 MMOL/L (ref 136–145)
SPECIFIC GRAVITY UA: 1.02 (ref 1–1.03)
TOTAL PROTEIN: 6.5 G/DL (ref 6.6–8.7)
TROPONIN: 0.02 NG/ML (ref 0–0.03)
URINE REFLEX TO CULTURE: ABNORMAL
UROBILINOGEN, URINE: 0.2 E.U./DL
WBC # BLD: 7 K/UL (ref 4.8–10.8)

## 2019-11-16 PROCEDURE — 81003 URINALYSIS AUTO W/O SCOPE: CPT

## 2019-11-16 PROCEDURE — 99284 EMERGENCY DEPT VISIT MOD MDM: CPT

## 2019-11-16 PROCEDURE — 36415 COLL VENOUS BLD VENIPUNCTURE: CPT

## 2019-11-16 PROCEDURE — 84484 ASSAY OF TROPONIN QUANT: CPT

## 2019-11-16 PROCEDURE — 6360000002 HC RX W HCPCS: Performed by: EMERGENCY MEDICINE

## 2019-11-16 PROCEDURE — 85610 PROTHROMBIN TIME: CPT

## 2019-11-16 PROCEDURE — 80162 ASSAY OF DIGOXIN TOTAL: CPT

## 2019-11-16 PROCEDURE — 93005 ELECTROCARDIOGRAM TRACING: CPT | Performed by: EMERGENCY MEDICINE

## 2019-11-16 PROCEDURE — 71045 X-RAY EXAM CHEST 1 VIEW: CPT

## 2019-11-16 PROCEDURE — 70450 CT HEAD/BRAIN W/O DYE: CPT

## 2019-11-16 PROCEDURE — 2580000003 HC RX 258: Performed by: EMERGENCY MEDICINE

## 2019-11-16 PROCEDURE — 85025 COMPLETE CBC W/AUTO DIFF WBC: CPT

## 2019-11-16 PROCEDURE — 80053 COMPREHEN METABOLIC PANEL: CPT

## 2019-11-16 RX ORDER — 0.9 % SODIUM CHLORIDE 0.9 %
500 INTRAVENOUS SOLUTION INTRAVENOUS ONCE
Status: COMPLETED | OUTPATIENT
Start: 2019-11-16 | End: 2019-11-16

## 2019-11-16 RX ADMIN — ENOXAPARIN SODIUM 30 MG: 30 INJECTION SUBCUTANEOUS at 23:54

## 2019-11-16 RX ADMIN — SODIUM CHLORIDE 500 ML: 9 INJECTION, SOLUTION INTRAVENOUS at 22:50

## 2019-11-16 NOTE — TELEPHONE ENCOUNTER
"She has been weak all weak, really getting confused and barely eating and drinking, has not voided today at all and maybe two times yesterday, was seen in [facility] this week, no results from tests, and today she is worse. What should we do? Told needs to be seen in ER maybe dehydrated . No voiding all day and not drinking. No fever .     Reason for Disposition  • [1] Drinking very little AND [2] dehydration suspected (e.g., no urine > 12 hours, very dry mouth, very lightheaded)    Additional Information  • Negative: Severe difficulty breathing (e.g., struggling for each breath, speaks in single words)  • Negative: Shock suspected (e.g., cold/pale/clammy skin, too weak to stand, low BP, rapid pulse)  • Negative: Difficult to awaken or acting confused (e.g., disoriented, slurred speech)  • Negative: [1] Fainted > 15 minutes ago AND [2] still feels too weak or dizzy to stand  • Negative: [1] SEVERE weakness (i.e., unable to walk or barely able to walk, requires support) AND     [2] new onset or worsening  • Negative: Sounds like a life-threatening emergency to the triager  • Negative: Weakness of the face, arm or leg on one side of the body  • Negative: [1] Has diabetes (diabetes mellitus) AND [2] weakness from low blood sugar (i.e., < 60 mg/dl or 3.5 mmol/l)  • Negative: Heat exhaustion suspected (i.e., dehydration from heat exposure)  • Negative: Vomiting is main symptom  • Negative: Diarrhea is main symptom  • Negative: Difficulty breathing  • Negative: Heart beating < 50 beats per minute OR > 140 beats per minute  • Negative: Extra heart beats OR irregular heart beating   (i.e., \"palpitations\")  • Negative: Follows bleeding (e.g., from vomiting, rectum, vagina; EXCEPTION: small brief weakness from sight of a small amount blood)  • Negative: Black or tarry bowel movements  • Negative: Patient sounds very sick or weak to the triager    Answer Assessment - Initial Assessment Questions  1. DESCRIPTION: \"Describe how " "you are feeling.\"      Weakness, confused and not wanting to eat  2. SEVERITY: \"How bad is it?\"  \"Can you stand and walk?\"    - MILD - Feels weak or tired, but does not interfere with work, school or normal activities    - MODERATE - Able to stand and walk; weakness interferes with work, school, or normal activities    - SEVERE - Unable to stand or walk      Knees buckle at times  3. ONSET:  \"When did the weakness begin?\"      This week  4. CAUSE: \"What do you think is causing the weakness?\"      unknown  5. MEDICINES: \"Have you recently started a new medicine or had a change in the amount of a medicine?\"      Depression meds added  6. OTHER SYMPTOMS: \"Do you have any other symptoms?\" (e.g., chest pain, fever, cough, SOB, vomiting, diarrhea, bleeding)      Sob at times, on oxygen  7. PREGNANCY: \"Is there any chance you are pregnant?\" \"When was your last menstrual period?\"      no    Protocols used: WEAKNESS (GENERALIZED) AND FATIGUE-ADULT-AH      "

## 2019-11-17 VITALS
HEART RATE: 95 BPM | RESPIRATION RATE: 26 BRPM | TEMPERATURE: 98.2 F | DIASTOLIC BLOOD PRESSURE: 82 MMHG | SYSTOLIC BLOOD PRESSURE: 141 MMHG | WEIGHT: 104 LBS | OXYGEN SATURATION: 100 % | HEIGHT: 60 IN | BODY MASS INDEX: 20.42 KG/M2

## 2019-11-17 LAB
EKG P AXIS: NORMAL DEGREES
EKG P-R INTERVAL: NORMAL MS
EKG Q-T INTERVAL: 330 MS
EKG QRS DURATION: 92 MS
EKG QTC CALCULATION (BAZETT): 380 MS
EKG T AXIS: 27 DEGREES

## 2019-11-18 ENCOUNTER — ANTI-COAG VISIT (OUTPATIENT)
Dept: CARDIOLOGY | Age: 84
End: 2019-11-18

## 2019-11-18 ENCOUNTER — TELEPHONE (OUTPATIENT)
Dept: PRIMARY CARE CLINIC | Age: 84
End: 2019-11-18

## 2019-11-18 LAB
INR BLD: 1.5
RPR: NORMAL

## 2019-11-19 ENCOUNTER — TELEPHONE (OUTPATIENT)
Dept: PRIMARY CARE CLINIC | Age: 84
End: 2019-11-19

## 2019-11-19 ENCOUNTER — OFFICE VISIT (OUTPATIENT)
Dept: PRIMARY CARE CLINIC | Age: 84
End: 2019-11-19
Payer: MEDICARE

## 2019-11-19 VITALS
WEIGHT: 104 LBS | RESPIRATION RATE: 20 BRPM | SYSTOLIC BLOOD PRESSURE: 100 MMHG | HEART RATE: 68 BPM | BODY MASS INDEX: 20.31 KG/M2 | TEMPERATURE: 98 F | OXYGEN SATURATION: 95 % | DIASTOLIC BLOOD PRESSURE: 68 MMHG

## 2019-11-19 DIAGNOSIS — Z00.00 ROUTINE GENERAL MEDICAL EXAMINATION AT A HEALTH CARE FACILITY: Primary | ICD-10-CM

## 2019-11-19 PROCEDURE — G0439 PPPS, SUBSEQ VISIT: HCPCS | Performed by: NURSE PRACTITIONER

## 2019-11-19 PROCEDURE — 1123F ACP DISCUSS/DSCN MKR DOCD: CPT | Performed by: NURSE PRACTITIONER

## 2019-11-19 PROCEDURE — 4040F PNEUMOC VAC/ADMIN/RCVD: CPT | Performed by: NURSE PRACTITIONER

## 2019-11-19 PROCEDURE — G8482 FLU IMMUNIZE ORDER/ADMIN: HCPCS | Performed by: NURSE PRACTITIONER

## 2019-11-19 RX ORDER — ESCITALOPRAM OXALATE 10 MG/1
10 TABLET ORAL NIGHTLY
Qty: 30 TABLET | Refills: 1 | Status: SHIPPED | OUTPATIENT
Start: 2019-11-19 | End: 2020-01-14

## 2019-11-19 RX ORDER — METOPROLOL TARTRATE 50 MG/1
50 TABLET, FILM COATED ORAL 2 TIMES DAILY
Qty: 60 TABLET | Refills: 2 | Status: SHIPPED | OUTPATIENT
Start: 2019-11-19 | End: 2019-11-19 | Stop reason: ALTCHOICE

## 2019-11-19 ASSESSMENT — PATIENT HEALTH QUESTIONNAIRE - PHQ9
SUM OF ALL RESPONSES TO PHQ QUESTIONS 1-9: 0
SUM OF ALL RESPONSES TO PHQ QUESTIONS 1-9: 0

## 2019-11-20 ENCOUNTER — ANTI-COAG VISIT (OUTPATIENT)
Dept: CARDIOLOGY | Age: 84
End: 2019-11-20
Payer: MEDICARE

## 2019-11-20 LAB
INR BLD: 2.5
VITAMIN B1, PLASMA: 11 NMOL/L (ref 4–15)

## 2019-11-20 PROCEDURE — 85610 PROTHROMBIN TIME: CPT | Performed by: CLINICAL NURSE SPECIALIST

## 2019-12-05 ENCOUNTER — ANTI-COAG VISIT (OUTPATIENT)
Dept: CARDIOLOGY | Age: 84
End: 2019-12-05

## 2019-12-05 LAB — INR BLD: 1.9

## 2019-12-07 ENCOUNTER — APPOINTMENT (OUTPATIENT)
Dept: GENERAL RADIOLOGY | Age: 84
DRG: 641 | End: 2019-12-07
Payer: MEDICARE

## 2019-12-07 ENCOUNTER — APPOINTMENT (OUTPATIENT)
Dept: CT IMAGING | Age: 84
DRG: 641 | End: 2019-12-07
Payer: MEDICARE

## 2019-12-07 ENCOUNTER — HOSPITAL ENCOUNTER (INPATIENT)
Age: 84
LOS: 1 days | Discharge: HOME | DRG: 641 | End: 2019-12-09
Attending: EMERGENCY MEDICINE | Admitting: HOSPITALIST
Payer: MEDICARE

## 2019-12-07 DIAGNOSIS — R53.1 GENERAL WEAKNESS: Primary | ICD-10-CM

## 2019-12-07 DIAGNOSIS — K21.00 GASTROESOPHAGEAL REFLUX DISEASE WITH ESOPHAGITIS: ICD-10-CM

## 2019-12-07 DIAGNOSIS — R41.0 CONFUSION: ICD-10-CM

## 2019-12-07 DIAGNOSIS — R77.8 ELEVATED TROPONIN: ICD-10-CM

## 2019-12-07 DIAGNOSIS — F03.90 DEMENTIA WITHOUT BEHAVIORAL DISTURBANCE, UNSPECIFIED DEMENTIA TYPE: ICD-10-CM

## 2019-12-07 DIAGNOSIS — N18.9 CHRONIC RENAL FAILURE, UNSPECIFIED CKD STAGE: ICD-10-CM

## 2019-12-07 LAB
ALBUMIN SERPL-MCNC: 3.6 G/DL (ref 3.5–5.2)
ALP BLD-CCNC: 35 U/L (ref 35–104)
ALT SERPL-CCNC: 13 U/L (ref 5–33)
AMMONIA: 21 UMOL/L (ref 11–51)
ANION GAP SERPL CALCULATED.3IONS-SCNC: 11 MMOL/L (ref 7–19)
AST SERPL-CCNC: 26 U/L (ref 5–32)
BILIRUB SERPL-MCNC: 0.6 MG/DL (ref 0.2–1.2)
BILIRUBIN URINE: NEGATIVE
BLOOD, URINE: NEGATIVE
BUN BLDV-MCNC: 38 MG/DL (ref 8–23)
CALCIUM SERPL-MCNC: 9.3 MG/DL (ref 8.8–10.2)
CHLORIDE BLD-SCNC: 105 MMOL/L (ref 98–111)
CLARITY: ABNORMAL
CO2: 23 MMOL/L (ref 22–29)
COLOR: YELLOW
CREAT SERPL-MCNC: 1.7 MG/DL (ref 0.5–0.9)
DIGOXIN LEVEL: 0.9 NG/ML (ref 0.6–1.2)
GFR NON-AFRICAN AMERICAN: 28
GLUCOSE BLD-MCNC: 98 MG/DL (ref 74–109)
GLUCOSE URINE: NEGATIVE MG/DL
HCT VFR BLD CALC: 42.2 % (ref 37–47)
HEMOGLOBIN: 13.2 G/DL (ref 12–16)
INR BLD: 2.01 (ref 0.88–1.18)
KETONES, URINE: NEGATIVE MG/DL
LEUKOCYTE ESTERASE, URINE: NEGATIVE
MCH RBC QN AUTO: 32.7 PG (ref 27–31)
MCHC RBC AUTO-ENTMCNC: 31.3 G/DL (ref 33–37)
MCV RBC AUTO: 104.5 FL (ref 81–99)
NITRITE, URINE: NEGATIVE
PDW BLD-RTO: 14.2 % (ref 11.5–14.5)
PH UA: 6 (ref 5–8)
PLATELET # BLD: 156 K/UL (ref 130–400)
PMV BLD AUTO: 12.3 FL (ref 9.4–12.3)
POTASSIUM SERPL-SCNC: 4.5 MMOL/L (ref 3.5–5)
PROTEIN UA: ABNORMAL MG/DL
PROTHROMBIN TIME: 22 SEC (ref 12–14.6)
RBC # BLD: 4.04 M/UL (ref 4.2–5.4)
SODIUM BLD-SCNC: 139 MMOL/L (ref 136–145)
SPECIFIC GRAVITY UA: 1.01 (ref 1–1.03)
TOTAL PROTEIN: 6 G/DL (ref 6.6–8.7)
TROPONIN: 0.04 NG/ML (ref 0–0.03)
TROPONIN: 0.05 NG/ML (ref 0–0.03)
TSH SERPL DL<=0.05 MIU/L-ACNC: 2.2 UIU/ML (ref 0.27–4.2)
URINE REFLEX TO CULTURE: ABNORMAL
UROBILINOGEN, URINE: 0.2 E.U./DL
VITAMIN B-12: 411 PG/ML (ref 211–946)
WBC # BLD: 5.6 K/UL (ref 4.8–10.8)

## 2019-12-07 PROCEDURE — 81003 URINALYSIS AUTO W/O SCOPE: CPT

## 2019-12-07 PROCEDURE — 6370000000 HC RX 637 (ALT 250 FOR IP): Performed by: EMERGENCY MEDICINE

## 2019-12-07 PROCEDURE — 93005 ELECTROCARDIOGRAM TRACING: CPT | Performed by: EMERGENCY MEDICINE

## 2019-12-07 PROCEDURE — G0378 HOSPITAL OBSERVATION PER HR: HCPCS

## 2019-12-07 PROCEDURE — 80162 ASSAY OF DIGOXIN TOTAL: CPT

## 2019-12-07 PROCEDURE — 80053 COMPREHEN METABOLIC PANEL: CPT

## 2019-12-07 PROCEDURE — 6370000000 HC RX 637 (ALT 250 FOR IP): Performed by: HOSPITALIST

## 2019-12-07 PROCEDURE — 70450 CT HEAD/BRAIN W/O DYE: CPT

## 2019-12-07 PROCEDURE — 36415 COLL VENOUS BLD VENIPUNCTURE: CPT

## 2019-12-07 PROCEDURE — 84443 ASSAY THYROID STIM HORMONE: CPT

## 2019-12-07 PROCEDURE — 85027 COMPLETE CBC AUTOMATED: CPT

## 2019-12-07 PROCEDURE — 71045 X-RAY EXAM CHEST 1 VIEW: CPT

## 2019-12-07 PROCEDURE — 99285 EMERGENCY DEPT VISIT HI MDM: CPT

## 2019-12-07 PROCEDURE — 2580000003 HC RX 258: Performed by: HOSPITALIST

## 2019-12-07 PROCEDURE — 82607 VITAMIN B-12: CPT

## 2019-12-07 PROCEDURE — 84484 ASSAY OF TROPONIN QUANT: CPT

## 2019-12-07 PROCEDURE — 85610 PROTHROMBIN TIME: CPT

## 2019-12-07 PROCEDURE — 82140 ASSAY OF AMMONIA: CPT

## 2019-12-07 RX ORDER — SODIUM CHLORIDE 9 MG/ML
INJECTION, SOLUTION INTRAVENOUS CONTINUOUS
Status: DISCONTINUED | OUTPATIENT
Start: 2019-12-07 | End: 2019-12-09 | Stop reason: HOSPADM

## 2019-12-07 RX ORDER — SODIUM CHLORIDE 0.9 % (FLUSH) 0.9 %
10 SYRINGE (ML) INJECTION PRN
Status: DISCONTINUED | OUTPATIENT
Start: 2019-12-07 | End: 2019-12-09 | Stop reason: HOSPADM

## 2019-12-07 RX ORDER — ACETAMINOPHEN 325 MG/1
650 TABLET ORAL EVERY 4 HOURS PRN
Status: DISCONTINUED | OUTPATIENT
Start: 2019-12-07 | End: 2019-12-09 | Stop reason: HOSPADM

## 2019-12-07 RX ORDER — ONDANSETRON 2 MG/ML
4 INJECTION INTRAMUSCULAR; INTRAVENOUS EVERY 6 HOURS PRN
Status: DISCONTINUED | OUTPATIENT
Start: 2019-12-07 | End: 2019-12-09 | Stop reason: HOSPADM

## 2019-12-07 RX ORDER — ASPIRIN 81 MG/1
81 TABLET, CHEWABLE ORAL DAILY
Status: DISCONTINUED | OUTPATIENT
Start: 2019-12-07 | End: 2019-12-09 | Stop reason: HOSPADM

## 2019-12-07 RX ORDER — ASPIRIN 325 MG
325 TABLET ORAL ONCE
Status: COMPLETED | OUTPATIENT
Start: 2019-12-07 | End: 2019-12-07

## 2019-12-07 RX ORDER — SODIUM CHLORIDE 0.9 % (FLUSH) 0.9 %
10 SYRINGE (ML) INJECTION EVERY 12 HOURS SCHEDULED
Status: DISCONTINUED | OUTPATIENT
Start: 2019-12-07 | End: 2019-12-09 | Stop reason: HOSPADM

## 2019-12-07 RX ADMIN — ASPIRIN 325 MG: 325 TABLET, COATED ORAL at 16:57

## 2019-12-07 RX ADMIN — ASPIRIN 81 MG 81 MG: 81 TABLET ORAL at 19:51

## 2019-12-07 RX ADMIN — Medication 10 ML: at 19:52

## 2019-12-07 RX ADMIN — SODIUM CHLORIDE: 9 INJECTION, SOLUTION INTRAVENOUS at 19:52

## 2019-12-07 ASSESSMENT — PAIN SCALES - GENERAL: PAINLEVEL_OUTOF10: 0

## 2019-12-08 LAB
ANION GAP SERPL CALCULATED.3IONS-SCNC: 11 MMOL/L (ref 7–19)
BUN BLDV-MCNC: 33 MG/DL (ref 8–23)
CALCIUM SERPL-MCNC: 9.1 MG/DL (ref 8.8–10.2)
CHLORIDE BLD-SCNC: 108 MMOL/L (ref 98–111)
CO2: 22 MMOL/L (ref 22–29)
CREAT SERPL-MCNC: 1.5 MG/DL (ref 0.5–0.9)
EKG P AXIS: NORMAL DEGREES
EKG P-R INTERVAL: NORMAL MS
EKG Q-T INTERVAL: 372 MS
EKG QRS DURATION: 122 MS
EKG QTC CALCULATION (BAZETT): 414 MS
EKG T AXIS: 70 DEGREES
GFR NON-AFRICAN AMERICAN: 33
GLUCOSE BLD-MCNC: 86 MG/DL (ref 74–109)
HCT VFR BLD CALC: 37.6 % (ref 37–47)
HEMOGLOBIN: 12.4 G/DL (ref 12–16)
LV EF: 60 %
LVEF MODALITY: NORMAL
MCH RBC QN AUTO: 33.2 PG (ref 27–31)
MCHC RBC AUTO-ENTMCNC: 33 G/DL (ref 33–37)
MCV RBC AUTO: 100.8 FL (ref 81–99)
PDW BLD-RTO: 13.8 % (ref 11.5–14.5)
PLATELET # BLD: 131 K/UL (ref 130–400)
PMV BLD AUTO: 11.5 FL (ref 9.4–12.3)
POTASSIUM REFLEX MAGNESIUM: 4.3 MMOL/L (ref 3.5–5)
RBC # BLD: 3.73 M/UL (ref 4.2–5.4)
SODIUM BLD-SCNC: 141 MMOL/L (ref 136–145)
TROPONIN: 0.03 NG/ML (ref 0–0.03)
TROPONIN: 0.04 NG/ML (ref 0–0.03)
TROPONIN: 0.04 NG/ML (ref 0–0.03)
WBC # BLD: 4.9 K/UL (ref 4.8–10.8)

## 2019-12-08 PROCEDURE — 99213 OFFICE O/P EST LOW 20 MIN: CPT | Performed by: INTERNAL MEDICINE

## 2019-12-08 PROCEDURE — G0378 HOSPITAL OBSERVATION PER HR: HCPCS

## 2019-12-08 PROCEDURE — 6370000000 HC RX 637 (ALT 250 FOR IP): Performed by: INTERNAL MEDICINE

## 2019-12-08 PROCEDURE — 93306 TTE W/DOPPLER COMPLETE: CPT

## 2019-12-08 PROCEDURE — 93010 ELECTROCARDIOGRAM REPORT: CPT | Performed by: INTERNAL MEDICINE

## 2019-12-08 PROCEDURE — 2580000003 HC RX 258: Performed by: HOSPITALIST

## 2019-12-08 PROCEDURE — 6370000000 HC RX 637 (ALT 250 FOR IP): Performed by: HOSPITALIST

## 2019-12-08 PROCEDURE — 85027 COMPLETE CBC AUTOMATED: CPT

## 2019-12-08 PROCEDURE — 36415 COLL VENOUS BLD VENIPUNCTURE: CPT

## 2019-12-08 PROCEDURE — 80048 BASIC METABOLIC PNL TOTAL CA: CPT

## 2019-12-08 PROCEDURE — 84484 ASSAY OF TROPONIN QUANT: CPT

## 2019-12-08 RX ORDER — FAMOTIDINE 20 MG/1
10 TABLET, FILM COATED ORAL DAILY
Status: DISCONTINUED | OUTPATIENT
Start: 2019-12-08 | End: 2019-12-09 | Stop reason: HOSPADM

## 2019-12-08 RX ORDER — MIRTAZAPINE 15 MG/1
15 TABLET, FILM COATED ORAL NIGHTLY
Status: DISCONTINUED | OUTPATIENT
Start: 2019-12-08 | End: 2019-12-09 | Stop reason: HOSPADM

## 2019-12-08 RX ORDER — DIGOXIN 125 MCG
125 TABLET ORAL
Status: DISCONTINUED | OUTPATIENT
Start: 2019-12-09 | End: 2019-12-09 | Stop reason: HOSPADM

## 2019-12-08 RX ORDER — ATORVASTATIN CALCIUM 20 MG/1
10 TABLET, FILM COATED ORAL NIGHTLY
Status: DISCONTINUED | OUTPATIENT
Start: 2019-12-08 | End: 2019-12-09 | Stop reason: HOSPADM

## 2019-12-08 RX ORDER — FUROSEMIDE 20 MG/1
20 TABLET ORAL DAILY
Status: DISCONTINUED | OUTPATIENT
Start: 2019-12-08 | End: 2019-12-09 | Stop reason: HOSPADM

## 2019-12-08 RX ORDER — LATANOPROST 50 UG/ML
1 SOLUTION/ DROPS OPHTHALMIC NIGHTLY
Status: DISCONTINUED | OUTPATIENT
Start: 2019-12-08 | End: 2019-12-09 | Stop reason: HOSPADM

## 2019-12-08 RX ORDER — DONEPEZIL HYDROCHLORIDE 5 MG/1
5 TABLET, FILM COATED ORAL NIGHTLY
Status: DISCONTINUED | OUTPATIENT
Start: 2019-12-08 | End: 2019-12-09 | Stop reason: HOSPADM

## 2019-12-08 RX ORDER — WARFARIN SODIUM 5 MG/1
5 TABLET ORAL DAILY
Status: DISCONTINUED | OUTPATIENT
Start: 2019-12-08 | End: 2019-12-08 | Stop reason: DRUGHIGH

## 2019-12-08 RX ORDER — WARFARIN SODIUM 2.5 MG/1
2.5 TABLET ORAL
Status: COMPLETED | OUTPATIENT
Start: 2019-12-08 | End: 2019-12-08

## 2019-12-08 RX ORDER — FAMOTIDINE 20 MG/1
20 TABLET, FILM COATED ORAL 2 TIMES DAILY
Status: DISCONTINUED | OUTPATIENT
Start: 2019-12-08 | End: 2019-12-08 | Stop reason: DRUGHIGH

## 2019-12-08 RX ADMIN — FAMOTIDINE 10 MG: 20 TABLET, FILM COATED ORAL at 16:17

## 2019-12-08 RX ADMIN — WARFARIN SODIUM 2.5 MG: 2.5 TABLET ORAL at 16:17

## 2019-12-08 RX ADMIN — DONEPEZIL HYDROCHLORIDE 5 MG: 5 TABLET, FILM COATED ORAL at 20:08

## 2019-12-08 RX ADMIN — MIRTAZAPINE 15 MG: 15 TABLET, FILM COATED ORAL at 20:09

## 2019-12-08 RX ADMIN — FUROSEMIDE 20 MG: 20 TABLET ORAL at 16:17

## 2019-12-08 RX ADMIN — SODIUM CHLORIDE: 9 INJECTION, SOLUTION INTRAVENOUS at 10:40

## 2019-12-08 RX ADMIN — ATORVASTATIN CALCIUM 10 MG: 20 TABLET, FILM COATED ORAL at 20:09

## 2019-12-08 RX ADMIN — ASPIRIN 81 MG 81 MG: 81 TABLET ORAL at 10:40

## 2019-12-08 RX ADMIN — Medication 10 ML: at 10:40

## 2019-12-08 RX ADMIN — LATANOPROST 1 DROP: 50 SOLUTION OPHTHALMIC at 20:09

## 2019-12-08 RX ADMIN — METOPROLOL TARTRATE 12.5 MG: 25 TABLET ORAL at 20:09

## 2019-12-08 ASSESSMENT — ENCOUNTER SYMPTOMS
BLOOD IN STOOL: 0
WHEEZING: 0
BACK PAIN: 0
SHORTNESS OF BREATH: 0
VOMITING: 0
DIARRHEA: 0
ABDOMINAL DISTENTION: 0
EYE DISCHARGE: 0
COUGH: 0
CONSTIPATION: 0

## 2019-12-09 ENCOUNTER — TELEPHONE (OUTPATIENT)
Dept: PRIMARY CARE CLINIC | Age: 84
End: 2019-12-09

## 2019-12-09 VITALS
SYSTOLIC BLOOD PRESSURE: 113 MMHG | TEMPERATURE: 98.3 F | OXYGEN SATURATION: 99 % | DIASTOLIC BLOOD PRESSURE: 64 MMHG | RESPIRATION RATE: 16 BRPM | WEIGHT: 104 LBS | HEART RATE: 72 BPM | HEIGHT: 64 IN | BODY MASS INDEX: 17.75 KG/M2

## 2019-12-09 PROBLEM — R77.8 ELEVATED TROPONIN: Status: ACTIVE | Noted: 2019-12-09

## 2019-12-09 PROBLEM — I95.9 HYPOTENSION: Status: ACTIVE | Noted: 2019-12-09

## 2019-12-09 PROBLEM — R79.89 ELEVATED TROPONIN: Status: ACTIVE | Noted: 2019-12-09

## 2019-12-09 PROBLEM — I95.9 HYPOTENSION: Status: RESOLVED | Noted: 2019-12-09 | Resolved: 2019-12-09

## 2019-12-09 PROBLEM — R41.0 MENTAL CONFUSION: Status: RESOLVED | Noted: 2019-12-07 | Resolved: 2019-12-09

## 2019-12-09 PROBLEM — R41.0 MENTAL CONFUSION: Status: ACTIVE | Noted: 2019-12-09

## 2019-12-09 PROBLEM — I35.1 AORTIC REGURGITATION: Status: ACTIVE | Noted: 2019-12-09

## 2019-12-09 LAB
ANION GAP SERPL CALCULATED.3IONS-SCNC: 10 MMOL/L (ref 7–19)
BUN BLDV-MCNC: 26 MG/DL (ref 8–23)
CALCIUM SERPL-MCNC: 9 MG/DL (ref 8.8–10.2)
CHLORIDE BLD-SCNC: 110 MMOL/L (ref 98–111)
CO2: 23 MMOL/L (ref 22–29)
CREAT SERPL-MCNC: 1.3 MG/DL (ref 0.5–0.9)
GFR NON-AFRICAN AMERICAN: 39
GLUCOSE BLD-MCNC: 133 MG/DL (ref 74–109)
INR BLD: 1.88 (ref 0.88–1.18)
POTASSIUM REFLEX MAGNESIUM: 4.3 MMOL/L (ref 3.5–5)
PROTHROMBIN TIME: 20.9 SEC (ref 12–14.6)
SODIUM BLD-SCNC: 143 MMOL/L (ref 136–145)

## 2019-12-09 PROCEDURE — 6370000000 HC RX 637 (ALT 250 FOR IP): Performed by: HOSPITALIST

## 2019-12-09 PROCEDURE — 97535 SELF CARE MNGMENT TRAINING: CPT

## 2019-12-09 PROCEDURE — 1210000000 HC MED SURG R&B

## 2019-12-09 PROCEDURE — 97165 OT EVAL LOW COMPLEX 30 MIN: CPT

## 2019-12-09 PROCEDURE — 36415 COLL VENOUS BLD VENIPUNCTURE: CPT

## 2019-12-09 PROCEDURE — 85610 PROTHROMBIN TIME: CPT

## 2019-12-09 PROCEDURE — 80048 BASIC METABOLIC PNL TOTAL CA: CPT

## 2019-12-09 PROCEDURE — 6370000000 HC RX 637 (ALT 250 FOR IP): Performed by: INTERNAL MEDICINE

## 2019-12-09 RX ORDER — FUROSEMIDE 20 MG/1
20 TABLET ORAL DAILY
Qty: 30 TABLET | Refills: 0 | Status: SHIPPED | OUTPATIENT
Start: 2019-12-10 | End: 2020-01-21 | Stop reason: SDUPTHER

## 2019-12-09 RX ORDER — ASPIRIN 81 MG/1
81 TABLET, CHEWABLE ORAL DAILY
Qty: 30 TABLET | Refills: 0 | Status: ON HOLD | OUTPATIENT
Start: 2019-12-10 | End: 2021-09-02 | Stop reason: HOSPADM

## 2019-12-09 RX ORDER — FAMOTIDINE 20 MG/1
10 TABLET, FILM COATED ORAL DAILY
Qty: 15 TABLET | Refills: 0 | Status: SHIPPED | OUTPATIENT
Start: 2019-12-09 | End: 2020-01-17

## 2019-12-09 RX ORDER — FUROSEMIDE 20 MG/1
20 TABLET ORAL DAILY
Qty: 60 TABLET | Refills: 0 | Status: SHIPPED | OUTPATIENT
Start: 2019-12-10 | End: 2019-12-09

## 2019-12-09 RX ADMIN — TIOTROPIUM BROMIDE INHALATION SPRAY 2 PUFF: 3.12 SPRAY, METERED RESPIRATORY (INHALATION) at 09:33

## 2019-12-09 RX ADMIN — FUROSEMIDE 20 MG: 20 TABLET ORAL at 09:26

## 2019-12-09 RX ADMIN — METOPROLOL TARTRATE 12.5 MG: 25 TABLET ORAL at 09:25

## 2019-12-09 RX ADMIN — DIGOXIN 125 MCG: 125 TABLET ORAL at 09:25

## 2019-12-09 RX ADMIN — ASPIRIN 81 MG 81 MG: 81 TABLET ORAL at 09:25

## 2019-12-09 RX ADMIN — FAMOTIDINE 10 MG: 20 TABLET, FILM COATED ORAL at 09:26

## 2019-12-09 ASSESSMENT — PAIN SCALES - GENERAL
PAINLEVEL_OUTOF10: 0
PAINLEVEL_OUTOF10: 0

## 2019-12-10 ENCOUNTER — HOSPITAL ENCOUNTER (EMERGENCY)
Age: 84
Discharge: HOME | End: 2019-12-10
Attending: EMERGENCY MEDICINE
Payer: MEDICARE

## 2019-12-10 ENCOUNTER — TELEPHONE (OUTPATIENT)
Dept: INTERNAL MEDICINE | Age: 84
End: 2019-12-10

## 2019-12-10 ENCOUNTER — APPOINTMENT (OUTPATIENT)
Dept: GENERAL RADIOLOGY | Age: 84
End: 2019-12-10
Payer: MEDICARE

## 2019-12-10 VITALS
RESPIRATION RATE: 18 BRPM | DIASTOLIC BLOOD PRESSURE: 93 MMHG | HEART RATE: 76 BPM | SYSTOLIC BLOOD PRESSURE: 142 MMHG | HEIGHT: 64 IN | WEIGHT: 104 LBS | OXYGEN SATURATION: 99 % | TEMPERATURE: 97.5 F | BODY MASS INDEX: 17.75 KG/M2

## 2019-12-10 DIAGNOSIS — R79.1 SUBTHERAPEUTIC INTERNATIONAL NORMALIZED RATIO (INR): ICD-10-CM

## 2019-12-10 DIAGNOSIS — I95.9 HYPOTENSION, UNSPECIFIED HYPOTENSION TYPE: Primary | ICD-10-CM

## 2019-12-10 LAB
ALBUMIN SERPL-MCNC: 3.4 G/DL (ref 3.5–5.2)
ALP BLD-CCNC: 35 U/L (ref 35–104)
ALT SERPL-CCNC: 15 U/L (ref 5–33)
ANION GAP SERPL CALCULATED.3IONS-SCNC: 11 MMOL/L (ref 7–19)
AST SERPL-CCNC: 30 U/L (ref 5–32)
BASOPHILS ABSOLUTE: 0.1 K/UL (ref 0–0.2)
BASOPHILS RELATIVE PERCENT: 1.3 % (ref 0–1)
BILIRUB SERPL-MCNC: 0.6 MG/DL (ref 0.2–1.2)
BILIRUBIN URINE: NEGATIVE
BLOOD, URINE: NEGATIVE
BUN BLDV-MCNC: 32 MG/DL (ref 8–23)
CALCIUM SERPL-MCNC: 9.7 MG/DL (ref 8.8–10.2)
CHLORIDE BLD-SCNC: 107 MMOL/L (ref 98–111)
CLARITY: CLEAR
CO2: 24 MMOL/L (ref 22–29)
COLOR: YELLOW
CREAT SERPL-MCNC: 1.5 MG/DL (ref 0.5–0.9)
DIGOXIN LEVEL: 1.1 NG/ML (ref 0.6–1.2)
EOSINOPHILS ABSOLUTE: 0.1 K/UL (ref 0–0.6)
EOSINOPHILS RELATIVE PERCENT: 1.8 % (ref 0–5)
GFR NON-AFRICAN AMERICAN: 33
GLUCOSE BLD-MCNC: 90 MG/DL (ref 74–109)
GLUCOSE URINE: NEGATIVE MG/DL
HCT VFR BLD CALC: 40.5 % (ref 37–47)
HEMOGLOBIN: 13 G/DL (ref 12–16)
IMMATURE GRANULOCYTES #: 0 K/UL
INR BLD: 1.58 (ref 0.88–1.18)
KETONES, URINE: NEGATIVE MG/DL
LEUKOCYTE ESTERASE, URINE: NEGATIVE
LYMPHOCYTES ABSOLUTE: 1.4 K/UL (ref 1.1–4.5)
LYMPHOCYTES RELATIVE PERCENT: 25.8 % (ref 20–40)
MCH RBC QN AUTO: 32.9 PG (ref 27–31)
MCHC RBC AUTO-ENTMCNC: 32.1 G/DL (ref 33–37)
MCV RBC AUTO: 102.5 FL (ref 81–99)
MONOCYTES ABSOLUTE: 0.9 K/UL (ref 0–0.9)
MONOCYTES RELATIVE PERCENT: 16.5 % (ref 0–10)
NEUTROPHILS ABSOLUTE: 2.9 K/UL (ref 1.5–7.5)
NEUTROPHILS RELATIVE PERCENT: 53.9 % (ref 50–65)
NITRITE, URINE: NEGATIVE
PDW BLD-RTO: 13.9 % (ref 11.5–14.5)
PH UA: 5.5 (ref 5–8)
PLATELET # BLD: 128 K/UL (ref 130–400)
PMV BLD AUTO: 11.9 FL (ref 9.4–12.3)
POTASSIUM REFLEX MAGNESIUM: 4.8 MMOL/L (ref 3.5–5)
PROTEIN UA: NEGATIVE MG/DL
PROTHROMBIN TIME: 18.2 SEC (ref 12–14.6)
RBC # BLD: 3.95 M/UL (ref 4.2–5.4)
SODIUM BLD-SCNC: 142 MMOL/L (ref 136–145)
SPECIFIC GRAVITY UA: 1.01 (ref 1–1.03)
TOTAL PROTEIN: 6.2 G/DL (ref 6.6–8.7)
URINE REFLEX TO CULTURE: NORMAL
UROBILINOGEN, URINE: 0.2 E.U./DL
WBC # BLD: 5.5 K/UL (ref 4.8–10.8)

## 2019-12-10 PROCEDURE — 80162 ASSAY OF DIGOXIN TOTAL: CPT

## 2019-12-10 PROCEDURE — 80053 COMPREHEN METABOLIC PANEL: CPT

## 2019-12-10 PROCEDURE — 85610 PROTHROMBIN TIME: CPT

## 2019-12-10 PROCEDURE — 81003 URINALYSIS AUTO W/O SCOPE: CPT

## 2019-12-10 PROCEDURE — 36415 COLL VENOUS BLD VENIPUNCTURE: CPT

## 2019-12-10 PROCEDURE — 85025 COMPLETE CBC W/AUTO DIFF WBC: CPT

## 2019-12-10 PROCEDURE — 93005 ELECTROCARDIOGRAM TRACING: CPT | Performed by: NURSE PRACTITIONER

## 2019-12-10 PROCEDURE — 71045 X-RAY EXAM CHEST 1 VIEW: CPT

## 2019-12-10 PROCEDURE — 99285 EMERGENCY DEPT VISIT HI MDM: CPT

## 2019-12-10 RX ORDER — 0.9 % SODIUM CHLORIDE 0.9 %
250 INTRAVENOUS SOLUTION INTRAVENOUS ONCE
Status: DISCONTINUED | OUTPATIENT
Start: 2019-12-10 | End: 2019-12-10

## 2019-12-11 ENCOUNTER — ANTI-COAG VISIT (OUTPATIENT)
Dept: CARDIOLOGY | Age: 84
End: 2019-12-11

## 2019-12-11 LAB
EKG P AXIS: NORMAL DEGREES
EKG P-R INTERVAL: NORMAL MS
EKG Q-T INTERVAL: 380 MS
EKG QRS DURATION: 120 MS
EKG QTC CALCULATION (BAZETT): 409 MS
EKG T AXIS: 5 DEGREES
INR BLD: 1.6

## 2019-12-11 PROCEDURE — 93010 ELECTROCARDIOGRAM REPORT: CPT | Performed by: INTERNAL MEDICINE

## 2019-12-12 ENCOUNTER — OFFICE VISIT (OUTPATIENT)
Dept: PRIMARY CARE CLINIC | Age: 84
End: 2019-12-12
Payer: MEDICARE

## 2019-12-12 VITALS
TEMPERATURE: 97.7 F | HEIGHT: 60 IN | WEIGHT: 109 LBS | SYSTOLIC BLOOD PRESSURE: 122 MMHG | BODY MASS INDEX: 21.4 KG/M2 | DIASTOLIC BLOOD PRESSURE: 82 MMHG | OXYGEN SATURATION: 98 % | HEART RATE: 56 BPM

## 2019-12-12 DIAGNOSIS — I10 ESSENTIAL HYPERTENSION: ICD-10-CM

## 2019-12-12 DIAGNOSIS — N18.30 CHRONIC RENAL FAILURE, STAGE 3 (MODERATE) (HCC): ICD-10-CM

## 2019-12-12 DIAGNOSIS — I48.91 ATRIAL FIBRILLATION, UNSPECIFIED TYPE (HCC): ICD-10-CM

## 2019-12-12 DIAGNOSIS — I50.22 CHRONIC SYSTOLIC CHF (CONGESTIVE HEART FAILURE), NYHA CLASS 3 (HCC): ICD-10-CM

## 2019-12-12 DIAGNOSIS — F41.9 ANXIETY: ICD-10-CM

## 2019-12-12 DIAGNOSIS — R60.9 EDEMA, UNSPECIFIED TYPE: ICD-10-CM

## 2019-12-12 DIAGNOSIS — R00.0 TACHYCARDIA WITH GREATER THAN 160 BEATS PER MINUTE: Primary | ICD-10-CM

## 2019-12-12 DIAGNOSIS — R63.5 WEIGHT GAIN: ICD-10-CM

## 2019-12-12 DIAGNOSIS — Z79.01 CHRONIC ANTICOAGULATION: ICD-10-CM

## 2019-12-12 PROCEDURE — G8427 DOCREV CUR MEDS BY ELIG CLIN: HCPCS | Performed by: NURSE PRACTITIONER

## 2019-12-12 PROCEDURE — 1090F PRES/ABSN URINE INCON ASSESS: CPT | Performed by: NURSE PRACTITIONER

## 2019-12-12 PROCEDURE — 1123F ACP DISCUSS/DSCN MKR DOCD: CPT | Performed by: NURSE PRACTITIONER

## 2019-12-12 PROCEDURE — G8420 CALC BMI NORM PARAMETERS: HCPCS | Performed by: NURSE PRACTITIONER

## 2019-12-12 PROCEDURE — 4040F PNEUMOC VAC/ADMIN/RCVD: CPT | Performed by: NURSE PRACTITIONER

## 2019-12-12 PROCEDURE — 1111F DSCHRG MED/CURRENT MED MERGE: CPT | Performed by: NURSE PRACTITIONER

## 2019-12-12 PROCEDURE — G8482 FLU IMMUNIZE ORDER/ADMIN: HCPCS | Performed by: NURSE PRACTITIONER

## 2019-12-12 PROCEDURE — 99214 OFFICE O/P EST MOD 30 MIN: CPT | Performed by: NURSE PRACTITIONER

## 2019-12-12 PROCEDURE — 1036F TOBACCO NON-USER: CPT | Performed by: NURSE PRACTITIONER

## 2019-12-12 ASSESSMENT — ENCOUNTER SYMPTOMS
NAUSEA: 0
COUGH: 0
SHORTNESS OF BREATH: 0
DIARRHEA: 0
CONSTIPATION: 0
VOMITING: 0
CHEST TIGHTNESS: 0
ABDOMINAL PAIN: 0
WHEEZING: 0

## 2019-12-13 ENCOUNTER — OFFICE VISIT (OUTPATIENT)
Dept: CARDIOLOGY | Age: 84
End: 2019-12-13
Payer: MEDICARE

## 2019-12-13 VITALS
SYSTOLIC BLOOD PRESSURE: 122 MMHG | HEART RATE: 83 BPM | DIASTOLIC BLOOD PRESSURE: 68 MMHG | BODY MASS INDEX: 21.4 KG/M2 | WEIGHT: 109 LBS | HEIGHT: 60 IN

## 2019-12-13 DIAGNOSIS — I10 ESSENTIAL HYPERTENSION: Primary | ICD-10-CM

## 2019-12-13 PROCEDURE — 93000 ELECTROCARDIOGRAM COMPLETE: CPT | Performed by: INTERNAL MEDICINE

## 2019-12-13 PROCEDURE — 1111F DSCHRG MED/CURRENT MED MERGE: CPT | Performed by: INTERNAL MEDICINE

## 2019-12-13 PROCEDURE — 4040F PNEUMOC VAC/ADMIN/RCVD: CPT | Performed by: INTERNAL MEDICINE

## 2019-12-13 PROCEDURE — 99212 OFFICE O/P EST SF 10 MIN: CPT | Performed by: INTERNAL MEDICINE

## 2019-12-13 PROCEDURE — 1123F ACP DISCUSS/DSCN MKR DOCD: CPT | Performed by: INTERNAL MEDICINE

## 2019-12-13 PROCEDURE — G8420 CALC BMI NORM PARAMETERS: HCPCS | Performed by: INTERNAL MEDICINE

## 2019-12-13 PROCEDURE — G8427 DOCREV CUR MEDS BY ELIG CLIN: HCPCS | Performed by: INTERNAL MEDICINE

## 2019-12-13 PROCEDURE — 1036F TOBACCO NON-USER: CPT | Performed by: INTERNAL MEDICINE

## 2019-12-13 PROCEDURE — 1090F PRES/ABSN URINE INCON ASSESS: CPT | Performed by: INTERNAL MEDICINE

## 2019-12-13 PROCEDURE — G8482 FLU IMMUNIZE ORDER/ADMIN: HCPCS | Performed by: INTERNAL MEDICINE

## 2019-12-13 RX ORDER — WARFARIN SODIUM 5 MG/1
5 TABLET ORAL
COMMUNITY
End: 2021-06-28

## 2019-12-20 RX ORDER — DIGOXIN 125 MCG
125 TABLET ORAL EVERY OTHER DAY
Qty: 45 TABLET | Refills: 3 | Status: SHIPPED | OUTPATIENT
Start: 2019-12-20 | End: 2020-05-28 | Stop reason: SDUPTHER

## 2019-12-26 ENCOUNTER — OFFICE VISIT (OUTPATIENT)
Dept: PRIMARY CARE CLINIC | Age: 84
End: 2019-12-26
Payer: MEDICARE

## 2019-12-26 ENCOUNTER — ANTI-COAG VISIT (OUTPATIENT)
Dept: CARDIOLOGY | Age: 84
End: 2019-12-26

## 2019-12-26 VITALS
TEMPERATURE: 98.5 F | HEART RATE: 77 BPM | DIASTOLIC BLOOD PRESSURE: 79 MMHG | SYSTOLIC BLOOD PRESSURE: 116 MMHG | WEIGHT: 108 LBS | BODY MASS INDEX: 21.77 KG/M2 | OXYGEN SATURATION: 97 % | HEIGHT: 59 IN

## 2019-12-26 DIAGNOSIS — I10 ESSENTIAL HYPERTENSION: ICD-10-CM

## 2019-12-26 DIAGNOSIS — G25.0 BENIGN ESSENTIAL TREMOR: ICD-10-CM

## 2019-12-26 DIAGNOSIS — I48.91 ATRIAL FIBRILLATION, UNSPECIFIED TYPE (HCC): ICD-10-CM

## 2019-12-26 DIAGNOSIS — R00.0 TACHYCARDIA WITH GREATER THAN 160 BEATS PER MINUTE: Primary | ICD-10-CM

## 2019-12-26 DIAGNOSIS — I50.22 CHRONIC SYSTOLIC CHF (CONGESTIVE HEART FAILURE), NYHA CLASS 3 (HCC): ICD-10-CM

## 2019-12-26 DIAGNOSIS — N18.30 CHRONIC RENAL FAILURE, STAGE 3 (MODERATE) (HCC): ICD-10-CM

## 2019-12-26 DIAGNOSIS — R60.9 EDEMA, UNSPECIFIED TYPE: ICD-10-CM

## 2019-12-26 DIAGNOSIS — F41.9 ANXIETY: ICD-10-CM

## 2019-12-26 DIAGNOSIS — Z79.01 CHRONIC ANTICOAGULATION: ICD-10-CM

## 2019-12-26 LAB — INR BLD: 1.7

## 2019-12-26 PROCEDURE — G8482 FLU IMMUNIZE ORDER/ADMIN: HCPCS | Performed by: NURSE PRACTITIONER

## 2019-12-26 PROCEDURE — 1090F PRES/ABSN URINE INCON ASSESS: CPT | Performed by: NURSE PRACTITIONER

## 2019-12-26 PROCEDURE — G8427 DOCREV CUR MEDS BY ELIG CLIN: HCPCS | Performed by: NURSE PRACTITIONER

## 2019-12-26 PROCEDURE — 4040F PNEUMOC VAC/ADMIN/RCVD: CPT | Performed by: NURSE PRACTITIONER

## 2019-12-26 PROCEDURE — 1123F ACP DISCUSS/DSCN MKR DOCD: CPT | Performed by: NURSE PRACTITIONER

## 2019-12-26 PROCEDURE — 1111F DSCHRG MED/CURRENT MED MERGE: CPT | Performed by: NURSE PRACTITIONER

## 2019-12-26 PROCEDURE — 1036F TOBACCO NON-USER: CPT | Performed by: NURSE PRACTITIONER

## 2019-12-26 PROCEDURE — 99214 OFFICE O/P EST MOD 30 MIN: CPT | Performed by: NURSE PRACTITIONER

## 2019-12-26 PROCEDURE — G8420 CALC BMI NORM PARAMETERS: HCPCS | Performed by: NURSE PRACTITIONER

## 2019-12-26 ASSESSMENT — ENCOUNTER SYMPTOMS
WHEEZING: 0
CONSTIPATION: 0
DIARRHEA: 0
ABDOMINAL PAIN: 0
CHEST TIGHTNESS: 0
NAUSEA: 0
VOMITING: 0
COUGH: 0
SHORTNESS OF BREATH: 0

## 2019-12-30 DIAGNOSIS — F41.9 ANXIETY: ICD-10-CM

## 2019-12-30 LAB
ALBUMIN SERPL-MCNC: 3.6 G/DL (ref 3.5–5.2)
ALP BLD-CCNC: 41 U/L (ref 35–104)
ALT SERPL-CCNC: 10 U/L (ref 5–33)
ANION GAP SERPL CALCULATED.3IONS-SCNC: 14 MMOL/L (ref 7–19)
AST SERPL-CCNC: 23 U/L (ref 5–32)
BILIRUB SERPL-MCNC: 0.5 MG/DL (ref 0.2–1.2)
BUN BLDV-MCNC: 32 MG/DL (ref 8–23)
CALCIUM SERPL-MCNC: 9.4 MG/DL (ref 8.8–10.2)
CHLORIDE BLD-SCNC: 101 MMOL/L (ref 98–111)
CO2: 26 MMOL/L (ref 22–29)
CREAT SERPL-MCNC: 1.7 MG/DL (ref 0.5–0.9)
GFR NON-AFRICAN AMERICAN: 28
GLUCOSE BLD-MCNC: 101 MG/DL (ref 74–109)
POTASSIUM SERPL-SCNC: 4.5 MMOL/L (ref 3.5–5)
SODIUM BLD-SCNC: 141 MMOL/L (ref 136–145)
TOTAL PROTEIN: 6.7 G/DL (ref 6.6–8.7)

## 2020-01-03 ENCOUNTER — TELEPHONE (OUTPATIENT)
Dept: PRIMARY CARE CLINIC | Age: 85
End: 2020-01-03

## 2020-01-08 PROBLEM — R77.8 ELEVATED TROPONIN: Status: RESOLVED | Noted: 2019-12-09 | Resolved: 2020-01-08

## 2020-01-08 PROBLEM — R79.89 ELEVATED TROPONIN: Status: RESOLVED | Noted: 2019-12-09 | Resolved: 2020-01-08

## 2020-01-14 ENCOUNTER — HOSPITAL ENCOUNTER (OUTPATIENT)
Dept: VASCULAR LAB | Age: 85
Discharge: HOME | End: 2020-01-14
Payer: MEDICARE

## 2020-01-14 ENCOUNTER — OFFICE VISIT (OUTPATIENT)
Dept: VASCULAR SURGERY | Age: 85
End: 2020-01-14
Payer: MEDICARE

## 2020-01-14 VITALS
HEART RATE: 70 BPM | WEIGHT: 106 LBS | TEMPERATURE: 98.3 F | DIASTOLIC BLOOD PRESSURE: 80 MMHG | SYSTOLIC BLOOD PRESSURE: 127 MMHG | HEIGHT: 66 IN | BODY MASS INDEX: 17.04 KG/M2 | RESPIRATION RATE: 20 BRPM

## 2020-01-14 PROCEDURE — G8427 DOCREV CUR MEDS BY ELIG CLIN: HCPCS | Performed by: NURSE PRACTITIONER

## 2020-01-14 PROCEDURE — 1123F ACP DISCUSS/DSCN MKR DOCD: CPT | Performed by: NURSE PRACTITIONER

## 2020-01-14 PROCEDURE — 4040F PNEUMOC VAC/ADMIN/RCVD: CPT | Performed by: NURSE PRACTITIONER

## 2020-01-14 PROCEDURE — 1036F TOBACCO NON-USER: CPT | Performed by: NURSE PRACTITIONER

## 2020-01-14 PROCEDURE — 99212 OFFICE O/P EST SF 10 MIN: CPT | Performed by: NURSE PRACTITIONER

## 2020-01-14 PROCEDURE — G8482 FLU IMMUNIZE ORDER/ADMIN: HCPCS | Performed by: NURSE PRACTITIONER

## 2020-01-14 PROCEDURE — G8419 CALC BMI OUT NRM PARAM NOF/U: HCPCS | Performed by: NURSE PRACTITIONER

## 2020-01-14 PROCEDURE — 93923 UPR/LXTR ART STDY 3+ LVLS: CPT

## 2020-01-14 PROCEDURE — 1090F PRES/ABSN URINE INCON ASSESS: CPT | Performed by: NURSE PRACTITIONER

## 2020-01-14 RX ORDER — DONEPEZIL HYDROCHLORIDE 5 MG/1
5 TABLET, FILM COATED ORAL NIGHTLY
Qty: 30 TABLET | Refills: 1 | Status: SHIPPED | OUTPATIENT
Start: 2020-01-14 | End: 2020-03-15

## 2020-01-14 RX ORDER — ESCITALOPRAM OXALATE 10 MG/1
TABLET ORAL
Qty: 30 TABLET | Refills: 1 | Status: SHIPPED | OUTPATIENT
Start: 2020-01-14 | End: 2020-03-15

## 2020-01-14 RX ORDER — TIOTROPIUM BROMIDE 18 UG/1
CAPSULE ORAL; RESPIRATORY (INHALATION)
Qty: 30 CAPSULE | Refills: 3 | Status: SHIPPED | OUTPATIENT
Start: 2020-01-14 | End: 2020-08-20

## 2020-01-14 NOTE — PROGRESS NOTES
 Other fatigue 12/26/2018    Family discord 12/10/2018    Decline in verbal memory 11/28/2018    Dyspnea 11/28/2018    Allergic rhinitis 11/28/2018    Edema 11/28/2018    Decreased pedal pulses 11/28/2018    Panlobular emphysema ([facility] [address] 11/28/2018    COPD exacerbation ([facility] [address] 11/28/2018    Hypoxemia 11/28/2018    Atrial fibrillation ([facility]) 09/28/2017    Chronic systolic CHF (congestive heart failure), NYHA class 3 ([facility]) 02/17/2017    Chronic kidney disease, stage III (moderate) ([facility]) 02/17/2017     Current Outpatient Medications   Medication Sig Dispense Refill    donepezil (ARICEPT) 5 MG tablet Take 1 tablet by mouth nightly 30 tablet 1    SPIRIVA HANDIHALER 18 MCG inhalation capsule Inhale 1 capsule into the lungs daily Please instruct on use. 30 capsule 3    escitalopram (LEXAPRO) 10 MG tablet TAKE ONE TABLET BY MOUTH EVERY DAY 30 tablet 1    digoxin (LANOXIN) 125 MCG tablet Take 1 tablet by mouth every other day Indications: M, W, F 45 tablet 3    warfarin (COUMADIN) 5 MG tablet Take 5 mg by mouth Take 1/2 tabs all days except on Wednesday take 1 tab.       aspirin 81 MG chewable tablet Take 1 tablet by mouth daily (Patient taking differently: Take 81 mg by mouth every morning ) 30 tablet 0    metoprolol tartrate (LOPRESSOR) 25 MG tablet Take 0.5 tablets by mouth 2 times daily 60 tablet 0    famotidine (PEPCID) 20 MG tablet Take 0.5 tablets by mouth daily (Patient taking differently: Take 10 mg by mouth every morning ) 15 tablet 0    furosemide (LASIX) 20 MG tablet Take 1 tablet by mouth daily (Patient taking differently: Take 20 mg by mouth every morning ) 30 tablet 0    mirtazapine (REMERON) 15 MG tablet Take 1 tablet by mouth nightly 30 tablet 3    isosorbide mononitrate (IMDUR) 30 MG extended release tablet TAKE 1 TABLET BY MOUTH DAILY (Patient taking differently: Take 30 mg by mouth every morning ) 30 tablet 5    alendronate (FOSAMAX) 70 MG tablet Take 1 tablet by mouth every 7 days 4 tablet 11    OXYGEN Inhale 2 L into the lungs continuous      ACETAMINOPHEN-CODEINE #3 PO Take by mouth every 4 hours as needed      ondansetron (ZOFRAN) 4 MG tablet Take 1 tablet by mouth 3 times daily as needed for Nausea or Vomiting 30 tablet 0    fexofenadine (ALLEGRA) 180 MG tablet Take 180 mg by mouth every morning       nitroGLYCERIN (NITROSTAT) 0.4 MG SL tablet Place 1 tablet under the tongue every 5 minutes as needed for Chest pain 25 tablet 3    allopurinol (ZYLOPRIM) 100 MG tablet Take 1 tablet by mouth daily (Patient taking differently: Take 100 mg by mouth every morning ) 30 tablet 1    fluticasone (FLONASE) 50 MCG/ACT nasal spray 1 spray by Each Nare route daily 1 Spray in each nostril (Patient taking differently: 1 spray by Each Nostril route daily as needed 1 Spray in each nostril) 2 Bottle 1    bimatoprost 0.01 % SOLN Place 1 drop into both eyes nightly       atorvastatin (LIPITOR) 10 MG tablet Take 1 tablet by mouth daily (Patient taking differently: Take 10 mg by mouth nightly ) 30 tablet 5    montelukast (SINGULAIR) 10 MG tablet Take 1 tablet by mouth nightly 30 tablet 5    tiotropium (SPIRIVA RESPIMAT) 2.5 MCG/ACT AERS inhaler Inhale 2 puffs into the lungs daily 1 Inhaler 2     No current facility-administered medications for this visit.       Allergies: Denosumab and Lisinopril  Past Medical History:   Diagnosis Date    Acute systolic CHF (congestive heart failure), NYHA class 3 ([facility]) 2/24/2017    Allergic rhinitis     Anticoagulant long-term use     coumadin for atrial fib    Anxiety 11/11/2019    Atrial fibrillation ([facility])     Chronic atrial fibrillation 9/28/2017    Chronic back pain     Chronic kidney disease     Chronic kidney disease, stage III (moderate) ([facility]) 2/17/2017    Chronic systolic CHF (congestive heart failure), NYHA class 3 ([address] 2/17/2017    Current moderate episode of major depressive disorder without prior episode ([facility] [address] 11/14/2019    GERD (gastroesophageal reflux disease)     Glaucoma     Hearing loss     Hypertension     Osteoarthritis     Osteoporosis     Panlobular emphysema ([address] 11/28/2018    Shingles     Sinus problem     Wears glasses      Past Surgical History:   Procedure Laterality Date    BREAST BIOPSY      Left-benign    BREAST BIOPSY  feb 2014    Left    HYSTERECTOMY      OVARY SURGERY      tumor removed     SKIN CANCER EXCISION  01/2020     Family History   Problem Relation Age of Onset    Asthma Mother     Emphysema Mother     Alzheimer's Disease Sister     Heart Disease Brother     Heart Disease Sister     No Known Problems Sister     Alzheimer's Disease Sister      Social History     Tobacco Use    Smoking status: Never Smoker    Smokeless tobacco: Never Used   Substance Use Topics    Alcohol use: No         Review of Systems    Constitutional - no significant activity change, appetite change, or unexpected weight change. No fever or chills. No diaphoresis or significant fatigue. HENT - no significant rhinorrhea or epistaxis. No tinnitus or significant hearing loss. Eyes - no sudden vision change or amaurosis. Respiratory - has significant shortness of breath and is on home oxygen, no wheezing, or stridor. No apnea, cough, or chest tightness associated with shortness of breath. Cardiovascular - no chest pain, syncope, or significant dizziness. No palpitations or significant leg swelling. Patient reports has claudication. Gastrointestinal - no abdominal swelling or pain. No blood in stool. No severe constipation, diarrhea, nausea, or vomiting. Genitourinary - No difficulty urinating, dysuria, frequency, or urgency. No flank pain or hematuria. Musculoskeletal - no back pain, gait disturbance, or myalgia. Skin - no color change, rash, pallor, or new wound. Neurologic - no dizziness, facial asymmetry, or light headedness. No seizures. No speech difficulty or lateralizing weakness.   Hematologic - no easy bruising or excessive bleeding. Psychiatric - no severe anxiety or nervousness. No confusion. All other review of systems are negative. Physical Exam    /80 (Site: Right Upper Arm, Position: Sitting, Cuff Size: Medium Adult)   Pulse 70   Temp 98.3 °F (36.8 °C) (Temporal)   Resp 20   Ht 5' 6\" (1.676 m)   Wt 106 lb (48.1 kg)   BMI 17.11 kg/m²     Constitutional - well developed, well nourished. No diaphoresis or acute distress. HENT - head normocephalic. Right external ear canal appears normal.  Left external ear canal appears normal.  Septum appears midline. Eyes - conjunctiva normal.  EOMS normal.  No exudate. No icterus. Neck- ROM appears normal, no tracheal deviation. Cardiovascular - Regular rate and rhythm. Heart sounds are normal.  No murmur, rub, or gallop. Carotid pulses are 2+ to palpation bilaterally without bruit. Extremities - Radial and brachial pulses are 2+ to palpation bilaterally. Right femoral pulse: present 2+; Right popliteal pulse: absent Right DP: absent; Right PT absent; Left femoral pulse: present 2+; Left popliteal pulse: absent; Left DP: absent; Left PT: absent No cyanosis, clubbing, or significant edema. No signs atheroembolic event. Spider veins both lower extremities. Pulmonary - effort appears normal.  No respiratory distress. Lungs - Breath sounds normal. No wheezes or rales. GI - Abdomen - soft, non tender, bowel sounds X 4 quadrants. No guarding or rebound tenderness. No distension or palpable mass. Genitourinary - deferred. Musculoskeletal - ROM appears normal.  No significant edema. Neurologic - alert and oriented X 3. Physiologic. Skin - warm, dry, and intact. No rash, erythema, or pallor.   Psychiatric - mood, affect, and behavior appear normal.  Judgment and thought processes appear normal.    Risk factors for atherosclerosis of all vascular beds have been reviewed with the patient including:  Family history, tobacco abuse

## 2020-01-14 NOTE — TELEPHONE ENCOUNTER
Bella Vergara called to request a refill on her medication. Last office visit : 12/26/2019   Next office visit : 1/21/2020     Requested Prescriptions     Pending Prescriptions Disp Refills    famotidine (PEPCID) 20 MG tablet [Pharmacy Med Name: FAMOTIDINE 20 MG TABS 20 TAB] 60 tablet 5     Sig: TAKE ONE TABLET BY MOUTH TWO TIMES A DAY     Signed Prescriptions Disp Refills    donepezil (ARICEPT) 5 MG tablet 30 tablet 1     Sig: Take 1 tablet by mouth nightly     Authorizing Provider: Tish Villela     Ordering User: Ernie Gutierrez    SPIRIVA HANDIHALER 18 MCG inhalation capsule 30 capsule 3     Sig: Inhale 1 capsule into the lungs daily Please instruct on use.      Authorizing Provider: Tish Villela     Ordering User: Kemal Lopez escitalopram (LEXAPRO) 10 MG tablet 30 tablet 1     Sig: TAKE ONE TABLET BY MOUTH EVERY DAY     Authorizing Provider: Tish Villela     Ordering User: Román Esquivel

## 2020-01-17 ENCOUNTER — ANTI-COAG VISIT (OUTPATIENT)
Dept: CARDIOLOGY | Age: 85
End: 2020-01-17

## 2020-01-17 LAB — INR BLD: 2.1

## 2020-01-17 RX ORDER — FAMOTIDINE 20 MG/1
10 TABLET, FILM COATED ORAL EVERY MORNING
Qty: 30 TABLET | Refills: 5 | Status: SHIPPED | OUTPATIENT
Start: 2020-01-17 | End: 2020-06-11 | Stop reason: SDUPTHER

## 2020-01-21 ENCOUNTER — OFFICE VISIT (OUTPATIENT)
Dept: PRIMARY CARE CLINIC | Age: 85
End: 2020-01-21
Payer: MEDICARE

## 2020-01-21 VITALS
TEMPERATURE: 97.5 F | OXYGEN SATURATION: 97 % | HEART RATE: 100 BPM | DIASTOLIC BLOOD PRESSURE: 67 MMHG | HEIGHT: 59 IN | BODY MASS INDEX: 23.18 KG/M2 | WEIGHT: 115 LBS | SYSTOLIC BLOOD PRESSURE: 127 MMHG

## 2020-01-21 LAB
ALBUMIN SERPL-MCNC: 3.8 G/DL (ref 3.5–5.2)
ALP BLD-CCNC: 52 U/L (ref 35–104)
ALT SERPL-CCNC: 15 U/L (ref 5–33)
ANION GAP SERPL CALCULATED.3IONS-SCNC: 11 MMOL/L (ref 7–19)
AST SERPL-CCNC: 27 U/L (ref 5–32)
BASOPHILS ABSOLUTE: 0.1 K/UL (ref 0–0.2)
BASOPHILS RELATIVE PERCENT: 1.3 % (ref 0–1)
BILIRUB SERPL-MCNC: 0.4 MG/DL (ref 0.2–1.2)
BILIRUBIN URINE: NEGATIVE
BLOOD, URINE: NEGATIVE
BUN BLDV-MCNC: 25 MG/DL (ref 8–23)
CALCIUM SERPL-MCNC: 9.5 MG/DL (ref 8.8–10.2)
CHLORIDE BLD-SCNC: 106 MMOL/L (ref 98–111)
CLARITY: CLEAR
CO2: 26 MMOL/L (ref 22–29)
COLOR: YELLOW
CREAT SERPL-MCNC: 1.3 MG/DL (ref 0.5–0.9)
CREATININE URINE: 64.8 MG/DL (ref 4.2–622)
EOSINOPHILS ABSOLUTE: 0.2 K/UL (ref 0–0.6)
EOSINOPHILS RELATIVE PERCENT: 5.1 % (ref 0–5)
GFR NON-AFRICAN AMERICAN: 39
GLUCOSE BLD-MCNC: 91 MG/DL (ref 74–109)
GLUCOSE URINE: NEGATIVE MG/DL
HCT VFR BLD CALC: 37.3 % (ref 37–47)
HEMOGLOBIN: 11.2 G/DL (ref 12–16)
IMMATURE GRANULOCYTES #: 0 K/UL
KETONES, URINE: NEGATIVE MG/DL
LEUKOCYTE ESTERASE, URINE: NEGATIVE
LYMPHOCYTES ABSOLUTE: 1.5 K/UL (ref 1.1–4.5)
LYMPHOCYTES RELATIVE PERCENT: 33.6 % (ref 20–40)
MAGNESIUM: 2.1 MG/DL (ref 1.6–2.4)
MCH RBC QN AUTO: 32.2 PG (ref 27–31)
MCHC RBC AUTO-ENTMCNC: 30 G/DL (ref 33–37)
MCV RBC AUTO: 107.2 FL (ref 81–99)
MONOCYTES ABSOLUTE: 0.6 K/UL (ref 0–0.9)
MONOCYTES RELATIVE PERCENT: 13.9 % (ref 0–10)
NEUTROPHILS ABSOLUTE: 2.1 K/UL (ref 1.5–7.5)
NEUTROPHILS RELATIVE PERCENT: 45.9 % (ref 50–65)
NITRITE, URINE: NEGATIVE
PARATHYROID HORMONE INTACT: 129.4 PG/ML (ref 15–65)
PDW BLD-RTO: 13.6 % (ref 11.5–14.5)
PH UA: 5.5 (ref 5–8)
PHOSPHORUS: 3.8 MG/DL (ref 2.5–4.5)
PLATELET # BLD: 167 K/UL (ref 130–400)
PMV BLD AUTO: 11.4 FL (ref 9.4–12.3)
POTASSIUM SERPL-SCNC: 4.7 MMOL/L (ref 3.5–5)
PROTEIN PROTEIN: 26 MG/DL (ref 15–45)
PROTEIN UA: ABNORMAL MG/DL
RBC # BLD: 3.48 M/UL (ref 4.2–5.4)
SODIUM BLD-SCNC: 143 MMOL/L (ref 136–145)
SPECIFIC GRAVITY UA: 1.01 (ref 1–1.03)
TOTAL PROTEIN: 6.8 G/DL (ref 6.6–8.7)
URIC ACID, SERUM: 5.4 MG/DL (ref 2.4–5.7)
UROBILINOGEN, URINE: 0.2 E.U./DL
VITAMIN D 25-HYDROXY: 38.9 NG/ML
WBC # BLD: 4.5 K/UL (ref 4.8–10.8)

## 2020-01-21 PROCEDURE — G8427 DOCREV CUR MEDS BY ELIG CLIN: HCPCS | Performed by: NURSE PRACTITIONER

## 2020-01-21 PROCEDURE — 3023F SPIROM DOC REV: CPT | Performed by: NURSE PRACTITIONER

## 2020-01-21 PROCEDURE — 4040F PNEUMOC VAC/ADMIN/RCVD: CPT | Performed by: NURSE PRACTITIONER

## 2020-01-21 PROCEDURE — 1090F PRES/ABSN URINE INCON ASSESS: CPT | Performed by: NURSE PRACTITIONER

## 2020-01-21 PROCEDURE — 1123F ACP DISCUSS/DSCN MKR DOCD: CPT | Performed by: NURSE PRACTITIONER

## 2020-01-21 PROCEDURE — G8482 FLU IMMUNIZE ORDER/ADMIN: HCPCS | Performed by: NURSE PRACTITIONER

## 2020-01-21 PROCEDURE — G8926 SPIRO NO PERF OR DOC: HCPCS | Performed by: NURSE PRACTITIONER

## 2020-01-21 PROCEDURE — 99214 OFFICE O/P EST MOD 30 MIN: CPT | Performed by: NURSE PRACTITIONER

## 2020-01-21 PROCEDURE — 1036F TOBACCO NON-USER: CPT | Performed by: NURSE PRACTITIONER

## 2020-01-21 PROCEDURE — G8420 CALC BMI NORM PARAMETERS: HCPCS | Performed by: NURSE PRACTITIONER

## 2020-01-21 RX ORDER — FUROSEMIDE 20 MG/1
20 TABLET ORAL DAILY
Qty: 30 TABLET | Refills: 2 | Status: SHIPPED | OUTPATIENT
Start: 2020-01-21 | End: 2020-11-03

## 2020-01-21 RX ORDER — MONTELUKAST SODIUM 10 MG/1
10 TABLET ORAL NIGHTLY
Qty: 30 TABLET | Refills: 5 | Status: SHIPPED | OUTPATIENT
Start: 2020-01-21 | End: 2020-07-21

## 2020-01-21 ASSESSMENT — ENCOUNTER SYMPTOMS
VOMITING: 0
CONSTIPATION: 0
ABDOMINAL PAIN: 0
WHEEZING: 0
COUGH: 0
CHEST TIGHTNESS: 0
DIARRHEA: 0
SHORTNESS OF BREATH: 0
NAUSEA: 0

## 2020-01-21 NOTE — PROGRESS NOTES
[facility]  [facility]  [id number]  [id number]  Dept: [phone]  Dept Fax: [phone]  Loc: [phone]        Wendelyn Epley is a 80 y.o. female who presents today for her medical conditions/ complaints as noted below. Wendelyn Epley is c/o 1 Month Follow-Up (afib, chronic anticoag, chronic renal, chf, tachy, anxiety, edema, tremor); Medication Check; and Hypertension        Chief Complaint   Patient presents with    1 Month Follow-Up     afib, chronic anticoag, chronic renal, chf, tachy, anxiety, edema, tremor    Medication Check    Hypertension       HPI:     Hypertension   Associated symptoms include palpitations. Pertinent negatives include no chest pain or shortness of breath. hypotension episodes. 1/21/2020: lasix dosage cut in 1/2 to be taken every other day due to elevation in creat, decline in GFR. Pt weight has increased from 106 at previous appointment to 115 today. Pt has not had increased sob but has had increased edema to lower extremities that extends up the leg to calf/ bilateral knee areas. Vitals 1/21/2020 1/14/2020 8/61/8164   SYSTOLIC 726 202 005   DIASTOLIC 67 80 87   Site  Right Upper Arm Left Upper Arm   Position  Sitting Sitting   Cuff Size  Medium Adult Medium Adult   Pulse 100  70   Temp 97.5  98.3   Resp   20   SpO2 97  (No Data)   Weight 115 lb  106 lb   Height 4' 11\"  5' 6\"   BMI (wt*703/ht~2) 23.22 kg/m2  17.11 kg/m2       12/26/19 Pt has followed up with Dr. Ellen Alex per family. They states that no changes were made to the pt's medications. They states that overall she has been doing well and hasn't had anymore episodes. Reviewed Dr. Ellen Alex office notes. Assessment/plan:  1. Chronic atrial fibrillation - Coumadin maintenance with acceptable ventricular response rate. 2.  Fluid gain - doubtless multifactorial including  her chronic renal disease.   No JVD and some problems with orthostasis discourages wheezing. Cardiovascular: Positive for palpitations and leg swelling. Negative for chest pain. Hypotension   Gastrointestinal: Negative for abdominal pain, constipation, diarrhea, nausea and vomiting. Genitourinary: Negative for difficulty urinating, dysuria and frequency. Neurological: Positive for tremors. Psychiatric/Behavioral: Positive for confusion. Negative for dysphoric mood and sleep disturbance. The patient is nervous/anxious. Objective:     Physical Exam  Vitals signs and nursing note reviewed. Constitutional:       General: She is not in acute distress. Appearance: She is well-developed. She is not diaphoretic. HENT:      Head: Normocephalic and atraumatic. Right Ear: External ear normal. Decreased hearing noted. Left Ear: External ear normal. Decreased hearing noted. Ears:        Nose: Nose normal.      Mouth/Throat:      Pharynx: No oropharyngeal exudate or posterior oropharyngeal erythema. Eyes:      General:         Right eye: No discharge. Left eye: No discharge. Conjunctiva/sclera: Conjunctivae normal.      Pupils: Pupils are equal, round, and reactive to light. Neck:      Musculoskeletal: Normal range of motion and neck supple. Cardiovascular:      Rate and Rhythm: Normal rate. Rhythm irregular. Heart sounds: Normal heart sounds. No murmur. Pulmonary:      Effort: Pulmonary effort is normal. No respiratory distress. Breath sounds: Normal breath sounds. No stridor. No wheezing or rales. Chest:      Chest wall: No tenderness. Breasts:         Right: No inverted nipple, mass, nipple discharge, skin change or tenderness. Left: No inverted nipple, mass, nipple discharge, skin change or tenderness. Abdominal:      General: Bowel sounds are normal. There is no distension. Palpations: Abdomen is soft. Tenderness: There is no tenderness. Musculoskeletal:         General: No tenderness or deformity. exercise. Pt/family agreed with treatment plan. Follow up as directed and sooner if needed. Patient/ family instructed that is symptoms worsen or persist they are to contact office or report to nearest ER. They voice understanding and agreement with this plan.   signed by NONI Cifuentes on 1/21/2020 at 2:23 PM    This dictation was generated by voice recognition computer software. Although all attempts are made to edit the dictation for accuracy, there may be errors in the transcription that are not intended.

## 2020-01-22 ENCOUNTER — TELEPHONE (OUTPATIENT)
Dept: PRIMARY CARE CLINIC | Age: 85
End: 2020-01-22

## 2020-01-30 LAB
ANION GAP SERPL CALCULATED.3IONS-SCNC: 14 MMOL/L (ref 7–19)
BUN BLDV-MCNC: 30 MG/DL (ref 8–23)
CALCIUM SERPL-MCNC: 9.5 MG/DL (ref 8.8–10.2)
CHLORIDE BLD-SCNC: 104 MMOL/L (ref 98–111)
CO2: 26 MMOL/L (ref 22–29)
CREAT SERPL-MCNC: 1.6 MG/DL (ref 0.5–0.9)
GFR NON-AFRICAN AMERICAN: 31
GLUCOSE BLD-MCNC: 92 MG/DL (ref 74–109)
POTASSIUM SERPL-SCNC: 4.6 MMOL/L (ref 3.5–5)
SODIUM BLD-SCNC: 144 MMOL/L (ref 136–145)

## 2020-02-03 ENCOUNTER — ANTI-COAG VISIT (OUTPATIENT)
Dept: CARDIOLOGY | Age: 85
End: 2020-02-03

## 2020-02-03 LAB — INR BLD: 1.6

## 2020-02-11 ENCOUNTER — OFFICE VISIT (OUTPATIENT)
Dept: PRIMARY CARE CLINIC | Age: 85
End: 2020-02-11
Payer: MEDICARE

## 2020-02-11 ENCOUNTER — HOSPITAL ENCOUNTER (OUTPATIENT)
Dept: GENERAL RADIOLOGY | Age: 85
Discharge: HOME | End: 2020-02-11
Payer: MEDICARE

## 2020-02-11 VITALS
WEIGHT: 109 LBS | OXYGEN SATURATION: 92 % | HEIGHT: 59 IN | TEMPERATURE: 98.6 F | DIASTOLIC BLOOD PRESSURE: 68 MMHG | HEART RATE: 72 BPM | SYSTOLIC BLOOD PRESSURE: 104 MMHG | BODY MASS INDEX: 21.97 KG/M2

## 2020-02-11 PROCEDURE — 71045 X-RAY EXAM CHEST 1 VIEW: CPT

## 2020-02-11 PROCEDURE — G8427 DOCREV CUR MEDS BY ELIG CLIN: HCPCS | Performed by: NURSE PRACTITIONER

## 2020-02-11 PROCEDURE — 1123F ACP DISCUSS/DSCN MKR DOCD: CPT | Performed by: NURSE PRACTITIONER

## 2020-02-11 PROCEDURE — 4040F PNEUMOC VAC/ADMIN/RCVD: CPT | Performed by: NURSE PRACTITIONER

## 2020-02-11 PROCEDURE — 1090F PRES/ABSN URINE INCON ASSESS: CPT | Performed by: NURSE PRACTITIONER

## 2020-02-11 PROCEDURE — G8420 CALC BMI NORM PARAMETERS: HCPCS | Performed by: NURSE PRACTITIONER

## 2020-02-11 PROCEDURE — G8482 FLU IMMUNIZE ORDER/ADMIN: HCPCS | Performed by: NURSE PRACTITIONER

## 2020-02-11 PROCEDURE — 1036F TOBACCO NON-USER: CPT | Performed by: NURSE PRACTITIONER

## 2020-02-11 PROCEDURE — 96372 THER/PROPH/DIAG INJ SC/IM: CPT | Performed by: NURSE PRACTITIONER

## 2020-02-11 PROCEDURE — 99214 OFFICE O/P EST MOD 30 MIN: CPT | Performed by: NURSE PRACTITIONER

## 2020-02-11 RX ORDER — CEFTRIAXONE 1 G/1
1 INJECTION, POWDER, FOR SOLUTION INTRAMUSCULAR; INTRAVENOUS ONCE
Status: COMPLETED | OUTPATIENT
Start: 2020-02-11 | End: 2020-02-11

## 2020-02-11 RX ORDER — DEXAMETHASONE SODIUM PHOSPHATE 4 MG/ML
4 INJECTION, SOLUTION INTRA-ARTICULAR; INTRALESIONAL; INTRAMUSCULAR; INTRAVENOUS; SOFT TISSUE ONCE
Status: COMPLETED | OUTPATIENT
Start: 2020-02-11 | End: 2020-02-11

## 2020-02-11 RX ORDER — METHYLPREDNISOLONE ACETATE 40 MG/ML
40 INJECTION, SUSPENSION INTRA-ARTICULAR; INTRALESIONAL; INTRAMUSCULAR; SOFT TISSUE ONCE
Status: COMPLETED | OUTPATIENT
Start: 2020-02-11 | End: 2020-02-11

## 2020-02-11 RX ADMIN — CEFTRIAXONE 1 G: 1 INJECTION, POWDER, FOR SOLUTION INTRAMUSCULAR; INTRAVENOUS at 18:05

## 2020-02-11 RX ADMIN — METHYLPREDNISOLONE ACETATE 40 MG: 40 INJECTION, SUSPENSION INTRA-ARTICULAR; INTRALESIONAL; INTRAMUSCULAR; SOFT TISSUE at 18:06

## 2020-02-11 RX ADMIN — DEXAMETHASONE SODIUM PHOSPHATE 4 MG: 4 INJECTION, SOLUTION INTRA-ARTICULAR; INTRALESIONAL; INTRAMUSCULAR; INTRAVENOUS; SOFT TISSUE at 18:07

## 2020-02-11 ASSESSMENT — ENCOUNTER SYMPTOMS
COUGH: 1
SHORTNESS OF BREATH: 0
CHEST TIGHTNESS: 0
ABDOMINAL PAIN: 0
VOMITING: 0
WHEEZING: 0
CONSTIPATION: 0
NAUSEA: 0

## 2020-02-11 NOTE — PROGRESS NOTES
[facility]  [facility]  [id number]  [address] [id number]  Dept: [phone]  Dept Fax: [phone]  Loc: [phone]        Les Berry is a 80 y.o. female who presents today for her medical conditions/ complaints as noted below. Les Berry is c/o Fever and Hypotension        Chief Complaint   Patient presents with    Fever    Hypotension       HPI:     Hypertension   Associated symptoms include palpitations. Pertinent negatives include no chest pain or shortness of breath. Fever    Associated symptoms include coughing. Pertinent negatives include no abdominal pain, chest pain, diarrhea, nausea, urinary pain, vomiting or wheezing. Fever, cough, decreased urine output and questionable dehydration. Family with pt and concerned to due to her sudden weight loss and symptoms. hypotension episodes. 1/21/2020: lasix dosage cut in 1/2 to be taken every other day due to elevation in creat, decline in GFR. Pt weight has increased from 106 at previous appointment to 115 today. Pt has not had increased sob but has had increased edema to lower extremities that extends up the leg to calf/ bilateral knee areas. Vitals 1/21/2020 1/14/2020 0/23/0526   SYSTOLIC 006 330 290   DIASTOLIC 67 80 87   Site  Right Upper Arm Left Upper Arm   Position  Sitting Sitting   Cuff Size  Medium Adult Medium Adult   Pulse 100  70   Temp 97.5  98.3   Resp   20   SpO2 97  (No Data)   Weight 115 lb  106 lb   Height 4' 11\"  5' 6\"   BMI (wt*703/ht~2) 23.22 kg/m2  17.11 kg/m2       12/26/19 Pt has followed up with Dr. Vani Mccain per family. They states that no changes were made to the pt's medications. They states that overall she has been doing well and hasn't had anymore episodes. Reviewed Dr. Vani Mccain office notes. Assessment/plan:  1. Chronic atrial fibrillation - Coumadin maintenance with acceptable ventricular response rate.   2.  Fluid gain - doubtless multifactorial and episodes of hypotension. I do not feel comfortable making blood pressure/ heart rate Rx adjustments out pt for pt is medically fragile and could require admission. Patient reports that they have been compliant with taking medications as directed. Past Medical History:   Diagnosis Date    Acute systolic CHF [facility]congestive heart failure), [facility] 2/24/2017    Allergic rhinitis     Anticoagulant long-term use     coumadin for atrial fib    Anxiety 11/11/2019    Atrial fibrillation ([facility]     Chronic atrial fibrillation 9/28/2017    Chronic back pain     Chronic kidney disease     Chronic kidney disease, stage III (moderate) ([facility] 2/17/2017    Chronic systolic CHF (congestive heart failure), NYHA class 3 ([facility] 2/17/2017    Current moderate episode of major depressive disorder without prior episode ([facility] 11/14/2019    GERD (gastroesophageal reflux disease)     Glaucoma     Hearing loss     Hypertension     Osteoarthritis     Osteoporosis     Panlobular emphysema ([facility] [address] 11/28/2018    Shingles     Sinus problem     Wears glasses        Past Surgical History:   Procedure Laterality Date    BREAST BIOPSY      Left-benign    BREAST BIOPSY  feb 2014    Left    HYSTERECTOMY      OVARY SURGERY      tumor removed     SKIN CANCER EXCISION  01/2020       Social History     Socioeconomic History    Marital status:       Spouse name: None    Number of children: None    Years of education: None    Highest education level: None   Occupational History    None   Social Needs    Financial resource strain: None    Food insecurity:     Worry: None     Inability: None    Transportation needs:     Medical: None     Non-medical: None   Tobacco Use    Smoking status: Never Smoker    Smokeless tobacco: Never Used   Substance and Sexual Activity    Alcohol use: No    Drug use: No    Sexual activity: Never   Lifestyle    Physical activity:     Days per week: None     Minutes per session: None    Stress: None   Relationships    Social connections:     Talks on phone: None     Gets together: None     Attends Religion service: None     Active member of club or organization: None     Attends meetings of clubs or organizations: None     Relationship status: None    Intimate partner violence:     Fear of current or ex partner: None     Emotionally abused: None     Physically abused: None     Forced sexual activity: None   Other Topics Concern    None   Social History Narrative    None       Family History   Problem Relation Age of Onset    Asthma Mother     Emphysema Mother     Alzheimer's Disease Sister     Heart Disease Brother     Heart Disease Sister     No Known Problems Sister     Alzheimer's Disease Sister        Current Outpatient Medications   Medication Sig Dispense Refill    montelukast (SINGULAIR) 10 MG tablet Take 1 tablet by mouth nightly 30 tablet 5    furosemide (LASIX) 20 MG tablet Take 1 tablet by mouth daily 30 tablet 2    diclofenac sodium 1 % GEL Apply 4 g topically 4 times daily (Patient taking differently: Apply 4 g topically 4 times daily as needed for Pain ) 2 Tube 0    famotidine (PEPCID) 20 MG tablet Take 0.5 tablets by mouth every morning 30 tablet 5    donepezil (ARICEPT) 5 MG tablet Take 1 tablet by mouth nightly 30 tablet 1    escitalopram (LEXAPRO) 10 MG tablet TAKE ONE TABLET BY MOUTH EVERY DAY (Patient taking differently: Take 10 mg by mouth nightly ) 30 tablet 1    digoxin (LANOXIN) 125 MCG tablet Take 1 tablet by mouth every other day Indications: M, W, F 45 tablet 3    warfarin (COUMADIN) 5 MG tablet Take 5 mg by mouth Take 1/2 tabs all days except on Wednesday take 1 tab.       aspirin 81 MG chewable tablet Take 1 tablet by mouth daily (Patient taking differently: Take 81 mg by mouth every morning ) 30 tablet 0    atorvastatin (LIPITOR) 10 MG tablet Take 1 tablet by mouth daily (Patient taking differently: Take 10 mg by mouth nightly ) 30 tablet 5    mirtazapine (REMERON) 15 MG tablet Take 1 tablet by mouth nightly 30 tablet 3    isosorbide mononitrate (IMDUR) 30 MG extended release tablet TAKE 1 TABLET BY MOUTH DAILY (Patient taking differently: Take 30 mg by mouth every morning ) 30 tablet 5    alendronate (FOSAMAX) 70 MG tablet Take 1 tablet by mouth every 7 days (Patient taking differently: Take 70 mg by mouth every 7 days sundays) 4 tablet 11    OXYGEN Inhale 2 L into the lungs continuous      ACETAMINOPHEN-CODEINE #3 PO Take by mouth every 4 hours as needed      ondansetron (ZOFRAN) 4 MG tablet Take 1 tablet by mouth 3 times daily as needed for Nausea or Vomiting 30 tablet 0    tiotropium (SPIRIVA RESPIMAT) 2.5 MCG/ACT AERS inhaler Inhale 2 puffs into the lungs daily 1 Inhaler 2    fexofenadine (ALLEGRA) 180 MG tablet Take 180 mg by mouth every morning       nitroGLYCERIN (NITROSTAT) 0.4 MG SL tablet Place 1 tablet under the tongue every 5 minutes as needed for Chest pain 25 tablet 3    allopurinol (ZYLOPRIM) 100 MG tablet Take 1 tablet by mouth daily (Patient taking differently: Take 100 mg by mouth every morning ) 30 tablet 1    fluticasone (FLONASE) 50 MCG/ACT nasal spray 1 spray by Each Nare route daily 1 Spray in each nostril (Patient taking differently: 1 spray by Each Nostril route daily as needed 1 Spray in each nostril) 2 Bottle 1    bimatoprost 0.01 % SOLN Place 1 drop into both eyes nightly       metoprolol tartrate (LOPRESSOR) 25 MG tablet Take 1 tablet by mouth 2 times daily 60 tablet 1    doxycycline hyclate (VIBRA-TABS) 100 MG tablet Take 1 tablet by mouth 2 times daily for 10 days 20 tablet 0    SPIRIVA HANDIHALER 18 MCG inhalation capsule Inhale 1 capsule into the lungs daily Please instruct on use. 30 capsule 3     No current facility-administered medications for this visit.         Allergies   Allergen Reactions    Denosumab Other (See Comments)     after had shot got blood in urine--not sure if associated  Lisinopril Other (See Comments)     cough         Lab Review not applicable  notapplicable    Subjective:   Review of Systems   Unable to perform ROS: Dementia   Constitutional: Positive for fever. Respiratory: Positive for cough. Negative for chest tightness, shortness of breath and wheezing. Cardiovascular: Positive for palpitations and leg swelling. Negative for chest pain. Hypotension   Gastrointestinal: Negative for abdominal pain, constipation, diarrhea, nausea and vomiting. Genitourinary: Negative for difficulty urinating, dysuria and frequency. Neurological: Positive for tremors. Psychiatric/Behavioral: Positive for confusion. Negative for dysphoric mood and sleep disturbance. The patient is not nervous/anxious. Objective:     Physical Exam  Vitals signs and nursing note reviewed. Constitutional:       General: She is not in acute distress. Appearance: She is well-developed. She is not diaphoretic. HENT:      Head: Normocephalic and atraumatic. Right Ear: External ear normal. Decreased hearing noted. Left Ear: External ear normal. Decreased hearing noted. Ears:        Nose: Nose normal.      Mouth/Throat: dry mucous membraines     Pharynx: No oropharyngeal exudate or posterior oropharyngeal erythema. Eyes:      General:         Right eye: No discharge. Left eye: No discharge. Conjunctiva/sclera: Conjunctivae normal.      Pupils: Pupils are equal, round, and reactive to light. Neck:      Musculoskeletal: Normal range of motion and neck supple. Cardiovascular:      Rate and Rhythm: Normal rate. Rhythm irregular. Heart sounds: Normal heart sounds. No murmur. Pulmonary:      Effort: Pulmonary effort is normal. No respiratory distress. Breath sounds: Normal breath sounds. No stridor. No wheezing or rales. Chest:      Chest wall: No tenderness.       Breasts:         Right: No inverted nipple, mass, nipple discharge, skin change or tenderness. Left: No inverted nipple, mass, nipple discharge, skin change or tenderness. Abdominal:      General: Bowel sounds are normal. There is no distension. Palpations: Abdomen is soft. Tenderness: There is no tenderness. Musculoskeletal:         General: No tenderness or deformity. Right lower le+ Edema present. Left lower le+ Edema present. Comments: Marked kyphosis   Skin:     General: Skin is warm and dry. Findings: No erythema or rash. Neurological:      Mental Status: She is alert. Cranial Nerves: No cranial nerve deficit. Motor: Weakness and tremor present. Gait: Gait abnormal.      Deep Tendon Reflexes: Reflexes are normal and symmetric. Comments: Oriented to person, year, and month but not place. Psychiatric:         Behavior: Behavior normal.         Thought Content: Thought content normal.       /68   Pulse 72   Temp 98.6 °F (37 °C)   Ht 4' 11\" (1.499 m)   Wt 109 lb (49.4 kg)   SpO2 92%   BMI 22.02 kg/m²     Assessment:      Diagnosis Orders   1. Fever, unspecified fever cause  cefTRIAXone (ROCEPHIN) injection 1 g   2. Upper respiratory tract infection, unspecified type  methylPREDNISolone acetate (DEPO-MEDROL) injection 40 mg    dexamethasone (DECADRON) injection 4 mg    cefTRIAXone (ROCEPHIN) injection 1 g   3. Dehydration     4. Decreased urine output       No results found for this visit on 20. Plan:     1. Fever, unspecified fever cause    2. Upper respiratory tract infection, unspecified type    3. Dehydration    4. Decreased urine output      Discussion:  Over 50% of the total visit time of 30 minutes was spent on counseling and or coordination of care of fever, URI, dehydration and decreased urine output. Due to pt's symptoms it is likely that she is dehydrated from decreased fluid intake.  Pt is unable to provide urine sample today in office, several attempts were made to obtain this mg    dexamethasone (DECADRON) injection 4 mg    cefTRIAXone (ROCEPHIN) injection 1 g       There are no Patient Instructions on file for this visit.    /family given educational materials - see patient instructions. Discussed use, benefit, and side effects of prescribed medications. All patient/family questions answered and voiced understanding. Instructed to continue current medications, diet and exercise. Pt/family agreed with treatment plan. Follow up as directed and sooner if needed. Patient/ family instructed that is symptoms worsen or persist they are to contact office or report to nearest ER. They voice understanding and agreement with this plan.   signed by NONI Walker on 2/18/2020 at 2:23 PM    This dictation was generated by voice recognition computer software. Although all attempts are made to edit the dictation for accuracy, there may be errors in the transcription that are not intended.

## 2020-02-12 ENCOUNTER — HOSPITAL ENCOUNTER (EMERGENCY)
Age: 85
Discharge: HOME | End: 2020-02-12
Attending: EMERGENCY MEDICINE
Payer: MEDICARE

## 2020-02-12 ENCOUNTER — APPOINTMENT (OUTPATIENT)
Dept: GENERAL RADIOLOGY | Age: 85
End: 2020-02-12
Payer: MEDICARE

## 2020-02-12 VITALS
HEART RATE: 83 BPM | WEIGHT: 108 LBS | DIASTOLIC BLOOD PRESSURE: 54 MMHG | RESPIRATION RATE: 2 BRPM | OXYGEN SATURATION: 95 % | HEIGHT: 64 IN | BODY MASS INDEX: 18.44 KG/M2 | SYSTOLIC BLOOD PRESSURE: 107 MMHG | TEMPERATURE: 98.2 F

## 2020-02-12 LAB
ALBUMIN SERPL-MCNC: 3.4 G/DL (ref 3.5–5.2)
ALP BLD-CCNC: 38 U/L (ref 35–104)
ALT SERPL-CCNC: 6 U/L (ref 5–33)
ANION GAP SERPL CALCULATED.3IONS-SCNC: 14 MMOL/L (ref 7–19)
APTT: 64.6 SEC (ref 26–36.2)
AST SERPL-CCNC: 19 U/L (ref 5–32)
BACTERIA: NEGATIVE /HPF
BASOPHILS ABSOLUTE: 0 K/UL (ref 0–0.2)
BASOPHILS RELATIVE PERCENT: 0.1 % (ref 0–1)
BILIRUB SERPL-MCNC: <0.2 MG/DL (ref 0.2–1.2)
BILIRUBIN URINE: NEGATIVE
BLOOD, URINE: NEGATIVE
BUN BLDV-MCNC: 43 MG/DL (ref 8–23)
CALCIUM SERPL-MCNC: 9.2 MG/DL (ref 8.8–10.2)
CHLORIDE BLD-SCNC: 102 MMOL/L (ref 98–111)
CLARITY: ABNORMAL
CO2: 24 MMOL/L (ref 22–29)
COLOR: YELLOW
CREAT SERPL-MCNC: 1.9 MG/DL (ref 0.5–0.9)
EOSINOPHILS ABSOLUTE: 0 K/UL (ref 0–0.6)
EOSINOPHILS RELATIVE PERCENT: 0 % (ref 0–5)
EPITHELIAL CELLS, UA: 2 /HPF (ref 0–5)
GFR NON-AFRICAN AMERICAN: 25
GLUCOSE BLD-MCNC: 122 MG/DL (ref 74–109)
GLUCOSE URINE: NEGATIVE MG/DL
HCT VFR BLD CALC: 36.8 % (ref 37–47)
HEMOGLOBIN: 11.5 G/DL (ref 12–16)
HYALINE CASTS: 12 /HPF (ref 0–8)
IMMATURE GRANULOCYTES #: 0 K/UL
INR BLD: 2.6 (ref 0.88–1.18)
KETONES, URINE: NEGATIVE MG/DL
LACTIC ACID: 1.6 MMOL/L (ref 0.5–1.9)
LACTIC ACID: 2.2 MMOL/L (ref 0.5–1.9)
LEUKOCYTE ESTERASE, URINE: NEGATIVE
LYMPHOCYTES ABSOLUTE: 0.7 K/UL (ref 1.1–4.5)
LYMPHOCYTES RELATIVE PERCENT: 9.6 % (ref 20–40)
MCH RBC QN AUTO: 31.1 PG (ref 27–31)
MCHC RBC AUTO-ENTMCNC: 31.3 G/DL (ref 33–37)
MCV RBC AUTO: 99.5 FL (ref 81–99)
MONOCYTES ABSOLUTE: 0.8 K/UL (ref 0–0.9)
MONOCYTES RELATIVE PERCENT: 11.4 % (ref 0–10)
NEUTROPHILS ABSOLUTE: 5.7 K/UL (ref 1.5–7.5)
NEUTROPHILS RELATIVE PERCENT: 78.5 % (ref 50–65)
NITRITE, URINE: NEGATIVE
PDW BLD-RTO: 12.6 % (ref 11.5–14.5)
PH UA: 5 (ref 5–8)
PLATELET # BLD: 193 K/UL (ref 130–400)
PMV BLD AUTO: 11.7 FL (ref 9.4–12.3)
POTASSIUM REFLEX MAGNESIUM: 5 MMOL/L (ref 3.5–5)
PRO-BNP: 7657 PG/ML (ref 0–1800)
PROTEIN UA: 30 MG/DL
PROTHROMBIN TIME: 27.1 SEC (ref 12–14.6)
RAPID INFLUENZA  B AGN: NEGATIVE
RAPID INFLUENZA A AGN: NEGATIVE
RBC # BLD: 3.7 M/UL (ref 4.2–5.4)
RBC UA: 2 /HPF (ref 0–4)
SODIUM BLD-SCNC: 140 MMOL/L (ref 136–145)
SPECIFIC GRAVITY UA: 1.01 (ref 1–1.03)
TOTAL PROTEIN: 6.8 G/DL (ref 6.6–8.7)
TROPONIN: 0.03 NG/ML (ref 0–0.03)
UROBILINOGEN, URINE: 0.2 E.U./DL
WBC # BLD: 7.2 K/UL (ref 4.8–10.8)
WBC UA: 4 /HPF (ref 0–5)

## 2020-02-12 PROCEDURE — 96367 TX/PROPH/DG ADDL SEQ IV INF: CPT

## 2020-02-12 PROCEDURE — 71045 X-RAY EXAM CHEST 1 VIEW: CPT

## 2020-02-12 PROCEDURE — 83605 ASSAY OF LACTIC ACID: CPT

## 2020-02-12 PROCEDURE — 93005 ELECTROCARDIOGRAM TRACING: CPT | Performed by: EMERGENCY MEDICINE

## 2020-02-12 PROCEDURE — 96366 THER/PROPH/DIAG IV INF ADDON: CPT

## 2020-02-12 PROCEDURE — 99285 EMERGENCY DEPT VISIT HI MDM: CPT

## 2020-02-12 PROCEDURE — 81001 URINALYSIS AUTO W/SCOPE: CPT

## 2020-02-12 PROCEDURE — 87804 INFLUENZA ASSAY W/OPTIC: CPT

## 2020-02-12 PROCEDURE — 36415 COLL VENOUS BLD VENIPUNCTURE: CPT

## 2020-02-12 PROCEDURE — 80053 COMPREHEN METABOLIC PANEL: CPT

## 2020-02-12 PROCEDURE — 85730 THROMBOPLASTIN TIME PARTIAL: CPT

## 2020-02-12 PROCEDURE — 83880 ASSAY OF NATRIURETIC PEPTIDE: CPT

## 2020-02-12 PROCEDURE — 2580000003 HC RX 258: Performed by: EMERGENCY MEDICINE

## 2020-02-12 PROCEDURE — 99999 PR OFFICE/OUTPT VISIT,PROCEDURE ONLY: CPT | Performed by: EMERGENCY MEDICINE

## 2020-02-12 PROCEDURE — 87040 BLOOD CULTURE FOR BACTERIA: CPT

## 2020-02-12 PROCEDURE — 85610 PROTHROMBIN TIME: CPT

## 2020-02-12 PROCEDURE — 84484 ASSAY OF TROPONIN QUANT: CPT

## 2020-02-12 PROCEDURE — 96365 THER/PROPH/DIAG IV INF INIT: CPT

## 2020-02-12 PROCEDURE — 6360000002 HC RX W HCPCS: Performed by: EMERGENCY MEDICINE

## 2020-02-12 PROCEDURE — 85025 COMPLETE CBC W/AUTO DIFF WBC: CPT

## 2020-02-12 RX ORDER — SODIUM CHLORIDE, SODIUM LACTATE, POTASSIUM CHLORIDE, CALCIUM CHLORIDE 600; 310; 30; 20 MG/100ML; MG/100ML; MG/100ML; MG/100ML
1000 INJECTION, SOLUTION INTRAVENOUS ONCE
Status: COMPLETED | OUTPATIENT
Start: 2020-02-12 | End: 2020-02-12

## 2020-02-12 RX ORDER — DOXYCYCLINE HYCLATE 100 MG
100 TABLET ORAL 2 TIMES DAILY
Qty: 20 TABLET | Refills: 0 | Status: SHIPPED | OUTPATIENT
Start: 2020-02-12 | End: 2020-02-22

## 2020-02-12 RX ORDER — SODIUM CHLORIDE, SODIUM LACTATE, POTASSIUM CHLORIDE, AND CALCIUM CHLORIDE .6; .31; .03; .02 G/100ML; G/100ML; G/100ML; G/100ML
500 INJECTION, SOLUTION INTRAVENOUS ONCE
Status: COMPLETED | OUTPATIENT
Start: 2020-02-12 | End: 2020-02-12

## 2020-02-12 RX ADMIN — Medication 500 MG: at 18:29

## 2020-02-12 RX ADMIN — SODIUM CHLORIDE, POTASSIUM CHLORIDE, SODIUM LACTATE AND CALCIUM CHLORIDE 500 ML: 600; 310; 30; 20 INJECTION, SOLUTION INTRAVENOUS at 19:38

## 2020-02-12 RX ADMIN — SODIUM CHLORIDE, SODIUM LACTATE, POTASSIUM CHLORIDE, AND CALCIUM CHLORIDE 1000 ML: 600; 310; 30; 20 INJECTION, SOLUTION INTRAVENOUS at 16:34

## 2020-02-12 ASSESSMENT — ENCOUNTER SYMPTOMS
EYE PAIN: 0
ABDOMINAL PAIN: 0
RHINORRHEA: 0
VOICE CHANGE: 0
VOMITING: 0
DIARRHEA: 1
COUGH: 1
SHORTNESS OF BREATH: 0
EYE REDNESS: 0

## 2020-02-12 NOTE — ED PROVIDER NOTES
[facility] EMERGENCY DEPT  EMERGENCY DEPARTMENT ENCOUNTER      Pt Name: Bella Vergara  MRN: [id number]  [place] 7/2/1933  Date of evaluation: 2/12/2020  Provider: Leroy Richard MD    [address] [place] [address]       Chief Complaint   Patient presents with    Generalized Body Aches    Fever         HISTORY OF PRESENT ILLNESS   (Location/Symptom, Timing/Onset,Context/Setting, Quality, Duration, Modifying Factors, Severity)  Note limiting factors. Bella Vergara is a 80 y.o. female who presents to the emergency department with complaint of generalized weakness, fevers, cough and thick sputum over the last 5 to 6 days. Patient was seen by primary care provider and got a shot of antibiotics and steroids yesterday in the clinic and was advised to come back to the emergency department today when she did not have improvement. Family states she is been eating and drinking less over the last several days and that she has not had much urine output recently. They were concerned she may be dehydrated. They state patient will occasionally have fevers up to near 101 which are usually brief and then all of, but they noted her temperature seemed lower this morning before they came here. They state patient has been getting choked frequently on thick sputum that is difficult to excrete last several days. Patient is on 2 L home oxygen recently and does not complain of any shortness of breath and has not had any increased oxygen requirements. HPI    NursingNotes were reviewed. REVIEW OF SYSTEMS    (2-9 systems for level 4, 10 or more for level 5)     Review of Systems   Constitutional: Positive for activity change and appetite change. Negative for fatigue and fever. HENT: Negative for congestion, rhinorrhea and voice change. Eyes: Negative for pain and redness. Respiratory: Positive for cough. Negative for shortness of breath. Cardiovascular: Negative for chest pain. Gastrointestinal: Positive for diarrhea.  Negative for abdominal pain and vomiting. Endocrine: Negative. Genitourinary: Negative. Musculoskeletal: Negative for arthralgias and gait problem. Skin: Negative for rash and wound. Neurological: Negative for weakness and headaches. Hematological: Negative. Psychiatric/Behavioral: Negative. All other systems reviewed and are negative. A complete review of systems was performed and is negative except as noted above in the HPI.        PAST MEDICAL HISTORY     Past Medical History:   Diagnosis Date    Acute systolic CHF (congestive heart failure), NYHA class 3 ([facility]) 2/24/2017    Allergic rhinitis     Anticoagulant long-term use     coumadin for atrial fib    Anxiety 11/11/2019    Atrial fibrillation ([facility])     Chronic atrial fibrillation 9/28/2017    Chronic back pain     Chronic kidney disease     Chronic kidney disease, stage III (moderate) ([facility] 2/17/2017    Chronic systolic CHF (congestive heart failure), NYHA class 3 ([address] 2/17/2017    Current moderate episode of major depressive disorder without prior episode ([facility] [address] 11/14/2019    GERD (gastroesophageal reflux disease)     Glaucoma     Hearing loss     Hypertension     Osteoarthritis     Osteoporosis     Panlobular emphysema ([facility] [address] 11/28/2018    Shingles     Sinus problem     Wears glasses          SURGICAL HISTORY       Past Surgical History:   Procedure Laterality Date    BREAST BIOPSY      Left-benign    BREAST BIOPSY  feb 2014    Left    HYSTERECTOMY      OVARY SURGERY      tumor removed     SKIN CANCER EXCISION  01/2020         CURRENT MEDICATIONS       Discharge Medication List as of 2/12/2020  9:17 PM      CONTINUE these medications which have NOT CHANGED    Details   montelukast (SINGULAIR) 10 MG tablet Take 1 tablet by mouth nightly, Disp-30 tablet, R-5Normal      furosemide (LASIX) 20 MG tablet Take 1 tablet by mouth daily, Disp-30 tablet, R-2Normal      diclofenac sodium 1 % GEL Apply 4 g topically 4 times daily, (NITROSTAT) 0.4 MG SL tablet Place 1 tablet under the tongue every 5 minutes as needed for Chest pain, Disp-25 tablet, R-3Normal      allopurinol (ZYLOPRIM) 100 MG tablet Take 1 tablet by mouth daily, Disp-30 tablet, R-1Normal      fluticasone (FLONASE) 50 MCG/ACT nasal spray 1 spray by Each Nare route daily 1 Spray in each nostril, Disp-2 Bottle, R-1Normal      bimatoprost 0.01 % SOLN Place 1 drop into both eyes nightly              ALLERGIES     Denosumab and Lisinopril    FAMILY HISTORY       Family History   Problem Relation Age of Onset    Asthma Mother     Emphysema Mother     Alzheimer's Disease Sister     Heart Disease Brother     Heart Disease Sister     No Known Problems Sister     Alzheimer's Disease Sister           SOCIAL HISTORY       Social History     Socioeconomic History    Marital status:       Spouse name: Not on file    Number of children: Not on file    Years of education: Not on file    Highest education level: Not on file   Occupational History    Not on file   Social Needs    Financial resource strain: Not on file    Food insecurity:     Worry: Not on file     Inability: Not on file    Transportation needs:     Medical: Not on file     Non-medical: Not on file   Tobacco Use    Smoking status: Never Smoker    Smokeless tobacco: Never Used   Substance and Sexual Activity    Alcohol use: No    Drug use: No    Sexual activity: Never   Lifestyle    Physical activity:     Days per week: Not on file     Minutes per session: Not on file    Stress: Not on file   Relationships    Social connections:     Talks on phone: Not on file     Gets together: Not on file     Attends Yazidism service: Not on file     Active member of club or organization: Not on file     Attends meetings of clubs or organizations: Not on file     Relationship status: Not on file    Intimate partner violence:     Fear of current or ex partner: Not on file     Emotionally abused: Not on file components within normal limits   PROTIME-INR - Abnormal; Notable for the following components:    Protime 27.1 (*)     INR 2.60 (*)     All other components within normal limits   APTT - Abnormal; Notable for the following components:    aPTT 64.6 (*)     All other components within normal limits    Narrative:     CALL  [place]  [facility] ,  Hematology results called to and read back by chasity bailey er, 02/12/2020 16:15,  by Sara Sandoval - Abnormal; Notable for the following components:    Clarity, UA CLOUDY (*)     Protein, UA 30 (*)     All other components within normal limits   LACTIC ACID, PLASMA - Abnormal; Notable for the following components:    Lactic Acid 2.2 (*)     All other components within normal limits    Narrative:     [address] ,  Chemistry results called to and read back by Shahrzad Pettit RN in ED, 02/12/2020  17:19, by [facility] - Abnormal; Notable for the following components:    Hyaline Casts, UA 12 (*)     All other components within normal limits   RAPID INFLUENZA A/B ANTIGENS   CULTURE BLOOD #1   CULTURE BLOOD #2   TROPONIN   LACTIC ACID, PLASMA       All other labs were within normal range or not returned as of this dictation. Medications   lactated ringers infusion 1,000 mL (0 mLs Intravenous Stopped 2/12/20 1734)   lactated ringers bolus (0 mLs Intravenous Stopped 2/12/20 2135)   azithromycin (ZITHROMAX) 500 mg in dextrose 5% 250 mL IVPB (0 mg Intravenous Stop Time 2/12/20 2036)       EMERGENCY DEPARTMENT COURSE and DIFFERENTIALDIAGNOSIS/MDM:   Vitals:    Vitals:    02/12/20 1802 02/12/20 1832 02/12/20 1938 02/12/20 2134   BP: (!) 99/55 (!) 107/54 95/74 (!) 107/54   Pulse: 88 83 71 83   Resp: 18 19 18 (!) 2   Temp:   98.1 °F (36.7 °C) 98.2 °F (36.8 °C)   TempSrc:   Oral Oral   SpO2: 99% 97% 97% 95%   Weight:       Height:           MDM  Blood pressure was borderline on arrival here. Patient afebrile and otherwise with normal vital signs.   Laboratory evaluation tablet Take 1 tablet by mouth 2 times daily for 10 days, Disp-20 tablet, R-0Print                (Please note that portions of this note were completed with a voice recognition program.  Efforts were made to edit the dictations butoccasionally words are mis-transcribed.)    Lossie Severs, MD (electronically signed)  AttendingEmergency Physician         Lossie Severs., MD  02/13/20 1366

## 2020-02-13 ENCOUNTER — TELEPHONE (OUTPATIENT)
Dept: PRIMARY CARE CLINIC | Age: 85
End: 2020-02-13

## 2020-02-13 LAB
EKG P AXIS: NORMAL DEGREES
EKG P-R INTERVAL: NORMAL MS
EKG Q-T INTERVAL: 402 MS
EKG QRS DURATION: 130 MS
EKG QTC CALCULATION (BAZETT): 424 MS
EKG T AXIS: 72 DEGREES

## 2020-02-13 NOTE — ED NOTES
Assisted pt to use bedside toilet. Pt tolerated well. Pt had small bowel movement. Dot care performed. New linens placed.       Pushpa Melvin RN  02/12/20 2030

## 2020-02-14 ENCOUNTER — TELEPHONE (OUTPATIENT)
Dept: PRIMARY CARE CLINIC | Age: 85
End: 2020-02-14

## 2020-02-14 ENCOUNTER — CARE COORDINATION (OUTPATIENT)
Dept: CARE COORDINATION | Age: 85
End: 2020-02-14

## 2020-02-14 ENCOUNTER — ANTI-COAG VISIT (OUTPATIENT)
Dept: CARDIOLOGY | Age: 85
End: 2020-02-14

## 2020-02-14 NOTE — CARE COORDINATION
Attempted to call Craig Nathaly at phone number provided yesterday via Telephone Encounter to schedule ED f/u appt for pt. No answer.  indicates this number is for Chuckie Henao  full. Electronically signed by Louis Hernandez RN on 2/14/2020 at 9:16 AM    Spoke with Craig Herrerananda via phone call. Notified her of an opening with Mary Ann Moore at 2:30 PM.  She states her brother Delmar Severance will be the one that has to take pt. Notified her that I attempted that number but was unable to leave . She verbalized understanding and requested I proceed with scheduling the appt. Will plan to see pt and family at that time today.   Electronically signed by Louis Hernandez RN on 2/14/2020 at 9:23 AM

## 2020-02-17 ENCOUNTER — CARE COORDINATION (OUTPATIENT)
Dept: CARE COORDINATION | Age: 85
End: 2020-02-17

## 2020-02-17 ENCOUNTER — OFFICE VISIT (OUTPATIENT)
Dept: PRIMARY CARE CLINIC | Age: 85
End: 2020-02-17
Payer: MEDICARE

## 2020-02-17 VITALS
OXYGEN SATURATION: 100 % | RESPIRATION RATE: 16 BRPM | HEIGHT: 59 IN | WEIGHT: 111 LBS | SYSTOLIC BLOOD PRESSURE: 110 MMHG | BODY MASS INDEX: 22.38 KG/M2 | TEMPERATURE: 97.8 F | HEART RATE: 75 BPM | DIASTOLIC BLOOD PRESSURE: 64 MMHG

## 2020-02-17 DIAGNOSIS — N18.30 CHRONIC RENAL FAILURE, STAGE 3 (MODERATE) (HCC): ICD-10-CM

## 2020-02-17 DIAGNOSIS — J43.1 PANLOBULAR EMPHYSEMA (HCC): ICD-10-CM

## 2020-02-17 DIAGNOSIS — I50.22 CHRONIC SYSTOLIC CHF (CONGESTIVE HEART FAILURE), NYHA CLASS 3 (HCC): ICD-10-CM

## 2020-02-17 LAB
ALBUMIN SERPL-MCNC: 3.7 G/DL (ref 3.5–5.2)
ALP BLD-CCNC: 51 U/L (ref 35–104)
ALT SERPL-CCNC: 10 U/L (ref 5–33)
ANION GAP SERPL CALCULATED.3IONS-SCNC: 12 MMOL/L (ref 7–19)
AST SERPL-CCNC: 22 U/L (ref 5–32)
BASOPHILS ABSOLUTE: 0.1 K/UL (ref 0–0.2)
BASOPHILS RELATIVE PERCENT: 0.6 % (ref 0–1)
BILIRUB SERPL-MCNC: 0.4 MG/DL (ref 0.2–1.2)
BLOOD CULTURE, ROUTINE: NORMAL
BUN BLDV-MCNC: 32 MG/DL (ref 8–23)
CALCIUM SERPL-MCNC: 9.6 MG/DL (ref 8.8–10.2)
CHLORIDE BLD-SCNC: 104 MMOL/L (ref 98–111)
CO2: 27 MMOL/L (ref 22–29)
CREAT SERPL-MCNC: 1.2 MG/DL (ref 0.5–0.9)
CULTURE, BLOOD 2: NORMAL
EOSINOPHILS ABSOLUTE: 0.2 K/UL (ref 0–0.6)
EOSINOPHILS RELATIVE PERCENT: 2.2 % (ref 0–5)
GFR NON-AFRICAN AMERICAN: 43
GLUCOSE BLD-MCNC: 87 MG/DL (ref 74–109)
HCT VFR BLD CALC: 39.7 % (ref 37–47)
HEMOGLOBIN: 12 G/DL (ref 12–16)
IMMATURE GRANULOCYTES #: 0.1 K/UL
LYMPHOCYTES ABSOLUTE: 1.3 K/UL (ref 1.1–4.5)
LYMPHOCYTES RELATIVE PERCENT: 16.4 % (ref 20–40)
MCH RBC QN AUTO: 30.7 PG (ref 27–31)
MCHC RBC AUTO-ENTMCNC: 30.2 G/DL (ref 33–37)
MCV RBC AUTO: 101.5 FL (ref 81–99)
MONOCYTES ABSOLUTE: 0.8 K/UL (ref 0–0.9)
MONOCYTES RELATIVE PERCENT: 10.1 % (ref 0–10)
NEUTROPHILS ABSOLUTE: 5.7 K/UL (ref 1.5–7.5)
NEUTROPHILS RELATIVE PERCENT: 70.1 % (ref 50–65)
PDW BLD-RTO: 12.8 % (ref 11.5–14.5)
PLATELET # BLD: 279 K/UL (ref 130–400)
PMV BLD AUTO: 11.7 FL (ref 9.4–12.3)
POTASSIUM SERPL-SCNC: 4.7 MMOL/L (ref 3.5–5)
PRO-BNP: 6214 PG/ML (ref 0–1800)
RBC # BLD: 3.91 M/UL (ref 4.2–5.4)
SODIUM BLD-SCNC: 143 MMOL/L (ref 136–145)
TOTAL PROTEIN: 7.1 G/DL (ref 6.6–8.7)
WBC # BLD: 8.1 K/UL (ref 4.8–10.8)

## 2020-02-17 PROCEDURE — 1090F PRES/ABSN URINE INCON ASSESS: CPT | Performed by: NURSE PRACTITIONER

## 2020-02-17 PROCEDURE — G8427 DOCREV CUR MEDS BY ELIG CLIN: HCPCS | Performed by: NURSE PRACTITIONER

## 2020-02-17 PROCEDURE — 4040F PNEUMOC VAC/ADMIN/RCVD: CPT | Performed by: NURSE PRACTITIONER

## 2020-02-17 PROCEDURE — 3023F SPIROM DOC REV: CPT | Performed by: NURSE PRACTITIONER

## 2020-02-17 PROCEDURE — 1123F ACP DISCUSS/DSCN MKR DOCD: CPT | Performed by: NURSE PRACTITIONER

## 2020-02-17 PROCEDURE — G8420 CALC BMI NORM PARAMETERS: HCPCS | Performed by: NURSE PRACTITIONER

## 2020-02-17 PROCEDURE — 99214 OFFICE O/P EST MOD 30 MIN: CPT | Performed by: NURSE PRACTITIONER

## 2020-02-17 PROCEDURE — G8482 FLU IMMUNIZE ORDER/ADMIN: HCPCS | Performed by: NURSE PRACTITIONER

## 2020-02-17 PROCEDURE — G8926 SPIRO NO PERF OR DOC: HCPCS | Performed by: NURSE PRACTITIONER

## 2020-02-17 PROCEDURE — 1036F TOBACCO NON-USER: CPT | Performed by: NURSE PRACTITIONER

## 2020-02-17 NOTE — CARE COORDINATION
Jacobide conversation with Sumi Albarran RN ACM, about patient's needs for transportation assistance. Reviewed patient's medical record. - Patient does not have [place] [employer]. - Patient lives in [place]. Public transportation would be through [employer]. It would be very expensive.    - Taxi service would be expensive.  - Patient needs to find family or friends to transport her. She may need to call local churches to ask for volunteer drivers.     Submitted by Fredy/SHELLY

## 2020-02-17 NOTE — PROGRESS NOTES
by mouth every 4 hours as needed      ondansetron (ZOFRAN) 4 MG tablet Take 1 tablet by mouth 3 times daily as needed for Nausea or Vomiting 30 tablet 0    fexofenadine (ALLEGRA) 180 MG tablet Take 180 mg by mouth every morning       nitroGLYCERIN (NITROSTAT) 0.4 MG SL tablet Place 1 tablet under the tongue every 5 minutes as needed for Chest pain 25 tablet 3    allopurinol (ZYLOPRIM) 100 MG tablet Take 1 tablet by mouth daily (Patient taking differently: Take 100 mg by mouth every morning ) 30 tablet 1    fluticasone (FLONASE) 50 MCG/ACT nasal spray 1 spray by Each Nare route daily 1 Spray in each nostril (Patient taking differently: 1 spray by Each Nostril route daily as needed 1 Spray in each nostril) 2 Bottle 1    bimatoprost 0.01 % SOLN Place 1 drop into both eyes nightly       metoprolol tartrate (LOPRESSOR) 25 MG tablet Take 1 tablet by mouth 2 times daily 60 tablet 1    atorvastatin (LIPITOR) 10 MG tablet Take 1 tablet by mouth daily (Patient taking differently: Take 10 mg by mouth nightly ) 30 tablet 5    tiotropium (SPIRIVA RESPIMAT) 2.5 MCG/ACT AERS inhaler Inhale 2 puffs into the lungs daily 1 Inhaler 2     No current facility-administered medications for this visit.         Allergies   Allergen Reactions    Denosumab Other (See Comments)     after had shot got blood in urine--not sure if associated    Lisinopril Other (See Comments)     cough         Past Surgical History:   Procedure Laterality Date    BREAST BIOPSY      Left-benign    BREAST BIOPSY  feb 2014    Left    HYSTERECTOMY      OVARY SURGERY      tumor removed     SKIN CANCER EXCISION  01/2020       Social History     Tobacco Use    Smoking status: Never Smoker    Smokeless tobacco: Never Used   Substance Use Topics    Alcohol use: No    Drug use: No       Family History   Problem Relation Age of Onset    Asthma Mother     Emphysema Mother     Alzheimer's Disease Sister     Heart Disease Brother     Heart Disease Sister     No Known Problems Sister     Alzheimer's Disease Sister        /64   Pulse 75   Temp 97.8 °F (36.6 °C) (Temporal)   Resp 16   Ht 4' 11\" (1.499 m)   Wt 111 lb (50.3 kg)   SpO2 100% Comment: 2L O2  BMI 22.42 kg/m²     Physical Exam  Vitals signs and nursing note reviewed. Constitutional:       General: She is not in acute distress. Appearance: She is well-developed. She is not diaphoretic. HENT:      Head: Normocephalic and atraumatic. Right Ear: External ear normal. Decreased hearing noted. Left Ear: External ear normal. Decreased hearing noted. Ears:        Nose: Nose normal.      Mouth/Throat:      Mouth: Mucous membranes are moist.      Pharynx: No oropharyngeal exudate or posterior oropharyngeal erythema. Eyes:      General:         Right eye: No discharge. Left eye: No discharge. Conjunctiva/sclera: Conjunctivae normal.      Pupils: Pupils are equal, round, and reactive to light. Neck:      Musculoskeletal: Normal range of motion and neck supple. Cardiovascular:      Rate and Rhythm: Normal rate. Rhythm irregular. Heart sounds: Normal heart sounds. No murmur. Pulmonary:      Effort: Pulmonary effort is normal. No respiratory distress. Breath sounds: Normal breath sounds. No stridor. No wheezing or rales. Chest:      Chest wall: No tenderness. Breasts:         Right: No inverted nipple, mass, nipple discharge, skin change or tenderness. Left: No inverted nipple, mass, nipple discharge, skin change or tenderness. Abdominal:      General: Bowel sounds are normal. There is no distension. Palpations: Abdomen is soft. Tenderness: There is no abdominal tenderness. Musculoskeletal:         General: No tenderness or deformity. Right lower le+ Edema (trace ) present. Left lower le+ Edema (trace) present. Comments: Marked kyphosis   Skin:     General: Skin is warm and dry.       Findings: No erythema or rash. Neurological:      Mental Status: She is alert. Cranial Nerves: No cranial nerve deficit. Motor: Weakness and tremor present. Gait: Gait abnormal.      Deep Tendon Reflexes: Reflexes are normal and symmetric. Comments: Patient is oriented to person. Psychiatric:         Mood and Affect: Mood normal.         Behavior: Behavior normal.         Thought Content: Thought content normal.         Assessment:    ICD-10-CM    1. Chronic systolic CHF (congestive heart failure), NYHA class 3 ([facility]) I50.22 Brain Natriuretic Peptide   2. Chronic renal failure, stage 3 (moderate) ([facility]) N18.3 CBC Auto Differential     Comprehensive Metabolic Panel   3. Panlobular emphysema ([facility]) J43.1 CBC Auto Differential     Comprehensive Metabolic Panel       Plan:   1. Chronic systolic CHF (congestive heart failure), NYHA class 3 ([facility])  - Brain Natriuretic Peptide; Future    2. Chronic renal failure, stage 3 (moderate) ([facility])  - CBC Auto Differential; Future  - Comprehensive Metabolic Panel; Future    3. Panlobular emphysema (HCC)  - CBC Auto Differential; Future  - Comprehensive Metabolic Panel; Future    Over 50% of the total visit time of 25 minutes was spent on counseling and or coordination of care of congestive heart failure, chronic kidney disease, panlobular emphysema, labs, medications, home health possibility, increased fluid intake, and follow-up. Orders Placed This Encounter   Procedures    CBC Auto Differential     Standing Status:   Future     Number of Occurrences:   1     Standing Expiration Date:   2/16/2021    Comprehensive Metabolic Panel     Standing Status:   Future     Number of Occurrences:   1     Standing Expiration Date:   2/16/2021    Brain Natriuretic Peptide     Standing Status:   Future     Number of Occurrences:   1     Standing Expiration Date:   2/17/2021     No orders of the defined types were placed in this encounter.     There are no discontinued

## 2020-02-18 ASSESSMENT — ENCOUNTER SYMPTOMS: DIARRHEA: 0

## 2020-02-18 NOTE — CARE COORDINATION
Met briefly with Scarlet Martines and family to offer support for ACM. She had already been released by office staff, so was unable to fully assess/enroll pt. Her daughter indicated that they may have some intermittent transportation needs. Will contact them later this week to fully assess pt/needs.

## 2020-02-19 ENCOUNTER — TELEPHONE (OUTPATIENT)
Dept: PRIMARY CARE CLINIC | Age: 85
End: 2020-02-19

## 2020-02-19 ASSESSMENT — ENCOUNTER SYMPTOMS
GASTROINTESTINAL NEGATIVE: 1
COUGH: 1
EYES NEGATIVE: 1
BACK PAIN: 1

## 2020-02-19 NOTE — TELEPHONE ENCOUNTER
----- Message from Mohammed Seip, [address] - NP sent at 2/19/2020 10:49 AM CST -----  Improved from last visit. Encourage patient to obtain biolyte from [address] [place] [address] and give half bottle every three times per week. Cony feels as if home health would be beneficial because they could give patient fluids at her home twice weekly. I know patient did not want home health, but it is an option    Recheck CMP and BNP in one week.

## 2020-02-25 DIAGNOSIS — I50.22 CHRONIC SYSTOLIC CHF (CONGESTIVE HEART FAILURE), NYHA CLASS 3 (HCC): ICD-10-CM

## 2020-02-25 LAB
ALBUMIN SERPL-MCNC: 3.5 G/DL (ref 3.5–5.2)
ALP BLD-CCNC: 56 U/L (ref 35–104)
ALT SERPL-CCNC: 8 U/L (ref 5–33)
ANION GAP SERPL CALCULATED.3IONS-SCNC: 16 MMOL/L (ref 7–19)
AST SERPL-CCNC: 20 U/L (ref 5–32)
BILIRUB SERPL-MCNC: 0.6 MG/DL (ref 0.2–1.2)
BUN BLDV-MCNC: 33 MG/DL (ref 8–23)
CALCIUM SERPL-MCNC: 9.6 MG/DL (ref 8.8–10.2)
CHLORIDE BLD-SCNC: 107 MMOL/L (ref 98–111)
CO2: 25 MMOL/L (ref 22–29)
CREAT SERPL-MCNC: 1.4 MG/DL (ref 0.5–0.9)
GFR NON-AFRICAN AMERICAN: 36
GLUCOSE BLD-MCNC: 71 MG/DL (ref 74–109)
POTASSIUM SERPL-SCNC: 4.6 MMOL/L (ref 3.5–5)
PRO-BNP: 2818 PG/ML (ref 0–1800)
SODIUM BLD-SCNC: 148 MMOL/L (ref 136–145)
TOTAL PROTEIN: 6.6 G/DL (ref 6.6–8.7)

## 2020-02-28 ENCOUNTER — TELEPHONE (OUTPATIENT)
Dept: PRIMARY CARE CLINIC | Age: 85
End: 2020-02-28

## 2020-02-29 ENCOUNTER — OFFICE VISIT (OUTPATIENT)
Dept: URGENT CARE | Age: 85
End: 2020-02-29
Payer: MEDICARE

## 2020-02-29 VITALS
DIASTOLIC BLOOD PRESSURE: 62 MMHG | HEIGHT: 62 IN | BODY MASS INDEX: 20.09 KG/M2 | RESPIRATION RATE: 15 BRPM | WEIGHT: 109.2 LBS | SYSTOLIC BLOOD PRESSURE: 110 MMHG | TEMPERATURE: 98.7 F | HEART RATE: 66 BPM | OXYGEN SATURATION: 98 %

## 2020-02-29 LAB
INFLUENZA A ANTIBODY: NEGATIVE
INFLUENZA B ANTIBODY: NEGATIVE

## 2020-02-29 PROCEDURE — 1090F PRES/ABSN URINE INCON ASSESS: CPT | Performed by: NURSE PRACTITIONER

## 2020-02-29 PROCEDURE — 1036F TOBACCO NON-USER: CPT | Performed by: NURSE PRACTITIONER

## 2020-02-29 PROCEDURE — G8427 DOCREV CUR MEDS BY ELIG CLIN: HCPCS | Performed by: NURSE PRACTITIONER

## 2020-02-29 PROCEDURE — 4040F PNEUMOC VAC/ADMIN/RCVD: CPT | Performed by: NURSE PRACTITIONER

## 2020-02-29 PROCEDURE — 1123F ACP DISCUSS/DSCN MKR DOCD: CPT | Performed by: NURSE PRACTITIONER

## 2020-02-29 PROCEDURE — 99213 OFFICE O/P EST LOW 20 MIN: CPT | Performed by: NURSE PRACTITIONER

## 2020-02-29 PROCEDURE — 87804 INFLUENZA ASSAY W/OPTIC: CPT | Performed by: NURSE PRACTITIONER

## 2020-02-29 PROCEDURE — G8482 FLU IMMUNIZE ORDER/ADMIN: HCPCS | Performed by: NURSE PRACTITIONER

## 2020-02-29 PROCEDURE — G8420 CALC BMI NORM PARAMETERS: HCPCS | Performed by: NURSE PRACTITIONER

## 2020-02-29 RX ORDER — OSELTAMIVIR PHOSPHATE 30 MG/1
30 CAPSULE ORAL DAILY
Qty: 1414 CAPSULE | Refills: 0 | Status: SHIPPED | OUTPATIENT
Start: 2020-02-29 | End: 2020-03-14

## 2020-02-29 RX ORDER — FLUTICASONE PROPIONATE 50 MCG
2 SPRAY, SUSPENSION (ML) NASAL DAILY
Qty: 3 BOTTLE | Refills: 1 | Status: SHIPPED | OUTPATIENT
Start: 2020-02-29 | End: 2020-06-03 | Stop reason: SDUPTHER

## 2020-02-29 RX ORDER — FLUTICASONE PROPIONATE 50 MCG
2 SPRAY, SUSPENSION (ML) NASAL DAILY
Qty: 3 BOTTLE | Refills: 1 | Status: SHIPPED | OUTPATIENT
Start: 2020-02-29 | End: 2020-02-29 | Stop reason: SDUPTHER

## 2020-02-29 ASSESSMENT — ENCOUNTER SYMPTOMS
EYES NEGATIVE: 1
ALLERGIC/IMMUNOLOGIC NEGATIVE: 1
SORE THROAT: 0
GASTROINTESTINAL NEGATIVE: 1
RHINORRHEA: 1
COUGH: 1

## 2020-02-29 NOTE — PROGRESS NOTES
[address] [facility]  [address] [address] [address]  [address] [place] [address]  Dept: [phone]  Loc: [phone]     Martinez Madrigal is a 80 y.o. female who presents today for her medical conditions/complaintsas noted below. Martinez Madrigal is c/o of Fever (exposed to Flu by her son.); Congestion; and Cough        HPI:     HPI   She presents with her two daughters for runny nose and slight cough. One of her daughters mention that the patient son's was recently diagnosed with the flu and was hospitalized. Her two daughters that are present with her today were positive for Influenza A. One daughter reports that she thinks her mother is having more trouble hearing although she wears hearing aides.     Past Medical History:   Diagnosis Date    Acute systolic CHF (congestive heart failure), NYHA class 3 ([facility]) 2/24/2017    Allergic rhinitis     Anticoagulant long-term use     coumadin for atrial fib    Anxiety 11/11/2019    Atrial fibrillation ([facility])     Chronic atrial fibrillation 9/28/2017    Chronic back pain     Chronic kidney disease     Chronic kidney disease, stage III (moderate) ([facility]) 2/17/2017    Chronic systolic CHF (congestive heart failure), NYHA class 3 ([address] 2/17/2017    Current moderate episode of major depressive disorder without prior episode ([address] 11/14/2019    GERD (gastroesophageal reflux disease)     Glaucoma     Hearing loss     Hypertension     Osteoarthritis     Osteoporosis     Panlobular emphysema ([address] 11/28/2018    Shingles     Sinus problem     Wears glasses      Past Surgical History:   Procedure Laterality Date    BREAST BIOPSY      Left-benign    BREAST BIOPSY  feb 2014    Left    HYSTERECTOMY      OVARY SURGERY      tumor removed     SKIN CANCER EXCISION  01/2020       Family History   Problem Relation Age of Onset    Asthma Mother     Emphysema Mother     Alzheimer's Disease Sister     Heart Disease Brother     Heart rhinorrhea. Negative for congestion and sore throat. Eyes: Negative. Respiratory: Positive for cough. Cardiovascular: Negative. Gastrointestinal: Negative. Endocrine: Negative. Genitourinary: Negative. Musculoskeletal: Negative. Skin: Negative. Allergic/Immunologic: Negative. Neurological: Negative. Hematological: Negative. Psychiatric/Behavioral: Negative. Objective:      Physical Exam  Vitals signs and nursing note reviewed. Constitutional:       Appearance: She is not ill-appearing or diaphoretic. HENT:      Head: Normocephalic. Right Ear: A middle ear effusion is present. There is no impacted cerumen. Left Ear: There is no impacted cerumen. Ears:      Comments: Hearing aides present bilaterally     Nose:      Comments: Nasal cannula oxygen on     Mouth/Throat:      Mouth: Mucous membranes are moist.      Pharynx: Oropharynx is clear. Eyes:      General:         Right eye: No discharge. Left eye: No discharge. Cardiovascular:      Rate and Rhythm: Normal rate. Pulses: Normal pulses. Pulmonary:      Effort: Pulmonary effort is normal.   Neurological:      Mental Status: She is alert. Comments: Alert and oriented   Psychiatric:         Mood and Affect: Mood normal.         Behavior: Behavior normal.       /62   Pulse 66   Temp 98.7 °F (37.1 °C)   Resp 15   Ht 5' 2\" (1.575 m)   Wt 109 lb 3.2 oz (49.5 kg)   SpO2 98%   BMI 19.97 kg/m²     Assessment:          Diagnosis Orders   1. Exposure to influenza  POCT Influenza A/B   2. Runny nose     3. Acute effusion of left ear         Plan:      Orders Placed This Encounter   Procedures    POCT Influenza A/B        No follow-ups on file.     Orders Placed This Encounter   Procedures    POCT Influenza A/B     Orders Placed This Encounter   Medications    DISCONTD: fluticasone (FLONASE) 50 MCG/ACT nasal spray     Si sprays by Each Nostril route daily     Dispense:  3

## 2020-02-29 NOTE — PATIENT INSTRUCTIONS
your doctor if:    · You begin to get better and then get worse.     · You are not getting better after 1 week. Where can you learn more? Go to [email or web address] and sign in to your [employer] account. Enter R409 in the [employer] box to learn more about \"Influenza (Flu): Care Instructions. \"     If you do not have an account, please click on the \"Sign Up Now\" link. Current as of: June 9, 2019  Content Version: 12.3  © 8403-5042 [employer]wise, Incorporated. Care instructions adapted under license by [facility] ([facility]). If you have questions about a medical condition or this instruction, always ask your healthcare professional. [address] any warranty or liability for your use of this information.

## 2020-03-02 LAB — INR BLD: 2

## 2020-03-03 ENCOUNTER — ANTI-COAG VISIT (OUTPATIENT)
Dept: CARDIOLOGY | Age: 85
End: 2020-03-03

## 2020-03-03 ENCOUNTER — OFFICE VISIT (OUTPATIENT)
Dept: PRIMARY CARE CLINIC | Age: 85
End: 2020-03-03
Payer: MEDICARE

## 2020-03-03 VITALS
TEMPERATURE: 98.6 F | DIASTOLIC BLOOD PRESSURE: 72 MMHG | WEIGHT: 109 LBS | OXYGEN SATURATION: 95 % | BODY MASS INDEX: 20.06 KG/M2 | SYSTOLIC BLOOD PRESSURE: 104 MMHG | HEART RATE: 70 BPM | HEIGHT: 62 IN

## 2020-03-03 PROCEDURE — G8420 CALC BMI NORM PARAMETERS: HCPCS | Performed by: NURSE PRACTITIONER

## 2020-03-03 PROCEDURE — 1123F ACP DISCUSS/DSCN MKR DOCD: CPT | Performed by: NURSE PRACTITIONER

## 2020-03-03 PROCEDURE — 99213 OFFICE O/P EST LOW 20 MIN: CPT | Performed by: NURSE PRACTITIONER

## 2020-03-03 PROCEDURE — 4040F PNEUMOC VAC/ADMIN/RCVD: CPT | Performed by: NURSE PRACTITIONER

## 2020-03-03 PROCEDURE — 1036F TOBACCO NON-USER: CPT | Performed by: NURSE PRACTITIONER

## 2020-03-03 PROCEDURE — G8482 FLU IMMUNIZE ORDER/ADMIN: HCPCS | Performed by: NURSE PRACTITIONER

## 2020-03-03 PROCEDURE — G8427 DOCREV CUR MEDS BY ELIG CLIN: HCPCS | Performed by: NURSE PRACTITIONER

## 2020-03-03 PROCEDURE — 1090F PRES/ABSN URINE INCON ASSESS: CPT | Performed by: NURSE PRACTITIONER

## 2020-03-03 PROCEDURE — G8926 SPIRO NO PERF OR DOC: HCPCS | Performed by: NURSE PRACTITIONER

## 2020-03-03 PROCEDURE — 3023F SPIROM DOC REV: CPT | Performed by: NURSE PRACTITIONER

## 2020-03-03 RX ORDER — CEFDINIR 300 MG/1
300 CAPSULE ORAL DAILY
Qty: 10 CAPSULE | Refills: 0 | Status: SHIPPED | OUTPATIENT
Start: 2020-03-03 | End: 2020-03-13

## 2020-03-03 ASSESSMENT — ENCOUNTER SYMPTOMS
VOMITING: 0
CHEST TIGHTNESS: 0
ABDOMINAL PAIN: 0
SHORTNESS OF BREATH: 0
NAUSEA: 0
DIARRHEA: 0
COUGH: 1
WHEEZING: 0
CONSTIPATION: 0

## 2020-03-03 ASSESSMENT — COPD QUESTIONNAIRES: COPD: 1

## 2020-03-05 NOTE — TELEPHONE ENCOUNTER
Daughter returned call and was informed of results. She will discuss with the family more about the infusions.

## 2020-03-13 ENCOUNTER — HOSPITAL ENCOUNTER (EMERGENCY)
Age: 85
Discharge: HOME | End: 2020-03-13
Attending: EMERGENCY MEDICINE
Payer: MEDICARE

## 2020-03-13 ENCOUNTER — OFFICE VISIT (OUTPATIENT)
Dept: URGENT CARE | Age: 85
End: 2020-03-13

## 2020-03-13 ENCOUNTER — APPOINTMENT (OUTPATIENT)
Dept: GENERAL RADIOLOGY | Age: 85
End: 2020-03-13
Payer: MEDICARE

## 2020-03-13 VITALS
TEMPERATURE: 98.2 F | SYSTOLIC BLOOD PRESSURE: 138 MMHG | DIASTOLIC BLOOD PRESSURE: 84 MMHG | WEIGHT: 113 LBS | OXYGEN SATURATION: 100 % | BODY MASS INDEX: 20.67 KG/M2 | HEART RATE: 84 BPM

## 2020-03-13 VITALS
HEIGHT: 62 IN | OXYGEN SATURATION: 93 % | BODY MASS INDEX: 20.8 KG/M2 | TEMPERATURE: 98 F | WEIGHT: 113 LBS | HEART RATE: 79 BPM | SYSTOLIC BLOOD PRESSURE: 164 MMHG | RESPIRATION RATE: 14 BRPM | DIASTOLIC BLOOD PRESSURE: 98 MMHG

## 2020-03-13 LAB
ALBUMIN SERPL-MCNC: 3.5 G/DL (ref 3.5–5.2)
ALP BLD-CCNC: 51 U/L (ref 35–104)
ALT SERPL-CCNC: 21 U/L (ref 5–33)
ANION GAP SERPL CALCULATED.3IONS-SCNC: 7 MMOL/L (ref 7–19)
AST SERPL-CCNC: 41 U/L (ref 5–32)
BASOPHILS ABSOLUTE: 0.1 K/UL (ref 0–0.2)
BASOPHILS RELATIVE PERCENT: 1.5 % (ref 0–1)
BILIRUB SERPL-MCNC: 0.3 MG/DL (ref 0.2–1.2)
BUN BLDV-MCNC: 23 MG/DL (ref 8–23)
CALCIUM SERPL-MCNC: 9.5 MG/DL (ref 8.8–10.2)
CHLORIDE BLD-SCNC: 101 MMOL/L (ref 98–111)
CO2: 30 MMOL/L (ref 22–29)
CREAT SERPL-MCNC: 1.2 MG/DL (ref 0.5–0.9)
DIGOXIN LEVEL: 0.7 NG/ML (ref 0.6–1.2)
EOSINOPHILS ABSOLUTE: 0.2 K/UL (ref 0–0.6)
EOSINOPHILS RELATIVE PERCENT: 4.1 % (ref 0–5)
GFR NON-AFRICAN AMERICAN: 43
GLUCOSE BLD-MCNC: 94 MG/DL (ref 74–109)
HCT VFR BLD CALC: 38.6 % (ref 37–47)
HEMOGLOBIN: 12 G/DL (ref 12–16)
IMMATURE GRANULOCYTES #: 0 K/UL
INR BLD: 3.31 (ref 0.88–1.18)
LYMPHOCYTES ABSOLUTE: 1.3 K/UL (ref 1.1–4.5)
LYMPHOCYTES RELATIVE PERCENT: 28.6 % (ref 20–40)
MCH RBC QN AUTO: 31.3 PG (ref 27–31)
MCHC RBC AUTO-ENTMCNC: 31.1 G/DL (ref 33–37)
MCV RBC AUTO: 100.8 FL (ref 81–99)
MONOCYTES ABSOLUTE: 0.7 K/UL (ref 0–0.9)
MONOCYTES RELATIVE PERCENT: 14.3 % (ref 0–10)
NEUTROPHILS ABSOLUTE: 2.4 K/UL (ref 1.5–7.5)
NEUTROPHILS RELATIVE PERCENT: 51.3 % (ref 50–65)
PDW BLD-RTO: 14.1 % (ref 11.5–14.5)
PLATELET # BLD: 171 K/UL (ref 130–400)
PMV BLD AUTO: 11.4 FL (ref 9.4–12.3)
POTASSIUM REFLEX MAGNESIUM: 5 MMOL/L (ref 3.5–5)
PRO-BNP: 3788 PG/ML (ref 0–1800)
PROTHROMBIN TIME: 34.7 SEC (ref 12–14.6)
RBC # BLD: 3.83 M/UL (ref 4.2–5.4)
SODIUM BLD-SCNC: 138 MMOL/L (ref 136–145)
TOTAL PROTEIN: 6.6 G/DL (ref 6.6–8.7)
TROPONIN: 0.01 NG/ML (ref 0–0.03)
WBC # BLD: 4.7 K/UL (ref 4.8–10.8)

## 2020-03-13 PROCEDURE — 85025 COMPLETE CBC W/AUTO DIFF WBC: CPT

## 2020-03-13 PROCEDURE — 83880 ASSAY OF NATRIURETIC PEPTIDE: CPT

## 2020-03-13 PROCEDURE — 85610 PROTHROMBIN TIME: CPT

## 2020-03-13 PROCEDURE — 96374 THER/PROPH/DIAG INJ IV PUSH: CPT

## 2020-03-13 PROCEDURE — 93005 ELECTROCARDIOGRAM TRACING: CPT | Performed by: EMERGENCY MEDICINE

## 2020-03-13 PROCEDURE — 71045 X-RAY EXAM CHEST 1 VIEW: CPT

## 2020-03-13 PROCEDURE — 99285 EMERGENCY DEPT VISIT HI MDM: CPT

## 2020-03-13 PROCEDURE — 84484 ASSAY OF TROPONIN QUANT: CPT

## 2020-03-13 PROCEDURE — 36415 COLL VENOUS BLD VENIPUNCTURE: CPT

## 2020-03-13 PROCEDURE — 80162 ASSAY OF DIGOXIN TOTAL: CPT

## 2020-03-13 PROCEDURE — 80053 COMPREHEN METABOLIC PANEL: CPT

## 2020-03-13 PROCEDURE — 6360000002 HC RX W HCPCS: Performed by: EMERGENCY MEDICINE

## 2020-03-13 RX ORDER — FUROSEMIDE 10 MG/ML
40 INJECTION INTRAMUSCULAR; INTRAVENOUS ONCE
Status: COMPLETED | OUTPATIENT
Start: 2020-03-13 | End: 2020-03-13

## 2020-03-13 RX ORDER — GUAIFENESIN 600 MG/1
600 TABLET, EXTENDED RELEASE ORAL 2 TIMES DAILY PRN
Qty: 14 TABLET | Refills: 0 | Status: SHIPPED | OUTPATIENT
Start: 2020-03-13 | End: 2021-06-08

## 2020-03-13 RX ORDER — MIRTAZAPINE 15 MG/1
15 TABLET, FILM COATED ORAL NIGHTLY
Qty: 30 TABLET | Refills: 3 | Status: SHIPPED | OUTPATIENT
Start: 2020-03-13 | End: 2020-07-02

## 2020-03-13 RX ADMIN — FUROSEMIDE 40 MG: 10 INJECTION, SOLUTION INTRAMUSCULAR; INTRAVENOUS at 20:07

## 2020-03-13 ASSESSMENT — ENCOUNTER SYMPTOMS
COUGH: 1
SPUTUM PRODUCTION: 0
ABDOMINAL PAIN: 0

## 2020-03-13 NOTE — PROGRESS NOTES
Patient arrived at Urgent Care with complaints of 6 lb weight gain and worsening cough. Patient has CHF. Patient assessed in triage area. Provider called and recommended patient go to ER for higher level of care. Patient left with daughter in stable condition.

## 2020-03-13 NOTE — PROGRESS NOTES
Luis Jimenez is here with tightness in her upper chest and has gained over 5 lbs. In the last 2 days. Her daughter reports the cough sounded loose earlier in the week but has now become tight and she is having more difficulty breathing. She is wearing oxygen and this is routine for her. I have advised them that I recommend she be taken to [facility] for further evaluation and treatment. Daughter declines ambulance and states she can drive her. She left our office in stable condition.

## 2020-03-13 NOTE — ED PROVIDER NOTES
episode ([facility] 11/14/2019    GERD (gastroesophageal reflux disease)     Glaucoma     Hearing loss     Hypertension     Osteoarthritis     Osteoporosis     Panlobular emphysema ([facility] 11/28/2018    Shingles     Sinus problem     Wears glasses          SURGICALHISTORY       Past Surgical History:   Procedure Laterality Date    BREAST BIOPSY      Left-benign    BREAST BIOPSY  feb 2014    Left    HYSTERECTOMY      OVARY SURGERY      tumor removed     SKIN CANCER EXCISION  01/2020         CURRENT MEDICATIONS       Discharge Medication List as of 3/13/2020  8:05 PM      CONTINUE these medications which have NOT CHANGED    Details   mirtazapine (REMERON) 15 MG tablet Take 1 tablet by mouth nightly, Disp-30 tablet, R-3Normal      cefdinir (OMNICEF) 300 MG capsule Take 1 capsule by mouth daily for 10 days, Disp-10 capsule, R-0Normal      oseltamivir (TAMIFLU) 30 MG capsule Take 1 capsule by mouth daily for 14 days, Disp-1414 capsule, R-0Normal      fluticasone (FLONASE) 50 MCG/ACT nasal spray 2 sprays by Each Nostril route daily, Disp-3 Bottle, R-1Normal      metoprolol tartrate (LOPRESSOR) 25 MG tablet Take 1 tablet by mouth 2 times daily, Disp-60 tablet, R-1Normal      famotidine (PEPCID) 20 MG tablet Take 0.5 tablets by mouth every morning, Disp-30 tablet, R-5Normal      donepezil (ARICEPT) 5 MG tablet Take 1 tablet by mouth nightly, Disp-30 tablet, R-1Normal      SPIRIVA HANDIHALER 18 MCG inhalation capsule Inhale 1 capsule into the lungs daily Please instruct on use., Disp-30 capsule, R-3Normal      escitalopram (LEXAPRO) 10 MG tablet TAKE ONE TABLET BY MOUTH EVERY DAY, Disp-30 tablet, R-1Normal      digoxin (LANOXIN) 125 MCG tablet Take 1 tablet by mouth every other day Indications: M, W, F, Disp-45 tablet, R-3Normal      warfarin (COUMADIN) 5 MG tablet Take 5 mg by mouth Take 1/2 tabs all days except on Wednesday take 1 tab. Historical Med      aspirin 81 MG chewable tablet Take 1 tablet by mouth daily, Not on file    Number of children: Not on file    Years of education: Not on file    Highest education level: Not on file   Occupational History    Not on file   Social Needs    Financial resource strain: Not on file    Food insecurity     Worry: Not on file     Inability: Not on file    Transportation needs     Medical: Not on file     Non-medical: Not on file   Tobacco Use    Smoking status: Never Smoker    Smokeless tobacco: Never Used   Substance and Sexual Activity    Alcohol use: No    Drug use: No    Sexual activity: Never   Lifestyle    Physical activity     Days per week: Not on file     Minutes per session: Not on file    Stress: Not on file   Relationships    Social connections     Talks on phone: Not on file     Gets together: Not on file     Attends Yazdanism service: Not on file     Active member of club or organization: Not on file     Attends meetings of clubs or organizations: Not on file     Relationship status: Not on file    Intimate partner violence     Fear of current or ex partner: Not on file     Emotionally abused: Not on file     Physically abused: Not on file     Forced sexual activity: Not on file   Other Topics Concern    Not on file   Social History Narrative    Not on file       SCREENINGS      @RSIL([id number])@      PHYSICAL EXAM    (up to 7 for level 4, 8 or more for level 5)     ED Triage Vitals [03/13/20 1805]   BP Temp Temp Source Pulse Resp SpO2 Height Weight   (!) 156/95 98 °F (36.7 °C) Oral 84 18 99 % 5' 2\" (1.575 m) 113 lb (51.3 kg)       Physical Exam  Vitals signs and nursing note reviewed. Constitutional:       General: She is not in acute distress. Appearance: She is not ill-appearing, toxic-appearing or diaphoretic. Comments: Somewhat frail   HENT:      Head: Normocephalic and atraumatic. Nose: Nose normal.      Mouth/Throat:      Mouth: Mucous membranes are moist.      Pharynx: Oropharynx is clear.    Eyes:      Conjunctiva/sclera: Conjunctivae normal.   Neck:      Musculoskeletal: Normal range of motion and neck supple. Cardiovascular:      Rate and Rhythm: Normal rate. Rhythm irregular. Heart sounds: Normal heart sounds. Pulmonary:      Effort: Pulmonary effort is normal.      Breath sounds: Normal breath sounds. Musculoskeletal:      Right lower leg: No edema. Left lower leg: No edema. Skin:     General: Skin is warm and dry. Neurological:      General: No focal deficit present. Mental Status: She is alert and oriented to person, place, and time. Psychiatric:         Mood and Affect: Mood normal.         DIAGNOSTIC RESULTS     EKG: All EKG's are interpreted by the Emergency Department Physician who either signs or Co-signsthis chart in the absence of a cardiologist.    EKG: a.fib, VR 73, RBBB/LAFFB, no change from 2/12/20    RADIOLOGY:   Non-plain filmimages such as CT, Ultrasound and MRI are read by the radiologist. Plain radiographic images are visualized and preliminarily interpreted by the emergency physician with the below findings:        Interpretation per the Radiologist below, if available at the time ofthis note:    XR CHEST PORTABLE   Final Result   1. New bilateral pleural effusions right greater than left. Most   likely this is early congestive failure.    Signed by Dr Maurizio Paez on 3/13/2020 6:37 PM            ED BEDSIDE ULTRASOUND:   Performed by ED Physician - none    LABS:  Labs Reviewed   CBC WITH AUTO DIFFERENTIAL - Abnormal; Notable for the following components:       Result Value    WBC 4.7 (*)     RBC 3.83 (*)     .8 (*)     MCH 31.3 (*)     MCHC 31.1 (*)     Monocytes % 14.3 (*)     Basophils % 1.5 (*)     All other components within normal limits   COMPREHENSIVE METABOLIC PANEL W/ REFLEX TO MG FOR LOW K - Abnormal; Notable for the following components:    CO2 30 (*)     CREATININE 1.2 (*)     GFR Non- 43 (*)     AST 41 (*)     All other components within normal

## 2020-03-13 NOTE — TELEPHONE ENCOUNTER
Patients son called requesting for an xray of the lungs. Stated her cough has become tight and its like she is choking. She has also gained 6 pounds in week. Per speaking with Carmen José we recommended she go to urgent care to be soon .

## 2020-03-15 RX ORDER — ESCITALOPRAM OXALATE 10 MG/1
TABLET ORAL
Qty: 30 TABLET | Refills: 1 | Status: SHIPPED | OUTPATIENT
Start: 2020-03-15 | End: 2020-06-02

## 2020-03-15 RX ORDER — DONEPEZIL HYDROCHLORIDE 5 MG/1
TABLET, FILM COATED ORAL
Qty: 30 TABLET | Refills: 1 | Status: SHIPPED | OUTPATIENT
Start: 2020-03-15 | End: 2020-06-02

## 2020-03-16 LAB
EKG P AXIS: NORMAL DEGREES
EKG P-R INTERVAL: NORMAL MS
EKG Q-T INTERVAL: 416 MS
EKG QRS DURATION: 142 MS
EKG QTC CALCULATION (BAZETT): 440 MS
EKG T AXIS: 86 DEGREES

## 2020-03-16 PROCEDURE — 93010 ELECTROCARDIOGRAM REPORT: CPT | Performed by: INTERNAL MEDICINE

## 2020-03-18 ENCOUNTER — ANTI-COAG VISIT (OUTPATIENT)
Dept: CARDIOLOGY | Age: 85
End: 2020-03-18

## 2020-03-18 LAB
INR BLD: 3.2
INR BLD: 3.3

## 2020-03-25 PROBLEM — J43.1 PANLOBULAR EMPHYSEMA (HCC): Status: RESOLVED | Noted: 2018-11-28 | Resolved: 2020-03-24

## 2020-03-31 LAB — INR BLD: 1.5

## 2020-04-03 ENCOUNTER — ANTI-COAG VISIT (OUTPATIENT)
Dept: CARDIOLOGY | Age: 85
End: 2020-04-03

## 2020-04-07 DIAGNOSIS — I50.22 CHRONIC SYSTOLIC CHF (CONGESTIVE HEART FAILURE), NYHA CLASS 3 (HCC): ICD-10-CM

## 2020-04-07 LAB
ALBUMIN SERPL-MCNC: 3.6 G/DL (ref 3.5–5.2)
ALP BLD-CCNC: 48 U/L (ref 35–104)
ALT SERPL-CCNC: 17 U/L (ref 5–33)
ANION GAP SERPL CALCULATED.3IONS-SCNC: 13 MMOL/L (ref 7–19)
AST SERPL-CCNC: 28 U/L (ref 5–32)
BILIRUB SERPL-MCNC: 0.3 MG/DL (ref 0.2–1.2)
BUN BLDV-MCNC: 36 MG/DL (ref 8–23)
CALCIUM SERPL-MCNC: 9.5 MG/DL (ref 8.8–10.2)
CHLORIDE BLD-SCNC: 103 MMOL/L (ref 98–111)
CO2: 27 MMOL/L (ref 22–29)
CREAT SERPL-MCNC: 1.5 MG/DL (ref 0.5–0.9)
GFR NON-AFRICAN AMERICAN: 33
GLUCOSE BLD-MCNC: 93 MG/DL (ref 74–109)
POTASSIUM SERPL-SCNC: 5.1 MMOL/L (ref 3.5–5)
PRO-BNP: 2876 PG/ML (ref 0–1800)
SODIUM BLD-SCNC: 143 MMOL/L (ref 136–145)
TOTAL PROTEIN: 6.9 G/DL (ref 6.6–8.7)

## 2020-04-09 ENCOUNTER — PATIENT MESSAGE (OUTPATIENT)
Dept: PRIMARY CARE CLINIC | Age: 85
End: 2020-04-09

## 2020-04-09 ENCOUNTER — TELEPHONE (OUTPATIENT)
Dept: INTERNAL MEDICINE | Age: 85
End: 2020-04-09

## 2020-04-09 NOTE — TELEPHONE ENCOUNTER
----- Message from NONI Logan sent at 4/8/2020  6:02 PM CDT -----    LM for Anibal Elise to call back      Results are abnormal. K + is elevated at 5.1, BUN elevated and creat at 1.5, drink more water. Give biolyte if possible. 1/2 bottle every day for 4 days. How much lasix is she currently on? . I think the family titrate that up and down. Please find out if she is taking K+ or any other supplements.

## 2020-04-09 NOTE — TELEPHONE ENCOUNTER
Sharon called,informed of lab results as noted below. VU on use of Biolyte 1/2 bottle x4 days and to drink more water  [facility] doesn't know exactly how much lasix but has chart at home and will call with that information  [facility] reports no potassium or other supplements that she is aware of. Results are abnormal. K + is elevated at 5.1, BUN elevated and creat at 1.5, drink more water. Give biolyte if possible. 1/2 bottle every day for 4 days. How much lasix is she currently on. I think the family titrate that up and down. Please find out if she is taking K+ or any other supplements.

## 2020-04-09 NOTE — TELEPHONE ENCOUNTER
Lasik 4/3 -1 whole pill    4/4 -whole pill 4/5 no pill    4/6 -1/2 pill   4/7- 1/2 pill   4/8 no pill   4/9 -  no pill   4/7 - 1/2 a  bottle of biolyte     4/8 -  1/2 a bottle of biolyte     But biolyte has 400 mg of potassium in one bottle

## 2020-04-13 RX ORDER — ISOSORBIDE MONONITRATE 30 MG/1
30 TABLET, EXTENDED RELEASE ORAL DAILY
Qty: 30 TABLET | Refills: 5 | Status: SHIPPED | OUTPATIENT
Start: 2020-04-13 | End: 2020-10-29

## 2020-04-13 NOTE — TELEPHONE ENCOUNTER
Potassium is dosed off of MEQ, not generally MG. Yes you can buy mg of K+ over the counter but the potency of this is low. Thus, because Ms. Hanna Summers gets dehydrated easily causing her creat to rise and GFR to fall. The potassium in the biolyte is not as concerning as the failing kidneys that are worsening because of dehydration. Hope that makes since. Water would be good but Ms. Clement Ford is generally against this. She hates water, but I am ok with her having tea if she will drink it. Also We will need to get a repeat K+ on her.

## 2020-04-14 ENCOUNTER — ANTI-COAG VISIT (OUTPATIENT)
Dept: CARDIOLOGY | Age: 85
End: 2020-04-14

## 2020-04-14 ENCOUNTER — TELEMEDICINE (OUTPATIENT)
Dept: PRIMARY CARE CLINIC | Age: 85
End: 2020-04-14
Payer: MEDICARE

## 2020-04-14 PROBLEM — E86.0 DEHYDRATION: Status: ACTIVE | Noted: 2020-04-14

## 2020-04-14 LAB — INR BLD: 1.7

## 2020-04-14 PROCEDURE — G8427 DOCREV CUR MEDS BY ELIG CLIN: HCPCS | Performed by: NURSE PRACTITIONER

## 2020-04-14 PROCEDURE — 1090F PRES/ABSN URINE INCON ASSESS: CPT | Performed by: NURSE PRACTITIONER

## 2020-04-14 PROCEDURE — 1123F ACP DISCUSS/DSCN MKR DOCD: CPT | Performed by: NURSE PRACTITIONER

## 2020-04-14 PROCEDURE — 4040F PNEUMOC VAC/ADMIN/RCVD: CPT | Performed by: NURSE PRACTITIONER

## 2020-04-14 PROCEDURE — 99214 OFFICE O/P EST MOD 30 MIN: CPT | Performed by: NURSE PRACTITIONER

## 2020-04-14 PROCEDURE — 1036F TOBACCO NON-USER: CPT | Performed by: NURSE PRACTITIONER

## 2020-04-14 PROCEDURE — G8420 CALC BMI NORM PARAMETERS: HCPCS | Performed by: NURSE PRACTITIONER

## 2020-04-14 ASSESSMENT — ENCOUNTER SYMPTOMS
BACK PAIN: 1
GASTROINTESTINAL NEGATIVE: 1
EYES NEGATIVE: 1
ALLERGIC/IMMUNOLOGIC NEGATIVE: 1
RESPIRATORY NEGATIVE: 1

## 2020-04-14 NOTE — PROGRESS NOTES
Take 1 tablet by mouth every other day Indications: M, W, F Yes NONI Najera - CNP   warfarin (COUMADIN) 5 MG tablet Take 5 mg by mouth Take 1/2 tabs all days except on Wednesday take 1 tab.  Yes Historical Provider, MD   aspirin 81 MG chewable tablet Take 1 tablet by mouth daily  Patient taking differently: Take 81 mg by mouth every morning  Yes Kaylin Griffith DO   alendronate (FOSAMAX) 70 MG tablet Take 1 tablet by mouth every 7 days  Patient taking differently: Take 70 mg by mouth every 7 days sundays Yes NONI Harrison   OXYGEN Inhale 2 L into the lungs continuous Yes Historical Provider, MD   ACETAMINOPHEN-CODEINE #3 PO Take by mouth every 4 hours as needed Yes Historical Provider, MD   nitroGLYCERIN (NITROSTAT) 0.4 MG SL tablet Place 1 tablet under the tongue every 5 minutes as needed for Chest pain Yes NONI Villafuerte   allopurinol (ZYLOPRIM) 100 MG tablet Take 1 tablet by mouth daily  Patient taking differently: Take 100 mg by mouth every morning  Yes NONI Harrison   bimatoprost 0.01 % SOLN Place 1 drop into both eyes nightly  Yes Historical Provider, MD   guaiFENesin (MUCINEX) 600 MG extended release tablet Take 1 tablet by mouth 2 times daily as needed for Congestion  Kait Alejandra MD   montelukast (SINGULAIR) 10 MG tablet Take 1 tablet by mouth nightly  NONI Harrison   furosemide (LASIX) 20 MG tablet Take 1 tablet by mouth daily  NONI Harrison   diclofenac sodium 1 % GEL Apply 4 g topically 4 times daily  Patient taking differently: Apply 4 g topically 4 times daily as needed for Pain   NONI Harrison   atorvastatin (LIPITOR) 10 MG tablet Take 1 tablet by mouth daily  Patient taking differently: Take 10 mg by mouth nightly   NONI Harrison   ondansetron (ZOFRAN) 4 MG tablet Take 1 tablet by mouth 3 times daily as needed for Nausea or Vomiting  NONI Harrison   tiotropium (SPIRIVA RESPIMAT) 2.5  Alzheimer's Disease Sister    ,   Immunization History   Administered Date(s) Administered    Influenza, Quadv, IM, PF (6 mo and older Fluzone, Flulaval, Fluarix, and 3 yrs and older Afluria) 01/03/2019    Influenza, Triv, inactivated, subunit, adjuvanted, IM (Fluad 65 yrs and older) 11/01/2019    Pneumococcal Conjugate Vaccine 10/27/1993    Pneumococcal Polysaccharide ([id number]) 11/03/2015, 05/03/2019    Pneumococcal Vaccine 10/27/1993    Tdap (Boostrix, Adacel) 10/01/2010   ,   Health Maintenance   Topic Date Due    Shingles Vaccine (1 of 2) 07/09/2020 (Originally 7/2/1983)    DTaP/Tdap/Td vaccine (2 - Td) 10/01/2020    Annual Wellness Visit (AWV)  11/19/2020    Potassium monitoring  04/07/2021    Creatinine monitoring  04/07/2021    Flu vaccine  Completed    Pneumococcal 65+ years Vaccine  Completed    Hepatitis A vaccine  Aged Out    Hepatitis B vaccine  Aged Out    Hib vaccine  Aged Out    Meningococcal (ACWY) vaccine  Aged Out       PHYSICAL EXAMINATION:  [ INSTRUCTIONS:  \"[x]\" Indicates a positive item  \"[]\" Indicates a negative item  -- DELETE ALL ITEMS NOT EXAMINED]  Vital Signs: (As obtained by patient/caregiver or practitioner observation)    Blood pressure- 80/47 Heart rate- 68   Respiratory rate- 18  Temperature- n/a Pulse oximetry- 98    Constitutional: [x] Appears well-developed and well-nourished [x] No apparent distress      [] Abnormal-   Mental status  [x] Alert and awake  [] Oriented to person/place/time [x]Able to follow commands      Eyes:  EOM    [x]  Normal  [] Abnormal-  Sclera  [x]  Normal  [] Abnormal -         Discharge [x]  None visible  [] Abnormal -    HENT:   [x] Normocephalic, atraumatic.   [] Abnormal   [x] Mouth/Throat: Mucous membranes are moist.     External Ears [x] Normal  [] Abnormal-     Neck: [x] No visualized mass     Pulmonary/Chest: [] Respiratory effort normal.  [] No visualized signs of difficulty breathing or respiratory distress        [x]

## 2020-04-20 LAB
ALBUMIN SERPL-MCNC: 3.8 G/DL (ref 3.5–5.2)
ALP BLD-CCNC: 55 U/L (ref 35–104)
ALT SERPL-CCNC: 17 U/L (ref 5–33)
ANION GAP SERPL CALCULATED.3IONS-SCNC: 9 MMOL/L (ref 7–19)
AST SERPL-CCNC: 34 U/L (ref 5–32)
BACTERIA: NEGATIVE /HPF
BASOPHILS ABSOLUTE: 0.1 K/UL (ref 0–0.2)
BASOPHILS RELATIVE PERCENT: 1 % (ref 0–1)
BILIRUB SERPL-MCNC: 0.4 MG/DL (ref 0.2–1.2)
BILIRUBIN URINE: NEGATIVE
BLOOD, URINE: NEGATIVE
BUN BLDV-MCNC: 35 MG/DL (ref 8–23)
CALCIUM SERPL-MCNC: 9.8 MG/DL (ref 8.8–10.2)
CHLORIDE BLD-SCNC: 103 MMOL/L (ref 98–111)
CLARITY: CLEAR
CO2: 28 MMOL/L (ref 22–29)
COLOR: YELLOW
CREAT SERPL-MCNC: 1.4 MG/DL (ref 0.5–0.9)
CREATININE URINE: 137.9 MG/DL (ref 4.2–622)
EOSINOPHILS ABSOLUTE: 0.2 K/UL (ref 0–0.6)
EOSINOPHILS RELATIVE PERCENT: 3 % (ref 0–5)
EPITHELIAL CELLS, UA: 2 /HPF (ref 0–5)
GFR NON-AFRICAN AMERICAN: 36
GLUCOSE BLD-MCNC: 94 MG/DL (ref 74–109)
GLUCOSE URINE: NEGATIVE MG/DL
HCT VFR BLD CALC: 38.8 % (ref 37–47)
HEMOGLOBIN: 12 G/DL (ref 12–16)
HYALINE CASTS: 1 /HPF (ref 0–8)
IMMATURE GRANULOCYTES #: 0 K/UL
KETONES, URINE: NEGATIVE MG/DL
LEUKOCYTE ESTERASE, URINE: NEGATIVE
LYMPHOCYTES ABSOLUTE: 1.4 K/UL (ref 1.1–4.5)
LYMPHOCYTES RELATIVE PERCENT: 28.1 % (ref 20–40)
MAGNESIUM: 2.3 MG/DL (ref 1.6–2.4)
MCH RBC QN AUTO: 30.8 PG (ref 27–31)
MCHC RBC AUTO-ENTMCNC: 30.9 G/DL (ref 33–37)
MCV RBC AUTO: 99.5 FL (ref 81–99)
MONOCYTES ABSOLUTE: 0.8 K/UL (ref 0–0.9)
MONOCYTES RELATIVE PERCENT: 15.6 % (ref 0–10)
NEUTROPHILS ABSOLUTE: 2.6 K/UL (ref 1.5–7.5)
NEUTROPHILS RELATIVE PERCENT: 52.1 % (ref 50–65)
NITRITE, URINE: NEGATIVE
PARATHYROID HORMONE INTACT: 119.4 PG/ML (ref 15–65)
PDW BLD-RTO: 14.6 % (ref 11.5–14.5)
PH UA: 6 (ref 5–8)
PHOSPHORUS: 4.2 MG/DL (ref 2.5–4.5)
PLATELET # BLD: 150 K/UL (ref 130–400)
PMV BLD AUTO: 11.7 FL (ref 9.4–12.3)
POTASSIUM SERPL-SCNC: 4.9 MMOL/L (ref 3.5–5)
PROTEIN PROTEIN: 52 MG/DL (ref 15–45)
PROTEIN UA: 30 MG/DL
RBC # BLD: 3.9 M/UL (ref 4.2–5.4)
RBC UA: 0 /HPF (ref 0–4)
SODIUM BLD-SCNC: 140 MMOL/L (ref 136–145)
SPECIFIC GRAVITY UA: 1.02 (ref 1–1.03)
TOTAL PROTEIN: 7.1 G/DL (ref 6.6–8.7)
URIC ACID, SERUM: 5.1 MG/DL (ref 2.4–5.7)
UROBILINOGEN, URINE: 0.2 E.U./DL
VITAMIN D 25-HYDROXY: 32.4 NG/ML
WBC # BLD: 4.9 K/UL (ref 4.8–10.8)
WBC UA: 3 /HPF (ref 0–5)

## 2020-04-28 ENCOUNTER — ANTI-COAG VISIT (OUTPATIENT)
Dept: CARDIOLOGY | Age: 85
End: 2020-04-28

## 2020-04-28 ENCOUNTER — TELEMEDICINE (OUTPATIENT)
Dept: PRIMARY CARE CLINIC | Age: 85
End: 2020-04-28
Payer: MEDICARE

## 2020-04-28 LAB — INR BLD: 4.4

## 2020-04-28 PROCEDURE — 99214 OFFICE O/P EST MOD 30 MIN: CPT | Performed by: NURSE PRACTITIONER

## 2020-04-28 PROCEDURE — 4040F PNEUMOC VAC/ADMIN/RCVD: CPT | Performed by: NURSE PRACTITIONER

## 2020-04-28 PROCEDURE — G8427 DOCREV CUR MEDS BY ELIG CLIN: HCPCS | Performed by: NURSE PRACTITIONER

## 2020-04-28 PROCEDURE — 1036F TOBACCO NON-USER: CPT | Performed by: NURSE PRACTITIONER

## 2020-04-28 PROCEDURE — 1090F PRES/ABSN URINE INCON ASSESS: CPT | Performed by: NURSE PRACTITIONER

## 2020-04-28 PROCEDURE — 1123F ACP DISCUSS/DSCN MKR DOCD: CPT | Performed by: NURSE PRACTITIONER

## 2020-04-28 PROCEDURE — G8420 CALC BMI NORM PARAMETERS: HCPCS | Performed by: NURSE PRACTITIONER

## 2020-04-28 RX ORDER — ACETAMINOPHEN AND CODEINE PHOSPHATE 300; 30 MG/1; MG/1
1 TABLET ORAL EVERY 8 HOURS PRN
Qty: 60 TABLET | Refills: 0 | Status: SHIPPED | OUTPATIENT
Start: 2020-04-28 | End: 2020-05-28

## 2020-04-28 NOTE — PROGRESS NOTES
moderate episode of major depressive disorder without prior episode ([address] 11/14/2019    GERD (gastroesophageal reflux disease)     Glaucoma     Hearing loss     Hypertension     Osteoarthritis     Osteoporosis     Panlobular emphysema ([address] 11/28/2018    Shingles     Sinus problem     Wears glasses    ,   Past Surgical History:   Procedure Laterality Date    BREAST BIOPSY      Left-benign    BREAST BIOPSY  feb 2014    Left    HYSTERECTOMY      OVARY SURGERY      tumor removed     SKIN CANCER EXCISION  01/2020   ,   Social History     Tobacco Use    Smoking status: Never Smoker    Smokeless tobacco: Never Used   Substance Use Topics    Alcohol use: No    Drug use: No   ,   Family History   Problem Relation Age of Onset    Asthma Mother     Emphysema Mother     Alzheimer's Disease Sister     Heart Disease Brother     Heart Disease Sister     No Known Problems Sister     Alzheimer's Disease Sister    ,   Immunization History   Administered Date(s) Administered    Influenza, Quadv, IM, PF (6 mo and older Fluzone, Flulaval, Fluarix, and 3 yrs and older Afluria) 01/03/2019    Influenza, Triv, inactivated, subunit, adjuvanted, IM (Fluad 65 yrs and older) 11/01/2019    Pneumococcal Conjugate Vaccine 10/27/1993    Pneumococcal Polysaccharide ([id number]) 11/03/2015, 05/03/2019    Pneumococcal Vaccine 10/27/1993    Tdap (Boostrix, Adacel) 10/01/2010   ,   Health Maintenance   Topic Date Due    Shingles Vaccine (1 of 2) 07/09/2020 (Originally [birth year]1983)    DTaP/Tdap/Td vaccine (2 - Td) 10/01/2020    Annual Wellness Visit (AWV)  11/19/2020    Potassium monitoring  04/20/2021    Creatinine monitoring  04/20/2021    Flu vaccine  Completed    Pneumococcal 65+ years Vaccine  Completed    Hepatitis A vaccine  Aged Out    Hepatitis B vaccine  Aged Out    Hib vaccine  Aged Out    Meningococcal (ACWY) vaccine  Aged Out       PHYSICAL EXAMINATION:  [ INSTRUCTIONS:  \"[x]\" Indicates a positive tablet; Take 1 tablet by mouth every 8 hours as needed for Pain (1/2 - 1 tablet bid prn) for up to 30 days. Dispense: 60 tablet; Refill: 0    3. Advanced care planning/counseling discussion      4. Encounter for monitoring Coumadin therapy        No follow-ups on file. Akil Denise is a 80 y.o. female being evaluated by a Virtual Visit (video visit) encounter to address concerns as mentioned above. A caregiver was present when appropriate. Due to this being a TeleHealth encounter (During IAPEY-03 public health emergency), evaluation of the following organ systems was limited: Vitals/Constitutional/EENT/Resp/CV/GI//MS/Neuro/Skin/Heme-Lymph-Imm. Pursuant to the emergency declaration under the [address] [place] [address], [address] [place] [address] authority and the [employer] Act, this Virtual Visit was conducted with patient's (and/or legal guardian's) consent, to reduce the patient's risk of exposure to COVID-19 and provide necessary medical care. The patient (and/or legal guardian) has also been advised to contact this office for worsening conditions or problems, and seek emergency medical treatment and/or call 911 if deemed necessary. Patient identification was verified at the start of the visit: Yes    Total time spent on this encounter: 30 min    Services were provided through a video synchronous discussion virtually to substitute for in-person clinic visit. Patient and provider were located at their individual homes. --NONI Calderon on 4/29/2020 at 9:11 PM    An electronic signature was used to authenticate this note.

## 2020-04-29 PROBLEM — Z71.89 ADVANCED CARE PLANNING/COUNSELING DISCUSSION: Status: ACTIVE | Noted: 2020-04-29

## 2020-04-29 PROBLEM — G89.29 CHRONIC PAIN OF BOTH KNEES: Status: ACTIVE | Noted: 2020-04-29

## 2020-04-29 PROBLEM — M25.562 CHRONIC PAIN OF BOTH KNEES: Status: ACTIVE | Noted: 2020-04-29

## 2020-04-29 PROBLEM — Z51.81 ENCOUNTER FOR MONITORING COUMADIN THERAPY: Status: ACTIVE | Noted: 2020-04-29

## 2020-04-29 PROBLEM — G89.29 CHRONIC BACK PAIN: Status: ACTIVE | Noted: 2020-04-29

## 2020-04-29 PROBLEM — M54.9 CHRONIC BACK PAIN: Status: ACTIVE | Noted: 2020-04-29

## 2020-04-29 PROBLEM — Z79.01 ENCOUNTER FOR MONITORING COUMADIN THERAPY: Status: ACTIVE | Noted: 2020-04-29

## 2020-04-29 PROBLEM — M25.561 CHRONIC PAIN OF BOTH KNEES: Status: ACTIVE | Noted: 2020-04-29

## 2020-04-29 ASSESSMENT — ENCOUNTER SYMPTOMS
GASTROINTESTINAL NEGATIVE: 1
BACK PAIN: 1
ALLERGIC/IMMUNOLOGIC NEGATIVE: 1
RESPIRATORY NEGATIVE: 1
EYES NEGATIVE: 1

## 2020-05-11 ENCOUNTER — TELEMEDICINE (OUTPATIENT)
Dept: PULMONOLOGY | Facility: CLINIC | Age: 85
End: 2020-05-11

## 2020-05-11 VITALS
HEART RATE: 66 BPM | BODY MASS INDEX: 21.73 KG/M2 | OXYGEN SATURATION: 100 % | SYSTOLIC BLOOD PRESSURE: 101 MMHG | DIASTOLIC BLOOD PRESSURE: 49 MMHG | TEMPERATURE: 98.2 F | WEIGHT: 115 LBS

## 2020-05-11 DIAGNOSIS — J44.9 STAGE 1 MILD COPD BY GOLD CLASSIFICATION (HCC): ICD-10-CM

## 2020-05-11 DIAGNOSIS — J30.9 ALLERGIC RHINITIS, UNSPECIFIED SEASONALITY, UNSPECIFIED TRIGGER: ICD-10-CM

## 2020-05-11 DIAGNOSIS — J43.9 PULMONARY EMPHYSEMA, UNSPECIFIED EMPHYSEMA TYPE (HCC): Primary | ICD-10-CM

## 2020-05-11 DIAGNOSIS — J96.11 CHRONIC RESPIRATORY FAILURE WITH HYPOXIA (HCC): ICD-10-CM

## 2020-05-11 PROCEDURE — 99213 OFFICE O/P EST LOW 20 MIN: CPT | Performed by: NURSE PRACTITIONER

## 2020-05-11 RX ORDER — ACETAMINOPHEN AND CODEINE PHOSPHATE 300; 30 MG/1; MG/1
0.5 TABLET ORAL 2 TIMES DAILY PRN
COMMUNITY

## 2020-05-11 NOTE — PROGRESS NOTES
DEMETRIA Jorgensen  [facility]   Respiratory Disease Clinic  [address] [place] [address]  Phone: [phone]  Fax: [phone]     Neelam Calle is a 86 y.o. female.   : 1933  CC:   Chief Complaint   Patient presents with   • COPD      HPI: This visit is conducted today via video visit 2' covid-19 epidemic.  You have chosen to receive care through a telehealth visit.  Do you consent to use a video/audio connection for your medical care today? Yes.  Also present during video visit where the patient's daughter and daughter-in-law.  The patient gave verbal permission to speak with these 2 family members.  This is a pleasant 86 y.o. female.  The patient has known COPD Gold stage I, chronic respiratory failure with hypoxemia, and emphysema.  The patient uses Spiriva and chronic oxygen therapy the patient is doing well with that and wishes to continue it.  The patient's daughter states that the patient will be going to the chiropractor next week for adjustment.  The patient has no new pulmonary complaints.  She has chronic shortness of breath with exertion, no fevers, no chills, no cough with purulent sputum.  Otherwise, they are adhering to healthy at home recommendations.  The patient's PCP is Paulie Smith MD.    The following portions of the patient's history were reviewed and updated as appropriate: allergies, current medications, past family history, past medical history, past social history, past surgical history and problem list.  Past Medical History:   Diagnosis Date   • Allergic rhinitis    • CHF (congestive heart failure) (CMS/[facility])    • CHF (congestive heart failure) (CMS/[facility])    • COPD (chronic obstructive pulmonary disease) (CMS/[facility])    • Dyspnea    • Edema    • GERD (gastroesophageal reflux disease)    • Hematuria    • Hyperlipidemia    • Hypoxemia    • Kidney disease    • Renal failure    • Weakness      Family History   Problem Relation Age of Onset   • Asthma  Mother    • Emphysema Mother      Social History     Socioeconomic History   • Marital status: Unknown     Spouse name: Not on file   • Number of children: Not on file   • Years of education: Not on file   • Highest education level: Not on file   Tobacco Use   • Smoking status: Never Smoker   • Smokeless tobacco: Never Used   Substance and Sexual Activity   • Alcohol use: No     Frequency: Never   • Drug use: No   • Sexual activity: Defer     Review of Systems   Constitutional: Negative for chills and fever.   HENT: Negative for congestion.    Eyes: Negative for blurred vision.   Respiratory: Positive for shortness of breath. Negative for cough.    Cardiovascular: Negative for chest pain.   Gastrointestinal: Negative for diarrhea, nausea and vomiting.   Endocrine: Negative for cold intolerance and heat intolerance.   Genitourinary: Negative for dysuria.   Musculoskeletal: Negative for arthralgias.   Skin: Negative for rash.   Neurological: Negative for dizziness, weakness and light-headedness.   Hematological: Does not bruise/bleed easily.   Psychiatric/Behavioral: Negative for agitation. The patient is not nervous/anxious.      /49   Pulse 66   Temp 98.2 °F (36.8 °C)   Wt 52.2 kg (115 lb)   SpO2 100%   BMI 21.73 kg/m²   Physical Exam   Constitutional: Nasal cannula in place.   Frail elderly female   Neck: Normal range of motion. Neck supple.   Pulmonary/Chest: No respiratory distress.   Musculoskeletal: Normal range of motion.   Neurological: She is alert.       Pulmonary Functions Testing Results:  PFT Values        Some values may be hidden. Unless noted otherwise, only the newest values recorded on each date are displayed.         Old Values PFT Results 3/14/19 4/19/19   % 110%   FEV1 99% 94%   FEV1/FVC 65.99 64.03%   DLCO  35%   %       Pre Drug PFT Results 3/14/19 4/19/19   No data to display.      Post Drug PFT Results 3/14/19 4/19/19   No data to display.      Other Tests PFT Results  3/14/19 4/19/19   No data to display.              Imaging: None to review today    Assessment and Plan:   Neelam was seen today for copd.    Diagnoses and all orders for this visit:    Pulmonary emphysema, unspecified emphysema type (CMS/HCC)  Continue current treatment regimen.  Stage 1 mild COPD by GOLD classification (CMS/HCC)  Continue Spiriva.  Chronic respiratory failure with hypoxia (CMS/[facility])  Continue chronic oxygen therapy.  The patient is benefiting from it and wants to continue it.  Allergic rhinitis, unspecified seasonality, unspecified trigger  Continue Allegra, Flonase, and Singulair.    Health maintenance:   Influenza vaccine: Yes  Pneumovax 23: Yes  Prevnar 13: Yes  BMI: unable to obtain   Follow up: 6 months  Angeline Silverman, APRN  5/11/2020  17:37    Please note that portions of this note were completed with a voice recognition program.

## 2020-05-11 NOTE — PATIENT INSTRUCTIONS
Chronic Obstructive Pulmonary Disease  Chronic obstructive pulmonary disease (COPD) is a long-term (chronic) lung problem. When you have COPD, it is hard for air to get in and out of your lungs. Usually the condition gets worse over time, and your lungs will never return to normal. There are things you can do to keep yourself as healthy as possible.  · Your doctor may treat your condition with:  ? Medicines.  ? Oxygen.  ? Lung surgery.  · Your doctor may also recommend:  ? Rehabilitation. This includes steps to make your body work better. It may involve a team of specialists.  ? Quitting smoking, if you smoke.  ? Exercise and changes to your diet.  ? Comfort measures (palliative care).  Follow these instructions at home:  Medicines  · Take over-the-counter and prescription medicines only as told by your doctor.  · Talk to your doctor before taking any cough or allergy medicines. You may need to avoid medicines that cause your lungs to be dry.  Lifestyle  · If you smoke, stop. Smoking makes the problem worse. If you need help quitting, ask your doctor.  · Avoid being around things that make your breathing worse. This may include smoke, chemicals, and fumes.  · Stay active, but remember to rest as well.  · Learn and use tips on how to relax.  · Make sure you get enough sleep. Most adults need at least 7 hours of sleep every night.  · Eat healthy foods. Eat smaller meals more often. Rest before meals.  Controlled breathing  Learn and use tips on how to control your breathing as told by your doctor. Try:  · Breathing in (inhaling) through your nose for 1 second. Then, pucker your lips and breath out (exhale) through your lips for 2 seconds.  · Putting one hand on your belly (abdomen). Breathe in slowly through your nose for 1 second. Your hand on your belly should move out. Pucker your lips and breathe out slowly through your lips. Your hand on your belly should move in as you breathe out.    Controlled coughing  Learn  and use controlled coughing to clear mucus from your lungs. Follow these steps:  1. Lean your head a little forward.  2. Breathe in deeply.  3. Try to hold your breath for 3 seconds.  4. Keep your mouth slightly open while coughing 2 times.  5. Spit any mucus out into a tissue.  6. Rest and do the steps again 1 or 2 times as needed.  General instructions  · Make sure you get all the shots (vaccines) that your doctor recommends. Ask your doctor about a flu shot and a pneumonia shot.  · Use oxygen therapy and pulmonary rehabilitation if told by your doctor. If you need home oxygen therapy, ask your doctor if you should buy a tool to measure your oxygen level (oximeter).  · Make a COPD action plan with your doctor. This helps you to know what to do if you feel worse than usual.  · Manage any other conditions you have as told by your doctor.  · Avoid going outside when it is very hot, cold, or humid.  · Avoid people who have a sickness you can catch (contagious).  · Keep all follow-up visits as told by your doctor. This is important.  Contact a doctor if:  · You cough up more mucus than usual.  · There is a change in the color or thickness of the mucus.  · It is harder to breathe than usual.  · Your breathing is faster than usual.  · You have trouble sleeping.  · You need to use your medicines more often than usual.  · You have trouble doing your normal activities such as getting dressed or walking around the house.  Get help right away if:  · You have shortness of breath while resting.  · You have shortness of breath that stops you from:  ? Being able to talk.  ? Doing normal activities.  · Your chest hurts for longer than 5 minutes.  · Your skin color is more blue than usual.  · Your pulse oximeter shows that you have low oxygen for longer than 5 minutes.  · You have a fever.  · You feel too tired to breathe normally.  Summary  · Chronic obstructive pulmonary disease (COPD) is a long-term lung problem.  · The way your  lungs work will never return to normal. Usually the condition gets worse over time. There are things you can do to keep yourself as healthy as possible.  · Take over-the-counter and prescription medicines only as told by your doctor.  · If you smoke, stop. Smoking makes the problem worse.  This information is not intended to replace advice given to you by your health care provider. Make sure you discuss any questions you have with your health care provider.  Document Released: 06/05/2009 Document Revised: 01/22/2018 Document Reviewed: 01/22/2018  [employer] Interactive Patient Education © 2020 [employer]

## 2020-05-12 ENCOUNTER — ANTI-COAG VISIT (OUTPATIENT)
Dept: CARDIOLOGY | Age: 85
End: 2020-05-12

## 2020-05-12 LAB — INR BLD: 3.3

## 2020-05-14 PROBLEM — E86.0 DEHYDRATION: Status: RESOLVED | Noted: 2020-04-14 | Resolved: 2020-05-14

## 2020-05-27 ENCOUNTER — ANTI-COAG VISIT (OUTPATIENT)
Dept: CARDIOLOGY | Age: 85
End: 2020-05-27

## 2020-05-27 LAB — INR BLD: 2.5

## 2020-05-28 RX ORDER — DIGOXIN 125 MCG
125 TABLET ORAL EVERY OTHER DAY
Qty: 45 TABLET | Refills: 3 | Status: SHIPPED | OUTPATIENT
Start: 2020-05-28 | End: 2021-03-30 | Stop reason: SDUPTHER

## 2020-06-02 RX ORDER — DONEPEZIL HYDROCHLORIDE 5 MG/1
TABLET, FILM COATED ORAL
Qty: 30 TABLET | Refills: 1 | Status: SHIPPED | OUTPATIENT
Start: 2020-06-02 | End: 2021-01-19

## 2020-06-02 RX ORDER — ATORVASTATIN CALCIUM 10 MG/1
TABLET, FILM COATED ORAL
Qty: 30 TABLET | Refills: 5 | Status: SHIPPED | OUTPATIENT
Start: 2020-06-02 | End: 2020-12-17

## 2020-06-02 RX ORDER — ESCITALOPRAM OXALATE 10 MG/1
TABLET ORAL
Qty: 30 TABLET | Refills: 1 | Status: SHIPPED | OUTPATIENT
Start: 2020-06-02 | End: 2020-07-29

## 2020-06-03 RX ORDER — FLUTICASONE PROPIONATE 50 MCG
2 SPRAY, SUSPENSION (ML) NASAL DAILY
Qty: 3 BOTTLE | Refills: 1 | Status: SHIPPED | OUTPATIENT
Start: 2020-06-03 | End: 2020-09-21 | Stop reason: SDUPTHER

## 2020-06-11 LAB — INR BLD: 1.3

## 2020-06-11 RX ORDER — FAMOTIDINE 20 MG/1
10 TABLET, FILM COATED ORAL EVERY MORNING
Qty: 30 TABLET | Refills: 5 | Status: SHIPPED | OUTPATIENT
Start: 2020-06-11 | End: 2021-01-19

## 2020-06-12 ENCOUNTER — ANTI-COAG VISIT (OUTPATIENT)
Dept: CARDIOLOGY | Age: 85
End: 2020-06-12

## 2020-06-18 ENCOUNTER — ANTI-COAG VISIT (OUTPATIENT)
Dept: CARDIOLOGY | Age: 85
End: 2020-06-18

## 2020-06-18 ENCOUNTER — TELEPHONE (OUTPATIENT)
Dept: CARDIOLOGY | Age: 85
End: 2020-06-18

## 2020-06-18 LAB — INR BLD: 1.2

## 2020-06-25 ENCOUNTER — ANTI-COAG VISIT (OUTPATIENT)
Dept: CARDIOLOGY | Age: 85
End: 2020-06-25

## 2020-06-25 LAB — INR BLD: 2.1

## 2020-06-29 ENCOUNTER — TELEPHONE (OUTPATIENT)
Dept: PRIMARY CARE CLINIC | Age: 85
End: 2020-06-29

## 2020-07-02 ENCOUNTER — OFFICE VISIT (OUTPATIENT)
Dept: CARDIOLOGY | Age: 85
End: 2020-07-02
Payer: MEDICARE

## 2020-07-02 VITALS
WEIGHT: 117 LBS | DIASTOLIC BLOOD PRESSURE: 66 MMHG | HEART RATE: 68 BPM | HEIGHT: 65 IN | SYSTOLIC BLOOD PRESSURE: 102 MMHG | BODY MASS INDEX: 19.49 KG/M2

## 2020-07-02 PROBLEM — I50.9 CONGESTIVE HEART FAILURE (HCC): Status: ACTIVE | Noted: 2020-07-02

## 2020-07-02 PROBLEM — E78.2 MIXED HYPERLIPIDEMIA: Status: ACTIVE | Noted: 2020-07-02

## 2020-07-02 LAB
INTERNATIONAL NORMALIZATION RATIO, POC: 2.6
PROTHROMBIN TIME, POC: NORMAL

## 2020-07-02 PROCEDURE — 4040F PNEUMOC VAC/ADMIN/RCVD: CPT | Performed by: NURSE PRACTITIONER

## 2020-07-02 PROCEDURE — G8420 CALC BMI NORM PARAMETERS: HCPCS | Performed by: NURSE PRACTITIONER

## 2020-07-02 PROCEDURE — 99214 OFFICE O/P EST MOD 30 MIN: CPT | Performed by: NURSE PRACTITIONER

## 2020-07-02 PROCEDURE — G8427 DOCREV CUR MEDS BY ELIG CLIN: HCPCS | Performed by: NURSE PRACTITIONER

## 2020-07-02 PROCEDURE — 1090F PRES/ABSN URINE INCON ASSESS: CPT | Performed by: NURSE PRACTITIONER

## 2020-07-02 PROCEDURE — 1123F ACP DISCUSS/DSCN MKR DOCD: CPT | Performed by: NURSE PRACTITIONER

## 2020-07-02 PROCEDURE — 85610 PROTHROMBIN TIME: CPT | Performed by: NURSE PRACTITIONER

## 2020-07-02 PROCEDURE — 1036F TOBACCO NON-USER: CPT | Performed by: NURSE PRACTITIONER

## 2020-07-02 RX ORDER — MIRTAZAPINE 15 MG/1
15 TABLET, FILM COATED ORAL NIGHTLY
Qty: 30 TABLET | Refills: 3 | Status: SHIPPED | OUTPATIENT
Start: 2020-07-02 | End: 2020-08-21

## 2020-07-02 RX ORDER — M-VIT,TX,IRON,MINS/CALC/FOLIC 27MG-0.4MG
1 TABLET ORAL DAILY
Status: ON HOLD | COMMUNITY
End: 2021-09-02 | Stop reason: HOSPADM

## 2020-07-02 RX ORDER — ACETAMINOPHEN AND CODEINE PHOSPHATE 300; 30 MG/1; MG/1
0.5 TABLET ORAL 2 TIMES DAILY PRN
COMMUNITY
End: 2020-08-20

## 2020-07-02 NOTE — PATIENT INSTRUCTIONS
New instructions for today:  If blood pressure less than 100 - top number, hold metoprolol dose. Start taking BP in evening prior to evening dose of metoprolol. Weigh yourself daily without clothing at the same time each day. Record a daily weight log. Call the office if you gain 3 pounds or more in 2 to 3 days or 5 pounds in one week. A sudden weight gain may mean that your heart failure is getting worse. Sodium causes your body to hold on to extra water. This may cause your heart failure symptoms to get worse. Limit sodium to 2,000 milligrams (mg) a day or less. That is less than 1 teaspoon of salt a day, including all the salt you eat in cooking or in packaged foods. Fluid restriction of 1500ml per day (about 6 cups of fluid per day). Coumadin can increase your risk of bleeding. If you notice blood in urine or stool, bleeding gums, excessive bruising or cough productive of bloody sputum, notify the office. Information on this blood thinner has been included in your after visit summary. Patient Instructions:  Continue current medications as prescribed. Always keep a current medication list. Bring your medications to every office visit. Continue to follow up with primary care provider for non cardiac medical problems. Call the office with any problems, questions or concerns at [phone]. If you have been asked to keep a blood pressure log, do so for 2 weeks. Call the office to report readings to the triage nurse at [phone]. Follow up with cardiologist as scheduled. The following educational material has been included in this after visit summary for your review: Life simple 7. Avoid heart failure triggers. Coumadin safety. Life simple 7  1) Manage blood pressure - high blood pressure is a major risk factor for heart disease and stroke. Keeping blood pressure in health range reduces strain on your heart, arteries and kidneys. Blood pressure goal is less than 130/80. 2) Control cholesterol - contributes to plaque, which can clog arteries and lead to heart disease and stroke. When you control your cholesterol you are giving your arteries their best chance to remain clear. It is recommended that you get cholesterol lab work done once a year. 3) Reduce blood sugar - most of the food we eat is turning into glucose or blood sugar that our body uses for energy. Over time, high levels of blood sugar can damage your heart, kidneys, eyes and nerves. 4) Get active - living an active life is one of the most rewarding gifts you can give yourself and those you love. Simply put, daily physical activity increases your length and quality of life. Strive to exercise 15 minutes most days of the week. 5)  Eat better - A healthy diet is one of your best weapons for fighting cardiovascular disease. When you eat a heart healthy diet, you improve your chances for feeling good and staying healthy for life. 6)  Lose weight - when you shed extra fat an unnecessary pounds, you reduce the burden on your hear, lungs, blood vessels and skeleton. You give yourself the gift of active living, you lower your blood pressure and help yourself feel better. 7) Stop smoking - cigarette smokers have a higher risk of developing cardiovascular disease. If  You smoke, quitting is the best thing you can do for your health. Check American Heart Association on line for more information on Life's Simple 7 and tips for healthy living. Patient Education        Avoiding Triggers With Heart Failure: Care Instructions  Your Care Instructions     Triggers are anything that make your heart failure flare up. A flare-up is also called \"sudden heart failure\" or \"acute heart failure. \" When you have a flare-up, fluid builds up in your lungs, and you have problems breathing.  You might need to go to the hospital. By watching for changes in your condition and avoiding triggers, you can prevent heart failure flare-ups. Follow-up care is a key part of your treatment and safety. Be sure to make and go to all appointments, and call your doctor if you are having problems. It's also a good idea to know your test results and keep a list of the medicines you take. How can you care for yourself at home? Watch for changes in your weight and condition  · Weigh yourself without clothing at the same time each day. Record your weight. Call your doctor if you have sudden weight gain, such as more than 2 to 3 pounds in a day or 5 pounds in a week. (Your doctor may suggest a different range of weight gain.) A sudden weight gain may mean that your heart failure is getting worse. · Keep a daily record of your symptoms. Write down any changes in how you feel, such as new shortness of breath, cough, or problems eating. Also record if your ankles are more swollen than usual and if you feel more tired than usual. Note anything that you ate or did that could have triggered these changes. Limit sodium  Sodium causes your body to hold on to extra water. This may cause your heart failure symptoms to get worse. People get most of their sodium from processed foods. Fast food and restaurant meals also tend to be very high in sodium. · Your doctor may suggest that you limit sodium. Your doctor can tell you how much sodium is right for you. This includes limiting sodium in cooked and packaged foods. · Read food labels on cans and food packages. They tell you how much sodium you get in one serving. Check the serving size. If you eat more than one serving, you are getting more sodium. · Be aware that sodium can come in forms other than salt, including monosodium glutamate (MSG), sodium citrate, and sodium bicarbonate (baking soda). MSG is often added to Asian food. You can sometimes ask for food without MSG or salt. · Slowly reducing salt will help you adjust to the taste. Take the salt shaker off the table.   · Flavor your food with garlic, lemon juice, onion, vinegar, herbs, and spices instead of salt. Do not use soy sauce, steak sauce, onion salt, garlic salt, mustard, or ketchup on your food, unless it is labeled \"low-sodium\" or \"low-salt. \"  · Make your own salad dressings, sauces, and ketchup without adding salt. · Use fresh or frozen ingredients, instead of canned ones, whenever you can. Choose low-sodium canned goods. · Eat less processed food and food from restaurants, including fast food. Exercise as directed  Moderate, regular exercise is very good for your heart. It improves your blood flow and helps control your weight. But too much exercise can stress your heart and cause a heart failure flare-up. · Check with your doctor before you start an exercise program.  · Walking is an easy way to get exercise. Start out slowly. Gradually increase the length and pace of your walk. Swimming, riding a bike, and using a treadmill are also good forms of exercise. · When you exercise, watch for signs that your heart is working too hard. You are pushing yourself too hard if you cannot talk while you are exercising. If you become short of breath or dizzy or have chest pain, stop, sit down, and rest.  · Do not exercise when you do not feel well. Take medicines correctly  · Take your medicines exactly as prescribed. Call your doctor if you think you are having a problem with your medicine. · Make a list of all the medicines you take. Include those prescribed to you by other doctors and any over-the-counter medicines, vitamins, or supplements you take. Take this list with you when you go to any doctor. · Take your medicines at the same time every day. It may help you to post a list of all the medicines you take every day and what time of day you take them. · Make taking your medicine as simple as you can. Plan times to take your medicines when you are doing other things, such as eating a meal or getting ready for bed.  This will make it easier to remember to take your medicines. · Get organized. Use helpful tools, such as daily or weekly pill containers. When should you call for help? Call 911 if you have symptoms of sudden heart failure such as:  · You have severe trouble breathing. · You cough up pink, foamy mucus. · You have a new irregular or rapid heartbeat. Call your doctor now or seek immediate medical care if:  · You have new or increased shortness of breath. · You are dizzy or lightheaded, or you feel like you may faint. · You have sudden weight gain, such as more than 2 to 3 pounds in a day or 5 pounds in a week. (Your doctor may suggest a different range of weight gain.)  · You have increased swelling in your legs, ankles, or feet. · You are suddenly so tired or weak that you cannot do your usual activities. Watch closely for changes in your health, and be sure to contact your doctor if you develop new symptoms. Where can you learn more? Go to https://[employer]. org and sign in to your [employer] account. Enter J117 in the [employer] box to learn more about \"Avoiding Triggers With Heart Failure: Care Instructions. \"     If you do not have an account, please click on the \"Sign Up Now\" link. Current as of: December 16, 2019               Content Version: 12.5  © 1650-1066 Healthwise, Incorporated. Care instructions adapted under license by [facility] ([facility]). If you have questions about a medical condition or this instruction, always ask your healthcare professional. [place] [address] any warranty or liability for your use of this information. Patient Education        Taking Warfarin Safely: Care Instructions  Your Care Instructions     Warfarin is a medicine that you take to prevent blood clots. It is often called a blood thinner. Doctors give warfarin (such as Coumadin) to reduce the risk of blood clots.  You may be at risk for blood clots if you have atrial fibrillation or deep vein thrombosis. Some other health problems may also put you at risk. Warfarin slows the amount of time it takes for your blood to clot. It can cause bleeding problems. Even if you've been taking warfarin for a while, it's important to know how to take it safely. Foods and other medicines can affect the way warfarin works. Some can make warfarin work too well. This can cause bleeding problems. And some can make it work poorly, so that it does not prevent blood clots very well. You will need regular blood tests to check how long it takes for your blood to form a clot. This test is called a PT or prothrombin time test. The result of the test is called an INR level. Depending on the test results, your doctor or anticoagulation clinic may adjust your dose of warfarin. Follow-up care is a key part of your treatment and safety. Be sure to make and go to all appointments, and call your doctor if you are having problems. It's also a good idea to know your test results and keep a list of the medicines you take. How can you care for yourself at home? Take warfarin safely  · Take your warfarin at the same time each day. · If you miss a dose of warfarin, don't take an extra dose to make up for it. Your doctor can tell you exactly what to do so you don't take too much or too little. · Wear medical alert jewelry that lets others know that you take warfarin. You can buy this at most drugstores. · Don't take warfarin if you are pregnant or planning to get pregnant. Talk to your doctor about how you can prevent getting pregnant while you are taking it. · Don't change your dose or stop taking warfarin unless your doctor tells you to. Effects of medicines and food on warfarin  · Don't start or stop taking any medicines, vitamins, or natural remedies unless you first talk to your doctor. Many medicines can affect how warfarin works.  These include aspirin and other pain relievers, over-the-counter medicines, multivitamins, dietary supplements, and herbal products. · Tell all of your doctors and pharmacists that you take warfarin. Some prescription medicines can affect how warfarin works. · Keep the amount of vitamin K in your diet about the same from day to day. Do not suddenly eat a lot more or a lot less food that is rich in vitamin K than you usually do. Vitamin K affects how warfarin works and how your blood clots. Talk with your doctor before making big changes in your diet. Foods that have a lot of vitamin K include cooked green vegetables, such as:  ? Kale, spinach, turnip greens, noemi greens, Swiss chard, and mustard greens. ? [place] sprouts, broccoli, and cabbage. · Limit your use of alcohol. Avoid bleeding by preventing falls and injuries  · Wear slippers or shoes with nonskid soles. · Remove throw rugs and clutter. · Rearrange furniture and electrical cords to keep them out of walking paths. · Keep stairways, porches, and outside walkways well lit. Use night-lights in hallways and bathrooms. · Be extra careful when you work with sharp tools or knives. When should you call for help? [id number] anytime you think you may need emergency care. For example, call if:  · You have a sudden, severe headache that is different from past headaches. Call your doctor now or seek immediate medical care if:  · You have any abnormal bleeding, such as:  ? Nosebleeds. ? Vaginal bleeding that is different (heavier, more frequent, at a different time of the month) than what you are used to.  ? Bloody or black stools, or rectal bleeding. ? Bloody or pink urine. Watch closely for changes in your health, and be sure to contact your doctor if you have any problems. Where can you learn more? Go to https://[employer] Companypejayleneeweb.[employer]. org and sign in to your [employer] account. Enter D749 in the [employer] box to learn more about \"Taking Warfarin Safely: Care Instructions. \"     If you do not have an account, please click on the \"Sign Up Now\" link. Current as of: December 16, 2019               Content Version: 12.5  © 3739-0338 Healthwise, Incorporated. Care instructions adapted under license by [facility] ([facility]). If you have questions about a medical condition or this instruction, always ask your healthcare professional. [address] any warranty or liability for your use of this information.

## 2020-07-02 NOTE — PROGRESS NOTES
[facility], [place]. [address] Counts include 234 beds at the [facility]  [phone] office  [phone] fax      OFFICE VISIT:  2020    Kajal Vidales - : 1933  Reason For Visit:  Cirilo Noyola is a 80 y.o. female who is here for Follow-up (no cardiac issues ); Hypertension; and Atrial Fibrillation    History:   Diagnosis Orders   1. Congestive heart failure, unspecified HF chronicity, unspecified heart failure type ([address]     2. Essential hypertension     3. Mixed hyperlipidemia     4. Aortic valve insufficiency, etiology of cardiac valve disease unspecified       The patient presents today for cardiology follow up. Overall, the patient is doing well for 80years of age. She resides with her daughter and goes for walks each day. She even hangs out some clothes to dry and goes to the garden. Her weight is stable. The daughter administers Lasix 20 mg 1/2-1 tab daily depending upon weight. The daughter reports \"we have to be careful because she dehydrates really easily. \"  The patient's BP is low on occasion. As a result, her PCP decreased Lopressor 25 mg to 1/2 tab twice daily. The patient has chronic atrial fibrillation on coumadin. No bleeding issues reported. The daughter home monitors. The patient's PCP monitors cholesterol. Subjective  Pia denies exertional chest pain, shortness of breath, orthopnea, paroxysmal nocturnal dyspnea, syncope, presyncope,  edema and fatigue. The patient denies numbness or weakness to suggest cerebrovascular accident or transient ischemic attack. Stable respiratory status on continuous oxygen therapy. No new or worsening dyspnea. + chronic AF.       Kajal Vidales has the following history as recorded in [facility]:  Patient Active Problem List   Diagnosis Code    Acute on chronic congestive heart failure (HCC) I50.9    Hyponatremia E87.1    Chronic renal failure N18.9    Weakness R53.1    Hematuria R31.9    Chronic systolic CHF (congestive heart failure), NYHA class 3 ([facility]) I50.22    Chronic kidney disease, stage III (moderate) ([facility]) N18.3    Atrial fibrillation ([facility]) I48.91    Decline in verbal memory R41.3    Dyspnea R06.00    Allergic rhinitis J30.9    Edema R60.9    Decreased pedal pulses R09.89    COPD exacerbation ([facility]) J44.1    Hypoxemia R09.02    Family discord Z63.8    Essential hypertension I10    Other fatigue R53.83    PVD (peripheral vascular disease) ([facility]) I73.9    Other chest pain R07.89    Panlobular emphysema ([facility]) J43.1    Gastroesophageal reflux disease with esophagitis K21.0    Nausea and vomiting R11.2    Renal failure N19    Stomatitis K12.1    Failure to thrive in adult R62.7    Weight loss R63.4    Bereavement reaction F43.20, Z63.4    Confusion R41.0    Decreased activities of daily living (ADL) Z78.9    Tachycardia with greater than 160 beats per minute R00.0    Accidental medication error T50.901A    Chronic anticoagulation Z79.01    Acute otitis media H66.90    Anxiety F41.9    Late onset Alzheimer's disease without behavioral disturbance ([facility]) G30.1, F02.80    Current moderate episode of major depressive disorder without prior episode ([facility]) F32.1    Benign essential tremor G25.0    Aortic valve regurgitation I35.1    Hypotension I95.9    Mental confusion R41.0    Weight gain R63.5    Chronic back pain M54.9, G89.29    Chronic pain of both knees M25.561, M25.562, G89.29    Encounter for monitoring Coumadin therapy Z51.81, Z79.01    Mixed hyperlipidemia E78.2    Congestive heart failure (HCC) I50.9     Past Medical History:   Diagnosis Date    Acute systolic CHF (congestive heart failure), NYHA class 3 ([facility] 2/24/2017    Allergic rhinitis     Anticoagulant long-term use     coumadin for atrial fib    Anxiety 11/11/2019    Atrial fibrillation ([facility])     Chronic atrial fibrillation 9/28/2017    Chronic back pain     Chronic kidney disease     Chronic kidney disease, stage III (moderate) [facility] 2/17/2017    Chronic systolic CHF (congestive heart failure), NYHA class 3 ([facility] 2/17/2017    Current moderate episode of major depressive disorder without prior episode ([facility] 11/14/2019    GERD (gastroesophageal reflux disease)     Glaucoma     Hearing loss     Hypertension     Mixed hyperlipidemia 7/2/2020    Osteoarthritis     Osteoporosis     Panlobular emphysema ([facility] 11/28/2018    Shingles     Sinus problem     Wears glasses      Past Surgical History:   Procedure Laterality Date    BREAST BIOPSY      Left-benign    BREAST BIOPSY  feb 2014    Left    HYSTERECTOMY      OVARY SURGERY      tumor removed     SKIN CANCER EXCISION  01/2020     Family History   Problem Relation Age of Onset    Asthma Mother     Emphysema Mother     Alzheimer's Disease Sister     Heart Disease Brother     Heart Disease Sister     No Known Problems Sister     Alzheimer's Disease Sister      Social History     Tobacco Use    Smoking status: Never Smoker    Smokeless tobacco: Never Used   Substance Use Topics    Alcohol use: No      Current Outpatient Medications   Medication Sig Dispense Refill    acetaminophen-codeine (TYLENOL #3) 300-30 MG per tablet Take 0.5 tablets by mouth 2 times daily as needed.       Multiple Vitamins-Minerals (THERAPEUTIC MULTIVITAMIN-MINERALS) tablet Take 1 tablet by mouth daily      famotidine (PEPCID) 20 MG tablet Take 0.5 tablets by mouth every morning 30 tablet 5    fluticasone (FLONASE) 50 MCG/ACT nasal spray 2 sprays by Each Nostril route daily 3 Bottle 1    atorvastatin (LIPITOR) 10 MG tablet TAKE ONE TABLET BY MOUTH DAILY 30 tablet 5    donepezil (ARICEPT) 5 MG tablet TAKE ONE TABLET BY MOUTH EVERY NIGHT AT BEDTIME 30 tablet 1    escitalopram (LEXAPRO) 10 MG tablet TAKE ONE TABLET BY MOUTH EVERY DAY 30 tablet 1    digoxin (LANOXIN) 125 MCG tablet Take 1 tablet by mouth every other day Indications: M, W, F 45 tablet 3    metoprolol tartrate (LOPRESSOR) 25 MG tablet Take 1 tablet by mouth 2 times daily 60 tablet 5    isosorbide mononitrate (IMDUR) 30 MG extended release tablet TAKE 1 TABLET BY MOUTH DAILY 30 tablet 5    mirtazapine (REMERON) 15 MG tablet Take 1 tablet by mouth nightly 30 tablet 3    guaiFENesin (MUCINEX) 600 MG extended release tablet Take 1 tablet by mouth 2 times daily as needed for Congestion 14 tablet 0    diclofenac sodium 1 % GEL Apply 4 g topically 4 times daily (Patient taking differently: Apply 4 g topically 4 times daily as needed for Pain ) 2 Tube 0    SPIRIVA HANDIHALER 18 MCG inhalation capsule Inhale 1 capsule into the lungs daily Please instruct on use. 30 capsule 3    warfarin (COUMADIN) 5 MG tablet Take 5 mg by mouth Take 1/2 tabs all days except on Wednesday take 1 tab.       aspirin 81 MG chewable tablet Take 1 tablet by mouth daily (Patient taking differently: Take 81 mg by mouth every morning ) 30 tablet 0    alendronate (FOSAMAX) 70 MG tablet Take 1 tablet by mouth every 7 days (Patient taking differently: Take 70 mg by mouth every 7 days sundays) 4 tablet 11    OXYGEN Inhale 2 L into the lungs continuous      nitroGLYCERIN (NITROSTAT) 0.4 MG SL tablet Place 1 tablet under the tongue every 5 minutes as needed for Chest pain 25 tablet 3    allopurinol (ZYLOPRIM) 100 MG tablet Take 1 tablet by mouth daily (Patient taking differently: Take 100 mg by mouth every morning ) 30 tablet 1    bimatoprost 0.01 % SOLN Place 1 drop into both eyes nightly       montelukast (SINGULAIR) 10 MG tablet Take 1 tablet by mouth nightly 30 tablet 5    furosemide (LASIX) 20 MG tablet Take 1 tablet by mouth daily 30 tablet 2    ondansetron (ZOFRAN) 4 MG tablet Take 1 tablet by mouth 3 times daily as needed for Nausea or Vomiting 30 tablet 0    tiotropium (SPIRIVA RESPIMAT) 2.5 MCG/ACT AERS inhaler Inhale 2 puffs into the lungs daily 1 Inhaler 2    fexofenadine (ALLEGRA) 180 MG tablet Take 180 mg by mouth every morning        No current facility-administered medications for this visit. Allergies: Denosumab and Lisinopril    Review of Systems  Constitutional - no appetite change, or unexpected weight change. No fever, chills or diaphoresis. No significant change in activity level or new onset of fatigue. HEENT - no significant rhinorrhea or epistaxis. No tinnitus or significant hearing loss. Eyes - no sudden vision change or amaurosis. No corneal arcus, xantholasma, subconjunctival hemorrhage or discharge. Respiratory - no significant wheezing, stridor, apnea or cough. .Stable respiratory status on continuous oxygen therapy. No new or worsening dyspnea. Cardiovascular - no exertional chest pain to suggest myocardial ischemia. No orthopnea or PND. No occurrence of slow heart rate. No palpitations. No claudication. + chronic AF. Gastrointestinal - no abdominal swelling or pain. No blood in stool. No severe constipation, diarrhea, nausea, or vomiting. Genitourinary - no dysuria, frequency, or urgency. No flank pain or hematuria. Musculoskeletal - no back pain or myalgia. Ambulates well with walker. Extremities - no clubbing or cyanosis. Mild bilateral ankle edema - non pitting. Skin - no color change or rash. No pallor. No new surgical incision. Neurologic - no speech difficulty, facial asymmetry or lateralizing weakness. No seizures, presyncope or syncope. No significant dizziness. Hematologic - no easy bruising or excessive bleeding. Psychiatric - no severe anxiety or insomnia. No confusion. All other review of systems are negative. Objective  Vital Signs - /66   Pulse 68   Ht 5' 5\" (1.651 m)   Wt 117 lb (53.1 kg)   BMI 19.47 kg/m²   General - Pia is alert, cooperative, and pleasant. Well groomed. No acute distress. Body habitus - Body mass index is 19.47 kg/m². HEENT - Head is normocephalic. No circumoral cyanosis.   Dentition is normal.  EYES -   Lids normal without ptosis. No discharge, edema or subconjunctival hemorrhage. Neck - Symmetrical without apparent mass or lymphadenopathy. Respiratory - Normal respiratory effort without use of accessory muscles. Ausculatation reveals vesicular breath sounds without crackles, wheezes, rub or rhonchi. Cardiovascular - No jugular venous distention. Auscultation reveals irregularly irregular rate and rhythm. No audible clicks, gallop or rub. No murmur. No lower extremity varicosities. No carotid bruits. Abdominal -  No visible distention, mass or pulsations. Extremities - No clubbing or cyanosis. No statis dermatitis or ulcers. Mild non pitting bilateral edema. Musculoskeletal -   No Osler's nodes. Ambulates well with walker. Significant kyphosis and scoliosis noted. Skin -  Warm and dry; no rash or pallor. No new surgical wound. Neurological - No focal neurological deficits. Thought processes coherent. No apparent tremor. Oriented to person, place and time. Psychiatric -  Appropriate affect and mood. Assessment:     Diagnosis Orders   1. Congestive heart failure, unspecified HF chronicity, unspecified heart failure type ([address]     2. Essential hypertension     3. Mixed hyperlipidemia     4. Aortic valve insufficiency, etiology of cardiac valve disease unspecified     5. Chronic atrial fibrillation  POCT INR   6. Chronic anticoagulation      Coumadin     Data reviewed:  12/8/19 echo    Summary   LV is normal in size with normal systolic function. LV ejection fraction   estimated at 60%. RV appears normal in size with normal systolic function. Mild left atrial enlargement. Moderate right atrial enlargement. Aortic valve is trileaflet with mild leaflet thickening. Mild to moderate   (2+) aortic regurgitation. No significant stenosis. Mitral valve is structurally normal with normal leaflet mobility. Mild   mitral regurgitation. No stenosis.    Trace 03/13/20 79   03/13/20 84        Wt Readings from Last 3 Encounters:   07/02/20 117 lb (53.1 kg)   03/13/20 113 lb (51.3 kg)   03/13/20 113 lb (51.3 kg)       Recent Results (from the past 1008 hour(s))   Protime-INR    Collection Time: 05/27/20 12:00 AM   Result Value Ref Range    INR 2.50    Protime-INR    Collection Time: 06/11/20 12:00 AM   Result Value Ref Range    INR 1.30    Protime-INR    Collection Time: 06/18/20 12:00 AM   Result Value Ref Range    INR 1.20    Protime-INR    Collection Time: 06/25/20 12:00 AM   Result Value Ref Range    INR 2.10      Plan  Previous cardiac history and records reviewed. Continue current medical management. Advised to check BP prior to AM and PM dose of Lopressor. If BP less than 481 systolic, advised to hold dose of Lopressor. Follow coumadin dosing schedule. Continue daily weight log and low sodium diet. Continue other current medications as directed. Continue to follow up with primary care provider for non cardiac medical problems. Call the office with any problems, questions or concerns at [phone]. Cardiology follow up: Dr. Devonte Rose 6 months. INR as scheduled - home monitors. Educational included in patient instructions. Avoid heart failure triggers. Coumadin safety.      Bern Bentley, NONI

## 2020-07-15 LAB — INR BLD: 2.2

## 2020-07-17 ENCOUNTER — ANTI-COAG VISIT (OUTPATIENT)
Dept: CARDIOLOGY | Age: 85
End: 2020-07-17

## 2020-07-21 ENCOUNTER — VIRTUAL VISIT (OUTPATIENT)
Dept: PRIMARY CARE CLINIC | Age: 85
End: 2020-07-21
Payer: MEDICARE

## 2020-07-21 PROCEDURE — 99443 PR PHYS/QHP TELEPHONE EVALUATION 21-30 MIN: CPT | Performed by: NURSE PRACTITIONER

## 2020-07-21 RX ORDER — PREDNISONE 10 MG/1
TABLET ORAL
Qty: 1 EACH | Refills: 0 | Status: SHIPPED | OUTPATIENT
Start: 2020-07-21 | End: 2021-06-08

## 2020-07-21 ASSESSMENT — ENCOUNTER SYMPTOMS
RESPIRATORY NEGATIVE: 1
RHINORRHEA: 1
ALLERGIC/IMMUNOLOGIC NEGATIVE: 1
GASTROINTESTINAL NEGATIVE: 1
SINUS PAIN: 1
EYES NEGATIVE: 1
BACK PAIN: 1

## 2020-07-21 NOTE — PATIENT INSTRUCTIONS
Begin prednisone for allergic rhinitis/ sinusitis after labs on Thursday. Follow up of dizziness and allergic rhinitis persist.    Call [facility] to inquire on Spiriva and if another medication is preferred.

## 2020-07-23 LAB
ALBUMIN SERPL-MCNC: 3.8 G/DL (ref 3.5–5.2)
ALP BLD-CCNC: 50 U/L (ref 35–104)
ALT SERPL-CCNC: 17 U/L (ref 5–33)
ANION GAP SERPL CALCULATED.3IONS-SCNC: 15 MMOL/L (ref 7–19)
AST SERPL-CCNC: 34 U/L (ref 5–32)
BACTERIA: NEGATIVE /HPF
BASOPHILS ABSOLUTE: 0 K/UL (ref 0–0.2)
BASOPHILS RELATIVE PERCENT: 0.8 % (ref 0–1)
BILIRUB SERPL-MCNC: 0.4 MG/DL (ref 0.2–1.2)
BILIRUBIN URINE: NEGATIVE
BLOOD, URINE: NEGATIVE
BUN BLDV-MCNC: 36 MG/DL (ref 8–23)
CALCIUM SERPL-MCNC: 9.6 MG/DL (ref 8.8–10.2)
CHLORIDE BLD-SCNC: 106 MMOL/L (ref 98–111)
CLARITY: CLEAR
CO2: 24 MMOL/L (ref 22–29)
COLOR: YELLOW
CREAT SERPL-MCNC: 1.5 MG/DL (ref 0.5–0.9)
CREATININE URINE: 39.5 MG/DL (ref 4.2–622)
CRYSTALS, UA: ABNORMAL /HPF
EOSINOPHILS ABSOLUTE: 0.2 K/UL (ref 0–0.6)
EOSINOPHILS RELATIVE PERCENT: 4.1 % (ref 0–5)
EPITHELIAL CELLS, UA: 2 /HPF (ref 0–5)
GFR AFRICAN AMERICAN: 40
GFR NON-AFRICAN AMERICAN: 33
GLUCOSE BLD-MCNC: 96 MG/DL (ref 74–109)
GLUCOSE URINE: NEGATIVE MG/DL
HCT VFR BLD CALC: 36.4 % (ref 37–47)
HEMOGLOBIN: 11.5 G/DL (ref 12–16)
HYALINE CASTS: 1 /HPF (ref 0–8)
IMMATURE GRANULOCYTES #: 0 K/UL
KETONES, URINE: NEGATIVE MG/DL
LEUKOCYTE ESTERASE, URINE: ABNORMAL
LYMPHOCYTES ABSOLUTE: 1.3 K/UL (ref 1.1–4.5)
LYMPHOCYTES RELATIVE PERCENT: 26.8 % (ref 20–40)
MAGNESIUM: 2.3 MG/DL (ref 1.6–2.4)
MCH RBC QN AUTO: 31.9 PG (ref 27–31)
MCHC RBC AUTO-ENTMCNC: 31.6 G/DL (ref 33–37)
MCV RBC AUTO: 100.8 FL (ref 81–99)
MONOCYTES ABSOLUTE: 0.6 K/UL (ref 0–0.9)
MONOCYTES RELATIVE PERCENT: 12.6 % (ref 0–10)
NEUTROPHILS ABSOLUTE: 2.7 K/UL (ref 1.5–7.5)
NEUTROPHILS RELATIVE PERCENT: 55.5 % (ref 50–65)
NITRITE, URINE: NEGATIVE
PARATHYROID HORMONE INTACT: 241.5 PG/ML (ref 15–65)
PDW BLD-RTO: 14.4 % (ref 11.5–14.5)
PH UA: 5.5 (ref 5–8)
PHOSPHORUS: 4.6 MG/DL (ref 2.5–4.5)
PLATELET # BLD: 126 K/UL (ref 130–400)
PMV BLD AUTO: 11.9 FL (ref 9.4–12.3)
POTASSIUM SERPL-SCNC: 5.4 MMOL/L (ref 3.5–5)
PROTEIN PROTEIN: 11 MG/DL (ref 15–45)
PROTEIN UA: NEGATIVE MG/DL
RBC # BLD: 3.61 M/UL (ref 4.2–5.4)
RBC UA: 0 /HPF (ref 0–4)
SODIUM BLD-SCNC: 145 MMOL/L (ref 136–145)
SPECIFIC GRAVITY UA: 1.01 (ref 1–1.03)
TOTAL PROTEIN: 6.6 G/DL (ref 6.6–8.7)
URIC ACID, SERUM: 5 MG/DL (ref 2.4–5.7)
UROBILINOGEN, URINE: 0.2 E.U./DL
VITAMIN D 25-HYDROXY: 38.2 NG/ML
WBC # BLD: 4.9 K/UL (ref 4.8–10.8)
WBC UA: 8 /HPF (ref 0–5)

## 2020-07-23 RX ORDER — ALENDRONATE SODIUM 70 MG/1
70 TABLET ORAL
Qty: 4 TABLET | Refills: 5 | Status: SHIPPED | OUTPATIENT
Start: 2020-07-23 | End: 2021-01-12

## 2020-07-23 NOTE — TELEPHONE ENCOUNTER
Rosangelalaura Mc called to request a refill on her medication.       Last office visit : 7/21/2020   Next office visit : Visit date not found     Requested Prescriptions     Pending Prescriptions Disp Refills    alendronate (FOSAMAX) 70 MG tablet [Pharmacy Med Name: ALENDRONATE NA 70 MG TAB 70 TAB] 4 tablet 11     Sig: Take 1 tablet by mouth every 7 days            Basilio Ch MA

## 2020-07-29 RX ORDER — ESCITALOPRAM OXALATE 10 MG/1
TABLET ORAL
Qty: 30 TABLET | Refills: 1 | Status: SHIPPED | OUTPATIENT
Start: 2020-07-29 | End: 2020-09-23

## 2020-07-31 LAB
INTERNATIONAL NORMALIZATION RATIO, POC: 4.2
PROTHROMBIN TIME, POC: NORMAL

## 2020-08-04 PROCEDURE — 85610 PROTHROMBIN TIME: CPT | Performed by: NURSE PRACTITIONER

## 2020-08-12 ENCOUNTER — ANTI-COAG VISIT (OUTPATIENT)
Dept: CARDIOLOGY | Age: 85
End: 2020-08-12

## 2020-08-12 LAB — INR BLD: 2.1

## 2020-08-20 RX ORDER — TIOTROPIUM BROMIDE 18 UG/1
CAPSULE ORAL; RESPIRATORY (INHALATION)
Qty: 30 CAPSULE | Refills: 0 | Status: SHIPPED | OUTPATIENT
Start: 2020-08-20 | End: 2020-09-24

## 2020-08-20 RX ORDER — ACETAMINOPHEN AND CODEINE PHOSPHATE 300; 30 MG/1; MG/1
TABLET ORAL
Qty: 60 TABLET | Refills: 0 | Status: SHIPPED | OUTPATIENT
Start: 2020-08-20 | End: 2020-11-05

## 2020-08-20 NOTE — TELEPHONE ENCOUNTER
Sunny Fails called to request a refill on her medication.       Last office visit : 7/21/2020   Next office visit : 8/19/2020     Requested Prescriptions     Pending Prescriptions Disp Refills    Jhonny Quails 18 MCG inhalation capsule [Pharmacy Med Name: Edrie Lasso 18 MCG CP-HANDIHALE 18 CAP] 30 capsule 0     Sig: PLACE 1 CAPSULE IN HANDIHALER AND INHALE CONTENTS ONCE DAILY    acetaminophen-codeine (TYLENOL #3) 300-30 MG per tablet [Pharmacy Med Name: ACETAMINOPHEN-COD #3 TABLET 300-30 TAB] 60 tablet 0     Sig: take one half to one tablet twice daily as needed for pain            Autumn Cogan, MA

## 2020-08-28 LAB — INR BLD: 3.2

## 2020-08-31 ENCOUNTER — ANTI-COAG VISIT (OUTPATIENT)
Dept: CARDIOLOGY | Age: 85
End: 2020-08-31

## 2020-09-14 ENCOUNTER — ANTI-COAG VISIT (OUTPATIENT)
Dept: CARDIOLOGY | Age: 85
End: 2020-09-14

## 2020-09-14 LAB — INR BLD: 3.6

## 2020-09-21 RX ORDER — FLUTICASONE PROPIONATE 50 MCG
2 SPRAY, SUSPENSION (ML) NASAL DAILY
Qty: 3 BOTTLE | Refills: 1 | Status: SHIPPED | OUTPATIENT
Start: 2020-09-21 | End: 2020-09-23 | Stop reason: SDUPTHER

## 2020-09-23 RX ORDER — FLUTICASONE PROPIONATE 50 MCG
2 SPRAY, SUSPENSION (ML) NASAL DAILY
Qty: 3 BOTTLE | Refills: 1 | Status: ON HOLD | OUTPATIENT
Start: 2020-09-23 | End: 2021-09-02 | Stop reason: HOSPADM

## 2020-09-23 NOTE — TELEPHONE ENCOUNTER
Pia called requesting a refill of the below medication which has been pended for you:   Refill was sent to wrong pharmacy   Requested Prescriptions     Signed Prescriptions Disp Refills    fluticasone (FLONASE) 50 MCG/ACT nasal spray 3 Bottle 1     Si sprays by Each Nostril route daily     Authorizing Provider: Errol Diallo     Ordering User: Phillip Hedrick       Last Appointment Date: 2020  Next Appointment Date: 2020    Allergies   Allergen Reactions    Denosumab Other (See Comments)     after had shot got blood in urine--not sure if associated    Lisinopril Other (See Comments)     cough

## 2020-09-24 RX ORDER — TIOTROPIUM BROMIDE 18 UG/1
CAPSULE ORAL; RESPIRATORY (INHALATION)
Qty: 30 CAPSULE | Refills: 0 | Status: SHIPPED | OUTPATIENT
Start: 2020-09-24 | End: 2020-11-12

## 2020-09-30 ENCOUNTER — OFFICE VISIT (OUTPATIENT)
Dept: URGENT CARE | Age: 85
End: 2020-09-30
Payer: MEDICARE

## 2020-09-30 VITALS
RESPIRATION RATE: 18 BRPM | TEMPERATURE: 97.2 F | OXYGEN SATURATION: 100 % | SYSTOLIC BLOOD PRESSURE: 128 MMHG | DIASTOLIC BLOOD PRESSURE: 88 MMHG | HEIGHT: 62 IN | BODY MASS INDEX: 21.53 KG/M2 | HEART RATE: 91 BPM | WEIGHT: 117 LBS

## 2020-09-30 PROCEDURE — 99213 OFFICE O/P EST LOW 20 MIN: CPT | Performed by: NURSE PRACTITIONER

## 2020-09-30 RX ORDER — CEPHALEXIN 250 MG/1
250 CAPSULE ORAL 2 TIMES DAILY
Qty: 14 CAPSULE | Refills: 0 | Status: SHIPPED | OUTPATIENT
Start: 2020-09-30 | End: 2020-10-07

## 2020-09-30 ASSESSMENT — ENCOUNTER SYMPTOMS
COLOR CHANGE: 1
RESPIRATORY NEGATIVE: 1

## 2020-09-30 ASSESSMENT — VISUAL ACUITY: OU: 1

## 2020-09-30 NOTE — PATIENT INSTRUCTIONS
Clean area to left upper leg with soap and water apply triple antibiotic ointment daily  Keflex- hold off today and call me tomorrow to discuss  Follow up with PCP or return to Urgent Care for worsening or unresolved symptoms.

## 2020-09-30 NOTE — PROGRESS NOTES
Formerly [facility] PHYSICIAN SERVICES  [facility] URGENT CARE  [address] [address] [place] [address]  Dept: [phone]  Dept Fax: [phone]: [phone]    Alayna Ny is a 80 y.o. female who presents today for her medical conditions/complaintsas noted below. Alayna Ny is c/o of Insect Bite (LLE)        HPI:     HPI   Mynor Lambert is here with complaint of small lesions to upper posterior left thigh that her daughter noticed this morning. She has 3 small circular ecchymotic lesions but no tenderness or drainage from the areas. She is on Coumadin and also steroids for her joints and her daughter reports her PT/INR has been up and she needs to check her PT/INR when she gets home today. Her daughter is afraid she was bitten by something.     Past Medical History:   Diagnosis Date    Acute systolic CHF (congestive heart failure), NYHA class 3 ([facility]) 2/24/2017    Allergic rhinitis     Anticoagulant long-term use     coumadin for atrial fib    Anxiety 11/11/2019    Atrial fibrillation ([facility])     Chronic atrial fibrillation 9/28/2017    Chronic back pain     Chronic kidney disease     Chronic kidney disease, stage III (moderate) ([facility]) 2/17/2017    Chronic systolic CHF (congestive heart failure), NYHA class 3 ([address] 2/17/2017    Current moderate episode of major depressive disorder without prior episode ([address] 11/14/2019    GERD (gastroesophageal reflux disease)     Glaucoma     Hearing loss     Hypertension     Mixed hyperlipidemia 7/2/2020    Osteoarthritis     Osteoporosis     Panlobular emphysema ([address] 11/28/2018    Shingles     Sinus problem     Wears glasses      Past Surgical History:   Procedure Laterality Date    BREAST BIOPSY      Left-benign    BREAST BIOPSY  feb 2014    Left    HYSTERECTOMY      OVARY SURGERY      tumor removed     SKIN CANCER EXCISION  01/2020       Family History   Problem Relation Age of Onset    Asthma Mother     Emphysema Mother     Alzheimer's Disease Sister     Heart Disease Brother     Heart Disease Sister     No Known Problems Sister     Alzheimer's Disease Sister        Social History     Tobacco Use    Smoking status: Never Smoker    Smokeless tobacco: Never Used   Substance Use Topics    Alcohol use: No      Current Outpatient Medications   Medication Sig Dispense Refill    cephALEXin (KEFLEX) 250 MG capsule Take 1 capsule by mouth 2 times daily for 7 days 14 capsule 0    SPIRIVA HANDIHALER 18 MCG inhalation capsule PLACE 1 CAPSULE IN HANDIHALER AND INHALE CONTENTS ONCE DAILY 30 capsule 0    montelukast (SINGULAIR) 10 MG tablet TAKE 1 TABLET BY MOUTH DAILY AT NIGHT 30 tablet 5    escitalopram (LEXAPRO) 10 MG tablet TAKE ONE TABLET BY MOUTH EVERY DAY 30 tablet 5    fluticasone (FLONASE) 50 MCG/ACT nasal spray 2 sprays by Each Nostril route daily 3 Bottle 1    mupirocin (BACTROBAN) 2 % ointment APPLY TO AFFECTED AREA(S) THREE TIMES A DAY 22 g 0    mirtazapine (REMERON) 15 MG tablet Take 1 tablet by mouth nightly 30 tablet 3    alendronate (FOSAMAX) 70 MG tablet Take 1 tablet by mouth every 7 days 4 tablet 5    predniSONE 10 MG (21) TBPK Take as directed per package instructions.  1 each 0    Multiple Vitamins-Minerals (THERAPEUTIC MULTIVITAMIN-MINERALS) tablet Take 1 tablet by mouth daily      famotidine (PEPCID) 20 MG tablet Take 0.5 tablets by mouth every morning 30 tablet 5    atorvastatin (LIPITOR) 10 MG tablet TAKE ONE TABLET BY MOUTH DAILY 30 tablet 5    donepezil (ARICEPT) 5 MG tablet TAKE ONE TABLET BY MOUTH EVERY NIGHT AT BEDTIME 30 tablet 1    digoxin (LANOXIN) 125 MCG tablet Take 1 tablet by mouth every other day Indications: M, W, F 45 tablet 3    metoprolol tartrate (LOPRESSOR) 25 MG tablet Take 1 tablet by mouth 2 times daily 60 tablet 5    isosorbide mononitrate (IMDUR) 30 MG extended release tablet TAKE 1 TABLET BY MOUTH DAILY 30 tablet 5    guaiFENesin (MUCINEX) 600 MG extended release tablet Take 1 tablet by mouth 2 times daily as needed for Congestion 14 tablet 0    warfarin (COUMADIN) 5 MG tablet Take 5 mg by mouth Take 1/2 tabs all days except on Wednesday take 1 tab.  aspirin 81 MG chewable tablet Take 1 tablet by mouth daily (Patient taking differently: Take 81 mg by mouth every morning ) 30 tablet 0    OXYGEN Inhale 2 L into the lungs continuous      nitroGLYCERIN (NITROSTAT) 0.4 MG SL tablet Place 1 tablet under the tongue every 5 minutes as needed for Chest pain 25 tablet 3    bimatoprost 0.01 % SOLN Place 1 drop into both eyes nightly       furosemide (LASIX) 20 MG tablet Take 1 tablet by mouth daily 30 tablet 2    diclofenac sodium 1 % GEL Apply 4 g topically 4 times daily (Patient taking differently: Apply 4 g topically 4 times daily as needed for Pain ) 2 Tube 0    ondansetron (ZOFRAN) 4 MG tablet Take 1 tablet by mouth 3 times daily as needed for Nausea or Vomiting 30 tablet 0    tiotropium (SPIRIVA RESPIMAT) 2.5 MCG/ACT AERS inhaler Inhale 2 puffs into the lungs daily 1 Inhaler 2    fexofenadine (ALLEGRA) 180 MG tablet Take 180 mg by mouth every morning       allopurinol (ZYLOPRIM) 100 MG tablet Take 1 tablet by mouth daily (Patient taking differently: Take 100 mg by mouth every morning ) 30 tablet 1     No current facility-administered medications for this visit.       Allergies   Allergen Reactions    Denosumab Other (See Comments)     after had shot got blood in urine--not sure if associated    Lisinopril Other (See Comments)     cough         Health Maintenance   Topic Date Due    Shingles Vaccine (1 of 2) 07/02/1983    Lipid screen  02/01/2018    Flu vaccine (1) 09/01/2020    DTaP/Tdap/Td vaccine (2 - Td) 10/01/2020    Annual Wellness Visit (AWV)  11/19/2020    Potassium monitoring  07/23/2021    Creatinine monitoring  07/23/2021    Pneumococcal 65+ years Vaccine  Completed    Hepatitis A vaccine  Aged Out    Hepatitis B vaccine  Aged Out    Hib vaccine  Aged C/ [address] Meningococcal (ACWY) vaccine  Aged Out       Subjective:     Review of Systems   Constitutional: Negative. Respiratory: Negative. Cardiovascular: Negative. Skin: Positive for color change. Negative for rash and wound. Allergic/Immunologic: Positive for immunocompromised state. Hematological: Bruises/bleeds easily.       :Objective      Physical Exam  Vitals signs and nursing note reviewed. Constitutional:       General: She is awake. She is not in acute distress. Appearance: Normal appearance. She is well-developed, well-groomed and normal weight. She is ill-appearing. Comments: Chronically ill appearing   HENT:      Head: Normocephalic. Right Ear: Decreased hearing noted. Left Ear: Decreased hearing noted. Nose: Nose normal.      Mouth/Throat:      Lips: Pink. Eyes:      General: Vision grossly intact. Cardiovascular:      Rate and Rhythm: Normal rate and regular rhythm. Pulmonary:      Effort: Pulmonary effort is normal. No respiratory distress. Musculoskeletal: Normal range of motion. General: No tenderness or deformity. Comments: Uses rollator walker   Skin:     General: Skin is warm and dry. Capillary Refill: Capillary refill takes less than 2 seconds. Findings: Ecchymosis and rash present. Rash is purpuric. Comments: 3 small ecchymotic areas to posterior upper left leg, biggest area is approximately quarter size and the other two areas are much smaller in size. The  2 smaller areas have small open pinpoint centers but no drainage, non tender to touch   Neurological:      General: No focal deficit present. Mental Status: She is alert, oriented to person, place, and time and easily aroused. Mental status is at baseline. Psychiatric:         Attention and Perception: Attention normal.         Mood and Affect: Mood normal.         Speech: Speech normal.         Behavior: Behavior normal. Behavior is cooperative.        /88   Pulse 91 Temp 97.2 °F (36.2 °C) (Oral)   Resp 18   Ht 5' 2\" (1.575 m)   Wt 117 lb (53.1 kg)   SpO2 100%   BMI 21.40 kg/m²     :Assessment       Diagnosis Orders   1. Hx of long term use of blood thinners     2. Hemorrhagic purpura ([facility]         :Plan    No orders of the defined types were placed in this encounter. Return if symptoms worsen or fail to improve. Orders Placed This Encounter   Medications    cephALEXin (KEFLEX) 250 MG capsule     Sig: Take 1 capsule by mouth 2 times daily for 7 days     Dispense:  14 capsule     Refill:  0        Patient Instructions   Clean area to left upper leg with soap and water apply triple antibiotic ointment daily  Keflex- hold off today and call me tomorrow to discuss  Follow up with PCP or return to Urgent Care for worsening or unresolved symptoms. Spoke with daughter Gilmar Or about lesions, they appear  To be ecchymosis/purpura from being on Coumadin and having thin skin. I would like to send her in an antibiotic but for her to hold this and call me tomorrow to discuss whether to start this. She will check her INR at home today and is aware of what measures to take regarding this. She is to carolina the areas with a marker and observe daily because she really does not need any additional medications that are not necessary. Her daughter agrees with plan of care. Patient given educational materials- see patient instructions. Discussed use, benefit, and side effects of prescribedmedications. All patient questions answered. Pt voiced understanding.        Electronically signed by NONI Celaya CNP on 9/30/2020 at 3:49 PM

## 2020-10-01 ENCOUNTER — ANTI-COAG VISIT (OUTPATIENT)
Dept: CARDIOLOGY | Age: 85
End: 2020-10-01
Payer: MEDICARE

## 2020-10-01 LAB
INTERNATIONAL NORMALIZATION RATIO, POC: 2.2
PROTHROMBIN TIME, POC: NORMAL

## 2020-10-01 PROCEDURE — 85610 PROTHROMBIN TIME: CPT | Performed by: NURSE PRACTITIONER

## 2020-10-21 ENCOUNTER — ANTI-COAG VISIT (OUTPATIENT)
Dept: CARDIOLOGY | Age: 85
End: 2020-10-21

## 2020-10-21 LAB
INR BLD: 1.4
INR BLD: 4.1

## 2020-10-28 ENCOUNTER — ANTI-COAG VISIT (OUTPATIENT)
Dept: CARDIOLOGY | Age: 85
End: 2020-10-28

## 2020-10-29 ENCOUNTER — ANTI-COAG VISIT (OUTPATIENT)
Dept: CARDIOLOGY | Age: 85
End: 2020-10-29

## 2020-10-29 LAB — INR BLD: 1.9

## 2020-10-29 RX ORDER — ISOSORBIDE MONONITRATE 30 MG/1
30 TABLET, EXTENDED RELEASE ORAL DAILY
Qty: 30 TABLET | Refills: 5 | Status: SHIPPED | OUTPATIENT
Start: 2020-10-29 | End: 2021-05-10 | Stop reason: SDUPTHER

## 2020-11-03 RX ORDER — FUROSEMIDE 20 MG/1
20 TABLET ORAL DAILY
Qty: 30 TABLET | Refills: 2 | Status: ON HOLD | OUTPATIENT
Start: 2020-11-03 | End: 2021-09-02 | Stop reason: HOSPADM

## 2020-11-12 RX ORDER — TIOTROPIUM BROMIDE 18 UG/1
CAPSULE ORAL; RESPIRATORY (INHALATION)
Qty: 30 CAPSULE | Refills: 0 | Status: SHIPPED | OUTPATIENT
Start: 2020-11-12 | End: 2020-12-28

## 2020-11-17 LAB — INR BLD: 3.1

## 2020-11-18 ENCOUNTER — ANTI-COAG VISIT (OUTPATIENT)
Dept: CARDIOLOGY | Age: 85
End: 2020-11-18

## 2020-12-01 ENCOUNTER — ANTI-COAG VISIT (OUTPATIENT)
Dept: CARDIOLOGY | Age: 85
End: 2020-12-01

## 2020-12-01 LAB — INR BLD: 3.5

## 2020-12-15 ENCOUNTER — ANTI-COAG VISIT (OUTPATIENT)
Dept: CARDIOLOGY | Age: 85
End: 2020-12-15

## 2020-12-15 LAB — INR BLD: 2.4

## 2020-12-17 RX ORDER — ATORVASTATIN CALCIUM 10 MG/1
TABLET, FILM COATED ORAL
Qty: 30 TABLET | Refills: 5 | Status: SHIPPED | OUTPATIENT
Start: 2020-12-17 | End: 2021-03-30 | Stop reason: SDUPTHER

## 2020-12-28 RX ORDER — TIOTROPIUM BROMIDE 18 UG/1
CAPSULE ORAL; RESPIRATORY (INHALATION)
Qty: 30 CAPSULE | Refills: 0 | Status: SHIPPED | OUTPATIENT
Start: 2020-12-28 | End: 2021-02-01 | Stop reason: SDUPTHER

## 2020-12-29 LAB
ALBUMIN SERPL-MCNC: 3.9 G/DL (ref 3.5–5.2)
ALP BLD-CCNC: 51 U/L (ref 35–104)
ALT SERPL-CCNC: 13 U/L (ref 5–33)
ANION GAP SERPL CALCULATED.3IONS-SCNC: 15 MMOL/L (ref 7–19)
AST SERPL-CCNC: 27 U/L (ref 5–32)
BASOPHILS ABSOLUTE: 0.1 K/UL (ref 0–0.2)
BASOPHILS RELATIVE PERCENT: 0.8 % (ref 0–1)
BILIRUB SERPL-MCNC: 0.5 MG/DL (ref 0.2–1.2)
BUN BLDV-MCNC: 41 MG/DL (ref 8–23)
CALCIUM SERPL-MCNC: 10 MG/DL (ref 8.8–10.2)
CHLORIDE BLD-SCNC: 103 MMOL/L (ref 98–111)
CO2: 25 MMOL/L (ref 22–29)
CREAT SERPL-MCNC: 1.5 MG/DL (ref 0.5–0.9)
EOSINOPHILS ABSOLUTE: 0.4 K/UL (ref 0–0.6)
EOSINOPHILS RELATIVE PERCENT: 5.8 % (ref 0–5)
GFR AFRICAN AMERICAN: 40
GFR NON-AFRICAN AMERICAN: 33
GLUCOSE BLD-MCNC: 87 MG/DL (ref 74–109)
HCT VFR BLD CALC: 38.4 % (ref 37–47)
HEMOGLOBIN: 12.2 G/DL (ref 12–16)
IMMATURE GRANULOCYTES #: 0 K/UL
LYMPHOCYTES ABSOLUTE: 1.3 K/UL (ref 1.1–4.5)
LYMPHOCYTES RELATIVE PERCENT: 19.9 % (ref 20–40)
MAGNESIUM: 2.1 MG/DL (ref 1.6–2.4)
MCH RBC QN AUTO: 31.3 PG (ref 27–31)
MCHC RBC AUTO-ENTMCNC: 31.8 G/DL (ref 33–37)
MCV RBC AUTO: 98.5 FL (ref 81–99)
MONOCYTES ABSOLUTE: 0.8 K/UL (ref 0–0.9)
MONOCYTES RELATIVE PERCENT: 12.1 % (ref 0–10)
NEUTROPHILS ABSOLUTE: 3.9 K/UL (ref 1.5–7.5)
NEUTROPHILS RELATIVE PERCENT: 61.2 % (ref 50–65)
PARATHYROID HORMONE INTACT: 97.7 PG/ML (ref 15–65)
PDW BLD-RTO: 13.2 % (ref 11.5–14.5)
PHOSPHORUS: 3.6 MG/DL (ref 2.5–4.5)
PLATELET # BLD: 144 K/UL (ref 130–400)
PMV BLD AUTO: 12.4 FL (ref 9.4–12.3)
POTASSIUM SERPL-SCNC: 4.8 MMOL/L (ref 3.5–5)
RBC # BLD: 3.9 M/UL (ref 4.2–5.4)
SODIUM BLD-SCNC: 143 MMOL/L (ref 136–145)
TOTAL PROTEIN: 7.4 G/DL (ref 6.6–8.7)
URIC ACID, SERUM: 5.5 MG/DL (ref 2.4–5.7)
VITAMIN D 25-HYDROXY: 80.1 NG/ML
WBC # BLD: 6.3 K/UL (ref 4.8–10.8)

## 2020-12-31 LAB — INR BLD: 1.9

## 2021-01-01 ENCOUNTER — OFFICE VISIT (OUTPATIENT)
Dept: PULMONOLOGY | Facility: CLINIC | Age: 86
End: 2021-01-01

## 2021-01-01 VITALS
DIASTOLIC BLOOD PRESSURE: 68 MMHG | OXYGEN SATURATION: 99 % | BODY MASS INDEX: 22.14 KG/M2 | HEART RATE: 69 BPM | WEIGHT: 117.2 LBS | SYSTOLIC BLOOD PRESSURE: 128 MMHG

## 2021-01-01 VITALS
BODY MASS INDEX: 21.73 KG/M2 | HEART RATE: 82 BPM | WEIGHT: 115 LBS | TEMPERATURE: 97 F | SYSTOLIC BLOOD PRESSURE: 122 MMHG | OXYGEN SATURATION: 97 % | DIASTOLIC BLOOD PRESSURE: 60 MMHG

## 2021-01-01 DIAGNOSIS — K21.9 GASTROESOPHAGEAL REFLUX DISEASE, UNSPECIFIED WHETHER ESOPHAGITIS PRESENT: Chronic | ICD-10-CM

## 2021-01-01 DIAGNOSIS — J30.9 ALLERGIC RHINITIS, UNSPECIFIED SEASONALITY, UNSPECIFIED TRIGGER: Chronic | ICD-10-CM

## 2021-01-01 DIAGNOSIS — J44.9 STAGE 1 MILD COPD BY GOLD CLASSIFICATION (HCC): Primary | ICD-10-CM

## 2021-01-01 DIAGNOSIS — J96.11 CHRONIC RESPIRATORY FAILURE WITH HYPOXIA (HCC): Chronic | ICD-10-CM

## 2021-01-01 DIAGNOSIS — J44.9 STAGE 1 MILD COPD BY GOLD CLASSIFICATION (HCC): Primary | Chronic | ICD-10-CM

## 2021-01-01 DIAGNOSIS — J43.9 PULMONARY EMPHYSEMA, UNSPECIFIED EMPHYSEMA TYPE (HCC): Chronic | ICD-10-CM

## 2021-01-01 PROCEDURE — 99214 OFFICE O/P EST MOD 30 MIN: CPT | Performed by: NURSE PRACTITIONER

## 2021-01-01 RX ORDER — ALBUTEROL SULFATE 90 UG/1
2 AEROSOL, METERED RESPIRATORY (INHALATION) EVERY 4 HOURS PRN
Qty: 1 G | Refills: 11 | Status: SHIPPED | OUTPATIENT
Start: 2021-01-01 | End: 2021-01-01

## 2021-01-01 RX ORDER — MIRTAZAPINE 15 MG/1
15 TABLET, FILM COATED ORAL
COMMUNITY
Start: 2021-01-01

## 2021-01-01 RX ORDER — MULTIPLE VITAMINS W/ MINERALS TAB 9MG-400MCG
1 TAB ORAL
COMMUNITY

## 2021-01-01 RX ORDER — GUAIFENESIN 600 MG/1
600 TABLET, EXTENDED RELEASE ORAL DAILY
COMMUNITY

## 2021-01-01 RX ORDER — ESCITALOPRAM OXALATE 10 MG/1
TABLET ORAL
COMMUNITY
Start: 2020-01-01

## 2021-01-01 RX ORDER — ASPIRIN 81 MG/1
81 TABLET ORAL DAILY
COMMUNITY

## 2021-01-04 ENCOUNTER — OFFICE VISIT (OUTPATIENT)
Dept: CARDIOLOGY CLINIC | Age: 86
End: 2021-01-04
Payer: MEDICARE

## 2021-01-04 ENCOUNTER — ANTI-COAG VISIT (OUTPATIENT)
Dept: CARDIOLOGY CLINIC | Age: 86
End: 2021-01-04

## 2021-01-04 VITALS
DIASTOLIC BLOOD PRESSURE: 64 MMHG | WEIGHT: 114 LBS | BODY MASS INDEX: 20.2 KG/M2 | HEIGHT: 63 IN | HEART RATE: 84 BPM | SYSTOLIC BLOOD PRESSURE: 102 MMHG

## 2021-01-04 DIAGNOSIS — I48.20 CHRONIC ATRIAL FIBRILLATION (HCC): Primary | ICD-10-CM

## 2021-01-04 LAB
BACTERIA: ABNORMAL /HPF
BILIRUBIN URINE: NEGATIVE
BLOOD, URINE: NEGATIVE
CLARITY: CLEAR
COLOR: YELLOW
CREATININE URINE: 176.9 MG/DL (ref 4.2–622)
CRYSTALS, UA: ABNORMAL /HPF
EPITHELIAL CELLS, UA: 3 /HPF (ref 0–5)
GLUCOSE URINE: NEGATIVE MG/DL
HYALINE CASTS: 4 /HPF (ref 0–8)
KETONES, URINE: NEGATIVE MG/DL
LEUKOCYTE ESTERASE, URINE: ABNORMAL
NITRITE, URINE: NEGATIVE
PH UA: 5 (ref 5–8)
PROTEIN PROTEIN: 64 MG/DL (ref 15–45)
PROTEIN UA: 100 MG/DL
RBC UA: 1 /HPF (ref 0–4)
SPECIFIC GRAVITY UA: 1.02 (ref 1–1.03)
UROBILINOGEN, URINE: 0.2 E.U./DL
WBC UA: 13 /HPF (ref 0–5)

## 2021-01-04 PROCEDURE — 1123F ACP DISCUSS/DSCN MKR DOCD: CPT | Performed by: INTERNAL MEDICINE

## 2021-01-04 PROCEDURE — 99212 OFFICE O/P EST SF 10 MIN: CPT | Performed by: INTERNAL MEDICINE

## 2021-01-04 PROCEDURE — G8427 DOCREV CUR MEDS BY ELIG CLIN: HCPCS | Performed by: INTERNAL MEDICINE

## 2021-01-04 PROCEDURE — G8484 FLU IMMUNIZE NO ADMIN: HCPCS | Performed by: INTERNAL MEDICINE

## 2021-01-04 PROCEDURE — G8420 CALC BMI NORM PARAMETERS: HCPCS | Performed by: INTERNAL MEDICINE

## 2021-01-04 PROCEDURE — 1090F PRES/ABSN URINE INCON ASSESS: CPT | Performed by: INTERNAL MEDICINE

## 2021-01-04 PROCEDURE — 1036F TOBACCO NON-USER: CPT | Performed by: INTERNAL MEDICINE

## 2021-01-04 PROCEDURE — 93000 ELECTROCARDIOGRAM COMPLETE: CPT | Performed by: INTERNAL MEDICINE

## 2021-01-04 PROCEDURE — 4040F PNEUMOC VAC/ADMIN/RCVD: CPT | Performed by: INTERNAL MEDICINE

## 2021-01-04 NOTE — PROGRESS NOTES
HISTORY  78-year-old lady with severe COPD/O2 maintenance, chronic renal disease, chronic atrial fibrillation, and recurrent congestive failure returns for follow-up. Echocardiogram obtained in December 2019 revealed normal left ventricular size and systolic function, mild left atrial enlargement, and mild to moderate aortic insufficiency. No evidence of pulmonary hypertension is RVSP estimate was 20 mmHg. Her family monitors her weights on a regular basis and adjust her loop diuretic accordingly. She is in addition followed by nephrology. On return today her caregiver tells me that her weight has been down recently and as a consequence they have held her diuretic. She has had no particular complaints. They have been careful in their response to the pandemic. PHYSICAL EXAM  Thin chronically ill-appearing elderly lady with moderate kyphosis -in no apparent distress. EOMs full, sclerae and conjunctiva normal. PERRLA. Mask in place. Trachea midline with no neck masses. Examined in a chair as she is unable to mount the exam table. No carotid bruits or JVD noted at 90 degrees. Markedly decreased breath sounds with some prolongation of the expiratory phase. Distant irregular heart sounds. No significant lower extremity edema. EKG reveals atrial fibrillation with a right bundle branch block, left anterior hemiblock, and a ventricular response rate of 84. ASSESSMENT/PLAN:   1. Volume status -may well be dry given her low pressure. She is to have blood drawn at the nephrologist this afternoon which will better answer the question. 2.  Chronic atrial fibrillation -acceptable ventricular response rate with Coumadin in place. We will discontinue the aspirin as it provides no further protection and markedly increases bleeding risk  3. Pandemic response -appropriate by history. Discussed the need to wear a mask and continue to practice social distancing.  Emphasized the fact that these practices should also be in place with non-household family members and when attending [employer].

## 2021-01-11 NOTE — PROGRESS NOTES
DEMETRIA Jorgensen  [facility]   Respiratory Disease Clinic  [address] [place] [address]  Phone: [phone]  Fax: [phone]       Chief Complaint  COPD, Shortness of Breath, and Emphysema    Subjective    History of Present Illness      Neelam Calle presents to [facility]   The patient's daughter is on the phone during office visit per patient request as the patient is very hard of hearing.  The patient has known COPD Gold stage I, chronic respiratory failure with hypoxemia, allergic rhinitis, and emphysema.  The patient states that she has been doing well from a pulmonary standpoint.  She is using Spiriva, Allegra, Flonase, and Singulair.  The patient has home oxygen.  She is benefiting from it and wishes to continue it.No increasing shortness of breath, no fever, no chills, no cough with purulent sputum.        Objective   Vital Signs:   /60   Pulse 82   Temp 97 °F (36.1 °C)   Wt 52.2 kg (115 lb)   SpO2 97% Comment: 2lt  BMI 21.73 kg/m²     Physical Exam  Vitals signs reviewed.   Constitutional:       General: She is not in acute distress.     Appearance: She is well-developed.      Interventions: Nasal cannula and face mask in place.   HENT:      Head: Normocephalic and atraumatic.      Right Ear: Decreased hearing noted.      Left Ear: Decreased hearing noted.   Eyes:      General: No scleral icterus.     Conjunctiva/sclera: Conjunctivae normal.      Pupils: Pupils are equal, round, and reactive to light.   Neck:      Musculoskeletal: Normal range of motion and neck supple.   Cardiovascular:      Rate and Rhythm: Normal rate and regular rhythm.      Heart sounds: Normal heart sounds. No murmur. No friction rub.   Pulmonary:      Effort: Pulmonary effort is normal. No respiratory distress.      Breath sounds: Normal breath sounds. No wheezing or rales.   Musculoskeletal: Normal range of motion.      Comments: Wheelchair    Skin:     General: Skin is warm and dry.   Neurological:      Mental Status: She is alert.   Psychiatric:         Behavior: Behavior normal.         Thought Content: Thought content normal.         Judgment: Judgment normal.        Result Review :   The following data was reviewed by: DEMETRIA Jorgensen on 01/11/2021:        Francisco Manning Incoming Radiology Results From [employer] - 03/13/2020  5:39 PM CDT  EXAMINATION: XR CHEST PORTABLE 3/13/2020 6:35 PM  HISTORY: XR CHEST PORTABLE 3/13/2020 5:30 PM  HISTORY: Difficulty breathing  COMPARISON: February 12, 2020.  FINDINGS:   The lungs are visualized. Bilateral pleural effusions are noted  greater on the right than the left. This is a change from February 12, 2026 likely this congestive failure.. Cardiac silhouettes upper limits  of normal. Scoliosis in the thoracic spine is noted. Old healed right  rib fractures are present.  The osseous structures and surrounding soft tissues demonstrate no  acute abnormality.  IMPRESSION:  1. New bilateral pleural effusions right greater than left. Most  likely this is early congestive failure.  Signed by Dr Clinton Tom on 3/13/2020 6:37 PM  PFT Values        Some values may be hidden. Unless noted otherwise, only the newest values recorded on each date are displayed.         Old Values PFT Results 3/14/19 4/19/19   % 110%   FEV1 99% 94%   FEV1/FVC 65.99 64.03%   DLCO  35%   %       Pre Drug PFT Results 3/14/19 4/19/19   No data to display.      Post Drug PFT Results 3/14/19 4/19/19   No data to display.      Other Tests PFT Results 3/14/19 4/19/19   No data to display.                    Assessment and Plan    Problem List Items Addressed This Visit        Other    Stage 1 mild COPD by GOLD classification (CMS/[facility]) - Primary    Relevant Medications    guaiFENesin (MUCINEX) 600 MG 12 hr tablet    tiotropium (Spiriva HandiHaler) 18 MCG per inhalation capsule    Chronic respiratory failure with hypoxia  (CMS/[facility])    Allergic rhinitis    Gastroesophageal reflux disease          • COPD-continue Spiriva and Mucinex as needed  • Chronic respiratory failure with hypoxia-continue home oxygen.  She is benefiting from it and wishes to continue it.  • Allergic rhinitis-continue Singulair, Flonase, Allegra  • GERD-continue Pepcid  Health maintenance:   Influenza vaccine: no  Pneumovax 23: yes  Prevnar 13: no   Covid 19: still thinking about vaccine  Patient's Body mass index is 21.73 kg/m². BMI is within normal parameters. No follow-up required..    Follow Up   Angeline Silverman, APRN  1/12/2021  13:25 CST  Return in about 6 months (around 7/11/2021).  Patient was given instructions and counseling regarding her condition or for health maintenance advice. Please see specific information pulled into the AVS if appropriate.

## 2021-01-11 NOTE — PATIENT INSTRUCTIONS
Chronic Obstructive Pulmonary Disease  Chronic obstructive pulmonary disease (COPD) is a long-term (chronic) lung problem. When you have COPD, it is hard for air to get in and out of your lungs. Usually the condition gets worse over time, and your lungs will never return to normal. There are things you can do to keep yourself as healthy as possible.  · Your doctor may treat your condition with:  ? Medicines.  ? Oxygen.  ? Lung surgery.  · Your doctor may also recommend:  ? Rehabilitation. This includes steps to make your body work better. It may involve a team of specialists.  ? Quitting smoking, if you smoke.  ? Exercise and changes to your diet.  ? Comfort measures (palliative care).  Follow these instructions at home:  Medicines  · Take over-the-counter and prescription medicines only as told by your doctor.  · Talk to your doctor before taking any cough or allergy medicines. You may need to avoid medicines that cause your lungs to be dry.  Lifestyle  · If you smoke, stop. Smoking makes the problem worse. If you need help quitting, ask your doctor.  · Avoid being around things that make your breathing worse. This may include smoke, chemicals, and fumes.  · Stay active, but remember to rest as well.  · Learn and use tips on how to relax.  · Make sure you get enough sleep. Most adults need at least 7 hours of sleep every night.  · Eat healthy foods. Eat smaller meals more often. Rest before meals.  Controlled breathing  Learn and use tips on how to control your breathing as told by your doctor. Try:  · Breathing in (inhaling) through your nose for 1 second. Then, pucker your lips and breath out (exhale) through your lips for 2 seconds.  · Putting one hand on your belly (abdomen). Breathe in slowly through your nose for 1 second. Your hand on your belly should move out. Pucker your lips and breathe out slowly through your lips. Your hand on your belly should move in as you breathe out.    Controlled coughing  Learn  and use controlled coughing to clear mucus from your lungs. Follow these steps:  1. Lean your head a little forward.  2. Breathe in deeply.  3. Try to hold your breath for 3 seconds.  4. Keep your mouth slightly open while coughing 2 times.  5. Spit any mucus out into a tissue.  6. Rest and do the steps again 1 or 2 times as needed.  General instructions  · Make sure you get all the shots (vaccines) that your doctor recommends. Ask your doctor about a flu shot and a pneumonia shot.  · Use oxygen therapy and pulmonary rehabilitation if told by your doctor. If you need home oxygen therapy, ask your doctor if you should buy a tool to measure your oxygen level (oximeter).  · Make a COPD action plan with your doctor. This helps you to know what to do if you feel worse than usual.  · Manage any other conditions you have as told by your doctor.  · Avoid going outside when it is very hot, cold, or humid.  · Avoid people who have a sickness you can catch (contagious).  · Keep all follow-up visits as told by your doctor. This is important.  Contact a doctor if:  · You cough up more mucus than usual.  · There is a change in the color or thickness of the mucus.  · It is harder to breathe than usual.  · Your breathing is faster than usual.  · You have trouble sleeping.  · You need to use your medicines more often than usual.  · You have trouble doing your normal activities such as getting dressed or walking around the house.  Get help right away if:  · You have shortness of breath while resting.  · You have shortness of breath that stops you from:  ? Being able to talk.  ? Doing normal activities.  · Your chest hurts for longer than 5 minutes.  · Your skin color is more blue than usual.  · Your pulse oximeter shows that you have low oxygen for longer than 5 minutes.  · You have a fever.  · You feel too tired to breathe normally.  Summary  · Chronic obstructive pulmonary disease (COPD) is a long-term lung problem.  · The way your  lungs work will never return to normal. Usually the condition gets worse over time. There are things you can do to keep yourself as healthy as possible.  · Take over-the-counter and prescription medicines only as told by your doctor.  · If you smoke, stop. Smoking makes the problem worse.  This information is not intended to replace advice given to you by your health care provider. Make sure you discuss any questions you have with your health care provider.  Document Revised: 11/30/2018 Document Reviewed: 01/22/2018  Elsevier Patient Education © 2020 [employer]    Allergic Rhinitis, Adult  Allergic rhinitis is an allergic reaction that affects the mucous membrane inside the nose. It causes sneezing, a runny or stuffy nose, and the feeling of mucus going down the back of the throat (postnasal drip). Allergic rhinitis can be mild to severe.  There are two types of allergic rhinitis:  · Seasonal. This type is also called hay fever. It happens only during certain seasons.  · Perennial. This type can happen at any time of the year.  What are the causes?  This condition happens when the body's defense system (immune system) responds to certain harmless substances called allergens as though they were germs.   Seasonal allergic rhinitis is triggered by pollen, which can come from grasses, trees, and weeds. Perennial allergic rhinitis may be caused by:  · House dust mites.  · Pet dander.  · Mold spores.  What are the signs or symptoms?  Symptoms of this condition include:  · Sneezing.  · Runny or stuffy nose (nasal congestion).  · Postnasal drip.  · Itchy nose.  · Tearing of the eyes.  · Trouble sleeping.  · Daytime sleepiness.  How is this diagnosed?  This condition may be diagnosed based on:  · Your medical history.  · A physical exam.  · Tests to check for related conditions, such as:  ? Asthma.  ? Pink eye.  ? Ear infection.  ? Upper respiratory infection.  · Tests to find out which allergens trigger your symptoms.  These may include skin or blood tests.  How is this treated?  There is no cure for this condition, but treatment can help control symptoms. Treatment may include:  · Taking medicines that block allergy symptoms, such as antihistamines. Medicine may be given as a shot, nasal spray, or pill.  · Avoiding the allergen.  · Desensitization. This treatment involves getting ongoing shots until your body becomes less sensitive to the allergen. This treatment may be done if other treatments do not help.  · If taking medicine and avoiding the allergen does not work, new, stronger medicines may be prescribed.  Follow these instructions at home:  · Find out what you are allergic to. Common allergens include smoke, dust, and pollen.  · Avoid the things you are allergic to. These are some things you can do to help avoid allergens:  ? Replace carpet with wood, tile, or vinyl adela. Carpet can trap dander and dust.  ? Do not smoke. Do not allow smoking in your home.  ? Change your heating and air conditioning filter at least once a month.  ? During allergy season:  § Keep windows closed as much as possible.  § Plan outdoor activities when pollen counts are lowest. This is usually during the evening hours.  § When coming indoors, change clothing and shower before sitting on furniture or bedding.  · Take over-the-counter and prescription medicines only as told by your health care provider.  · Keep all follow-up visits as told by your health care provider. This is important.  Contact a health care provider if:  · You have a fever.  · You develop a persistent cough.  · You make whistling sounds when you breathe (you wheeze).  · Your symptoms interfere with your normal daily activities.  Get help right away if:  · You have shortness of breath.  Summary  · This condition can be managed by taking medicines as directed and avoiding allergens.  · Contact your health care provider if you develop a persistent cough or fever.  · During  allergy season, keep windows closed as much as possible.  This information is not intended to replace advice given to you by your health care provider. Make sure you discuss any questions you have with your health care provider.  Document Revised: 11/30/2018 Document Reviewed: 01/25/2018  Elsevier Patient Education © 2020 [employer] Inc[employer]

## 2021-01-12 PROBLEM — K21.9 GASTROESOPHAGEAL REFLUX DISEASE: Status: ACTIVE | Noted: 2021-01-01

## 2021-01-12 RX ORDER — ALENDRONATE SODIUM 70 MG/1
70 TABLET ORAL
Qty: 4 TABLET | Refills: 5 | Status: SHIPPED | OUTPATIENT
Start: 2021-01-12 | End: 2021-07-23

## 2021-01-13 ENCOUNTER — TELEPHONE (OUTPATIENT)
Dept: PRIMARY CARE CLINIC | Age: 86
End: 2021-01-13

## 2021-01-13 NOTE — TELEPHONE ENCOUNTER
Attempted to call pt's daughter to let her know that I would send in pt's medication and that I would send a note to the provider about concerns. No answer. Left message to call back if there are any more concerns and I would reach out to her again once I hear back from NONI Valles. Pt's daughter states that pt has lost weight and have not been giving her Lasix. Pt's daughter reported that her other doctors say she is doing good but still feels like something has changed with her. Please advise.

## 2021-01-14 ENCOUNTER — ANTI-COAG VISIT (OUTPATIENT)
Dept: CARDIOLOGY CLINIC | Age: 86
End: 2021-01-14

## 2021-01-14 LAB — INR BLD: 2.4

## 2021-01-19 DIAGNOSIS — K21.00 GASTROESOPHAGEAL REFLUX DISEASE WITH ESOPHAGITIS: ICD-10-CM

## 2021-01-19 RX ORDER — FAMOTIDINE 20 MG/1
10 TABLET, FILM COATED ORAL EVERY MORNING
Qty: 30 TABLET | Refills: 5 | Status: ON HOLD | OUTPATIENT
Start: 2021-01-19 | End: 2021-09-02 | Stop reason: HOSPADM

## 2021-01-19 RX ORDER — DONEPEZIL HYDROCHLORIDE 5 MG/1
TABLET, FILM COATED ORAL
Qty: 30 TABLET | Refills: 1 | Status: SHIPPED | OUTPATIENT
Start: 2021-01-19 | End: 2021-03-18

## 2021-02-01 DIAGNOSIS — J43.1 PANLOBULAR EMPHYSEMA (HCC): ICD-10-CM

## 2021-02-01 DIAGNOSIS — J44.1 COPD EXACERBATION (HCC): ICD-10-CM

## 2021-02-01 LAB — INR BLD: 1.5

## 2021-02-01 RX ORDER — TIOTROPIUM BROMIDE 18 UG/1
18 CAPSULE ORAL; RESPIRATORY (INHALATION) DAILY
Qty: 30 CAPSULE | Refills: 2 | Status: ON HOLD | OUTPATIENT
Start: 2021-02-01 | End: 2021-09-02 | Stop reason: HOSPADM

## 2021-02-02 ENCOUNTER — ANTI-COAG VISIT (OUTPATIENT)
Dept: CARDIOLOGY CLINIC | Age: 86
End: 2021-02-02

## 2021-02-17 ENCOUNTER — ANTI-COAG VISIT (OUTPATIENT)
Dept: CARDIOLOGY CLINIC | Age: 86
End: 2021-02-17

## 2021-02-17 LAB — INR BLD: 2.4

## 2021-03-03 ENCOUNTER — ANTI-COAG VISIT (OUTPATIENT)
Dept: CARDIOLOGY CLINIC | Age: 86
End: 2021-03-03

## 2021-03-03 LAB — INR BLD: 2.5

## 2021-03-24 DIAGNOSIS — J30.89 ALLERGIC RHINITIS DUE TO OTHER ALLERGIC TRIGGER, UNSPECIFIED SEASONALITY: ICD-10-CM

## 2021-03-24 RX ORDER — MONTELUKAST SODIUM 10 MG/1
TABLET ORAL
Qty: 30 TABLET | Refills: 5 | Status: ON HOLD | OUTPATIENT
Start: 2021-03-24 | End: 2021-09-02 | Stop reason: HOSPADM

## 2021-03-24 RX ORDER — ESCITALOPRAM OXALATE 10 MG/1
TABLET ORAL
Qty: 30 TABLET | Refills: 5 | Status: ON HOLD | OUTPATIENT
Start: 2021-03-24 | End: 2021-09-02 | Stop reason: HOSPADM

## 2021-03-25 ENCOUNTER — ANTI-COAG VISIT (OUTPATIENT)
Dept: CARDIOLOGY CLINIC | Age: 86
End: 2021-03-25

## 2021-03-25 LAB — INR BLD: 3.1

## 2021-03-30 RX ORDER — DIGOXIN 125 MCG
125 TABLET ORAL EVERY OTHER DAY
Qty: 45 TABLET | Refills: 3 | Status: ON HOLD | OUTPATIENT
Start: 2021-03-30 | End: 2021-09-02 | Stop reason: HOSPADM

## 2021-04-07 ENCOUNTER — OFFICE VISIT (OUTPATIENT)
Dept: PRIMARY CARE CLINIC | Age: 86
End: 2021-04-07
Payer: MEDICARE

## 2021-04-07 VITALS
OXYGEN SATURATION: 99 % | HEIGHT: 62 IN | DIASTOLIC BLOOD PRESSURE: 72 MMHG | BODY MASS INDEX: 20.8 KG/M2 | WEIGHT: 113 LBS | SYSTOLIC BLOOD PRESSURE: 106 MMHG | RESPIRATION RATE: 18 BRPM | TEMPERATURE: 98.6 F | HEART RATE: 71 BPM

## 2021-04-07 DIAGNOSIS — G30.1 LATE ONSET ALZHEIMER'S DISEASE WITHOUT BEHAVIORAL DISTURBANCE (HCC): ICD-10-CM

## 2021-04-07 DIAGNOSIS — F02.80 LATE ONSET ALZHEIMER'S DISEASE WITHOUT BEHAVIORAL DISTURBANCE (HCC): ICD-10-CM

## 2021-04-07 DIAGNOSIS — I50.9 CONGESTIVE HEART FAILURE, UNSPECIFIED HF CHRONICITY, UNSPECIFIED HEART FAILURE TYPE (HCC): ICD-10-CM

## 2021-04-07 DIAGNOSIS — F32.1 CURRENT MODERATE EPISODE OF MAJOR DEPRESSIVE DISORDER WITHOUT PRIOR EPISODE (HCC): ICD-10-CM

## 2021-04-07 DIAGNOSIS — H91.12 PRESBYCUSIS OF LEFT EAR, UNSPECIFIED HEARING STATUS ON CONTRALATERAL SIDE: ICD-10-CM

## 2021-04-07 DIAGNOSIS — J44.1 COPD EXACERBATION (HCC): ICD-10-CM

## 2021-04-07 DIAGNOSIS — M15.9 PRIMARY OSTEOARTHRITIS INVOLVING MULTIPLE JOINTS: Primary | ICD-10-CM

## 2021-04-07 PROBLEM — D69.49: Status: ACTIVE | Noted: 2021-04-07

## 2021-04-07 PROCEDURE — 99213 OFFICE O/P EST LOW 20 MIN: CPT | Performed by: FAMILY MEDICINE

## 2021-04-07 RX ORDER — ACETAMINOPHEN AND CODEINE PHOSPHATE 300; 30 MG/1; MG/1
1 TABLET ORAL EVERY 4 HOURS PRN
Qty: 18 TABLET | Refills: 0 | Status: SHIPPED | OUTPATIENT
Start: 2021-04-07 | End: 2021-04-10

## 2021-04-07 SDOH — ECONOMIC STABILITY: FOOD INSECURITY: WITHIN THE PAST 12 MONTHS, THE FOOD YOU BOUGHT JUST DIDN'T LAST AND YOU DIDN'T HAVE MONEY TO GET MORE.: NEVER TRUE

## 2021-04-07 SDOH — ECONOMIC STABILITY: TRANSPORTATION INSECURITY
IN THE PAST 12 MONTHS, HAS LACK OF TRANSPORTATION KEPT YOU FROM MEETINGS, WORK, OR FROM GETTING THINGS NEEDED FOR DAILY LIVING?: NO

## 2021-04-07 SDOH — ECONOMIC STABILITY: INCOME INSECURITY: HOW HARD IS IT FOR YOU TO PAY FOR THE VERY BASICS LIKE FOOD, HOUSING, MEDICAL CARE, AND HEATING?: NOT HARD AT ALL

## 2021-04-07 ASSESSMENT — ENCOUNTER SYMPTOMS
GASTROINTESTINAL NEGATIVE: 1
EYES NEGATIVE: 1
COUGH: 1

## 2021-04-07 NOTE — PROGRESS NOTES
[address] [facility]  [address] [address]  [address] [place] [address]  Dept: [phone]  Dept Fax: [phone]  Loc: [phone]      Subjective:     Chief Complaint   Patient presents with    Ear Problem     Pt here to have ears checked and possibly cleaned due to ear wax buildup     Discuss Medications     Would like to discuss some questions they have about medications states pt take Tylenol 3 PRN and would like a refill on that if possible     Altered Mental Status     daughter reports that pt has been confused lately states that she woke up from sleep saying she saw a little girl. HPI:  Storm Dugan is a 80 y.o. female presents today for medication management and refills. Pt is accompanied by her youngest daughter. Pt is needing refill of her Tylenol #3 - which she takes  1/2 to 1 pill po once a day as needed. She is hard hearing. No ear pain. She wears a hearing aid. Daughter thinks she needs a new hearing aid  No acute interval change reported since last visit. ROS:   Review of Systems   Constitutional: Negative. HENT: Positive for hearing loss (worsening). Eyes: Negative. Respiratory: Positive for cough (+ hx of COPD). Cardiovascular: Positive for leg swelling (at baseline). Gastrointestinal: Negative. Genitourinary: Negative. Musculoskeletal: Positive for arthralgias and joint swelling. Neurological: Negative.         PMHx:  Past Medical History:   Diagnosis Date    Acute systolic CHF (congestive heart failure), NYHA class 3 (HCC) 2/24/2017    Allergic rhinitis     Anticoagulant long-term use     coumadin for atrial fib    Anxiety 11/11/2019    Atrial fibrillation (HCC)     Chronic atrial fibrillation (HCC) 9/28/2017    Chronic back pain     Chronic kidney disease     Chronic kidney disease, stage III (moderate) 2/17/2017    Chronic systolic CHF (congestive heart failure), NYHA class 3 ([facility] 2/17/2017    Current moderate episode of major depressive disorder without prior episode ([address] 11/14/2019    GERD (gastroesophageal reflux disease)     Glaucoma     Hearing loss     Hypertension     Mixed hyperlipidemia 7/2/2020    Osteoarthritis     Osteoporosis     Panlobular emphysema ([address] 11/28/2018    Shingles     Sinus problem     Wears glasses      Patient Active Problem List   Diagnosis    Acute on chronic congestive heart failure (HCC)    Hyponatremia    Chronic renal failure    Weakness    Hematuria    Chronic systolic CHF (congestive heart failure), NYHA class 3 (HCC)    Chronic kidney disease, stage III (moderate) [facility]    Atrial fibrillation ([facility]    Decline in verbal memory    Dyspnea    Allergic rhinitis    Edema    Decreased pedal pulses    COPD exacerbation ([facility]    Hypoxemia    Family discord    Essential hypertension    Other fatigue    PVD (peripheral vascular disease) ([facility]    Other chest pain    Panlobular emphysema ([address]    Gastroesophageal reflux disease with esophagitis    Nausea and vomiting    Renal failure    Stomatitis    Failure to thrive in adult    Weight loss    Bereavement reaction    Confusion    Decreased activities of daily living (ADL)    Tachycardia with greater than 160 beats per minute    Accidental medication error    Chronic anticoagulation    Acute otitis media    Anxiety    Late onset Alzheimer's disease without behavioral disturbance ([facility]    Current moderate episode of major depressive disorder without prior episode ([address]    Benign essential tremor    Aortic valve regurgitation    Hypotension    Mental confusion    Weight gain    Chronic back pain    Chronic pain of both knees    Encounter for monitoring Coumadin therapy    Mixed hyperlipidemia    Congestive heart failure ([address]    Hemorrhagic purpura ([address]       PSHx:  Past Surgical History:   Procedure Laterality Date    BREAST BIOPSY      Left-benign    BREAST BIOPSY  feb 2014 Left    HYSTERECTOMY      OVARY SURGERY      tumor removed     SKIN CANCER EXCISION  01/2020       PFHx:  Family History   Problem Relation Age of Onset    Asthma Mother     Emphysema Mother     Alzheimer's Disease Sister     Heart Disease Brother     Heart Disease Sister     No Known Problems Sister     Alzheimer's Disease Sister        SocialHx:  Social History     Tobacco Use    Smoking status: Never Smoker    Smokeless tobacco: Never Used   Substance Use Topics    Alcohol use: No       Allergies: Allergies   Allergen Reactions    Denosumab Other (See Comments)     after had shot got blood in urine--not sure if associated    Lisinopril Other (See Comments)     cough         Medications:  Current Outpatient Medications   Medication Sig Dispense Refill    acetaminophen-codeine (TYLENOL/CODEINE #3) 300-30 MG per tablet Take 1 tablet by mouth every 4 hours as needed for Pain for up to 3 days. Intended supply: 3 days.  Take lowest dose possible to manage pain 18 tablet 0    atorvastatin (LIPITOR) 10 MG tablet Take 1 tablet by mouth daily 30 tablet 0    digoxin (LANOXIN) 125 MCG tablet Take 1 tablet by mouth every other day Indications: M, W, F 45 tablet 3    montelukast (SINGULAIR) 10 MG tablet TAKE 1 TABLET BY MOUTH DAILY AT NIGHT 30 tablet 5    escitalopram (LEXAPRO) 10 MG tablet TAKE ONE TABLET BY MOUTH EVERY DAY 30 tablet 5    donepezil (ARICEPT) 5 MG tablet TAKE ONE TABLET BY MOUTH EVERY NIGHT AT BEDTIME 30 tablet 0    SPIRIVA HANDIHALER 18 MCG inhalation capsule Inhale 1 capsule into the lungs daily 30 capsule 2    famotidine (PEPCID) 20 MG tablet Take 0.5 tablets by mouth every morning 30 tablet 5    alendronate (FOSAMAX) 70 MG tablet Take 1 tablet by mouth every 7 days 4 tablet 5    mirtazapine (REMERON) 15 MG tablet Take 1 tablet by mouth nightly 30 tablet 0    isosorbide mononitrate (IMDUR) 30 MG extended release tablet TAKE 1 TABLET BY MOUTH DAILY 30 tablet 5    fluticasone (FLONASE) 50 MCG/ACT nasal spray 2 sprays by Each Nostril route daily 3 Bottle 1    mupirocin (BACTROBAN) 2 % ointment APPLY TO AFFECTED AREA(S) THREE TIMES A DAY 22 g 0    predniSONE 10 MG (21) TBPK Take as directed per package instructions. 1 each 0    Multiple Vitamins-Minerals (THERAPEUTIC MULTIVITAMIN-MINERALS) tablet Take 1 tablet by mouth daily      metoprolol tartrate (LOPRESSOR) 25 MG tablet Take 1 tablet by mouth 2 times daily (Patient taking differently: Take 25 mg by mouth 1/2 tablet twice daily) 60 tablet 5    guaiFENesin (MUCINEX) 600 MG extended release tablet Take 1 tablet by mouth 2 times daily as needed for Congestion 14 tablet 0    warfarin (COUMADIN) 5 MG tablet Take 5 mg by mouth Take 1/2 tabs all days except on Wednesday take 1 tab.  aspirin 81 MG chewable tablet Take 1 tablet by mouth daily (Patient taking differently: Take 81 mg by mouth every morning ) 30 tablet 0    OXYGEN Inhale 2 L into the lungs continuous      nitroGLYCERIN (NITROSTAT) 0.4 MG SL tablet Place 1 tablet under the tongue every 5 minutes as needed for Chest pain 25 tablet 3    bimatoprost 0.01 % SOLN Place 1 drop into both eyes nightly       furosemide (LASIX) 20 MG tablet TAKE 1 TABLET BY MOUTH DAILY 30 tablet 2    diclofenac sodium 1 % GEL Apply 4 g topically 4 times daily (Patient taking differently: Apply 4 g topically 4 times daily as needed for Pain ) 2 Tube 0    tiotropium (SPIRIVA RESPIMAT) 2.5 MCG/ACT AERS inhaler Inhale 2 puffs into the lungs daily 1 Inhaler 2    allopurinol (ZYLOPRIM) 100 MG tablet Take 1 tablet by mouth daily 30 tablet 1     No current facility-administered medications for this visit. Objective:   PE:  /72   Pulse 71   Temp 98.6 °F (37 °C) (Temporal)   Resp 18   Ht 5' 2\" (1.575 m)   Wt 113 lb (51.3 kg)   SpO2 99%   BMI 20.67 kg/m²   Physical Exam  Exam conducted with a chaperone present. Constitutional:       Appearance: She is underweight.       Comments: Ambulates using a rollator   HENT:      Head: Normocephalic. Right Ear: Decreased hearing noted. There is no impacted cerumen. Left Ear: Decreased hearing noted. There is no impacted cerumen. Nose: Nose normal.   Eyes:      Pupils: Pupils are equal, round, and reactive to light. Neck:      Musculoskeletal: Neck supple. Cardiovascular:      Rate and Rhythm: Normal rate and regular rhythm. Pulmonary:      Breath sounds: Decreased breath sounds present. No wheezing or rales. Abdominal:      General: Abdomen is flat. Musculoskeletal:         General: Deformity present. Right lower leg: Right lower leg edema: arthritic changes. Skin:     General: Skin is warm. Psychiatric:         Behavior: Behavior is cooperative. Assessment & Plan   Tk Juan was seen today for ear problem, discuss medications and altered mental status. Diagnoses and all orders for this visit:    Primary osteoarthritis involving multiple joints  -     acetaminophen-codeine (TYLENOL/CODEINE #3) 300-30 MG per tablet; Take 1 tablet by mouth every 4 hours as needed for Pain for up to 3 days. Intended supply: 3 days.  Take lowest dose possible to manage pain    Presbycusis of left ear, unspecified hearing status on contralateral side    COPD exacerbation ([address]    Late onset Alzheimer's disease without behavioral disturbance (HCC)    Congestive heart failure, unspecified HF chronicity, unspecified heart failure type ([address]    Current moderate episode of major depressive disorder without prior episode ([address]    Continue present care and management  Continue all maintenance medications  Encouraged to continue healthy lifestyle   Recommend Covid vaccination  Stay active - as tolerated  Stay well and hydrated - drink at least 64 oz of fluid a day  Eat 6 servings of fruit and vegetables daily  Keep scheduled follow-up appt with me and other providers/specialists  Make appt with hearing aid specialist  Call with new concerns  Keep f/up appt with Rita Nolen 7/7/21  Return if symptoms worsen or fail to improve. All questions were answered. Medications, including possible adverse effects, and instructions were reviewed and  understanding was confirmed. Follow-up recommendations, including when to contact or return to office (ie; if symptoms worsen or fail to improve), were discussed and acknowledged.     Electronically signed by Cora Brunner MD on 4/7/21 at 3:15 PM CDT

## 2021-04-08 ENCOUNTER — ANTI-COAG VISIT (OUTPATIENT)
Dept: CARDIOLOGY CLINIC | Age: 86
End: 2021-04-08

## 2021-04-08 LAB — INR BLD: 1.9

## 2021-04-21 ENCOUNTER — ANTI-COAG VISIT (OUTPATIENT)
Dept: CARDIOLOGY CLINIC | Age: 86
End: 2021-04-21

## 2021-04-21 LAB — INR BLD: 1.9

## 2021-05-05 LAB — INR BLD: 2.6

## 2021-05-06 ENCOUNTER — ANTI-COAG VISIT (OUTPATIENT)
Dept: CARDIOLOGY CLINIC | Age: 86
End: 2021-05-06

## 2021-05-11 RX ORDER — ISOSORBIDE MONONITRATE 30 MG/1
30 TABLET, EXTENDED RELEASE ORAL DAILY
Qty: 30 TABLET | Refills: 5 | Status: ON HOLD | OUTPATIENT
Start: 2021-05-11 | End: 2021-09-02 | Stop reason: HOSPADM

## 2021-05-11 RX ORDER — MIRTAZAPINE 15 MG/1
15 TABLET, FILM COATED ORAL NIGHTLY
Qty: 30 TABLET | Refills: 0 | Status: SHIPPED | OUTPATIENT
Start: 2021-05-11 | End: 2021-06-07 | Stop reason: SDUPTHER

## 2021-05-26 LAB — INR BLD: 2.1

## 2021-05-27 ENCOUNTER — ANTI-COAG VISIT (OUTPATIENT)
Dept: CARDIOLOGY CLINIC | Age: 86
End: 2021-05-27

## 2021-06-02 ENCOUNTER — VIRTUAL VISIT (OUTPATIENT)
Dept: PRIMARY CARE CLINIC | Age: 86
End: 2021-06-02
Payer: MEDICARE

## 2021-06-02 ENCOUNTER — TELEPHONE (OUTPATIENT)
Dept: ADMINISTRATIVE | Age: 86
End: 2021-06-02

## 2021-06-02 ENCOUNTER — NURSE TRIAGE (OUTPATIENT)
Dept: OTHER | Facility: CLINIC | Age: 86
End: 2021-06-02

## 2021-06-02 DIAGNOSIS — L89.159 DECUBITUS ULCER OF COCCYX, UNSPECIFIED PRESSURE ULCER STAGE: Primary | ICD-10-CM

## 2021-06-02 PROCEDURE — G8420 CALC BMI NORM PARAMETERS: HCPCS | Performed by: NURSE PRACTITIONER

## 2021-06-02 PROCEDURE — 1036F TOBACCO NON-USER: CPT | Performed by: NURSE PRACTITIONER

## 2021-06-02 PROCEDURE — 99213 OFFICE O/P EST LOW 20 MIN: CPT | Performed by: NURSE PRACTITIONER

## 2021-06-02 PROCEDURE — G8427 DOCREV CUR MEDS BY ELIG CLIN: HCPCS | Performed by: NURSE PRACTITIONER

## 2021-06-02 PROCEDURE — 1123F ACP DISCUSS/DSCN MKR DOCD: CPT | Performed by: NURSE PRACTITIONER

## 2021-06-02 PROCEDURE — 1090F PRES/ABSN URINE INCON ASSESS: CPT | Performed by: NURSE PRACTITIONER

## 2021-06-02 PROCEDURE — 4040F PNEUMOC VAC/ADMIN/RCVD: CPT | Performed by: NURSE PRACTITIONER

## 2021-06-02 ASSESSMENT — ENCOUNTER SYMPTOMS
VOICE CHANGE: 0
COLOR CHANGE: 0
BACK PAIN: 0
PHOTOPHOBIA: 0
VOMITING: 0
SHORTNESS OF BREATH: 0
COUGH: 0
RHINORRHEA: 0
NAUSEA: 0

## 2021-06-02 NOTE — PROGRESS NOTES
[facility]   [address] [place] [address]             Phone:  [phone]  Fax:  [phone]       2021    TELEHEALTH EVALUATION -- Audio/Visual (During [id number]-50 public health emergency)    HPI:  Chief Complaint   Patient presents with    Tailbone Pain         Storm Dugan (:  1933) has requested an audio/video evaluation for the following concern(s):    Patient presents today for evaluation of wound on her tailbone. Patient is typically completely sedentary. Recently her family has gotten her a donut cushion to use while sitting. She has noticed a \"white boil\" that has appeared on tailbone that is painful. Her daughter reports that this does not have any drainage. It is erythematous all around site. Review of Systems   Constitutional: Negative for chills and fever. HENT: Negative for ear pain, hearing loss, rhinorrhea and voice change. Eyes: Negative for photophobia and visual disturbance. Respiratory: Negative for cough and shortness of breath. Cardiovascular: Negative for chest pain and palpitations. Gastrointestinal: Negative for nausea and vomiting. Endocrine: Negative. Negative for cold intolerance and heat intolerance. Genitourinary: Negative for difficulty urinating and flank pain. Musculoskeletal: Negative for back pain and neck pain. Skin: Positive for wound. Negative for color change and rash. Allergic/Immunologic: Negative for environmental allergies and food allergies. Neurological: Negative for dizziness, speech difficulty and headaches. Hematological: Does not bruise/bleed easily. Psychiatric/Behavioral: Negative for sleep disturbance and suicidal ideas. Prior to Visit Medications    Medication Sig Taking?  Authorizing Provider   metoprolol tartrate (LOPRESSOR) 25 MG tablet Take 1 tablet by mouth 2 times daily Yes Deborra NONI Funes   isosorbide mononitrate (IMDUR) 30 MG extended release tablet Take 1 tablet by mouth daily Yes Bertnadya Briggs, APRN   mirtazapine (REMERON) 15 MG tablet Take 1 tablet by mouth nightly Yes Marjorie Cesar MD   donepezil (ARICEPT) 5 MG tablet TAKE ONE TABLET BY MOUTH EVERY NIGHT AT BEDTIME Yes iAyana Funes, APRN   atorvastatin (LIPITOR) 10 MG tablet Take 1 tablet by mouth daily Yes Aiyana Deiters, APRN   digoxin (LANOXIN) 125 MCG tablet Take 1 tablet by mouth every other day Indications: M, W, F Yes NONI Gann CNP   montelukast (SINGULAIR) 10 MG tablet TAKE 1 TABLET BY MOUTH DAILY AT NIGHT Yes Aiyana Deiters, APRANGEL   escitalopram (LEXAPRO) 10 MG tablet TAKE ONE TABLET BY MOUTH EVERY DAY Yes NONI Gtz   SPIRIVA HANDIHALER 18 MCG inhalation capsule Inhale 1 capsule into the lungs daily Yes Aiyana Funes, APRANGEL   famotidine (PEPCID) 20 MG tablet Take 0.5 tablets by mouth every morning Yes Aiyana DeNONI conklin   alendronate (FOSAMAX) 70 MG tablet Take 1 tablet by mouth every 7 days Yes Aiyana Dekaterin APRANGEL   fluticasone (FLONASE) 50 MCG/ACT nasal spray 2 sprays by Each Nostril route daily Yes NONI Liu NP   mupirocin (BACTROBAN) 2 % ointment APPLY TO AFFECTED AREA(S) THREE TIMES A DAY Yes NONI Gtz   predniSONE 10 MG (21) TBPK Take as directed per package instructions. Yes Aiyana DeNONI conklin   Multiple Vitamins-Minerals (THERAPEUTIC MULTIVITAMIN-MINERALS) tablet Take 1 tablet by mouth daily Yes Historical Provider, MD   guaiFENesin (MUCINEX) 600 MG extended release tablet Take 1 tablet by mouth 2 times daily as needed for Congestion Yes Janet Davalos MD   warfarin (COUMADIN) 5 MG tablet Take 5 mg by mouth Take 1/2 tabs all days except on Wednesday take 1 tab.  Yes Historical Provider, MD   aspirin 81 MG chewable tablet Take 1 tablet by mouth daily  Patient taking differently: Take 81 mg by mouth every morning  Yes Felix Messina, DO   OXYGEN Inhale 2 L into the lungs continuous Yes Historical Provider, MD   nitroGLYCERIN (NITROSTAT) 0.4 MG SL tablet Place 1 tablet under the tongue every 5 minutes as needed for Chest pain Yes GatitoNONI Quick   bimatoprost 0.01 % SOLN Place 1 drop into both eyes nightly  Yes Historical Provider, MD   furosemide (LASIX) 20 MG tablet TAKE 1 TABLET BY MOUTH DAILY  NONI Hein   diclofenac sodium 1 % GEL Apply 4 g topically 4 times daily  Patient taking differently: Apply 4 g topically 4 times daily as needed for Pain   NONI Hein   tiotropium (SPIRIVA RESPIMAT) 2.5 MCG/ACT AERS inhaler Inhale 2 puffs into the lungs daily  NONI Hein   allopurinol (ZYLOPRIM) 100 MG tablet Take 1 tablet by mouth daily  NONI Hein       Social History     Tobacco Use    Smoking status: Never Smoker    Smokeless tobacco: Never Used   Vaping Use    Vaping Use: Never used   Substance Use Topics    Alcohol use: No    Drug use: No        Allergies   Allergen Reactions    Denosumab Other (See Comments)     after had shot got blood in urine--not sure if associated    Lisinopril Other (See Comments)     cough     ,   Past Medical History:   Diagnosis Date    Acute systolic CHF (congestive heart failure), NYHA class 3 ([address] 2/24/2017    Allergic rhinitis     Anticoagulant long-term use     coumadin for atrial fib    Anxiety 11/11/2019    Atrial fibrillation (HCC)     Chronic atrial fibrillation (HCC) 9/28/2017    Chronic back pain     Chronic kidney disease     Chronic kidney disease, stage III (moderate) 2/17/2017    Chronic systolic CHF (congestive heart failure), NYHA class 3 ([address] 2/17/2017    Current moderate episode of major depressive disorder without prior episode ([address] 11/14/2019    GERD (gastroesophageal reflux disease)     Glaucoma     Hearing loss     Hypertension     Mixed hyperlipidemia 7/2/2020    Osteoarthritis     Osteoporosis     Panlobular emphysema ([address] 11/28/2018    Shingles     Sinus problem     Wears glasses    ,   Past Surgical History:   Procedure Laterality Date    BREAST BIOPSY      Left-benign    BREAST BIOPSY  feb 2014    Left    HYSTERECTOMY      OVARY SURGERY      tumor removed     SKIN CANCER EXCISION  01/2020   ,   Social History     Tobacco Use    Smoking status: Never Smoker    Smokeless tobacco: Never Used   Vaping Use    Vaping Use: Never used   Substance Use Topics    Alcohol use: No    Drug use: No   ,   Family History   Problem Relation Age of Onset    Asthma Mother     Emphysema Mother     Alzheimer's Disease Sister     Heart Disease Brother     Heart Disease Sister     No Known Problems Sister     Alzheimer's Disease Sister    ,   Immunization History   Administered Date(s) Administered    Influenza, Quadv, IM, PF (6 mo and older Fluzone, Flulaval, Fluarix, and 3 yrs and older Afluria) 01/03/2019    Influenza, Triv, inactivated, subunit, adjuvanted, IM (Fluad 65 yrs and older) 11/01/2019    Pneumococcal Conjugate Vaccine 10/27/1993    Pneumococcal Polysaccharide ([id number]) 11/03/2015, 05/03/2019    Pneumococcal Vaccine 10/27/1993    Tdap (Boostrix, Adacel) 10/01/2010   ,   Health Maintenance   Topic Date Due    COVID-19 Vaccine (1) Never done    Shingles Vaccine (1 of 2) Never done    Lipid screen  02/01/2018    Annual Wellness Visit (AWV)  Never done    DTaP/Tdap/Td vaccine (2 - Td or Tdap) 10/01/2020    Flu vaccine (Season Ended) 09/01/2021    Potassium monitoring  12/29/2021    Creatinine monitoring  12/29/2021    Pneumococcal 65+ years Vaccine  Completed    Hepatitis A vaccine  Aged Out    Hepatitis B vaccine  Aged Out    Hib vaccine  Aged Out    Meningococcal (ACWY) vaccine  Aged Out       PHYSICAL EXAMINATION:  [ INSTRUCTIONS:  \"[x]\" Indicates a positive item  \"[]\" Indicates a negative item  -- DELETE ALL ITEMS NOT EXAMINED]  [x] Alert  [x] Oriented to person/place/time    [x] No apparent distress  [] Toxic appearing    [] Face flushed appearing [x] Sclera clear  [] Lips are cyanotic      [x] Breathing appears normal  [] Appears tachypneic      [] Rash on visible skin    [x] Cranial Nerves II-XII grossly intact    [x] Motor grossly intact in visible upper extremities    [x] Motor grossly intact in visible lower extremities    [x] Normal Mood  [] Anxious appearing    [] Depressed appearing  [] Confused appearing      [] Poor short term memory  [] Poor long term memory    [] OTHER:      Due to this being a TeleHealth encounter, evaluation of the following organ systems is limited: Vitals/Constitutional/EENT/Resp/CV/GI//MS/Neuro/Skin/Heme-Lymph-Imm. There were no vitals taken for this visit. Patient-Reported Vitals 6/2/2021   Patient-Reported Weight 112.2lbs   Patient-Reported Height 5' 2\"   Patient-Reported Systolic 991   Patient-Reported Diastolic 60   Patient-Reported Pulse 76   Patient-Reported Temperature 98.2   Patient-Reported SpO2 93          ASSESSMENT/PLAN:  1. Decubitus ulcer of coccyx, unspecified pressure ulcer stage    - JESI Colon, Wound Care, [facility]      Return if symptoms worsen or fail to improve. An  electronic signature was used to authenticate this note. --NONI Persaud on 6/2/2021 at 4:47 PM        Pursuant to the emergency declaration under the [facility] waiver authority and the [employer] Act, this Virtual  Visit was conducted, with patient's consent, to reduce the patient's risk of exposure to COVID-19 and provide continuity of care for an established patient. Services were provided through a video synchronous discussion virtually to substitute for in-person clinic visit.

## 2021-06-02 NOTE — TELEPHONE ENCOUNTER
Reason for Disposition   Spreading redness around the boil and no fever    Answer Assessment - Initial Assessment Questions  1. APPEARANCE of BOIL: \"What does the boil look like? \"       White hard head, slightly reddened around it. 2. LOCATION: \"Where is the boil located? \"    tailbone    3. NUMBER: \"How many boils are there? \"    there could be a smaller one, but there is this larger one    4. SIZE: \"How big is the boil? \" (e.g., inches, cm; compare to size of a coin or other object)  Larger than a marble. 5. ONSET: \"When did the boil start? \"  Has been there for a while. 6. PAIN: \"Is there any pain? \" If so, ask: \"How bad is the pain? \"   (Scale 1-10; or mild, moderate, severe)   Some pain when sitting. 7. FEVER: \"Do you have a fever? \" If so, ask: \"What is it, how was it measured, and when did it start? \"    none    8. SOURCE: \"Have you been around anyone with boils or other Staph infections? \" \"Have you ever had boils before? \"   Haven't seen anything like this on her before. 9. OTHER SYMPTOMS: \"Do you have any other symptoms? \" (e.g., shaking chills, weakness, rash elsewhere on body)   Pt is generally weak. 10. PREGNANCY: \"Is there any chance you are pregnant? \" \"When was your last menstrual period? \"   n/a    Protocols used: BOIL (SKIN ABSCESS)-ADULT-[place]    [facility] [address] [facility] with Red Flag Complaint. Brief description of triage: Pt has a reddened bump that family describe as a boil on pt's tailbone. Has a hardened white center at the top of the boil. Triage indicates for patient to be seen by Provider today. Family is requesting a virtual visit. Care advice provided, patient verbalizes understanding; denies any other questions or concerns; instructed to call back for any new or worsening symptoms. Writer provided warm transfer to Tiesha Yuan at [facility] for appointment scheduling. Attention Provider:   Thank you for allowing me to

## 2021-06-02 NOTE — TELEPHONE ENCOUNTER
Erwin Layne is calling requesting a doxy me ([phone]) appointment today for a possible boil on tailbone with a hard white spot on top, with redness, trouble sitting. I also had pt triaged to be on safe side.      Please Advise

## 2021-06-07 RX ORDER — MIRTAZAPINE 15 MG/1
15 TABLET, FILM COATED ORAL NIGHTLY
Qty: 30 TABLET | Refills: 0 | Status: SHIPPED | OUTPATIENT
Start: 2021-06-07 | End: 2021-07-16

## 2021-06-07 NOTE — TELEPHONE ENCOUNTER
Pauly Damien called to request a refill on her medication.       Last office visit : 6/2/2021   Next office visit : 7/7/2021     Requested Prescriptions     Pending Prescriptions Disp Refills    mirtazapine (REMERON) 15 MG tablet 30 tablet 0     Sig: Take 1 tablet by mouth nightly            Yuni Mchugh LPN

## 2021-06-08 ENCOUNTER — HOSPITAL ENCOUNTER (OUTPATIENT)
Dept: WOUND CARE | Age: 86
Discharge: HOME | End: 2021-06-08
Payer: MEDICARE

## 2021-06-08 VITALS
BODY MASS INDEX: 19.99 KG/M2 | HEART RATE: 63 BPM | RESPIRATION RATE: 18 BRPM | DIASTOLIC BLOOD PRESSURE: 77 MMHG | TEMPERATURE: 98 F | WEIGHT: 112.8 LBS | SYSTOLIC BLOOD PRESSURE: 130 MMHG | HEIGHT: 63 IN

## 2021-06-08 DIAGNOSIS — L98.9 NODULAR LESION ON SURFACE OF SKIN: Chronic | ICD-10-CM

## 2021-06-08 PROCEDURE — 99212 OFFICE O/P EST SF 10 MIN: CPT

## 2021-06-08 PROCEDURE — 99203 OFFICE O/P NEW LOW 30 MIN: CPT | Performed by: SURGERY

## 2021-06-08 RX ORDER — GUAIFENESIN 400 MG/1
1200 TABLET ORAL 2 TIMES DAILY
Status: ON HOLD | COMMUNITY
End: 2021-09-02 | Stop reason: HOSPADM

## 2021-06-08 NOTE — PLAN OF CARE
Problem: Pressure Ulcer:  Goal: Signs of wound healing will improve  Description: Signs of wound healing will improve  Outcome: Ongoing  Goal: Absence of new pressure ulcer  Description: Absence of new pressure ulcer  Outcome: Ongoing  Goal: Will show no infection signs and symptoms  Description: Will show no infection signs and symptoms  Outcome: Ongoing     Problem: Falls - Risk of:  Goal: Will remain free from falls  Description: Will remain free from falls  Outcome: Ongoing

## 2021-06-08 NOTE — PROGRESS NOTES
WOUND CARE HISTORY AND PHYSICAL    Patient Care Team:  NONI Ghotra as PCP - General (Family Nurse Practitioner)  NONI Ghotra as PCP - REHABILITATION [facility]  Gasper Garcia MD as Consulting Physician (Cardiology)     CC:     Casimer Libman is a 80 y.o. female who presents today for wound evaluation.     Wound Type: undetermined  Wound Location: raphe of buttocks  Modifying factors: shear force    Patient Active Problem List   Diagnosis Code    Acute on chronic congestive heart failure ([facility]) I50.9    Hyponatremia E87.1    Chronic renal failure N18.9    Weakness R53.1    Hematuria R31.9    Chronic systolic CHF (congestive heart failure), NYHA class 3 ([facility]) I50.22    Chronic kidney disease, stage III (moderate) ([facility]) N18.30    Atrial fibrillation ([facility]) I48.91    Decline in verbal memory R41.3    Dyspnea R06.00    Allergic rhinitis J30.9    Edema R60.9    Decreased pedal pulses R09.89    COPD exacerbation ([facility]) J44.1    Hypoxemia R09.02    Family discord Z63.8    Essential hypertension I10    Other fatigue R53.83    PVD (peripheral vascular disease) ([facility]) I73.9    Other chest pain R07.89    Panlobular emphysema ([facility]) J43.1    Gastroesophageal reflux disease with esophagitis K21.00    Nausea and vomiting R11.2    Renal failure N19    Stomatitis K12.1    Failure to thrive in adult R62.7    Weight loss R63.4    Bereavement reaction F43.20, Z63.4    Confusion R41.0    Decreased activities of daily living (ADL) Z78.9    Tachycardia with greater than 160 beats per minute R00.0    Accidental medication error T50.901A    Chronic anticoagulation Z79.01    Acute otitis media H66.90    Anxiety F41.9    Late onset Alzheimer's disease without behavioral disturbance ([facility]) G30.1, F02.80    Current moderate episode of major depressive disorder without prior episode ([facility]) F32.1    Benign essential tremor G25.0    Aortic valve regurgitation I35.1    Hypotension I95.9  Mental confusion R41.0    Weight gain R63.5    Chronic back pain M54.9, G89.29    Chronic pain of both knees M25.561, M25.562, G89.29    Encounter for monitoring Coumadin therapy Z51.81, Z79.01    Mixed hyperlipidemia E78.2    Congestive heart failure (HCC) I50.9    Hemorrhagic purpura (HCC) D69.49    Nodular lesion on surface of skin L98.9       HPI:  She reports she developed a wound on buttocks. This started 2 month(s) ago. She believes this is not healing. She has been applying nothing. She has not had  fever or chills. She has/with CHF.     Casimer Libman is a 80 y.o. female with the following history reviewed and recorded in "[employer]":  Patient Active Problem List    Diagnosis Date Noted    Other chest pain 02/22/2019     Priority: High    Acute on chronic congestive heart failure (HCC)      Priority: High    Hyponatremia      Priority: High    Chronic renal failure      Priority: High    Weakness      Priority: High    Hematuria      Priority: High    Nodular lesion on surface of skin 06/08/2021    Hemorrhagic purpura ([address] 04/07/2021    Mixed hyperlipidemia 07/02/2020    Congestive heart failure ([address] 07/02/2020    Chronic back pain 04/29/2020    Chronic pain of both knees 04/29/2020    Encounter for monitoring Coumadin therapy 04/29/2020    Weight gain 12/12/2019    Aortic valve regurgitation 12/09/2019    Hypotension 12/09/2019    Mental confusion 12/09/2019    Late onset Alzheimer's disease without behavioral disturbance ([address] 11/14/2019    Current moderate episode of major depressive disorder without prior episode ([address] 11/14/2019    Benign essential tremor 11/14/2019    Acute otitis media 11/11/2019    Anxiety 11/11/2019    Tachycardia with greater than 160 beats per minute 10/20/2019    Accidental medication error 10/20/2019    Chronic anticoagulation 10/20/2019    Failure to thrive in adult 08/01/2019    Weight loss 08/01/2019    Bereavement reaction 08/01/2019 Inhale 1 capsule into the lungs daily 30 capsule 2    famotidine (PEPCID) 20 MG tablet Take 0.5 tablets by mouth every morning 30 tablet 5    alendronate (FOSAMAX) 70 MG tablet Take 1 tablet by mouth every 7 days (Patient taking differently: Take 70 mg by mouth every 7 days Sunday) 4 tablet 5    fluticasone (FLONASE) 50 MCG/ACT nasal spray 2 sprays by Each Nostril route daily (Patient taking differently: 2 sprays by Each Nostril route nightly ) 3 Bottle 1    Multiple Vitamins-Minerals (THERAPEUTIC MULTIVITAMIN-MINERALS) tablet Take 1 tablet by mouth daily      diclofenac sodium 1 % GEL Apply 4 g topically 4 times daily (Patient taking differently: Apply 4 g topically 4 times daily as needed for Pain ) 2 Tube 0    warfarin (COUMADIN) 5 MG tablet Take 5 mg by mouth Take 1/2 tabs all days except on Wednesday take 1 tab.  aspirin 81 MG chewable tablet Take 1 tablet by mouth daily (Patient taking differently: Take 81 mg by mouth every morning ) 30 tablet 0    OXYGEN Inhale 2 L into the lungs continuous      allopurinol (ZYLOPRIM) 100 MG tablet Take 1 tablet by mouth daily 30 tablet 1    bimatoprost 0.01 % SOLN Place 1 drop into both eyes nightly       furosemide (LASIX) 20 MG tablet TAKE 1 TABLET BY MOUTH DAILY (Patient taking differently: Take 20 mg by mouth daily as needed (Pt is weighed daily and she is given if weight is climbing) ) 30 tablet 2    mupirocin (BACTROBAN) 2 % ointment APPLY TO AFFECTED AREA(S) THREE TIMES A DAY 22 g 0    tiotropium (SPIRIVA RESPIMAT) 2.5 MCG/ACT AERS inhaler Inhale 2 puffs into the lungs daily 1 Inhaler 2    nitroGLYCERIN (NITROSTAT) 0.4 MG SL tablet Place 1 tablet under the tongue every 5 minutes as needed for Chest pain 25 tablet 3     No current facility-administered medications for this encounter.      Allergies: Denosumab and Lisinopril  Past Medical History:   Diagnosis Date    Acute systolic CHF (congestive heart failure), NYHA class 3 ([address] 2/24/2017    Allergic rhinitis     Anticoagulant long-term use     coumadin for atrial fib    Anxiety 11/11/2019    Atrial fibrillation (HCC)     Chronic atrial fibrillation (HCC) 9/28/2017    Chronic back pain     Chronic kidney disease     Chronic kidney disease, stage III (moderate) (HCC) 2/17/2017    Chronic systolic CHF (congestive heart failure), NYHA class 3 ([facility] 2/17/2017    Current moderate episode of major depressive disorder without prior episode ([facility] 11/14/2019    GERD (gastroesophageal reflux disease)     Glaucoma     Hearing loss     Hypertension     Mixed hyperlipidemia 7/2/2020    Osteoarthritis     Osteoporosis     [facility]lobular emphysema ([facility] 11/28/2018    Shingles     Sinus problem     Wears glasses        Past Surgical History:   Procedure Laterality Date    BREAST BIOPSY      Left-benign    BREAST BIOPSY  feb 2014    Left    HYSTERECTOMY      OVARY SURGERY      tumor removed     SKIN CANCER EXCISION  01/2020     Family History   Problem Relation Age of Onset    Asthma Mother     Emphysema Mother     Alzheimer's Disease Sister     Heart Disease Brother     Heart Disease Sister     No Known Problems Sister     Alzheimer's Disease Sister      Social History     Tobacco Use    Smoking status: Never Smoker    Smokeless tobacco: Never Used   Substance Use Topics    Alcohol use: No         Review of Systems    Constitutional - no significant activity change, appetite change, or unexpected weight change. No fever or chills. No diaphoresis or significant fatigue. HENT - no significant rhinorrhea or epistaxis. No tinnitus or significant hearing loss. Eyes - no sudden vision change or amaurosis. Respiratory - no significant shortness of breath, wheezing, or stridor. No apnea, cough, or chest tightness associated with shortness of breath. Cardiovascular - no chest pain, syncope, or significant dizziness. No palpitations. Patient reports no claudication.   Gastrointestinal - no abdominal swelling or pain. No blood in stool. No severe constipation, diarrhea, nausea, or vomiting. Genitourinary - No difficulty urinating, dysuria, frequency, or urgency. No flank pain or hematuria. Musculoskeletal - no back pain, gait disturbance, or myalgia. Skin - no color change, rash, pallor. Neurologic - no dizziness, facial asymmetry, or light headedness. No seizures. No speech difficulty or lateralizing weakness. Hematologic - no easy bruising or excessive bleeding. Psychiatric - no severe anxiety or nervousness. No confusion. All other review of systems are negative. Physical Exam    /77   Pulse 63   Temp 98 °F (36.7 °C) (Temporal)   Resp 18   Ht 5' 3\" (1.6 m)   Wt 112 lb 12.8 oz (51.2 kg)   BMI 19.98 kg/m²     Constitutional - well developed, well nourished. No diaphoresis or acute distress. HENT - head normocephalic. Right external ear canal appears normal.  Left external ear canal appears normal.  Septum appears midline. Lips,teeth,gums normal  Eyes - conjunctiva normal.  EOMS normal.  No exudate. No icterus. PERRLA  Neck- ROM appears normal, no tracheal deviation. Cardiovascular - Regular rate and rhythm. Heart sounds are normal.  No murmur, rub, or gallop. Carotid pulses are 2+ to palpation bilaterally without bruit. Extremities - Radial and brachial pulses are 2+ to palpation bilaterally. Femoral pulses: present 2+bilaterally;Popliteal pulses: present 2+bilaterally Right DP: present 2+; Right PT present 2+; Left DP: present 2+; Left PT: present 2+  No cyanosis, clubbing. no edema. No signs atheroembolic event. Pulmonary - effort appears normal.  No respiratory distress. Lungs - Breath sounds normal. No wheezes or rales. GI - Abdomen - soft, non tender, bowel sounds X 4 quadrants. No guarding or rebound tenderness. No distension or palpable mass. Genitourinary - deferred. Musculoskeletal - ROM appears normal. no edema.   Skin: warm,dry  Neurologic - alert and oriented X 3. Sensation normal  Psychiatric - mood, affect, and behavior appear normal.  Judgment and thought processes appear normal.  Wound - buttock wound    Wound Picture:        Assessment     buttock lesion    1. Nodular lesion on surface of skin        NO OPEN WOUND    Plan for wound - Dress per physician order    Treatment:     Compression : No   Offloading : No   Dressing : SEE AVS   Additional Therapy :    Discussed appropriate home care of this wound. Wound redressed. Patient instructions were given. Follow up: PRN. Recommend no smoking  Offloading instructions given    Discharge Instructions         [address] [facility]   Physician Orders and Discharge Instructions  [address] [facility], [place] [address]  Telephone: [phone]    NAME:  Daria Dubon:  7/2/1933  MEDICAL RECORD NUMBER:  [id number]  DATE:  6/8/2021    Discharge condition: Stable    Discharge to: Home    Left via:Private automobile    Accompanied by:  self    ECF/HHA: none    Dressing Orders: Buttocks/ Coccyx:   Wash with soap and water. Apply Desitin to buttocks. DO NOT SCRUB OFF. Wash soilage or urine from skin gently. Leave any Desitin in place and re-apply or use baby oil to remove desitin gently. Treatment Orders:    PRESSURE RELIEF INSTRUCTONS    DO NOT USE A DONUT CUSHION. Avoid Pressure to wound site. Turn frequently (turn at least every two hours when in bed)  While in chair reposition every 30 minutes  Patient advised to sit up no more than 30 minutes at a time for meals ONLY. Please do not elevate the head of the bed higher than 30 degrees. Off-load heels by applying heel-lift boots or \"float\" heels by placing pillows under calves so that heels do not touch mattress.     Continue Protein rich diet (unless restricted by your physician)  Take Multivitamin daily    Please use Memory foam cushion in chair  Lay

## 2021-06-22 LAB
ALBUMIN SERPL-MCNC: 3.8 G/DL (ref 3.5–5.2)
ALP BLD-CCNC: 49 U/L (ref 35–104)
ALT SERPL-CCNC: 13 U/L (ref 5–33)
ANION GAP SERPL CALCULATED.3IONS-SCNC: 8 MMOL/L (ref 7–19)
AST SERPL-CCNC: 27 U/L (ref 5–32)
BASOPHILS ABSOLUTE: 0.1 K/UL (ref 0–0.2)
BASOPHILS RELATIVE PERCENT: 1.2 % (ref 0–1)
BILIRUB SERPL-MCNC: 0.4 MG/DL (ref 0.2–1.2)
BUN BLDV-MCNC: 26 MG/DL (ref 8–23)
CALCIUM SERPL-MCNC: 9.8 MG/DL (ref 8.8–10.2)
CHLORIDE BLD-SCNC: 105 MMOL/L (ref 98–111)
CO2: 31 MMOL/L (ref 22–29)
CREAT SERPL-MCNC: 1.3 MG/DL (ref 0.5–0.9)
EOSINOPHILS ABSOLUTE: 0.3 K/UL (ref 0–0.6)
EOSINOPHILS RELATIVE PERCENT: 5.4 % (ref 0–5)
GFR AFRICAN AMERICAN: 47
GFR NON-AFRICAN AMERICAN: 39
GLUCOSE BLD-MCNC: 105 MG/DL (ref 74–109)
HCT VFR BLD CALC: 36.9 % (ref 37–47)
HEMOGLOBIN: 11.9 G/DL (ref 12–16)
IMMATURE GRANULOCYTES #: 0 K/UL
LYMPHOCYTES ABSOLUTE: 1.8 K/UL (ref 1.1–4.5)
LYMPHOCYTES RELATIVE PERCENT: 33.8 % (ref 20–40)
MAGNESIUM: 1.9 MG/DL (ref 1.6–2.4)
MCH RBC QN AUTO: 32.1 PG (ref 27–31)
MCHC RBC AUTO-ENTMCNC: 32.2 G/DL (ref 33–37)
MCV RBC AUTO: 99.5 FL (ref 81–99)
MONOCYTES ABSOLUTE: 0.8 K/UL (ref 0–0.9)
MONOCYTES RELATIVE PERCENT: 14.4 % (ref 0–10)
NEUTROPHILS ABSOLUTE: 2.3 K/UL (ref 1.5–7.5)
NEUTROPHILS RELATIVE PERCENT: 44.8 % (ref 50–65)
PARATHYROID HORMONE INTACT: 100.6 PG/ML (ref 15–65)
PDW BLD-RTO: 14 % (ref 11.5–14.5)
PHOSPHORUS: 3.7 MG/DL (ref 2.5–4.5)
PLATELET # BLD: 142 K/UL (ref 130–400)
PMV BLD AUTO: 12 FL (ref 9.4–12.3)
POTASSIUM SERPL-SCNC: 3.9 MMOL/L (ref 3.5–5)
RBC # BLD: 3.71 M/UL (ref 4.2–5.4)
SODIUM BLD-SCNC: 144 MMOL/L (ref 136–145)
TOTAL PROTEIN: 6.8 G/DL (ref 6.6–8.7)
URIC ACID, SERUM: 5.2 MG/DL (ref 2.4–5.7)
VITAMIN D 25-HYDROXY: 87.1 NG/ML
WBC # BLD: 5.2 K/UL (ref 4.8–10.8)

## 2021-06-28 RX ORDER — WARFARIN SODIUM 5 MG/1
TABLET ORAL
Qty: 90 TABLET | Refills: 3 | Status: ON HOLD | OUTPATIENT
Start: 2021-06-28 | End: 2021-09-02 | Stop reason: HOSPADM

## 2021-06-29 LAB
BACTERIA: ABNORMAL /HPF
BILIRUBIN URINE: NEGATIVE
BLOOD, URINE: NEGATIVE
CLARITY: ABNORMAL
COLOR: ABNORMAL
CREATININE URINE: 238.5 MG/DL (ref 4.2–622)
CRYSTALS, UA: ABNORMAL /HPF
EPITHELIAL CELLS, UA: ABNORMAL /HPF
GLUCOSE URINE: NEGATIVE MG/DL
KETONES, URINE: ABNORMAL MG/DL
LEUKOCYTE ESTERASE, URINE: ABNORMAL
NITRITE, URINE: NEGATIVE
PH UA: 5.5 (ref 5–8)
PROTEIN PROTEIN: 127 MG/DL (ref 15–45)
PROTEIN UA: 100 MG/DL
RBC UA: ABNORMAL /HPF (ref 0–2)
SPECIFIC GRAVITY UA: 1.02 (ref 1–1.03)
UROBILINOGEN, URINE: 1 E.U./DL
WBC UA: ABNORMAL /HPF (ref 0–5)

## 2021-07-01 ENCOUNTER — ANTI-COAG VISIT (OUTPATIENT)
Dept: CARDIOLOGY CLINIC | Age: 86
End: 2021-07-01

## 2021-07-01 LAB — INR BLD: 3.7

## 2021-07-01 ASSESSMENT — PATIENT HEALTH QUESTIONNAIRE - PHQ9
SUM OF ALL RESPONSES TO PHQ QUESTIONS 1-9: 0
2. FEELING DOWN, DEPRESSED OR HOPELESS: 0
1. LITTLE INTEREST OR PLEASURE IN DOING THINGS: 0
SUM OF ALL RESPONSES TO PHQ QUESTIONS 1-9: 0
SUM OF ALL RESPONSES TO PHQ9 QUESTIONS 1 & 2: 0
SUM OF ALL RESPONSES TO PHQ QUESTIONS 1-9: 0

## 2021-07-01 ASSESSMENT — LIFESTYLE VARIABLES: HOW OFTEN DO YOU HAVE A DRINK CONTAINING ALCOHOL: 0

## 2021-07-07 ASSESSMENT — LIFESTYLE VARIABLES
AUDIT-C TOTAL SCORE: INCOMPLETE
AUDIT TOTAL SCORE: INCOMPLETE
HOW OFTEN DO YOU HAVE A DRINK CONTAINING ALCOHOL: NEVER

## 2021-07-08 ENCOUNTER — OFFICE VISIT (OUTPATIENT)
Dept: CARDIOLOGY CLINIC | Age: 86
End: 2021-07-08
Payer: MEDICARE

## 2021-07-08 VITALS
DIASTOLIC BLOOD PRESSURE: 80 MMHG | HEART RATE: 65 BPM | SYSTOLIC BLOOD PRESSURE: 118 MMHG | BODY MASS INDEX: 20.98 KG/M2 | HEIGHT: 62 IN | OXYGEN SATURATION: 96 % | WEIGHT: 114 LBS

## 2021-07-08 DIAGNOSIS — I35.1 MODERATE AORTIC REGURGITATION: ICD-10-CM

## 2021-07-08 DIAGNOSIS — I50.22 CHRONIC SYSTOLIC HEART FAILURE (HCC): ICD-10-CM

## 2021-07-08 DIAGNOSIS — I48.20 CHRONIC ATRIAL FIBRILLATION (HCC): Primary | ICD-10-CM

## 2021-07-08 DIAGNOSIS — I73.9 PVD (PERIPHERAL VASCULAR DISEASE) (HCC): ICD-10-CM

## 2021-07-08 DIAGNOSIS — Z79.01 ANTICOAGULATED ON COUMADIN: ICD-10-CM

## 2021-07-08 DIAGNOSIS — E78.2 MIXED HYPERLIPIDEMIA: ICD-10-CM

## 2021-07-08 LAB
INTERNATIONAL NORMALIZATION RATIO, POC: 1.9
PROTHROMBIN TIME, POC: NORMAL

## 2021-07-08 PROCEDURE — 1036F TOBACCO NON-USER: CPT | Performed by: NURSE PRACTITIONER

## 2021-07-08 PROCEDURE — 99214 OFFICE O/P EST MOD 30 MIN: CPT | Performed by: NURSE PRACTITIONER

## 2021-07-08 PROCEDURE — 1123F ACP DISCUSS/DSCN MKR DOCD: CPT | Performed by: NURSE PRACTITIONER

## 2021-07-08 PROCEDURE — 4040F PNEUMOC VAC/ADMIN/RCVD: CPT | Performed by: NURSE PRACTITIONER

## 2021-07-08 PROCEDURE — G8427 DOCREV CUR MEDS BY ELIG CLIN: HCPCS | Performed by: NURSE PRACTITIONER

## 2021-07-08 PROCEDURE — G8420 CALC BMI NORM PARAMETERS: HCPCS | Performed by: NURSE PRACTITIONER

## 2021-07-08 PROCEDURE — 1090F PRES/ABSN URINE INCON ASSESS: CPT | Performed by: NURSE PRACTITIONER

## 2021-07-08 PROCEDURE — 85610 PROTHROMBIN TIME: CPT | Performed by: NURSE PRACTITIONER

## 2021-07-08 NOTE — PROGRESS NOTES
[facility] [place], [place]. [address] Counts include 234 beds at the [facility]  [phone] office  [phone] fax      OFFICE VISIT:  2021    Jelly Mcknight - : 1933  Reason For Visit:  Abhi Matute is a 80 y.o. female who is here for 6 Month Follow-Up (Patient denies any cardiac symptoms. ), Congestive Heart Failure, Atrial Fibrillation, and Hypertension    History:   Diagnosis Orders   1. Chronic atrial fibrillation ([address]     2. PVD (peripheral vascular disease) ([address]     3. Anticoagulated on Coumadin     4. Moderate aortic regurgitation     5. Mixed hyperlipidemia     6. Chronic systolic heart failure [facility])       The patient presents today for cardiology follow up accompanied by her daughter. Overall, the patient is doing very well. The family takes turns staying with her. Her BP has been low and Lopressor was decreased by the daughter to 25 mg 1/2 tab BID. The daughter reports BP still running a little low. She reports her mother does some things around the house but sits in her chair mostly. No report of bleeding issues on Coumadin. No report of falls. The patient has a hx of systolic heart failure with EF in 2017 at 35%. 2019 echo showed EF at 60% with moderate AR. The daughter reports no longer needing to administer diuretic. Weight is stable at 114 pounds. The patient denies symptoms to suggest myocardial ischemia or heart failure. She has good rate control of chronic AF. The patient's PCP monitors cholesterol. Subjective  Pia denies exertional chest pain, shortness of breath, orthopnea, paroxysmal nocturnal dyspnea, syncope, presyncope and edema. The patient denies numbness or weakness to suggest cerebrovascular accident or transient ischemic attack. + chronic AF.   Jelly Mcknight has the following history as recorded in [facility]:  Patient Active Problem List   Diagnosis Code    Acute on chronic congestive heart failure ([address] I50.9    Hyponatremia E87.1    Chronic renal failure N18.9    Weakness R53.1    Hematuria R31.9    Chronic systolic CHF (congestive heart failure), NYHA class 3 ([facility]) I50.22    Chronic kidney disease, stage III (moderate) ([facility]) N18.30    Atrial fibrillation ([facility]) I48.91    Decline in verbal memory R41.3    Dyspnea R06.00    Allergic rhinitis J30.9    Edema R60.9    Decreased pedal pulses R09.89    COPD exacerbation ([facility]) J44.1    Hypoxemia R09.02    Family discord Z63.8    Essential hypertension I10    Other fatigue R53.83    PVD (peripheral vascular disease) ([facility]) I73.9    Other chest pain R07.89    Panlobular emphysema ([facility]) J43.1    Gastroesophageal reflux disease with esophagitis K21.00    Nausea and vomiting R11.2    Renal failure N19    Stomatitis K12.1    Failure to thrive in adult R62.7    Weight loss R63.4    Bereavement reaction F43.20, Z63.4    Confusion R41.0    Decreased activities of daily living (ADL) Z78.9    Tachycardia with greater than 160 beats per minute R00.0    Accidental medication error T50.901A    Chronic anticoagulation Z79.01    Acute otitis media H66.90    Anxiety F41.9    Late onset Alzheimer's disease without behavioral disturbance ([facility]) G30.1, F02.80    Current moderate episode of major depressive disorder without prior episode ([facility]) F32.1    Benign essential tremor G25.0    Aortic valve regurgitation I35.1    Hypotension I95.9    Mental confusion R41.0    Weight gain R63.5    Chronic back pain M54.9, G89.29    Chronic pain of both knees M25.561, M25.562, G89.29    Encounter for monitoring Coumadin therapy Z51.81, Z79.01    Mixed hyperlipidemia E78.2    Congestive heart failure (HCC) I50.9    Hemorrhagic purpura ([facility]) D69.49    Nodular lesion on surface of skin L98.9     Past Medical History:   Diagnosis Date    Acute systolic CHF (congestive heart failure), NYHA class 3 ([facility]) 2/24/2017    Allergic rhinitis     Anticoagulant long-term use     coumadin for atrial fib    Anxiety 11/11/2019    Atrial fibrillation (HCC)     Chronic atrial fibrillation (HCC) 9/28/2017    Chronic back pain     Chronic kidney disease     Chronic kidney disease, stage III (moderate) (HCC) 2/17/2017    Chronic systolic CHF (congestive heart failure), NYHA class 3 ([facility]) 2/17/2017    Current moderate episode of major depressive disorder without prior episode [facility] 11/14/2019    GERD (gastroesophageal reflux disease)     Glaucoma     Hearing loss     Hypertension     Mixed hyperlipidemia 7/2/2020    Osteoarthritis     Osteoporosis     Panlobular emphysema ([facility] 11/28/2018    Shingles     Sinus problem     Wears glasses      Past Surgical History:   Procedure Laterality Date    BREAST BIOPSY      Left-benign    BREAST BIOPSY  feb 2014    Left    HYSTERECTOMY      OVARY SURGERY      tumor removed     SKIN CANCER EXCISION  01/2020     Family History   Problem Relation Age of Onset    Asthma Mother     Emphysema Mother     Alzheimer's Disease Sister     Heart Disease Brother     Heart Disease Sister     No Known Problems Sister     Alzheimer's Disease Sister      Social History     Tobacco Use    Smoking status: Never Smoker    Smokeless tobacco: Never Used   Substance Use Topics    Alcohol use: No      Current Outpatient Medications   Medication Sig Dispense Refill    warfarin (COUMADIN) 5 MG tablet Take 1 tablet by mouth daily 90 tablet 3    Acetaminophen-Codeine (TYLENOL WITH CODEINE #3 PO) Take 1 tablet by mouth every 4 hours as needed (knee pain)      guaiFENesin 400 MG tablet Take 400 mg by mouth 2 times daily      mirtazapine (REMERON) 15 MG tablet Take 1 tablet by mouth nightly 30 tablet 0    metoprolol tartrate (LOPRESSOR) 25 MG tablet Take 1 tablet by mouth 2 times daily 60 tablet 5    isosorbide mononitrate (IMDUR) 30 MG extended release tablet Take 1 tablet by mouth daily 30 tablet 5    donepezil (ARICEPT) 5 MG tablet TAKE ONE TABLET BY MOUTH EVERY NIGHT AT BEDTIME 30 tablet 3    atorvastatin (LIPITOR) 10 MG tablet Take 1 tablet by mouth daily (Patient taking differently: Take 10 mg by mouth nightly ) 30 tablet 0    digoxin (LANOXIN) 125 MCG tablet Take 1 tablet by mouth every other day Indications: M, W, F 45 tablet 3    montelukast (SINGULAIR) 10 MG tablet TAKE 1 TABLET BY MOUTH DAILY AT NIGHT 30 tablet 5    escitalopram (LEXAPRO) 10 MG tablet TAKE ONE TABLET BY MOUTH EVERY DAY 30 tablet 5    SPIRIVA HANDIHALER 18 MCG inhalation capsule Inhale 1 capsule into the lungs daily 30 capsule 2    famotidine (PEPCID) 20 MG tablet Take 0.5 tablets by mouth every morning 30 tablet 5    alendronate (FOSAMAX) 70 MG tablet Take 1 tablet by mouth every 7 days (Patient taking differently: Take 70 mg by mouth every 7 days Sunday) 4 tablet 5    fluticasone (FLONASE) 50 MCG/ACT nasal spray 2 sprays by Each Nostril route daily (Patient taking differently: 2 sprays by Each Nostril route nightly ) 3 Bottle 1    mupirocin (BACTROBAN) 2 % ointment APPLY TO AFFECTED AREA(S) THREE TIMES A DAY 22 g 0    Multiple Vitamins-Minerals (THERAPEUTIC MULTIVITAMIN-MINERALS) tablet Take 1 tablet by mouth daily      aspirin 81 MG chewable tablet Take 1 tablet by mouth daily (Patient taking differently: Take 81 mg by mouth every morning ) 30 tablet 0    OXYGEN Inhale 2 L into the lungs continuous      nitroGLYCERIN (NITROSTAT) 0.4 MG SL tablet Place 1 tablet under the tongue every 5 minutes as needed for Chest pain 25 tablet 3    bimatoprost 0.01 % SOLN Place 1 drop into both eyes nightly       furosemide (LASIX) 20 MG tablet TAKE 1 TABLET BY MOUTH DAILY (Patient taking differently: Take 20 mg by mouth daily as needed (Pt is weighed daily and she is given if weight is climbing) ) 30 tablet 2    diclofenac sodium 1 % GEL Apply 4 g topically 4 times daily (Patient taking differently: Apply 4 g topically 4 times daily as needed for Pain ) 2 Tube 0    kg/m².  HEENT - Head is normocephalic. No circumoral cyanosis. Dentition is normal.  EYES -   Lids normal without ptosis. No discharge, edema or subconjunctival hemorrhage. Neck - Symmetrical without apparent mass or lymphadenopathy. Respiratory - Normal respiratory effort without use of accessory muscles. Ausculatation reveals vesicular breath sounds without crackles, wheezes, rub or rhonchi. Cardiovascular - No jugular venous distention. Auscultation reveals irregularly irregular rate and rhythm. No audible clicks, gallop or rub. No murmur. No lower extremity varicosities. No carotid bruits. Abdominal -  No visible distention, mass or pulsations. Extremities - No clubbing or cyanosis. No statis dermatitis or ulcers. No edema. Musculoskeletal -   No Osler's nodes. Kyphosis and scoliosis noted. Ambulates with walker. Skin -  Warm and dry; no rash or pallor. No new surgical wound. Neurological - No focal neurological deficits. Thought processes coherent. No apparent tremor. Oriented to person, place and time. Psychiatric -  Appropriate affect and mood. Data reviewed:  12/8/19 echo   Summary   LV is normal in size with normal systolic function. LV ejection fraction   estimated at 60%. RV appears normal in size with normal systolic function. Mild left atrial enlargement. Moderate right atrial enlargement. Aortic valve is trileaflet with mild leaflet thickening. Mild to moderate   (2+) aortic regurgitation. No significant stenosis. Mitral valve is structurally normal with normal leaflet mobility. Mild   mitral regurgitation. No stenosis. Trace tricuspid regurgitation. Signature    Electronically signed by Rhina Styles MD(Interpreting physician)   on 12/08/2019 05:02 PM    5/1/17 echo   In atrial fibrillation during the study. Normal LV size with severe dysfunction. EF 35%. Mild MR and AR. Moderate TR. Moderate pulmonary hypertension.     Signature Electronically signed by Marisela Tomlinson MD(Interpreting   physician) on 05/02/2017 05:09 PM    Lab Results   Component Value Date    INR 3.70 06/29/2021    INR 2.10 05/26/2021    INR 2.60 05/05/2021    PROTIME 34.7 (H) 03/13/2020    PROTIME 27.1 (H) 02/12/2020    PROTIME 18.2 (H) 12/10/2019     Lab Results   Component Value Date    WBC 5.2 06/22/2021    HGB 11.9 (L) 06/22/2021    HCT 36.9 (L) 06/22/2021    MCV 99.5 (H) 06/22/2021     06/22/2021     Lab Results   Component Value Date    CHOL 113 (L) 02/01/2017     Lab Results   Component Value Date    TRIG 60 (L) 02/01/2017     Lab Results   Component Value Date    HDL 43 (L) 02/01/2017     Lab Results   Component Value Date    LDLCALC 58 02/01/2017       Lab Results   Component Value Date     06/22/2021    K 3.9 06/22/2021     06/22/2021    CO2 31 (H) 06/22/2021    BUN 26 (H) 06/22/2021    CREATININE 1.3 (H) 06/22/2021    GLUCOSE 105 06/22/2021    CALCIUM 9.8 06/22/2021    PROT 6.8 06/22/2021    LABALBU 3.8 06/22/2021    BILITOT 0.4 06/22/2021    ALKPHOS 49 06/22/2021    AST 27 06/22/2021    ALT 13 06/22/2021    LABGLOM 39 (A) 06/22/2021    GFRAA 47 (L) 06/22/2021    GLOB 2.8 02/01/2017     BP Readings from Last 3 Encounters:   07/08/21 118/80   06/08/21 130/77   04/07/21 106/72    Pulse Readings from Last 3 Encounters:   07/08/21 65   06/08/21 63   04/07/21 71        Wt Readings from Last 3 Encounters:   07/08/21 114 lb (51.7 kg)   06/08/21 112 lb 12.8 oz (51.2 kg)   04/07/21 113 lb (51.3 kg)     Assessment/Plan:   Diagnosis Orders   1. Chronic atrial fibrillation ([facility]     2. PVD (peripheral vascular disease) ([facility]     3. Anticoagulated on Coumadin     4. Moderate aortic regurgitation     5. Mixed hyperlipidemia     6. Chronic systolic heart failure (HCC)       Chronic AF - good rate control on lanoxin and Lopressor. BP has been running low. Decrease Lopressor 25 mg to 1/2 tab at bedtime. Anticoagulated with coumadin.   See anticoag encounter. Home monitors with family assistance. Dose management by [facility]. No bleeding issues reported. No incident of falls. Moderate AR - no symptoms to suggest worsening regurgitation. Hx systolic heart failure - EF 2017 was 35%, 2019 up to 60%. No symptoms of overt heart failure. Weight stable at 114 pounds. Not requiring diuretic therapy. Patient is compliant with medication regimen. Previous cardiac history and records reviewed. Continue current medical management for cardiac related condition. Continue other current medications as directed. Continue to follow up with primary care provider for non cardiac medical problems. If your primary care provider is outside of the [facility], please request that your labs be faxed to this office at [phone]. BP goal 130/80 or less. Call the office with any problems, questions or concerns at [phone]. Cardiology follow up as scheduled in [address] appointments. Educational included in patient instructions. Heart health.       Chetan Okeefe, APRN

## 2021-07-08 NOTE — PATIENT INSTRUCTIONS
New instructions for today:  Reduce Lopressor (metoprolol) to 25 mg 1/2 tab at bedtime. Coumadin can increase your risk of bleeding. If you notice blood in urine or stool, bleeding gums, excessive bruising or cough productive of bloody sputum, notify the office. Information on this blood thinner has been included in your after visit summary. Patient Instructions:  Continue current medications as prescribed. Always keep a current medication list. Bring your medications to every office visit. Continue to follow up with primary care provider for non cardiac medical problems. Call the office with any problems, questions or concerns at [phone]. If you have been asked to keep a blood pressure log, do so for 2 weeks. Call the office to report readings to the triage nurse at [phone]. Follow up with cardiologist as scheduled. The following educational material has been included in this after visit summary for your review: Life simple 7. Heart health. Life simple 7  1) Manage blood pressure - high blood pressure is a major risk factor for heart disease and stroke. Keeping blood pressure in health range reduces strain on your heart, arteries and kidneys. Blood pressure goal is less than 130/80. 2) Control cholesterol - contributes to plaque, which can clog arteries and lead to heart disease and stroke. When you control your cholesterol you are giving your arteries their best chance to remain clear. It is recommended that you get cholesterol lab work done once a year. 3) Reduce blood sugar - most of the food we eat is turning into glucose or blood sugar that our body uses for energy. Over time, high levels of blood sugar can damage your heart, kidneys, eyes and nerves. 4) Get active - living an active life is one of the most rewarding gifts you can give yourself and those you love. Simply put, daily physical activity increases your length and quality of life.  Strive to exercise 15 minutes most days of the week. 5)  Eat better - A healthy diet is one of your best weapons for fighting cardiovascular disease. When you eat a heart healthy diet, you improve your chances for feeling good and staying healthy for life. 6)  Lose weight - when you shed extra fat an unnecessary pounds, you reduce the burden on your hear, lungs, blood vessels and skeleton. You give yourself the gift of active living, you lower your blood pressure and help yourself feel better. 7) Stop smoking - cigarette smokers have a higher risk of developing cardiovascular disease. If  You smoke, quitting is the best thing you can do for your health. Check American Heart Association on line for more information on Life's Simple 7 and tips for healthy living. A Healthy Heart: Care Instructions  Your Care Instructions     Coronary artery disease, also called heart disease, occurs when a substance called plaque builds up in the vessels that supply oxygen-rich blood to your heart muscle. This can narrow the blood vessels and reduce blood flow. A heart attack happens when blood flow is completely blocked. A high-fat diet, smoking, and other factors increase the risk of heart disease. Your doctor has found that you have a chance of having heart disease. You can do lots of things to keep your heart healthy. It may not be easy, but you can change your diet, exercise more, and quit smoking. These steps really work to lower your chance of heart disease. Follow-up care is a key part of your treatment and safety. Be sure to make and go to all appointments, and call your doctor if you are having problems. It's also a good idea to know your test results and keep a list of the medicines you take. How can you care for yourself at home? Diet  · Use less salt when you cook and eat. This helps lower your blood pressure. Taste food before salting. Add only a little salt when you think you need it.  With time, your taste buds will adjust to less salt.  · Eat fewer snack items, fast foods, canned soups, and other high-salt, high-fat, processed foods. · Read food labels and try to avoid saturated and trans fats. They increase your risk of heart disease by raising cholesterol levels. · Limit the amount of solid fat-butter, margarine, and shortening-you eat. Use olive, peanut, or canola oil when you cook. Bake, broil, and steam foods instead of frying them. · Eat a variety of fruit and vegetables every day. Dark green, deep orange, red, or yellow fruits and vegetables are especially good for you. Examples include spinach, carrots, peaches, and berries. · Foods high in fiber can reduce your cholesterol and provide important vitamins and minerals. High-fiber foods include whole-grain cereals and breads, oatmeal, beans, brown rice, citrus fruits, and apples. · Eat lean proteins. Heart-healthy proteins include seafood, lean meats and poultry, eggs, beans, peas, nuts, seeds, and soy products. · Limit drinks and foods with added sugar. These include candy, desserts, and soda pop. Lifestyle changes  · If your doctor recommends it, get more exercise. Walking is a good choice. Bit by bit, increase the amount you walk every day. Try for at least 30 minutes on most days of the week. You also may want to swim, bike, or do other activities. · Do not smoke. If you need help quitting, talk to your doctor about stop-smoking programs and medicines. These can increase your chances of quitting for good. Quitting smoking may be the most important step you can take to protect your heart. It is never too late to quit. · Limit alcohol to 2 drinks a day for men and 1 drink a day for women. Too much alcohol can cause health problems. · Manage other health problems such as diabetes, high blood pressure, and high cholesterol. If you think you may have a problem with alcohol or drug use, talk to your doctor. Medicines  · Take your medicines exactly as prescribed.  Call your doctor if you think you are having a problem with your medicine. · If your doctor recommends aspirin, take the amount directed each day. Make sure you take aspirin and not another kind of pain reliever, such as acetaminophen (Tylenol). When should you call for help? [id number] if you have symptoms of a heart attack. These may include:  · Chest pain or pressure, or a strange feeling in the chest.  · Sweating. · Shortness of breath. · Pain, pressure, or a strange feeling in the back, neck, jaw, or upper belly or in one or both shoulders or arms. · Lightheadedness or sudden weakness. · A fast or irregular heartbeat. After you call 911, the  may tell you to chew 1 adult-strength or 2 to 4 low-dose aspirin. Wait for an ambulance. Do not try to drive yourself. Watch closely for changes in your health, and be sure to contact your doctor if you have any problems. Where can you learn more? Go to https://[employer]Mo. org and sign in to your [employer] account. Enter O575 in the [employer] box to learn more about \"A Healthy Heart: Care Instructions. \"     If you do not have an account, please click on the \"Sign Up Now\" link. Current as of: December 16, 2019               Content Version: 12.5  © 9921-3760 Healthwise, Incorporated. Care instructions adapted under license by [facility] ([facility]). If you have questions about a medical condition or this instruction, always ask your healthcare professional. [place] [address] any warranty or liability for your use of this information.

## 2021-07-14 ENCOUNTER — OFFICE VISIT (OUTPATIENT)
Dept: URGENT CARE | Age: 86
End: 2021-07-14
Payer: MEDICARE

## 2021-07-14 VITALS
RESPIRATION RATE: 20 BRPM | WEIGHT: 112.2 LBS | DIASTOLIC BLOOD PRESSURE: 80 MMHG | TEMPERATURE: 97.9 F | SYSTOLIC BLOOD PRESSURE: 150 MMHG | HEART RATE: 81 BPM | BODY MASS INDEX: 20.52 KG/M2 | OXYGEN SATURATION: 96 %

## 2021-07-14 DIAGNOSIS — L30.9 DERMATITIS: Primary | ICD-10-CM

## 2021-07-14 PROCEDURE — 99213 OFFICE O/P EST LOW 20 MIN: CPT | Performed by: NURSE PRACTITIONER

## 2021-07-14 PROCEDURE — 1123F ACP DISCUSS/DSCN MKR DOCD: CPT | Performed by: NURSE PRACTITIONER

## 2021-07-14 PROCEDURE — 4040F PNEUMOC VAC/ADMIN/RCVD: CPT | Performed by: NURSE PRACTITIONER

## 2021-07-14 PROCEDURE — 1036F TOBACCO NON-USER: CPT | Performed by: NURSE PRACTITIONER

## 2021-07-14 PROCEDURE — 1090F PRES/ABSN URINE INCON ASSESS: CPT | Performed by: NURSE PRACTITIONER

## 2021-07-14 PROCEDURE — G8427 DOCREV CUR MEDS BY ELIG CLIN: HCPCS | Performed by: NURSE PRACTITIONER

## 2021-07-14 PROCEDURE — G8420 CALC BMI NORM PARAMETERS: HCPCS | Performed by: NURSE PRACTITIONER

## 2021-07-14 ASSESSMENT — ENCOUNTER SYMPTOMS
COUGH: 0
SHORTNESS OF BREATH: 0

## 2021-07-14 ASSESSMENT — VISUAL ACUITY: OU: 1

## 2021-07-14 NOTE — PATIENT INSTRUCTIONS
Use good moisturizing lotion twice daily  Triamcinolone to rash twice daily, avoid face, groin and under arms  Follow up with PCP or return to Urgent Care for worsening or unresolved symptoms. Patient Education        Dermatitis: Care Instructions  Your Care Instructions  Dermatitis is the general name used for any rash or inflammation of the skin. Different kinds of dermatitis cause different kinds of rashes. Common causes of a rash include new medicines, plants (such as poison oak or poison ivy), heat, and stress. Certain illnesses can also cause a rash. An allergic reaction to something that touches your skin, such as latex, nickel, or poison ivy, is called contact dermatitis. Contact dermatitis may also be caused by something that irritates the skin, such as bleach, a chemical, or soap. These types of rashes cannot be spread from person to person. How long your rash will last depends on what caused it. Rashes may last a few days or months. Follow-up care is a key part of your treatment and safety. Be sure to make and go to all appointments, and call your doctor if you are having problems. It's also a good idea to know your test results and keep a list of the medicines you take. How can you care for yourself at home? · Do not scratch the rash. Cut your nails short, and file them smooth. Or wear gloves if this helps keep you from scratching. · Wash the area with water only. Pat dry. · Put cold, wet cloths on the rash to reduce itching. · Keep cool, and stay out of the sun. · Leave the rash open to the air as much as possible. · If the rash itches, use hydrocortisone cream. Follow the directions on the label. Calamine lotion may help for plant rashes. · Take an over-the-counter antihistamine, such as diphenhydramine (Benadryl) or loratadine (Claritin), to help calm the itching. Read and follow all instructions on the label. · If your doctor prescribed a cream, use it as directed.  If your doctor prescribed medicine, take it exactly as directed. When should you call for help? Call your doctor now or seek immediate medical care if:    · You have symptoms of infection, such as:  ? Increased pain, swelling, warmth, or redness. ? Red streaks leading from the area. ? Pus draining from the area. ? A fever.     · You have joint pain along with the rash. Watch closely for changes in your health, and be sure to contact your doctor if:    · Your rash is changing or getting worse.     · You are not getting better as expected. Where can you learn more? Go to https://[employer]. org and sign in to your [employer] account. Enter [phone] in the [employer] box to learn more about \"Dermatitis: Care Instructions. \"     If you do not have an account, please click on the \"Sign Up Now\" link. Current as of: March 3, 2021               Content Version: 12.9  © 2006-2021 Healthwise, [facility]. Care instructions adapted under license by [facility] ([facility]). If you have questions about a medical condition or this instruction, always ask your healthcare professional. [place] [address] any warranty or liability for your use of this information.

## 2021-07-14 NOTE — PROGRESS NOTES
[facility] PHYSICIAN SERVICES  [facility]  [address] [address] [place] [address]  Dept: [phone]  Dept Fax: [phone]: [phone]    Osorio Nunn is a 80 y.o. female who presents today for her medical conditions/complaintsas noted below. Osorio Nunn is c/o of Rash (back, nose, left arm)        HPI:     Rash  This is a new problem. The current episode started in the past 7 days. The problem is unchanged. The affected locations include the face, torso, back and left wrist (left wrist, low back, nose). The rash is characterized by redness and itchiness. She was exposed to nothing. Pertinent negatives include no congestion, cough, fatigue, fever or shortness of breath. Past treatments include moisturizer. The treatment provided mild relief. Evelyne Bautista has a rash to her left side of her nose, left wrist and low back and it scratching the areas and her daughter did not know what to use on the rash or what is causing it.   Past Medical History:   Diagnosis Date    Acute systolic CHF (congestive heart failure), NYHA class 3 ([facility]) 2/24/2017    Allergic rhinitis     Anticoagulant long-term use     coumadin for atrial fib    Anxiety 11/11/2019    Atrial fibrillation (HCC)     Chronic atrial fibrillation ([facility]) 9/28/2017    Chronic back pain     Chronic kidney disease     Chronic kidney disease, stage III (moderate) ([facility]) 2/17/2017    Chronic systolic CHF (congestive heart failure), NYHA class 3 ([address] 2/17/2017    Current moderate episode of major depressive disorder without prior episode ([address] 11/14/2019    GERD (gastroesophageal reflux disease)     Glaucoma     Hearing loss     Hypertension     Mixed hyperlipidemia 7/2/2020    Osteoarthritis     Osteoporosis     Panlobular emphysema ([address] 11/28/2018    Shingles     Sinus problem     Wears glasses      Past Surgical History:   Procedure Laterality Date    BREAST BIOPSY      Left-benign    BREAST BIOPSY  feb 2014 Left    HYSTERECTOMY      OVARY SURGERY      tumor removed     SKIN CANCER EXCISION  01/2020       Family History   Problem Relation Age of Onset    Asthma Mother     Emphysema Mother     Alzheimer's Disease Sister     Heart Disease Brother     Heart Disease Sister     No Known Problems Sister     Alzheimer's Disease Sister        Social History     Tobacco Use    Smoking status: Never Smoker    Smokeless tobacco: Never Used   Substance Use Topics    Alcohol use: No      Current Outpatient Medications   Medication Sig Dispense Refill    triamcinolone (KENALOG) 0.1 % ointment Apply to rash twice daily for 7 days, avoid face, axilla, groin 1 Tube 0    metoprolol tartrate (LOPRESSOR) 25 MG tablet Take 0.5 tablets by mouth nightly 1 tablet 0    warfarin (COUMADIN) 5 MG tablet Take 1 tablet by mouth daily 90 tablet 3    Acetaminophen-Codeine (TYLENOL WITH CODEINE #3 PO) Take 1 tablet by mouth every 4 hours as needed (knee pain)      guaiFENesin 400 MG tablet Take 400 mg by mouth 2 times daily      mirtazapine (REMERON) 15 MG tablet Take 1 tablet by mouth nightly 30 tablet 0    isosorbide mononitrate (IMDUR) 30 MG extended release tablet Take 1 tablet by mouth daily 30 tablet 5    donepezil (ARICEPT) 5 MG tablet TAKE ONE TABLET BY MOUTH EVERY NIGHT AT BEDTIME 30 tablet 3    atorvastatin (LIPITOR) 10 MG tablet Take 1 tablet by mouth daily (Patient taking differently: Take 10 mg by mouth nightly ) 30 tablet 0    digoxin (LANOXIN) 125 MCG tablet Take 1 tablet by mouth every other day Indications: M, W, F 45 tablet 3    montelukast (SINGULAIR) 10 MG tablet TAKE 1 TABLET BY MOUTH DAILY AT NIGHT 30 tablet 5    escitalopram (LEXAPRO) 10 MG tablet TAKE ONE TABLET BY MOUTH EVERY DAY 30 tablet 5    SPIRIVA HANDIHALER 18 MCG inhalation capsule Inhale 1 capsule into the lungs daily 30 capsule 2    famotidine (PEPCID) 20 MG tablet Take 0.5 tablets by mouth every morning 30 tablet 5    alendronate (FOSAMAX) 70 MG tablet Take 1 tablet by mouth every 7 days (Patient taking differently: Take 70 mg by mouth every 7 days Sunday) 4 tablet 5    fluticasone (FLONASE) 50 MCG/ACT nasal spray 2 sprays by Each Nostril route daily (Patient taking differently: 2 sprays by Each Nostril route nightly ) 3 Bottle 1    mupirocin (BACTROBAN) 2 % ointment APPLY TO AFFECTED AREA(S) THREE TIMES A DAY 22 g 0    Multiple Vitamins-Minerals (THERAPEUTIC MULTIVITAMIN-MINERALS) tablet Take 1 tablet by mouth daily      aspirin 81 MG chewable tablet Take 1 tablet by mouth daily (Patient taking differently: Take 81 mg by mouth every morning ) 30 tablet 0    OXYGEN Inhale 2 L into the lungs continuous      nitroGLYCERIN (NITROSTAT) 0.4 MG SL tablet Place 1 tablet under the tongue every 5 minutes as needed for Chest pain 25 tablet 3    bimatoprost 0.01 % SOLN Place 1 drop into both eyes nightly       furosemide (LASIX) 20 MG tablet TAKE 1 TABLET BY MOUTH DAILY (Patient taking differently: Take 20 mg by mouth daily as needed (Pt is weighed daily and she is given if weight is climbing) ) 30 tablet 2    diclofenac sodium 1 % GEL Apply 4 g topically 4 times daily (Patient taking differently: Apply 4 g topically 4 times daily as needed for Pain ) 2 Tube 0    tiotropium (SPIRIVA RESPIMAT) 2.5 MCG/ACT AERS inhaler Inhale 2 puffs into the lungs daily 1 Inhaler 2    allopurinol (ZYLOPRIM) 100 MG tablet Take 1 tablet by mouth daily 30 tablet 1     No current facility-administered medications for this visit.      Allergies   Allergen Reactions    Denosumab Other (See Comments)     after had shot got blood in urine--not sure if associated    Lisinopril Other (See Comments)     cough         Health Maintenance   Topic Date Due    COVID-19 Vaccine (1) Never done    Shingles Vaccine (1 of 2) Never done    Lipid screen  02/01/2018    Annual Wellness Visit (AWV)  Never done    DTaP/Tdap/Td vaccine (2 - Td or Tdap) 10/01/2020    Flu vaccine (1) mid upper back, no drainage noted    Neurological:      General: No focal deficit present. Mental Status: She is alert, oriented to person, place, and time and easily aroused. Mental status is at baseline. Psychiatric:         Attention and Perception: Attention normal.         Mood and Affect: Mood normal.         Speech: Speech normal.         Behavior: Behavior normal. Behavior is cooperative. BP (!) 150/80 (Site: Left Upper Arm)   Pulse 81   Temp 97.9 °F (36.6 °C)   Resp 20   Wt 112 lb 3.2 oz (50.9 kg)   SpO2 96% Comment: 2L  BMI 20.52 kg/m²     :Assessment       Diagnosis Orders   1. Dermatitis         :Plan    No orders of the defined types were placed in this encounter. Spoke with daughter, Jackie Paiz, if no improvement in 2-3 days call our office back and I will send her in oral steroids, but would like to avoid this if possible. She voiced understanding of this. No follow-ups on file. Orders Placed This Encounter   Medications    triamcinolone (KENALOG) 0.1 % ointment     Sig: Apply to rash twice daily for 7 days, avoid face, axilla, groin     Dispense:  1 Tube     Refill:  0        Patient Instructions     Use good moisturizing lotion twice daily  Triamcinolone to rash twice daily, avoid face, groin and under arms  Follow up with PCP or return to Urgent Care for worsening or unresolved symptoms. Patient Education        Dermatitis: Care Instructions  Your Care Instructions  Dermatitis is the general name used for any rash or inflammation of the skin. Different kinds of dermatitis cause different kinds of rashes. Common causes of a rash include new medicines, plants (such as poison oak or poison ivy), heat, and stress. Certain illnesses can also cause a rash. An allergic reaction to something that touches your skin, such as latex, nickel, or poison ivy, is called contact dermatitis.  Contact dermatitis may also be caused by something that irritates the skin, such as bleach, a chemical, or soap. These types of rashes cannot be spread from person to person. How long your rash will last depends on what caused it. Rashes may last a few days or months. Follow-up care is a key part of your treatment and safety. Be sure to make and go to all appointments, and call your doctor if you are having problems. It's also a good idea to know your test results and keep a list of the medicines you take. How can you care for yourself at home? · Do not scratch the rash. Cut your nails short, and file them smooth. Or wear gloves if this helps keep you from scratching. · Wash the area with water only. Pat dry. · Put cold, wet cloths on the rash to reduce itching. · Keep cool, and stay out of the sun. · Leave the rash open to the air as much as possible. · If the rash itches, use hydrocortisone cream. Follow the directions on the label. Calamine lotion may help for plant rashes. · Take an over-the-counter antihistamine, such as diphenhydramine (Benadryl) or loratadine (Claritin), to help calm the itching. Read and follow all instructions on the label. · If your doctor prescribed a cream, use it as directed. If your doctor prescribed medicine, take it exactly as directed. When should you call for help? Call your doctor now or seek immediate medical care if:    · You have symptoms of infection, such as:  ? Increased pain, swelling, warmth, or redness. ? Red streaks leading from the area. ? Pus draining from the area. ? A fever.     · You have joint pain along with the rash. Watch closely for changes in your health, and be sure to contact your doctor if:    · Your rash is changing or getting worse.     · You are not getting better as expected. Where can you learn more? Go to https://GoodChime!pelatoniaeb.LynxFit for [employer]. org and sign in to your [employer] account. Enter [phone] in the Zocere box to learn more about \"Dermatitis: Care Instructions. \"     If you do not have an account, please click on the \"Sign Up Now\" link. Current as of: March 3, 2021               Content Version: 12.9  © 2006-2021 [employer]. Care instructions adapted under license by [facility] ([facility]). If you have questions about a medical condition or this instruction, always ask your healthcare professional. [address] [id number] any warranty or liability for your use of this information. Patient given educational materials- see patient instructions. Discussed use, benefit, and side effects of prescribedmedications. All patient questions answered. Pt voiced understanding.        Electronically signed by Ashton Essex, APRN - CNP on 7/14/2021 at 3:21 PM

## 2021-07-16 RX ORDER — MIRTAZAPINE 15 MG/1
15 TABLET, FILM COATED ORAL NIGHTLY
Qty: 30 TABLET | Refills: 0 | Status: SHIPPED | OUTPATIENT
Start: 2021-07-16 | End: 2021-07-23

## 2021-07-16 NOTE — TELEPHONE ENCOUNTER
Must keep appointment for further refills      Piaterrell Cobb called to request a refill on her medication.       Last office visit : 6/2/2021   Next office visit : 8/11/2021     Requested Prescriptions     Signed Prescriptions Disp Refills    mirtazapine (REMERON) 15 MG tablet 30 tablet 0     Sig: Take 1 tablet by mouth nightly     Authorizing Provider: Meg Andrea     Ordering User: Chirstelle Riggs MA

## 2021-07-26 NOTE — PATIENT INSTRUCTIONS
Chronic Obstructive Pulmonary Disease  Chronic obstructive pulmonary disease (COPD) is a long-term (chronic) lung problem. When you have COPD, it is hard for air to get in and out of your lungs. Usually the condition gets worse over time, and your lungs will never return to normal. There are things you can do to keep yourself as healthy as possible.  · Your doctor may treat your condition with:  ? Medicines.  ? Oxygen.  ? Lung surgery.  · Your doctor may also recommend:  ? Rehabilitation. This includes steps to make your body work better. It may involve a team of specialists.  ? Quitting smoking, if you smoke.  ? Exercise and changes to your diet.  ? Comfort measures (palliative care).  Follow these instructions at home:  Medicines  · Take over-the-counter and prescription medicines only as told by your doctor.  · Talk to your doctor before taking any cough or allergy medicines. You may need to avoid medicines that cause your lungs to be dry.  Lifestyle  · If you smoke, stop. Smoking makes the problem worse. If you need help quitting, ask your doctor.  · Avoid being around things that make your breathing worse. This may include smoke, chemicals, and fumes.  · Stay active, but remember to rest as well.  · Learn and use tips on how to relax.  · Make sure you get enough sleep. Most adults need at least 7 hours of sleep every night.  · Eat healthy foods. Eat smaller meals more often. Rest before meals.  Controlled breathing  Learn and use tips on how to control your breathing as told by your doctor. Try:  · Breathing in (inhaling) through your nose for 1 second. Then, pucker your lips and breath out (exhale) through your lips for 2 seconds.  · Putting one hand on your belly (abdomen). Breathe in slowly through your nose for 1 second. Your hand on your belly should move out. Pucker your lips and breathe out slowly through your lips. Your hand on your belly should move in as you breathe out.    Controlled coughing  Learn  and use controlled coughing to clear mucus from your lungs. Follow these steps:  1. Lean your head a little forward.  2. Breathe in deeply.  3. Try to hold your breath for 3 seconds.  4. Keep your mouth slightly open while coughing 2 times.  5. Spit any mucus out into a tissue.  6. Rest and do the steps again 1 or 2 times as needed.  General instructions  · Make sure you get all the shots (vaccines) that your doctor recommends. Ask your doctor about a flu shot and a pneumonia shot.  · Use oxygen therapy and pulmonary rehabilitation if told by your doctor. If you need home oxygen therapy, ask your doctor if you should buy a tool to measure your oxygen level (oximeter).  · Make a COPD action plan with your doctor. This helps you to know what to do if you feel worse than usual.  · Manage any other conditions you have as told by your doctor.  · Avoid going outside when it is very hot, cold, or humid.  · Avoid people who have a sickness you can catch (contagious).  · Keep all follow-up visits as told by your doctor. This is important.  Contact a doctor if:  · You cough up more mucus than usual.  · There is a change in the color or thickness of the mucus.  · It is harder to breathe than usual.  · Your breathing is faster than usual.  · You have trouble sleeping.  · You need to use your medicines more often than usual.  · You have trouble doing your normal activities such as getting dressed or walking around the house.  Get help right away if:  · You have shortness of breath while resting.  · You have shortness of breath that stops you from:  ? Being able to talk.  ? Doing normal activities.  · Your chest hurts for longer than 5 minutes.  · Your skin color is more blue than usual.  · Your pulse oximeter shows that you have low oxygen for longer than 5 minutes.  · You have a fever.  · You feel too tired to breathe normally.  Summary  · Chronic obstructive pulmonary disease (COPD) is a long-term lung problem.  · The way your  lungs work will never return to normal. Usually the condition gets worse over time. There are things you can do to keep yourself as healthy as possible.  · Take over-the-counter and prescription medicines only as told by your doctor.  · If you smoke, stop. Smoking makes the problem worse.  This information is not intended to replace advice given to you by your health care provider. Make sure you discuss any questions you have with your health care provider.  Document Revised: 11/30/2018 Document Reviewed: 01/22/2018  [employer] Patient Education © 2021 [employer]      Allergic Rhinitis, Adult    Allergic rhinitis is an allergic reaction that affects the mucous membrane inside the nose. The mucous membrane is the tissue that produces mucus.  There are two types of allergic rhinitis:  · Seasonal. This type is also called hay fever and happens only during certain seasons.  · Perennial. This type can happen at any time of the year.  Allergic rhinitis cannot be spread from person to person. This condition can be mild, moderate, or severe. It can develop at any age and may be outgrown.  What are the causes?  This condition is caused by allergens. These are things that can cause an allergic reaction. Allergens may differ for seasonal allergic rhinitis and perennial allergic rhinitis.  · Seasonal allergic rhinitis is triggered by pollen. Pollen can come from grasses, trees, and weeds.  · Perennial allergic rhinitis may be triggered by:  ? Dust mites.  ? Proteins in a pet's urine, saliva, or dander. Dander is dead skin cells from a pet.  ? Smoke, mold, or car fumes.  What increases the risk?  You are more likely to develop this condition if you have a family history of allergies or other conditions related to allergies, including:  · Allergic conjunctivitis. This is inflammation of parts of the eyes and eyelids.  · Asthma. This condition affects the lungs and makes it hard to breathe.  · Atopic dermatitis or eczema. This is  long term (chronic) inflammation of the skin.  · Food allergies.  What are the signs or symptoms?  Symptoms of this condition include:  · Sneezing or coughing.  · A stuffy nose (nasal congestion), itchy nose, or nasal discharge.  · Itchy eyes and tearing of the eyes.  · A feeling of mucus dripping down the back of your throat (postnasal drip).  · Trouble sleeping.  · Tiredness or fatigue.  · Headache.  · Sore throat.  How is this diagnosed?  This condition may be diagnosed with your symptoms, medical history, and physical exam. Your health care provider may check for related conditions, such as:  · Asthma.  · Pink eye. This is eye inflammation caused by infection (conjunctivitis).  · Ear infection.  · Upper respiratory infection. This is an infection in the nose, throat, or upper airways.  You may also have tests to find out which allergens trigger your symptoms. These may include skin tests or blood tests.  How is this treated?  There is no cure for this condition, but treatment can help control symptoms. Treatment may include:  · Taking medicines that block allergy symptoms, such as corticosteroids and antihistamines. Medicine may be given as a shot, nasal spray, or pill.  · Avoiding any allergens.  · Being exposed again and again to tiny amounts of allergens to help you build a defense against allergens (immunotherapy). This is done if other treatments have not helped. It may include:  ? Allergy shots. These are injected medicines that have small amounts of allergen in them.  ? Sublingual immunotherapy. This involves taking small doses of a medicine with allergen in it under your tongue.  If these treatments do not work, your health care provider may prescribe newer, stronger medicines.  Follow these instructions at home:  Avoiding allergens  Find out what you are allergic to and avoid those allergens. These are some things you can do to help avoid allergens:  · If you have perennial allergies:  ? Replace carpet  with wood, tile, or vinyl adela. Carpet can trap dander and dust.  ? Do not smoke. Do not allow smoking in your home.  ? Change your heating and air conditioning filters at least once a month.  · If you have seasonal allergies, take these steps during allergy season:  ? Keep windows closed as much as possible.  ? Plan outdoor activities when pollen counts are lowest. Check pollen counts before you plan outdoor activities.  ? When coming indoors, change clothing and shower before sitting on furniture or bedding.  · If you have a pet in the house that produces allergens:  ? Keep the pet out of the bedroom.  ? Vacuum, sweep, and dust regularly.  General instructions  · Take over-the-counter and prescription medicines only as told by your health care provider.  · Drink enough fluid to keep your urine pale yellow.  · Keep all follow-up visits as told by your health care provider. This is important.  Where to find more information  · American Academy of Allergy, Asthma & Immunology: www.aaaai.org  Contact a health care provider if:  · You have a fever.  · You develop a cough that does not go away.  · You make whistling sounds when you breathe (wheeze).  · Your symptoms slow you down or stop you from doing your normal activities each day.  Get help right away if:  · You have shortness of breath.  This symptom may represent a serious problem that is an emergency. Do not wait to see if the symptom will go away. Get medical help right away. Call your local emergency services (911 in the [place] Do not drive yourself to the hospital.  Summary  · Allergic rhinitis may be managed by taking medicines as directed and avoiding allergens.  · If you have seasonal allergies, keep windows closed as much as possible during allergy season.  · Contact your health care provider if you develop a fever or a cough that does not go away.  This information is not intended to replace advice given to you by your health care provider. Make sure  you discuss any questions you have with your health care provider.  Document Revised: 02/05/2021 Document Reviewed: 12/15/2020  [employer] Patient Education © 2021 [employer]      Allergic Rhinitis, Adult    Allergic rhinitis is an allergic reaction that affects the mucous membrane inside the nose. The mucous membrane is the tissue that produces mucus.  There are two types of allergic rhinitis:  · Seasonal. This type is also called hay fever and happens only during certain seasons.  · Perennial. This type can happen at any time of the year.  Allergic rhinitis cannot be spread from person to person. This condition can be mild, moderate, or severe. It can develop at any age and may be outgrown.  What are the causes?  This condition is caused by allergens. These are things that can cause an allergic reaction. Allergens may differ for seasonal allergic rhinitis and perennial allergic rhinitis.  · Seasonal allergic rhinitis is triggered by pollen. Pollen can come from grasses, trees, and weeds.  · Perennial allergic rhinitis may be triggered by:  ? Dust mites.  ? Proteins in a pet's urine, saliva, or dander. Dander is dead skin cells from a pet.  ? Smoke, mold, or car fumes.  What increases the risk?  You are more likely to develop this condition if you have a family history of allergies or other conditions related to allergies, including:  · Allergic conjunctivitis. This is inflammation of parts of the eyes and eyelids.  · Asthma. This condition affects the lungs and makes it hard to breathe.  · Atopic dermatitis or eczema. This is long term (chronic) inflammation of the skin.  · Food allergies.  What are the signs or symptoms?  Symptoms of this condition include:  · Sneezing or coughing.  · A stuffy nose (nasal congestion), itchy nose, or nasal discharge.  · Itchy eyes and tearing of the eyes.  · A feeling of mucus dripping down the back of your throat (postnasal drip).  · Trouble sleeping.  · Tiredness or  fatigue.  · Headache.  · Sore throat.  How is this diagnosed?  This condition may be diagnosed with your symptoms, medical history, and physical exam. Your health care provider may check for related conditions, such as:  · Asthma.  · Pink eye. This is eye inflammation caused by infection (conjunctivitis).  · Ear infection.  · Upper respiratory infection. This is an infection in the nose, throat, or upper airways.  You may also have tests to find out which allergens trigger your symptoms. These may include skin tests or blood tests.  How is this treated?  There is no cure for this condition, but treatment can help control symptoms. Treatment may include:  · Taking medicines that block allergy symptoms, such as corticosteroids and antihistamines. Medicine may be given as a shot, nasal spray, or pill.  · Avoiding any allergens.  · Being exposed again and again to tiny amounts of allergens to help you build a defense against allergens (immunotherapy). This is done if other treatments have not helped. It may include:  ? Allergy shots. These are injected medicines that have small amounts of allergen in them.  ? Sublingual immunotherapy. This involves taking small doses of a medicine with allergen in it under your tongue.  If these treatments do not work, your health care provider may prescribe newer, stronger medicines.  Follow these instructions at home:  Avoiding allergens  Find out what you are allergic to and avoid those allergens. These are some things you can do to help avoid allergens:  · If you have perennial allergies:  ? Replace carpet with wood, tile, or vinyl adela. Carpet can trap dander and dust.  ? Do not smoke. Do not allow smoking in your home.  ? Change your heating and air conditioning filters at least once a month.  · If you have seasonal allergies, take these steps during allergy season:  ? Keep windows closed as much as possible.  ? Plan outdoor activities when pollen counts are lowest. Check  pollen counts before you plan outdoor activities.  ? When coming indoors, change clothing and shower before sitting on furniture or bedding.  · If you have a pet in the house that produces allergens:  ? Keep the pet out of the bedroom.  ? Vacuum, sweep, and dust regularly.  General instructions  · Take over-the-counter and prescription medicines only as told by your health care provider.  · Drink enough fluid to keep your urine pale yellow.  · Keep all follow-up visits as told by your health care provider. This is important.  Where to find more information  · American Academy of Allergy, Asthma & Immunology: www.aaaai.org  Contact a health care provider if:  · You have a fever.  · You develop a cough that does not go away.  · You make whistling sounds when you breathe (wheeze).  · Your symptoms slow you down or stop you from doing your normal activities each day.  Get help right away if:  · You have shortness of breath.  This symptom may represent a serious problem that is an emergency. Do not wait to see if the symptom will go away. Get medical help right away. Call your local emergency services (911 in the [place] Do not drive yourself to the hospital.  Summary  · Allergic rhinitis may be managed by taking medicines as directed and avoiding allergens.  · If you have seasonal allergies, keep windows closed as much as possible during allergy season.  · Contact your health care provider if you develop a fever or a cough that does not go away.  This information is not intended to replace advice given to you by your health care provider. Make sure you discuss any questions you have with your health care provider.  Document Revised: 02/05/2021 Document Reviewed: 12/15/2020  Elsevier Patient Education © 2021 [employer]

## 2021-07-26 NOTE — PROGRESS NOTES
DEMETRIA Jorgensen  [facility]   Respiratory Disease Clinic  [address] [place] [address]  Phone: [phone]  Fax: [phone]       Chief Complaint  COPD and Shortness of Breath    Subjective    History of Present Illness    Neelam Calle presents to [facility] PULMONARY [facility]ICAL [facility]ARE MEDICINE   History of Present Illness   The patient has known COPD Gold stage I, chronic respiratory failure with hypoxemia, allergic rhinitis, and emphysema.  The patient is accompanied by family member today.  She is extremely hard of hearing.  She ambulates with a Rollator.  She is using her home oxygen.  She is benefiting from it and wishes to continue it.  Her family member states that she continues on Spiriva, Allegra, Flonase, and Singulair.  She has an albuterol HFA inhaler to use as needed.  The family member states that the patient occasionally has a cough with thick sputum production.  When I question the patient she states that the cough does not bother her.  I recommended increasing Mucinex to 1200 mg twice a day to see if there is any further benefit.  No other aggravating or alleviating factors.     Objective   Vital Signs:   /68   Pulse 69   Wt 53.2 kg (117 lb 3.2 oz)   SpO2 99% Comment: 2 L  BMI 22.14 kg/m²     Physical Exam  Vitals reviewed.   Constitutional:       General: She is not in acute distress.     Appearance: She is well-developed.      Interventions: Nasal cannula and face mask in place.   HENT:      Head: Normocephalic and atraumatic.      Right Ear: Decreased hearing noted.      Left Ear: Decreased hearing noted.   Eyes:      General: No scleral icterus.     Conjunctiva/sclera: Conjunctivae normal.      Pupils: Pupils are equal, round, and reactive to light.   Cardiovascular:      Rate and Rhythm: Normal rate.   Pulmonary:      Effort: Pulmonary effort is normal. No respiratory distress.      Breath sounds: Decreased breath sounds present.  No wheezing or rales.   Musculoskeletal:         General: Normal range of motion.      Cervical back: Normal range of motion and neck supple.      Comments: Ambulates with Rollator   Skin:     General: Skin is warm and dry.   Neurological:      Mental Status: She is alert.   Psychiatric:         Behavior: Behavior normal.        Result Review :  The following data was reviewed by: DEMETRIA Jorgensen on 07/26/2021:        Results for orders placed in visit on 04/19/19    Pulmonary Function Test      Results for orders placed in visit on 03/14/19    Pulmonary Function Test           Assessment and Plan  Diagnoses and all orders for this visit:    1. Stage 1 mild COPD by GOLD classification (CMS/[facility]) (Primary)  Comments:  Continue Spiriva and albuterol HFA as needed.  Recommend increasing Mucinex to 1200 mg twice a day with full glass of water.  Orders:  -     albuterol sulfate  (90 Base) MCG/ACT inhaler; Inhale 2 puffs Every 4 (Four) Hours As Needed for Wheezing or Shortness of Air for up to 30 days.  Dispense: 1 g; Refill: 11    2. Gastroesophageal reflux disease, unspecified whether esophagitis present  Comments:  Continue Pepcid    3. Pulmonary emphysema, unspecified emphysema type (CMS/[facility])  Comments:  Continue Spiriva and albuterol HFA as needed.    4. Chronic respiratory failure with hypoxia (CMS/[facility])  Comments:  Continue chronic oxygen therapy.  The patient is benefiting from it and wishes to continue it.    5. Allergic rhinitis, unspecified seasonality, unspecified trigger  Comments:  Continue Allegra, Flonase, and Singulair.      Health maintenance:   Influenza vaccine: no   Pneumovax 23: yes  Prevnar 13: yes  Covid 19: no   Follow Up  DEMETRIA Jorgensen  7/27/2021  13:41 CDT  Return in about 1 year (around 7/26/2022).  Patient was given instructions and counseling regarding her condition or for health maintenance advice. Please see specific information pulled into the AVS if appropriate.    Please note that portions of this note were completed with a voice recognition program.

## 2021-07-27 PROBLEM — J96.11 CHRONIC RESPIRATORY FAILURE WITH HYPOXIA (HCC): Chronic | Status: ACTIVE | Noted: 2019-03-14

## 2021-07-27 PROBLEM — K21.9 GASTROESOPHAGEAL REFLUX DISEASE: Chronic | Status: ACTIVE | Noted: 2021-01-01

## 2021-07-27 PROBLEM — J44.9 STAGE 1 MILD COPD BY GOLD CLASSIFICATION (HCC): Chronic | Status: ACTIVE | Noted: 2019-03-14

## 2021-07-27 PROBLEM — J43.9 PULMONARY EMPHYSEMA (HCC): Chronic | Status: ACTIVE | Noted: 2019-03-12

## 2021-07-27 PROBLEM — J30.9 ALLERGIC RHINITIS: Chronic | Status: ACTIVE | Noted: 2019-11-11

## 2021-07-30 ENCOUNTER — ANTI-COAG VISIT (OUTPATIENT)
Dept: CARDIOLOGY CLINIC | Age: 86
End: 2021-07-30

## 2021-07-30 LAB — INR BLD: 1.5

## 2021-08-05 ENCOUNTER — ANTI-COAG VISIT (OUTPATIENT)
Dept: CARDIOLOGY CLINIC | Age: 86
End: 2021-08-05

## 2021-08-05 LAB — INR BLD: 2.4

## 2021-08-11 ENCOUNTER — OFFICE VISIT (OUTPATIENT)
Dept: PRIMARY CARE CLINIC | Age: 86
End: 2021-08-11
Payer: MEDICARE

## 2021-08-11 VITALS
RESPIRATION RATE: 18 BRPM | TEMPERATURE: 97.9 F | SYSTOLIC BLOOD PRESSURE: 100 MMHG | BODY MASS INDEX: 20.24 KG/M2 | OXYGEN SATURATION: 92 % | HEART RATE: 82 BPM | WEIGHT: 110 LBS | HEIGHT: 62 IN | DIASTOLIC BLOOD PRESSURE: 64 MMHG

## 2021-08-11 DIAGNOSIS — M54.9 CHRONIC BACK PAIN, UNSPECIFIED BACK LOCATION, UNSPECIFIED BACK PAIN LATERALITY: ICD-10-CM

## 2021-08-11 DIAGNOSIS — I50.22 CHRONIC SYSTOLIC CHF (CONGESTIVE HEART FAILURE), NYHA CLASS 3 (HCC): ICD-10-CM

## 2021-08-11 DIAGNOSIS — R62.7 FAILURE TO THRIVE IN ADULT: ICD-10-CM

## 2021-08-11 DIAGNOSIS — G89.29 CHRONIC BACK PAIN, UNSPECIFIED BACK LOCATION, UNSPECIFIED BACK PAIN LATERALITY: ICD-10-CM

## 2021-08-11 DIAGNOSIS — R53.1 WEAKNESS: ICD-10-CM

## 2021-08-11 DIAGNOSIS — R63.4 WEIGHT LOSS: ICD-10-CM

## 2021-08-11 DIAGNOSIS — F02.80 LATE ONSET ALZHEIMER'S DISEASE WITHOUT BEHAVIORAL DISTURBANCE (HCC): ICD-10-CM

## 2021-08-11 DIAGNOSIS — J43.1 PANLOBULAR EMPHYSEMA (HCC): ICD-10-CM

## 2021-08-11 DIAGNOSIS — I35.1 AORTIC VALVE INSUFFICIENCY, ETIOLOGY OF CARDIAC VALVE DISEASE UNSPECIFIED: ICD-10-CM

## 2021-08-11 DIAGNOSIS — Z78.9 DECREASED ACTIVITIES OF DAILY LIVING (ADL): ICD-10-CM

## 2021-08-11 DIAGNOSIS — I50.9 CONGESTIVE HEART FAILURE, UNSPECIFIED HF CHRONICITY, UNSPECIFIED HEART FAILURE TYPE (HCC): ICD-10-CM

## 2021-08-11 DIAGNOSIS — I48.91 ATRIAL FIBRILLATION, UNSPECIFIED TYPE (HCC): ICD-10-CM

## 2021-08-11 DIAGNOSIS — G30.1 LATE ONSET ALZHEIMER'S DISEASE WITHOUT BEHAVIORAL DISTURBANCE (HCC): ICD-10-CM

## 2021-08-11 DIAGNOSIS — Z00.00 ROUTINE GENERAL MEDICAL EXAMINATION AT A HEALTH CARE FACILITY: Primary | ICD-10-CM

## 2021-08-11 DIAGNOSIS — N18.30 CHRONIC RENAL FAILURE, STAGE 3 (MODERATE), UNSPECIFIED WHETHER STAGE 3A OR 3B CKD (HCC): ICD-10-CM

## 2021-08-11 DIAGNOSIS — I73.9 PVD (PERIPHERAL VASCULAR DISEASE) (HCC): ICD-10-CM

## 2021-08-11 PROCEDURE — 1123F ACP DISCUSS/DSCN MKR DOCD: CPT | Performed by: NURSE PRACTITIONER

## 2021-08-11 PROCEDURE — G0439 PPPS, SUBSEQ VISIT: HCPCS | Performed by: NURSE PRACTITIONER

## 2021-08-11 PROCEDURE — 4040F PNEUMOC VAC/ADMIN/RCVD: CPT | Performed by: NURSE PRACTITIONER

## 2021-08-11 ASSESSMENT — PATIENT HEALTH QUESTIONNAIRE - PHQ9
SUM OF ALL RESPONSES TO PHQ9 QUESTIONS 1 & 2: 0
1. LITTLE INTEREST OR PLEASURE IN DOING THINGS: 0
SUM OF ALL RESPONSES TO PHQ QUESTIONS 1-9: 0
2. FEELING DOWN, DEPRESSED OR HOPELESS: 0

## 2021-08-11 ASSESSMENT — LIFESTYLE VARIABLES: HOW OFTEN DO YOU HAVE A DRINK CONTAINING ALCOHOL: 0

## 2021-08-11 NOTE — PROGRESS NOTES
Patient is here for 2 separate encounters: Medicare annual wellness visit as well as acute and chronic issues. See attached note for Medicare annual wellness visit as well as the scanned and attached paper HRA form. The below review of systems and physical exam applies to both encounters. Medicare AWV HPI:  Please see attached scanned in Health Risk Assessment Form for further referenence    Acute/Chronic HPI:    weakness    Medicare Annual Wellness Visit  Name: Lex Ruiz Date: 2021   MRN: [id number] Sex: Female   Age: 80 y.o. Ethnicity: Non- / Non    : 1933 Race: White (non-)      Roxie Chambers is here for Medicare AWV (Pt's daughter stated pt is having a hard time getting around and having a hard time breathing. Also having diarrhea.) and Fatigue    Screenings for behavioral, psychosocial and functional/safety risks, and cognitive dysfunction are all negative except as indicated below. These results, as well as other patient data from the [address] form, are documented in Flowsheets linked to this Encounter. Allergies   Allergen Reactions    Denosumab Other (See Comments)     after had shot got blood in urine--not sure if associated    Lisinopril Other (See Comments)     cough           Prior to Visit Medications    Medication Sig Taking?  Authorizing Provider   mirtazapine (REMERON) 15 MG tablet Take 1 tablet by mouth nightly PATIENT WILL NEED APPOINTMENT FOR FURTHER REFILLS Yes NONI Ross   atorvastatin (LIPITOR) 10 MG tablet Take 1 tablet by mouth daily Yes NONI Ross   alendronate (FOSAMAX) 70 MG tablet Take 1 tablet by mouth every 7 days Yes NONI Ross   triamcinolone (KENALOG) 0.1 % ointment Apply to rash twice daily for 7 days, avoid face, axilla, groin Yes Vicci Cancer, APRN - CNP   metoprolol tartrate (LOPRESSOR) 25 MG tablet Take 0.5 tablets by mouth nightly Yes NONI Mathew warfarin (COUMADIN) 5 MG tablet Take 1 tablet by mouth daily Yes NONI Warren   Acetaminophen-Codeine (TYLENOL WITH CODEINE #3 PO) Take 1 tablet by mouth every 4 hours as needed (knee pain) Yes Historical Provider, MD mendozaFENesin 400 MG tablet Take 1,200 mg by mouth 2 times daily  Yes Historical Provider, MD   isosorbide mononitrate (IMDUR) 30 MG extended release tablet Take 1 tablet by mouth daily Yes NONI Moseley   donepezil (ARICEPT) 5 MG tablet TAKE ONE TABLET BY MOUTH EVERY NIGHT AT BEDTIME Yes NONI Bruno   digoxin (LANOXIN) 125 MCG tablet Take 1 tablet by mouth every other day Indications: M, W, F Yes NONI Zabala CNP   montelukast (SINGULAIR) 10 MG tablet TAKE 1 TABLET BY MOUTH DAILY AT NIGHT Yes NONI Bruno   escitalopram (LEXAPRO) 10 MG tablet TAKE ONE TABLET BY MOUTH EVERY DAY Yes NONI Bruno   SPIRIVA HANDIHALER 18 MCG inhalation capsule Inhale 1 capsule into the lungs daily Yes NONI Bruno   famotidine (PEPCID) 20 MG tablet Take 0.5 tablets by mouth every morning Yes NONI Bruno   furosemide (LASIX) 20 MG tablet TAKE 1 TABLET BY MOUTH DAILY  Patient taking differently: Take 20 mg by mouth daily as needed (Pt is weighed daily and she is given if weight is climbing)  Yes NONI Bruno   fluticasone (FLONASE) 50 MCG/ACT nasal spray 2 sprays by Each Nostril route daily  Patient taking differently: 2 sprays by Each Nostril route nightly  Yes NONI Osorio NP   mupirocin (BACTROBAN) 2 % ointment APPLY TO AFFECTED AREA(S) THREE TIMES A DAY Yes NONI Bruno   Multiple Vitamins-Minerals (THERAPEUTIC MULTIVITAMIN-MINERALS) tablet Take 1 tablet by mouth daily Yes Historical Provider, MD   diclofenac sodium 1 % GEL Apply 4 g topically 4 times daily  Patient taking differently: Apply 4 g topically 4 times daily as needed for Pain  Yes NONI Bruno   aspirin 81 MG chewable tablet Take 1 tablet by mouth daily  Patient taking differently: Take 81 mg by mouth every morning  Yes Roosevelt Hamman Ward, DO   OXYGEN Inhale 2 L into the lungs continuous Yes Historical Provider, MD   tiotropium (SPIRIVA RESPIMAT) 2.5 MCG/ACT AERS inhaler Inhale 2 puffs into the lungs daily Yes NONI Garcia   nitroGLYCERIN (NITROSTAT) 0.4 MG SL tablet Place 1 tablet under the tongue every 5 minutes as needed for Chest pain Yes NONI Alexandre   allopurinol (ZYLOPRIM) 100 MG tablet Take 1 tablet by mouth daily Yes NONI Garcia   bimatoprost 0.01 % SOLN Place 1 drop into both eyes nightly  Yes Historical Provider, MD         Past Medical History:   Diagnosis Date    Acute systolic CHF (congestive heart failure), NYHA class 3 ([address] 2/24/2017    Allergic rhinitis     Anticoagulant long-term use     coumadin for atrial fib    Anxiety 11/11/2019    Atrial fibrillation ([address]     Chronic atrial fibrillation ([address] 9/28/2017    Chronic back pain     Chronic kidney disease     Chronic kidney disease, stage III (moderate) ([address] 2/17/2017    Chronic systolic CHF (congestive heart failure), NYHA class 3 ([address] 2/17/2017    Current moderate episode of major depressive disorder without prior episode ([address] 11/14/2019    GERD (gastroesophageal reflux disease)     Glaucoma     Hearing loss     Hypertension     Mixed hyperlipidemia 7/2/2020    Osteoarthritis     Osteoporosis     Panlobular emphysema ([address] 11/28/2018    Shingles     Sinus problem     Wears glasses        Past Surgical History:   Procedure Laterality Date    BREAST BIOPSY      Left-benign    BREAST BIOPSY  feb 2014    Left    HYSTERECTOMY      OVARY SURGERY      tumor removed     SKIN CANCER EXCISION  01/2020         Family History   Problem Relation Age of Onset    Asthma Mother     Emphysema Mother     Alzheimer's Disease Sister     Heart Disease Brother     Heart Disease Sister     No Known Problems Sister     Alzheimer's Disease Sister        CareTeam (Including outside providers/suppliers regularly involved in providing care):   Patient Care Team:  NONI Garcia as PCP - General (Family Nurse Practitioner)  NON[facility]  Sheryl Jansen MD as Consulting Physician (Cardiology)    Wt Readings from Last 3 Encounters:   08/11/21 110 lb (49.9 kg)   07/14/21 112 lb 3.2 oz (50.9 kg)   07/08/21 114 lb (51.7 kg)     Vitals:    08/11/21 1542   BP: 100/64   Site: Left Upper Arm   Position: Sitting   Pulse: 82   Resp: 18   Temp: 97.9 °F (36.6 °C)   TempSrc: Temporal   SpO2: 92%   Weight: 110 lb (49.9 kg)   Height: 5' 2\" (1.575 m)     Body mass index is 20.12 kg/m². Based upon direct observation of the patient, evaluation of cognition reveals global memory impairment noted.     General Appearance: alert and oriented to person, place and time, well developed and well- nourished, in no acute distress  Skin: warm and dry, no rash or erythema  Head: normocephalic and atraumatic  Eyes: pupils equal, round, and reactive to light, extraocular eye movements intact, conjunctivae normal  ENT: tympanic membrane, external ear and ear canal normal bilaterally, nose without deformity, nasal mucosa and turbinates normal without polyps  Neck: supple and non-tender without mass, no thyromegaly or thyroid nodules, no cervical lymphadenopathy  Pulmonary/Chest: clear to auscultation bilaterally- no wheezes, rales or rhonchi, normal air movement, no respiratory distress  Cardiovascular: normal rate, regular rhythm, normal S1 and S2, no murmurs, rubs, clicks, or gallops, distal pulses intact, no carotid bruits  Abdomen: soft, non-tender, non-distended, normal bowel sounds, no masses or organomegaly  Extremities: no cyanosis, clubbing or edema  Musculoskeletal: normal range of motion, no joint swelling, deformity or tenderness  Neurologic: reflexes normal and symmetric, no cranial nerve deficit, gait, coordination and speech normal    Patient's complete Health Risk Assessment and screening values have been reviewed and are found in Flowsheets. The following problems were reviewed today and where indicated follow up appointments were made and/or referrals ordered. Positive Risk Factor Screenings with Interventions:            General Health and ACP:  General  In general, how would you say your health is?: Fair  In the past 7 days, have you experienced any of the following?  New or Increased Pain, New or Increased Fatigue, Loneliness, Social Isolation, Stress or Anger?: (!) New or Increased Pain, New or Increased Fatigue  Do you get the social and emotional support that you need?: Yes  Do you have a Living Will?: (!) No  Advance Directives     Power of  Living Will ACP-Advance Directive ACP-Power of     Not on File Filed on 01/14/20 Filed [facility] Risk Interventions:  · Fatigue: patient declines any further evaluation/treatment for this issue    Health Habits/Nutrition:  Health Habits/Nutrition  Do you exercise for at least 20 minutes 2-3 times per week?: (!) No  Have you lost any weight without trying in the past 3 months?: (!) Yes  Do you eat only one meal per day?: (!) Yes  Have you seen the dentist within the past year?: (!) No  Body mass index: 20.12  Health Habits/Nutrition Interventions:  · Inadequate physical activity:  patient is not ready to increase his/her physical activity level at this time    Hearing/Vision:  No exam data present  Hearing/Vision  Do you or your family notice any trouble with your hearing that hasn't been managed with hearing aids?: (!) Yes  Do you have difficulty driving, watching TV, or doing any of your daily activities because of your eyesight?: (!) Yes  Have you had an eye exam within the past year?: Yes  Hearing/Vision Interventions:  · Hearing concerns:  patient declines any further evaluation/treatment for hearing issues     ADL:  ADLs  In the past 7 days, did you need help from others to perform any of the following everyday activities? Eating, dressing, grooming, bathing, toileting, or walking/balance?: (!) Walking/Balance, Toileting, Bathing, Grooming, Dressing, Eating  In the past 7 days, did you need help from others to take care of any of the following? Laundry, housekeeping, banking/finances, shopping, telephone use, food preparation, transportation, or taking medications?: (!) Laundry, Housekeeping, Banking/Finances, Shopping, Telephone Use, Food Preparation, Transportation, Taking Medications  ADL Interventions:  · Patient declines any further evaluation/treatment for this issue    Personalized Preventive Plan   Current Health Maintenance Status  Immunization History   Administered Date(s) Administered    COVID-19, Moderna, PF, 100mcg/0.5mL 08/04/2021    Influenza, Quadv, IM, PF (6 mo and older Fluzone, Flulaval, Fluarix, and 3 yrs and older Afluria) 01/03/2019    Influenza, Triv, inactivated, subunit, adjuvanted, IM (Fluad 65 yrs and older) 11/01/2019    Pneumococcal Conjugate Vaccine 10/27/1993    Pneumococcal Polysaccharide ([id number]) 11/03/2015, 05/03/2019    Pneumococcal Vaccine 10/27/1993    Tdap (Boostrix, Adacel) 10/01/2010        Health Maintenance   Topic Date Due    Shingles Vaccine (1 of 2) Never done    Lipid screen  02/01/2018    Annual Wellness Visit (AWV)  Never done    DTaP/Tdap/Td vaccine (2 - Td or Tdap) 10/01/2020    Flu vaccine (1) 09/01/2021    COVID-19 Vaccine (2 - Moderna 2-dose series) 09/01/2021    Potassium monitoring  06/22/2022    Creatinine monitoring  06/22/2022    Pneumococcal 65+ years Vaccine  Completed    Hepatitis A vaccine  Aged Out    Hepatitis B vaccine  Aged Out    Hib vaccine  Aged Out    Meningococcal (ACWY) vaccine  Aged Out     Recommendations for ReplySend Due: see orders and patient instructions/AVS.  .   Recommended screening schedule for the next 5-10 years is provided to the patient in written form: see Patient Instructions/AVS.    Gilmer Meza was seen today for medicare awv and fatigue.     Diagnoses and all orders for this visit:    Routine general medical examination at a health care facility    Failure to thrive in adult  -     [facility] [address], APRN, Palliative Care, [place]    Weight loss  -     [facility], [address], [address], APRN, Palliative Care, [place]    Congestive heart failure, unspecified HF chronicity, unspecified heart failure type [facility])  -     [facility], [place], APRN, Palliative Care, [place]    Panlobular emphysema [facility])  -     [facility], [place], APRN, Palliative Care, [place]    Chronic systolic CHF (congestive heart failure), NYHA class 3 [facility])  -     [facility], [address], [address], APRN, Palliative Care, [place]    Chronic renal failure, stage 3 (moderate), unspecified whether stage 3a or 3b CKD [facility])  -     [facility], [place], APRN, Palliative Care, [place]    Chronic back pain, unspecified back location, unspecified back pain laterality  -     [facility], [place], APRN, Palliative Care, [place]    Atrial fibrillation, unspecified type [facility])  -     [facility], [place], APRN, Palliative Care, [place]    Aortic valve insufficiency, etiology of cardiac valve disease unspecified  -     [facility], APRN, Palliative Care, [place]    Decreased activities of daily living (ADL)  -     [facility], [address], [place], APRN, Palliative Care, [place]    Late onset Alzheimer's disease without behavioral disturbance [facility])  -     [facility]) - Home Care, [address], Alphonse Freeman, APRN, Palliative Care, [place]    PVD (peripheral vascular disease) [facility])  -     [facility], [address], NONI Gabriel, Palliative Care, [address] [place] [address] Drive, NONI Gabriel, [facility]             ---------------------------------------------------------------------------------------------------------------------  SECOND IDENTIFIABLE ENCOUNTER  ---------------------------------------------------------------------------------------------------------------------    Kwame Roberson  [address] [place] [address]  Dept: [phone]  Dept Fax: [phone]  Loc: [phone]        Lucio Hollingsworth is a 80 y.o. female who presents today for her medical conditions/ complaints as noted below. Lucio Hollingsworth is c/o Medicare AWV (Pt's daughter stated pt is having a hard time getting around and having a hard time breathing. Also having diarrhea.) and Fatigue        Chief Complaint   Patient presents with    Medicare AWV     Pt's daughter stated pt is having a hard time getting around and having a hard time breathing. Also having diarrhea.  Fatigue       HPI:     HPI    Weakness: daughter reports that patient weakness has increased, and she has been sleeping more and more. Patient reports that they have been compliant with taking medications as directed.      Past Medical History:   Diagnosis Date    Acute systolic CHF (congestive heart failure), NYHA class 3 ([facility] 2/24/2017    Allergic rhinitis     Anticoagulant long-term use     coumadin for atrial fib    Anxiety 11/11/2019    Atrial fibrillation ([facility]     Chronic atrial fibrillation ([facility] 9/28/2017    Chronic back pain     Chronic kidney disease     Chronic kidney disease, stage III (moderate) ([facility]) 2/17/2017    Chronic systolic CHF (congestive heart failure), NYHA class 3 ([facility] 2/17/2017    Current moderate episode of major depressive disorder without prior episode ([facility] 11/14/2019    GERD (gastroesophageal reflux disease)     Glaucoma     Hearing loss     Hypertension     Mixed hyperlipidemia 7/2/2020    Osteoarthritis     Osteoporosis     Panlobular emphysema ([facility] 11/28/2018    Shingles     Sinus problem     Wears glasses        Past Surgical History:   Procedure Laterality Date    BREAST BIOPSY      Left-benign    BREAST BIOPSY  feb 2014    Left    HYSTERECTOMY      OVARY SURGERY      tumor removed     SKIN CANCER EXCISION  01/2020       Social History     Socioeconomic History    Marital status:      Spouse name: None    Number of children: None    Years of education: None    Highest education level: None   Occupational History    None   Tobacco Use    Smoking status: Never Smoker    Smokeless tobacco: Never Used   Vaping Use    Vaping Use: Never used   Substance and Sexual Activity    Alcohol use: No    Drug use: No    Sexual activity: Never   Other Topics Concern    None   Social History Narrative    None     Social Determinants of Health     Financial Resource Strain: Low Risk     Difficulty of Paying Living Expenses: Not hard at all   Food Insecurity: No Food Insecurity    Worried About Running Out of Food in the Last Year: Never true    [place] of Food in the Last Year: Never true   Transportation Needs: No Transportation Needs    Lack of Transportation (Medical): No    Lack of Transportation (Non-Medical):  No   Physical Activity:     Days of Exercise per Week:     Minutes of Exercise per Session:    Stress:     Feeling of Stress :    Social Connections:     Frequency of Communication with Friends and Family:     Frequency of Social Gatherings with Friends and Family:     Attends Voodoo Services:     Active Member of Clubs or Organizations:     Attends Club or Organization Meetings:    Troy Nix Marital Status:    Intimate Partner Violence:     Fear of Current or Ex-Partner:     Emotionally Abused:     Physically Abused:     Sexually Abused:        Family History   Problem Relation Age of Onset    Asthma Mother     Emphysema Mother     Alzheimer's Disease Sister     Heart Disease Brother     Heart Disease Sister     No Known Problems Sister     Alzheimer's Disease Sister        Current Outpatient Medications   Medication Sig Dispense Refill    mirtazapine (REMERON) 15 MG tablet Take 1 tablet by mouth nightly PATIENT WILL NEED APPOINTMENT FOR FURTHER REFILLS 30 tablet 0    atorvastatin (LIPITOR) 10 MG tablet Take 1 tablet by mouth daily 30 tablet 0    alendronate (FOSAMAX) 70 MG tablet Take 1 tablet by mouth every 7 days 4 tablet 0    triamcinolone (KENALOG) 0.1 % ointment Apply to rash twice daily for 7 days, avoid face, axilla, groin 1 Tube 0    metoprolol tartrate (LOPRESSOR) 25 MG tablet Take 0.5 tablets by mouth nightly 1 tablet 0    warfarin (COUMADIN) 5 MG tablet Take 1 tablet by mouth daily 90 tablet 3    Acetaminophen-Codeine (TYLENOL WITH CODEINE #3 PO) Take 1 tablet by mouth every 4 hours as needed (knee pain)      guaiFENesin 400 MG tablet Take 1,200 mg by mouth 2 times daily       isosorbide mononitrate (IMDUR) 30 MG extended release tablet Take 1 tablet by mouth daily 30 tablet 5    donepezil (ARICEPT) 5 MG tablet TAKE ONE TABLET BY MOUTH EVERY NIGHT AT BEDTIME 30 tablet 3    digoxin (LANOXIN) 125 MCG tablet Take 1 tablet by mouth every other day Indications: M, W, F 45 tablet 3    montelukast (SINGULAIR) 10 MG tablet TAKE 1 TABLET BY MOUTH DAILY AT NIGHT 30 tablet 5    escitalopram (LEXAPRO) 10 MG tablet TAKE ONE TABLET BY MOUTH EVERY DAY 30 tablet 5    SPIRIVA HANDIHALER 18 MCG inhalation capsule Inhale 1 capsule into the lungs daily 30 capsule 2    famotidine (PEPCID) 20 MG tablet Take 0.5 tablets by mouth every morning 30 tablet 5    furosemide (LASIX) 20 MG tablet TAKE 1 TABLET BY MOUTH DAILY (Patient taking differently: Take 20 mg by mouth daily as needed (Pt is weighed daily and she is given if weight is climbing) ) 30 tablet 2    fluticasone (FLONASE) 50 MCG/ACT nasal spray 2 sprays by Each Nostril route daily (Patient taking differently: 2 sprays by Each Nostril route nightly ) 3 Bottle 1    mupirocin (BACTROBAN) 2 % ointment APPLY TO AFFECTED AREA(S) THREE TIMES A DAY 22 g 0    Multiple Vitamins-Minerals (THERAPEUTIC MULTIVITAMIN-MINERALS) tablet Take 1 tablet by mouth daily      diclofenac sodium 1 % GEL Apply 4 g topically 4 times daily (Patient taking differently: Apply 4 g topically 4 times daily as needed for Pain ) 2 Tube 0    aspirin 81 MG chewable tablet Take 1 tablet by mouth daily (Patient taking differently: Take 81 mg by mouth every morning ) 30 tablet 0    OXYGEN Inhale 2 L into the lungs continuous      tiotropium (SPIRIVA RESPIMAT) 2.5 MCG/ACT AERS inhaler Inhale 2 puffs into the lungs daily 1 Inhaler 2    nitroGLYCERIN (NITROSTAT) 0.4 MG SL tablet Place 1 tablet under the tongue every 5 minutes as needed for Chest pain 25 tablet 3    allopurinol (ZYLOPRIM) 100 MG tablet Take 1 tablet by mouth daily 30 tablet 1    bimatoprost 0.01 % SOLN Place 1 drop into both eyes nightly        No current facility-administered medications for this visit. Allergies   Allergen Reactions    Denosumab Other (See Comments)     after had shot got blood in urine--not sure if associated    Lisinopril Other (See Comments)     cough         Lab Review not applicable  notapplicable    Subjective:   Review of Systems   Constitutional: Negative for chills and fever. HENT: Negative for ear pain, hearing loss, rhinorrhea and voice change. Eyes: Negative for photophobia and visual disturbance. Respiratory: Negative for cough and shortness of breath. Cardiovascular: Negative for chest pain and palpitations.    Gastrointestinal: Negative for nausea and vomiting. Endocrine: Negative. Negative for cold intolerance and heat intolerance. Genitourinary: Negative for difficulty urinating and flank pain. Musculoskeletal: Negative for back pain and neck pain. Skin: Negative for color change, rash and wound. Allergic/Immunologic: Negative for environmental allergies and food allergies. Neurological: Negative for dizziness, speech difficulty and headaches. Hematological: Does not bruise/bleed easily. Psychiatric/Behavioral: Negative for sleep disturbance and suicidal ideas. Objective:         Physical Exam  Vitals and nursing note reviewed. Constitutional:       General: She is awake. She is not in acute distress. Appearance: Normal appearance. She is well-developed and normal weight. She is ill-appearing. Comments: Frail, chronically ill appearing   HENT:      Head: Normocephalic. Right Ear: Decreased hearing noted. Left Ear: Decreased hearing noted. Nose: Nose normal.      Mouth/Throat:      Lips: Pink. Eyes:      General: Vision grossly intact. Neck:      Trachea: Phonation normal.   Cardiovascular:      Rate and Rhythm: Normal rate and regular rhythm. Heart sounds: No friction rub. Pulmonary:      Effort: Pulmonary effort is normal. No respiratory distress. Comments: Wears oxygen  Abdominal:      Palpations: Abdomen is soft. Musculoskeletal:         General: No tenderness or deformity. Normal range of motion. Cervical back: Neck supple. Comments: Pt used rollator walker  Kyphosis of spine noted   Skin:     General: Skin is warm and dry. Capillary Refill: Capillary refill takes less than 2 seconds. Findings: No rash. Neurological:      General: No focal deficit present. Mental Status: She is alert, oriented to person, place, and time and easily aroused. Mental status is at baseline.    Psychiatric:         Attention and Perception: Attention normal.         Mood and Affect: Mood normal.         Speech: Speech normal.         Behavior: Behavior normal. Behavior is cooperative. /64 (Site: Left Upper Arm, Position: Sitting)   Pulse 82   Temp 97.9 °F (36.6 °C) (Temporal)   Resp 18   Ht 5' 2\" (1.575 m)   Wt 110 lb (49.9 kg)   SpO2 92%   BMI 20.12 kg/m²     Assessment:      Diagnosis Orders   1. Routine general medical examination at a health care facility     2. Failure to thrive in adult  [facility], [address], Temo Cornea, APRN, Palliative Care, [place]   3. Weight loss  [address] [place], Temo Cornea, APRN, [address], [place]   4. Congestive heart failure, unspecified HF chronicity, unspecified heart failure type [facility])  [address], Temo Cornea, APRN, Palliative Care, [place]   5. Panlobular emphysema [facility])  [address], Temo Cornea, APRN, Palliative Care, [place]   6. Chronic systolic CHF (congestive heart failure), NYHA class 3 [facility])  [address], Temo Cornea, APRN, Palliative Care, [place]   7. Chronic renal failure, stage 3 (moderate), unspecified whether stage 3a or 3b CKD [facility])  [address], Temo Cornea, APRN, Palliative Care, [place]   8. Chronic back pain, unspecified back location, unspecified back pain laterality  [address], Temo Cornea, APRN, [address], [place]   9. Atrial fibrillation, unspecified type [facility])  [address], Temo Cornea, APRN, [address], [place]   10. Aortic valve insufficiency, etiology of cardiac valve disease unspecified  [address], Temo Cornea, APRN, [address], [place]   11. Decreased activities of daily living (ADL)  [address], Temo Cornea, APRN, [address], [place]   12.  Late onset Alzheimer's disease without behavioral disturbance [facility])  [address] [place] [address], [address], APRN, [address], [place]   13. PVD (peripheral vascular disease) [facility])  [address] [place] [address], [address], APRN, Palliative Care, [place]   14. Weakness  [address], APRN, Palliative Care, [place]     No results found for this visit on 08/11/21. Plan:     1. Routine general medical examination at a health care facility    2. Failure to thrive in adult    3. Weight loss    4. Congestive heart failure, unspecified HF chronicity, unspecified heart failure type ([address]    5. Panlobular emphysema ([address]    6. Chronic systolic CHF (congestive heart failure), NYHA class 3 ([address]    7. Chronic renal failure, stage 3 (moderate), unspecified whether stage 3a or 3b CKD ([address]    8. Chronic back pain, unspecified back location, unspecified back pain laterality    9. Atrial fibrillation, unspecified type ([address]    10. Aortic valve insufficiency, etiology of cardiac valve disease unspecified    11. Decreased activities of daily living (ADL)    12. Late onset Alzheimer's disease without behavioral disturbance ([address]    13. PVD (peripheral vascular disease) ([address]    14. Weakness      Return for Medicare Annual Wellness Visit in 1 year.   Orders Placed This Encounter   Procedures   [address] [place]     Referral Priority:   Routine     [facility]   [facility]     Referral Reason:   Specialty Services Required     Requested Specialty:   [address]     Number of Visits Requested:   [address] [facility] [address], [address], APRN, Palliative Care, [place]     Referral Priority:   Routine     Referral Type:   Eval and Treat     Referral Reason:   Specialty Services Required     Referred to Provider:   NONI Quintero - CNP     Requested Specialty:   Nurse Practitioner     Number of Visits Requested:   1     No orders of the defined types were placed in this encounter. Patient Instructions     Personalized Preventive Plan for Suellyn Gosselin - 8/11/2021  Medicare offers a range of preventive health benefits. Some of the tests and screenings are paid in full while other may be subject to a deductible, co-insurance, and/or copay. Some of these benefits include a comprehensive review of your medical history including lifestyle, illnesses that may run in your family, and various assessments and screenings as appropriate. After reviewing your medical record and screening and assessments performed today your provider may have ordered immunizations, labs, imaging, and/or referrals for you. A list of these orders (if applicable) as well as your Preventive Care list are included within your After Visit Summary for your review. Other Preventive Recommendations:    · A preventive eye exam performed by an eye specialist is recommended every 1-2 years to screen for glaucoma; cataracts, macular degeneration, and other eye disorders. · A preventive dental visit is recommended every 6 months. · Try to get at least 150 minutes of exercise per week or 10,000 steps per day on a pedometer . · Order or download the FREE \"Exercise & Physical Activity: Your Everyday Guide\" from [employer]. Call 3-411.129.6234 or search [employer] online. · You need 9038-5299 mg of calcium and 2131-1394 IU of vitamin D per day. It is possible to meet your calcium requirement with diet alone, but a vitamin D supplement is usually necessary to meet this goal.  · When exposed to the sun, use a sunscreen that protects against both UVA and UVB radiation with an SPF of 30 or greater. Reapply every 2 to 3 hours or after sweating, drying off with a towel, or swimming. · Always wear a seat belt when traveling in a car.  Always wear a helmet when riding a bicycle or motorcycle.      /family given educational materials - see patient instructions. Discussed use, benefit, and side effects of prescribed medications. All patient/family questions answered and voiced understanding. Instructed to continue current medications, diet and exercise. Pt/family agreed with treatment plan. Follow up as directed and sooner if needed. Patient/ family instructed that is symptoms worsen or persist they are to contact office or report to nearest ER. They voice understanding and agreement with this plan.   signed by NONI Haq on 8/16/2021 at 9:26 PM CDT    This dictation was generated by voice recognition computer software. Although all attempts are made to edit the dictation for accuracy, there may be errors in the transcription that are not intended.

## 2021-08-16 ENCOUNTER — TELEPHONE (OUTPATIENT)
Dept: INTERNAL MEDICINE CLINIC | Age: 86
End: 2021-08-16

## 2021-08-16 ASSESSMENT — ENCOUNTER SYMPTOMS
COUGH: 0
RHINORRHEA: 0
PHOTOPHOBIA: 0
NAUSEA: 0
VOICE CHANGE: 0
VOMITING: 0
BACK PAIN: 0
SHORTNESS OF BREATH: 0
COLOR CHANGE: 0

## 2021-08-16 ASSESSMENT — VISUAL ACUITY: OU: 1

## 2021-08-16 NOTE — TELEPHONE ENCOUNTER
[facility] nurse admitted pt on 8/14 and nursing will plan to see the pt 2w2 then 1w2 for CP assess, vs monitoring, disease process education, medication education/compliance. Pt and family have covid symptoms, and pt daughter tested positive for covid 8/15 - would you want them to do a Covid test on pt  ?    Dgt reported pt is sleeping a lot and she is encouraging fluids     Please advise

## 2021-08-17 ENCOUNTER — TELEPHONE (OUTPATIENT)
Dept: INTERNAL MEDICINE CLINIC | Age: 86
End: 2021-08-17

## 2021-08-17 DIAGNOSIS — J06.9 UPPER RESPIRATORY TRACT INFECTION, UNSPECIFIED TYPE: ICD-10-CM

## 2021-08-17 DIAGNOSIS — U07.1 COVID-19: Primary | ICD-10-CM

## 2021-08-17 RX ORDER — DOXYCYCLINE HYCLATE 100 MG
100 TABLET ORAL 2 TIMES DAILY
Qty: 20 TABLET | Refills: 0 | Status: ON HOLD | OUTPATIENT
Start: 2021-08-17 | End: 2021-08-28 | Stop reason: HOSPADM

## 2021-08-17 RX ORDER — DEXAMETHASONE 1.5 MG/1
TABLET ORAL
Qty: 1 EACH | Refills: 0 | Status: ON HOLD | OUTPATIENT
Start: 2021-08-17 | End: 2021-09-02 | Stop reason: HOSPADM

## 2021-08-17 NOTE — PROGRESS NOTES
covid positive today via home health at 1:00pm. Patient symptoms started 8/11/2021. Patient has developed cough, O2 has increased to 98% with position change and setting up. Patient is on 2-3 L nasal cannula 24/7. Patient daughter notes that fever reduced to 80 with tylenol. Reports some sinus like symptoms. Patient qualifies for infusion of Bamlanivimab    [facility]ed [facility] at [phone] to discuss. Patient will have infusion on Friday Aug 20th at 12:00 pm. Discussed with Daughter Eddie Moreno who will transport her. She will have patient at [facility] at 11:45 am. Discussed with Marialuisa Ayala that patient will not be albe to be accompanied to the infusion center and that it will take approximately 3 hours. Patient will start doxycycline 100mg twice daily and dexamethasone due to sinus infection/ URI symptoms and prevent development of pneumonia. Humberto Garcia at [phone] to discuss Bamlanivimab order. Notes that if [facility] has been contacted there is no need for additional steps from provider at this time.

## 2021-08-17 NOTE — TELEPHONE ENCOUNTER
[facility] 9 performed rapid covid test on pt and result was  Positive   Pt O2 sats running 87 % on O2 at  2 L NC   -79 HR 90 temp 97.9   Pt does not have a nebulzer in her home , she had not done her spiriva inhaler today per pt dgt     Please advise if you need anything else done

## 2021-08-18 ENCOUNTER — TELEPHONE (OUTPATIENT)
Dept: INTERNAL MEDICINE CLINIC | Age: 86
End: 2021-08-18

## 2021-08-18 DIAGNOSIS — U07.1 COVID-19: ICD-10-CM

## 2021-08-18 RX ORDER — SODIUM CHLORIDE 0.9 % (FLUSH) 0.9 %
5-40 SYRINGE (ML) INJECTION PRN
Status: CANCELLED | OUTPATIENT
Start: 2021-08-18

## 2021-08-18 RX ORDER — METHYLPREDNISOLONE SODIUM SUCCINATE 125 MG/2ML
125 INJECTION, POWDER, LYOPHILIZED, FOR SOLUTION INTRAMUSCULAR; INTRAVENOUS ONCE
Status: CANCELLED | OUTPATIENT
Start: 2021-08-18 | End: 2021-08-18

## 2021-08-18 RX ORDER — DIPHENHYDRAMINE HYDROCHLORIDE 50 MG/ML
25 INJECTION INTRAMUSCULAR; INTRAVENOUS ONCE
Status: CANCELLED | OUTPATIENT
Start: 2021-08-18

## 2021-08-18 RX ORDER — EPINEPHRINE 1 MG/ML
0.3 INJECTION, SOLUTION, CONCENTRATE INTRAVENOUS PRN
Status: CANCELLED | OUTPATIENT
Start: 2021-08-18

## 2021-08-18 RX ORDER — SODIUM CHLORIDE 9 MG/ML
INJECTION, SOLUTION INTRAVENOUS CONTINUOUS
Status: CANCELLED | OUTPATIENT
Start: 2021-08-18

## 2021-08-18 RX ORDER — ACETAMINOPHEN 325 MG/1
650 TABLET ORAL ONCE
Status: CANCELLED | OUTPATIENT
Start: 2021-08-18

## 2021-08-18 RX ORDER — HEPARIN SODIUM (PORCINE) LOCK FLUSH IV SOLN 100 UNIT/ML 100 UNIT/ML
500 SOLUTION INTRAVENOUS PRN
Status: CANCELLED | OUTPATIENT
Start: 2021-08-18

## 2021-08-18 RX ORDER — DIPHENHYDRAMINE HYDROCHLORIDE 50 MG/ML
50 INJECTION INTRAMUSCULAR; INTRAVENOUS ONCE
Status: CANCELLED | OUTPATIENT
Start: 2021-08-18 | End: 2021-08-18

## 2021-08-18 NOTE — TELEPHONE ENCOUNTER
[facility] OT eval completed and per OT Pt requiring increased asssitance with ADLs, mobility, and transfers. Pt also with decreased BUE strength.    Recommending : Skilled OT recommended for 1w1, 2w1 to address above needs    Please advise if approved

## 2021-08-18 NOTE — TELEPHONE ENCOUNTER
Patel Long Worker did phone visit with pt dgt Nigel Horvath and dgt reports no SW needs at this time   Should something change she would go out but no needs identified at this time

## 2021-08-20 ENCOUNTER — ANTI-COAG VISIT (OUTPATIENT)
Dept: CARDIOLOGY CLINIC | Age: 86
End: 2021-08-20
Payer: MEDICARE

## 2021-08-20 ENCOUNTER — HOSPITAL ENCOUNTER (OUTPATIENT)
Dept: INFUSION THERAPY | Age: 86
Setting detail: INFUSION SERIES
Discharge: HOME | End: 2021-08-20
Payer: MEDICARE

## 2021-08-20 VITALS
TEMPERATURE: 98 F | RESPIRATION RATE: 17 BRPM | HEART RATE: 83 BPM | SYSTOLIC BLOOD PRESSURE: 142 MMHG | DIASTOLIC BLOOD PRESSURE: 74 MMHG | OXYGEN SATURATION: 99 %

## 2021-08-20 DIAGNOSIS — I48.20 CHRONIC ATRIAL FIBRILLATION (HCC): Primary | ICD-10-CM

## 2021-08-20 DIAGNOSIS — U07.1 COVID-19: Primary | ICD-10-CM

## 2021-08-20 LAB
INTERNATIONAL NORMALIZATION RATIO, POC: 4
PROTHROMBIN TIME, POC: NORMAL

## 2021-08-20 PROCEDURE — 85610 PROTHROMBIN TIME: CPT | Performed by: CLINICAL NURSE SPECIALIST

## 2021-08-20 PROCEDURE — 6360000002 HC RX W HCPCS: Performed by: NURSE PRACTITIONER

## 2021-08-20 PROCEDURE — 96374 THER/PROPH/DIAG INJ IV PUSH: CPT

## 2021-08-20 PROCEDURE — 6370000000 HC RX 637 (ALT 250 FOR IP): Performed by: NURSE PRACTITIONER

## 2021-08-20 PROCEDURE — 2500000003 HC RX 250 WO HCPCS: Performed by: NURSE PRACTITIONER

## 2021-08-20 PROCEDURE — M0243 CASIRIVI AND IMDEVI INFUSION: HCPCS

## 2021-08-20 PROCEDURE — 2580000003 HC RX 258: Performed by: NURSE PRACTITIONER

## 2021-08-20 RX ORDER — SODIUM CHLORIDE 0.9 % (FLUSH) 0.9 %
5-40 SYRINGE (ML) INJECTION PRN
Status: CANCELLED | OUTPATIENT
Start: 2021-08-20

## 2021-08-20 RX ORDER — SODIUM CHLORIDE 0.9 % (FLUSH) 0.9 %
5-40 SYRINGE (ML) INJECTION PRN
Status: DISCONTINUED | OUTPATIENT
Start: 2021-08-20 | End: 2021-08-21 | Stop reason: HOSPADM

## 2021-08-20 RX ORDER — HEPARIN SODIUM (PORCINE) LOCK FLUSH IV SOLN 100 UNIT/ML 100 UNIT/ML
500 SOLUTION INTRAVENOUS PRN
Status: CANCELLED | OUTPATIENT
Start: 2021-08-20

## 2021-08-20 RX ORDER — DIPHENHYDRAMINE HYDROCHLORIDE 50 MG/ML
25 INJECTION INTRAMUSCULAR; INTRAVENOUS ONCE
Status: COMPLETED | OUTPATIENT
Start: 2021-08-20 | End: 2021-08-20

## 2021-08-20 RX ORDER — DIPHENHYDRAMINE HYDROCHLORIDE 50 MG/ML
25 INJECTION INTRAMUSCULAR; INTRAVENOUS ONCE
Status: CANCELLED | OUTPATIENT
Start: 2021-08-20

## 2021-08-20 RX ORDER — EPINEPHRINE 1 MG/ML
0.3 INJECTION, SOLUTION, CONCENTRATE INTRAVENOUS PRN
Status: CANCELLED | OUTPATIENT
Start: 2021-08-20

## 2021-08-20 RX ORDER — SODIUM CHLORIDE 9 MG/ML
INJECTION, SOLUTION INTRAVENOUS CONTINUOUS
Status: CANCELLED | OUTPATIENT
Start: 2021-08-20

## 2021-08-20 RX ORDER — ACETAMINOPHEN 325 MG/1
650 TABLET ORAL ONCE
Status: COMPLETED | OUTPATIENT
Start: 2021-08-20 | End: 2021-08-20

## 2021-08-20 RX ORDER — METHYLPREDNISOLONE SODIUM SUCCINATE 125 MG/2ML
125 INJECTION, POWDER, LYOPHILIZED, FOR SOLUTION INTRAMUSCULAR; INTRAVENOUS ONCE
Status: CANCELLED | OUTPATIENT
Start: 2021-08-20 | End: 2021-08-20

## 2021-08-20 RX ORDER — SODIUM CHLORIDE 9 MG/ML
INJECTION, SOLUTION INTRAVENOUS CONTINUOUS
Status: DISCONTINUED | OUTPATIENT
Start: 2021-08-20 | End: 2021-08-21 | Stop reason: HOSPADM

## 2021-08-20 RX ORDER — ACETAMINOPHEN 325 MG/1
650 TABLET ORAL ONCE
Status: CANCELLED | OUTPATIENT
Start: 2021-08-20

## 2021-08-20 RX ORDER — DIPHENHYDRAMINE HYDROCHLORIDE 50 MG/ML
50 INJECTION INTRAMUSCULAR; INTRAVENOUS ONCE
Status: CANCELLED | OUTPATIENT
Start: 2021-08-20 | End: 2021-08-20

## 2021-08-20 RX ADMIN — ACETAMINOPHEN 650 MG: 325 TABLET ORAL at 13:33

## 2021-08-20 RX ADMIN — DIPHENHYDRAMINE HYDROCHLORIDE 25 MG: 50 INJECTION, SOLUTION INTRAMUSCULAR; INTRAVENOUS at 13:33

## 2021-08-20 RX ADMIN — IMDEVIMAB: 300 INJECTION, SOLUTION, CONCENTRATE INTRAVENOUS at 13:39

## 2021-08-20 RX ADMIN — SODIUM CHLORIDE: 9 INJECTION, SOLUTION INTRAVENOUS at 13:33

## 2021-08-20 ASSESSMENT — PAIN SCALES - GENERAL: PAINLEVEL_OUTOF10: 0

## 2021-08-20 NOTE — PROGRESS NOTES
Patient is on Oxygen 2L NC at all times. Her O2 demands have not increased since her positive COVID-19 result.   Electronically signed by Dorota Avila RN on 8/20/2021 at 4:33 PM

## 2021-08-20 NOTE — PROGRESS NOTES
Pts INR is 4.0, which is out of range. Per Nneka Crews, APRN Pt is to hold her dose tonight, then continue her normal dosing. Called to discuss this with her Daughter, [employer]. [employer] stated that the Pt has COVID and was prescribed antibiotics. [employer] held the Pts dose last night and has trying to have the Pt eat greens to thicken up. I instructed the daughter that since she had held the dose last night to go ahead and have the Pt take her 2.5mg tonight that way she doesnt thicken too much. And she is to recheck in 1 week. The Pts daughter voiced her understanding of dosing changes and to recheck in 1 week.

## 2021-08-20 NOTE — PROGRESS NOTES
Pt is accompanied today with her daughter. Pt is very hard of hearing and N95 masks and communication remains very difficult. Pt has agreed to have her daughter present as a patient advocate. Patient given FACT SHEET for EMERGENCY USE AUTHORIZATION FOR CASIRIVIMAB AND IMDEVIMAB. Pt/daughter verbalize understanding of EMERGENCY USE AUTHORIZATION. CASIRIVIMAB AND IMDEVIMAB are invesitgational because they are still being studied. Pt/daughter verbalize understanding regarding known safety and effectiveness and understands limited data is available regarding risks and benefits. Pt/daughter understands there is a continued need to self-isolate and continue all infection control measures. Pt/daughter choose to proceed with monoclonal antibody infusion therapy. Pt/daughter signed a copy of this information.  Electronically signed by Penny Garza RN on 8/20/2021 at 1:28 PM

## 2021-08-20 NOTE — PROGRESS NOTES
Patient tolerated monoclonal antibody infusion therapy without s/s of adverse reaction. Patient observed one hour post observation. Discharge instructions provided. Patient verbalizes understanding of discharge instructions and advice to seek emergent medical care in the event of adverse reaction (fever, chills, changes in heartbeat, shortness of air, wheezing, swelling of lips, face or throat, rash including hives, itching, headache, nausea, vomiting, sweating, muscle aches, dizziness and shivering. Patient wheeled to her daughter's vehicle by RN.  Electronically signed by Penny Garza RN on 8/20/2021 at 4:48 PM

## 2021-08-25 ENCOUNTER — ANTI-COAG VISIT (OUTPATIENT)
Dept: CARDIOLOGY CLINIC | Age: 86
End: 2021-08-25

## 2021-08-25 ENCOUNTER — TELEPHONE (OUTPATIENT)
Dept: INTERNAL MEDICINE CLINIC | Age: 86
End: 2021-08-25

## 2021-08-25 LAB — INR BLD: 8

## 2021-08-25 NOTE — TELEPHONE ENCOUNTER
I [facility] nurse visit tomorrow   Iv fluids order and face sheet would need to be sent to [facility] at fax [phone] and the phone # is [phone]  I recommend calling them after you fax that to make sure they get that out timely and also include which type of IV - Periferal I would assume,  as they will need to know which kinds of supplies to send out with the fluids

## 2021-08-25 NOTE — TELEPHONE ENCOUNTER
We can send an order for N/S 500 cc bag. Infusion one 500cc bag tomorrow and recheck labs of cbc, cmp at next visit.

## 2021-08-25 NOTE — TELEPHONE ENCOUNTER
[address] [address] nurse reporting that pt daughter called to state she thinks that patient needs IV fluids. Pt was seen to day by physical therapy today, he reports vitals 112/80, heart rate in the 80's, pulse ox 97%. Patient seems fine until she takes a drink and then gets choked and coughs up thick blood tinged sputum.    Patient is scheduled for [facility] nurse  visit tomorrow     Please advise on any new orders for tomorrows visit     ( they would not have fluids on hand just fyi, those have to go thru [facility] out of [place] and I can provide the number to send order to  if you need that however it does take several hours for them to get fluids into the home once order is placed with them  )

## 2021-08-26 ENCOUNTER — HOSPITAL ENCOUNTER (INPATIENT)
Age: 86
LOS: 2 days | Discharge: HOME | DRG: 813 | End: 2021-08-28
Attending: EMERGENCY MEDICINE | Admitting: FAMILY MEDICINE
Payer: MEDICARE

## 2021-08-26 ENCOUNTER — APPOINTMENT (OUTPATIENT)
Dept: GENERAL RADIOLOGY | Age: 86
DRG: 813 | End: 2021-08-26
Payer: MEDICARE

## 2021-08-26 DIAGNOSIS — E78.00 HYPERCHOLESTEREMIA: ICD-10-CM

## 2021-08-26 DIAGNOSIS — R58 MUCOSAL BLEEDING: ICD-10-CM

## 2021-08-26 DIAGNOSIS — U07.1 COVID-19 VIRUS INFECTION: ICD-10-CM

## 2021-08-26 DIAGNOSIS — R79.1 SUPRATHERAPEUTIC INR: Primary | ICD-10-CM

## 2021-08-26 DIAGNOSIS — E87.0 HYPERNATREMIA: ICD-10-CM

## 2021-08-26 PROBLEM — D68.9 COAGULOPATHY (HCC): Status: ACTIVE | Noted: 2021-08-26

## 2021-08-26 PROBLEM — H91.90 DEAFNESS: Status: ACTIVE | Noted: 2021-08-26

## 2021-08-26 LAB
ALBUMIN SERPL-MCNC: 3.7 G/DL (ref 3.5–5.2)
ALP BLD-CCNC: 46 U/L (ref 35–104)
ALT SERPL-CCNC: 8 U/L (ref 5–33)
ANION GAP SERPL CALCULATED.3IONS-SCNC: 10 MMOL/L (ref 7–19)
APTT: 109.1 SEC (ref 26–36.2)
AST SERPL-CCNC: 22 U/L (ref 5–32)
BASOPHILS ABSOLUTE: 0 K/UL (ref 0–0.2)
BASOPHILS RELATIVE PERCENT: 0.6 % (ref 0–1)
BILIRUB SERPL-MCNC: 0.6 MG/DL (ref 0.2–1.2)
BUN BLDV-MCNC: 57 MG/DL (ref 8–23)
CALCIUM SERPL-MCNC: 10.2 MG/DL (ref 8.8–10.2)
CHLORIDE BLD-SCNC: 111 MMOL/L (ref 98–111)
CO2: 28 MMOL/L (ref 22–29)
CREAT SERPL-MCNC: 1.3 MG/DL (ref 0.5–0.9)
EOSINOPHILS ABSOLUTE: 0.1 K/UL (ref 0–0.6)
EOSINOPHILS RELATIVE PERCENT: 0.8 % (ref 0–5)
GFR AFRICAN AMERICAN: 47
GFR NON-AFRICAN AMERICAN: 39
GLUCOSE BLD-MCNC: 100 MG/DL (ref 74–109)
HCT VFR BLD CALC: 41 % (ref 37–47)
HEMOGLOBIN: 12.3 G/DL (ref 12–16)
IMMATURE GRANULOCYTES #: 0 K/UL
INR BLD: 8.59 (ref 0.88–1.18)
LACTIC ACID: 1.4 MMOL/L (ref 0.5–1.9)
LYMPHOCYTES ABSOLUTE: 1.2 K/UL (ref 1.1–4.5)
LYMPHOCYTES RELATIVE PERCENT: 16.2 % (ref 20–40)
MCH RBC QN AUTO: 30.6 PG (ref 27–31)
MCHC RBC AUTO-ENTMCNC: 30 G/DL (ref 33–37)
MCV RBC AUTO: 102 FL (ref 81–99)
MONOCYTES ABSOLUTE: 0.8 K/UL (ref 0–0.9)
MONOCYTES RELATIVE PERCENT: 10.5 % (ref 0–10)
NEUTROPHILS ABSOLUTE: 5.2 K/UL (ref 1.5–7.5)
NEUTROPHILS RELATIVE PERCENT: 71.5 % (ref 50–65)
PDW BLD-RTO: 13.1 % (ref 11.5–14.5)
PLATELET # BLD: 167 K/UL (ref 130–400)
PMV BLD AUTO: 11.7 FL (ref 9.4–12.3)
POTASSIUM SERPL-SCNC: 4.6 MMOL/L (ref 3.5–5)
PROTHROMBIN TIME: 66.7 SEC (ref 12–14.6)
RBC # BLD: 4.02 M/UL (ref 4.2–5.4)
SARS-COV-2, NAAT: NOT DETECTED
SODIUM BLD-SCNC: 149 MMOL/L (ref 136–145)
TOTAL PROTEIN: 7.7 G/DL (ref 6.6–8.7)
WBC # BLD: 7.2 K/UL (ref 4.8–10.8)

## 2021-08-26 PROCEDURE — 6370000000 HC RX 637 (ALT 250 FOR IP): Performed by: FAMILY MEDICINE

## 2021-08-26 PROCEDURE — 93005 ELECTROCARDIOGRAM TRACING: CPT | Performed by: EMERGENCY MEDICINE

## 2021-08-26 PROCEDURE — 71045 X-RAY EXAM CHEST 1 VIEW: CPT

## 2021-08-26 PROCEDURE — 83605 ASSAY OF LACTIC ACID: CPT

## 2021-08-26 PROCEDURE — 85610 PROTHROMBIN TIME: CPT

## 2021-08-26 PROCEDURE — 2580000003 HC RX 258: Performed by: FAMILY MEDICINE

## 2021-08-26 PROCEDURE — 99283 EMERGENCY DEPT VISIT LOW MDM: CPT

## 2021-08-26 PROCEDURE — 2700000000 HC OXYGEN THERAPY PER DAY

## 2021-08-26 PROCEDURE — 1210000000 HC MED SURG R&B

## 2021-08-26 PROCEDURE — 85730 THROMBOPLASTIN TIME PARTIAL: CPT

## 2021-08-26 PROCEDURE — 80053 COMPREHEN METABOLIC PANEL: CPT

## 2021-08-26 PROCEDURE — 6360000002 HC RX W HCPCS: Performed by: EMERGENCY MEDICINE

## 2021-08-26 PROCEDURE — 36415 COLL VENOUS BLD VENIPUNCTURE: CPT

## 2021-08-26 PROCEDURE — 87635 SARS-COV-2 COVID-19 AMP PRB: CPT

## 2021-08-26 PROCEDURE — 85025 COMPLETE CBC W/AUTO DIFF WBC: CPT

## 2021-08-26 RX ORDER — ONDANSETRON 2 MG/ML
4 INJECTION INTRAMUSCULAR; INTRAVENOUS EVERY 6 HOURS PRN
Status: DISCONTINUED | OUTPATIENT
Start: 2021-08-26 | End: 2021-08-28 | Stop reason: HOSPADM

## 2021-08-26 RX ORDER — ACETAMINOPHEN 325 MG/1
650 TABLET ORAL EVERY 6 HOURS PRN
Status: DISCONTINUED | OUTPATIENT
Start: 2021-08-26 | End: 2021-08-28 | Stop reason: HOSPADM

## 2021-08-26 RX ORDER — DONEPEZIL HYDROCHLORIDE 5 MG/1
TABLET, FILM COATED ORAL
Qty: 30 TABLET | Refills: 3 | Status: ON HOLD | OUTPATIENT
Start: 2021-08-26 | End: 2021-09-02 | Stop reason: HOSPADM

## 2021-08-26 RX ORDER — ATORVASTATIN CALCIUM 10 MG/1
10 TABLET, FILM COATED ORAL NIGHTLY
Status: DISCONTINUED | OUTPATIENT
Start: 2021-08-27 | End: 2021-08-28 | Stop reason: HOSPADM

## 2021-08-26 RX ORDER — MONTELUKAST SODIUM 10 MG/1
10 TABLET ORAL NIGHTLY
Status: DISCONTINUED | OUTPATIENT
Start: 2021-08-26 | End: 2021-08-28 | Stop reason: HOSPADM

## 2021-08-26 RX ORDER — M-VIT,TX,IRON,MINS/CALC/FOLIC 27MG-0.4MG
1 TABLET ORAL DAILY
Status: DISCONTINUED | OUTPATIENT
Start: 2021-08-27 | End: 2021-08-28 | Stop reason: HOSPADM

## 2021-08-26 RX ORDER — ISOSORBIDE MONONITRATE 30 MG/1
30 TABLET, EXTENDED RELEASE ORAL DAILY
Status: DISCONTINUED | OUTPATIENT
Start: 2021-08-27 | End: 2021-08-28 | Stop reason: HOSPADM

## 2021-08-26 RX ORDER — ESCITALOPRAM OXALATE 10 MG/1
10 TABLET ORAL DAILY
Status: DISCONTINUED | OUTPATIENT
Start: 2021-08-27 | End: 2021-08-28 | Stop reason: HOSPADM

## 2021-08-26 RX ORDER — ONDANSETRON 4 MG/1
4 TABLET, ORALLY DISINTEGRATING ORAL EVERY 8 HOURS PRN
Status: DISCONTINUED | OUTPATIENT
Start: 2021-08-26 | End: 2021-08-28 | Stop reason: HOSPADM

## 2021-08-26 RX ORDER — DIGOXIN 125 MCG
125 TABLET ORAL EVERY OTHER DAY
Status: DISCONTINUED | OUTPATIENT
Start: 2021-08-27 | End: 2021-08-27

## 2021-08-26 RX ORDER — GUAIFENESIN 600 MG/1
1200 TABLET, EXTENDED RELEASE ORAL 2 TIMES DAILY
Status: DISCONTINUED | OUTPATIENT
Start: 2021-08-26 | End: 2021-08-28 | Stop reason: HOSPADM

## 2021-08-26 RX ORDER — DONEPEZIL HYDROCHLORIDE 5 MG/1
5 TABLET, FILM COATED ORAL NIGHTLY
Status: DISCONTINUED | OUTPATIENT
Start: 2021-08-26 | End: 2021-08-28 | Stop reason: HOSPADM

## 2021-08-26 RX ORDER — ACETAMINOPHEN 650 MG/1
650 SUPPOSITORY RECTAL EVERY 6 HOURS PRN
Status: DISCONTINUED | OUTPATIENT
Start: 2021-08-26 | End: 2021-08-28 | Stop reason: HOSPADM

## 2021-08-26 RX ORDER — GUAIFENESIN 200 MG/1
1200 TABLET ORAL 2 TIMES DAILY
Status: DISCONTINUED | OUTPATIENT
Start: 2021-08-26 | End: 2021-08-26

## 2021-08-26 RX ORDER — PHYTONADIONE 10 MG/ML
5 INJECTION, EMULSION INTRAMUSCULAR; INTRAVENOUS; SUBCUTANEOUS ONCE
Status: COMPLETED | OUTPATIENT
Start: 2021-08-26 | End: 2021-08-26

## 2021-08-26 RX ORDER — ALLOPURINOL 100 MG/1
100 TABLET ORAL DAILY
Status: DISCONTINUED | OUTPATIENT
Start: 2021-08-27 | End: 2021-08-28 | Stop reason: HOSPADM

## 2021-08-26 RX ORDER — ATORVASTATIN CALCIUM 10 MG/1
10 TABLET, FILM COATED ORAL DAILY
Qty: 30 TABLET | Refills: 2 | Status: ON HOLD | OUTPATIENT
Start: 2021-08-26 | End: 2021-09-02 | Stop reason: HOSPADM

## 2021-08-26 RX ORDER — MIRTAZAPINE 15 MG/1
15 TABLET, FILM COATED ORAL NIGHTLY
Status: DISCONTINUED | OUTPATIENT
Start: 2021-08-26 | End: 2021-08-28 | Stop reason: HOSPADM

## 2021-08-26 RX ORDER — POLYETHYLENE GLYCOL 3350 17 G/17G
17 POWDER, FOR SOLUTION ORAL DAILY PRN
Status: DISCONTINUED | OUTPATIENT
Start: 2021-08-26 | End: 2021-08-28 | Stop reason: HOSPADM

## 2021-08-26 RX ORDER — SODIUM CHLORIDE 0.9 % (FLUSH) 0.9 %
5-40 SYRINGE (ML) INJECTION EVERY 12 HOURS SCHEDULED
Status: DISCONTINUED | OUTPATIENT
Start: 2021-08-26 | End: 2021-08-28 | Stop reason: HOSPADM

## 2021-08-26 RX ORDER — LATANOPROST 50 UG/ML
1 SOLUTION/ DROPS OPHTHALMIC NIGHTLY
Status: DISCONTINUED | OUTPATIENT
Start: 2021-08-26 | End: 2021-08-28 | Stop reason: HOSPADM

## 2021-08-26 RX ORDER — SODIUM CHLORIDE 9 MG/ML
25 INJECTION, SOLUTION INTRAVENOUS PRN
Status: DISCONTINUED | OUTPATIENT
Start: 2021-08-26 | End: 2021-08-28 | Stop reason: HOSPADM

## 2021-08-26 RX ORDER — SODIUM CHLORIDE 0.9 % (FLUSH) 0.9 %
5-40 SYRINGE (ML) INJECTION PRN
Status: DISCONTINUED | OUTPATIENT
Start: 2021-08-26 | End: 2021-08-28 | Stop reason: HOSPADM

## 2021-08-26 RX ADMIN — METOPROLOL TARTRATE 12.5 MG: 25 TABLET, FILM COATED ORAL at 23:21

## 2021-08-26 RX ADMIN — SODIUM CHLORIDE, PRESERVATIVE FREE 10 ML: 5 INJECTION INTRAVENOUS at 23:22

## 2021-08-26 RX ADMIN — DONEPEZIL HYDROCHLORIDE 5 MG: 5 TABLET, FILM COATED ORAL at 23:22

## 2021-08-26 RX ADMIN — MIRTAZAPINE 15 MG: 15 TABLET, FILM COATED ORAL at 23:22

## 2021-08-26 RX ADMIN — MONTELUKAST 10 MG: 10 TABLET, FILM COATED ORAL at 23:22

## 2021-08-26 RX ADMIN — PHYTONADIONE 5 MG: 10 INJECTION, EMULSION INTRAMUSCULAR; INTRAVENOUS; SUBCUTANEOUS at 17:32

## 2021-08-26 NOTE — ED NOTES
Isolation precautions initiated. Isolation gown, mask, face shield, double gloves, bonnet and foot coverings worn by staff entering room. Patient placed in mask and instructed to wear mask during all contact.         Zahraa Laws RN  08/26/21 [id number]

## 2021-08-26 NOTE — ED PROVIDER NOTES
[facility] EMERGENCY DEPT  eMERGENCY dEPARTMENT eNCOUnter      Pt Name: Andre Kaplan  MRN: [id number]  [place] 7/2/1933  Date of evaluation: 8/26/2021  Provider: Odalys Horton MD    [address] [place] [address]       Chief Complaint   Patient presents with    Hemoptysis    Positive For Covid-19         HISTORY OF PRESENT ILLNESS   (Location/Symptom, Timing/Onset,Context/Setting, Quality, Duration, Modifying Factors, Severity)  Note limiting factors. Andre Kaplan is a 80 y.o. female who presents to the emergency department for evaluation regarding spitting up blood and bleeding from her lips and mouth. Patient reports that she tested positive for COVID-19 infection last week. The symptoms began really about 3 days previously after she had had an outpatient course of doxycycline. Patient's family reports they were notified that her INR was elevated. She has a prior history of atrial fibrillation and is maintained on Coumadin therapy. On arrival to the ED patient is alert and speaking. She is currently maintained on her baseline level of oxygen at 2 L via nasal cannula. She is not complaining of any shortness of breath or chest pain. She denies abdominal pain. She is not had any episodes of black tarry stools according the patient's family. HPI    NursingNotes were reviewed. REVIEW OF SYSTEMS    (2-9 systems for level 4, 10 or more for level 5)     Review of Systems   Constitutional: Negative for chills and fever. HENT: Positive for mouth sores. Negative for nosebleeds and trouble swallowing. Respiratory: Positive for cough. Cardiovascular: Negative for chest pain and palpitations. Gastrointestinal: Negative for abdominal pain and vomiting. Neurological: Negative for dizziness and syncope. All other systems reviewed and are negative.            PAST MEDICALHISTORY     Past Medical History:   Diagnosis Date    Acute systolic CHF (congestive heart failure), NYHA class 3 ([facility]) 2/24/2017    Allergic rhinitis     Anticoagulant long-term use     coumadin for atrial fib    Anxiety 11/11/2019    Atrial fibrillation (HCC)     Chronic atrial fibrillation (HCC) 9/28/2017    Chronic back pain     Chronic kidney disease     Chronic kidney disease, stage III (moderate) (HCC) 2/17/2017    Chronic systolic CHF (congestive heart failure), NYHA class 3 ([facility] 2/17/2017    Current moderate episode of major depressive disorder without prior episode ([facility] 11/14/2019    GERD (gastroesophageal reflux disease)     Glaucoma     Hearing loss     Hypertension     Mixed hyperlipidemia 7/2/2020    Osteoarthritis     Osteoporosis     Panlobular emphysema ([facility] 11/28/2018    Shingles     Sinus problem     Wears glasses          SURGICAL HISTORY       Past Surgical History:   Procedure Laterality Date    BREAST BIOPSY      Left-benign    BREAST BIOPSY  feb 2014    Left    HYSTERECTOMY      OVARY SURGERY      tumor removed     SKIN CANCER EXCISION  01/2020         CURRENT MEDICATIONS     Previous Medications    ACETAMINOPHEN-CODEINE (TYLENOL WITH CODEINE #3 PO)    Take 1 tablet by mouth every 4 hours as needed (knee pain)    ALENDRONATE (FOSAMAX) 70 MG TABLET    Take 1 tablet by mouth every 7 days    ALLOPURINOL (ZYLOPRIM) 100 MG TABLET    Take 1 tablet by mouth daily    ASPIRIN 81 MG CHEWABLE TABLET    Take 1 tablet by mouth daily    BIMATOPROST 0.01 % SOLN    Place 1 drop into both eyes nightly     DEXAMETHASONE 1.5 MG (21) TBPK    Take as directed    DICLOFENAC SODIUM 1 % GEL    Apply 4 g topically 4 times daily    DIGOXIN (LANOXIN) 125 MCG TABLET    Take 1 tablet by mouth every other day Indications: M, W, F    DOXYCYCLINE HYCLATE (VIBRA-TABS) 100 MG TABLET    Take 1 tablet by mouth 2 times daily for 10 days    ESCITALOPRAM (LEXAPRO) 10 MG TABLET    TAKE ONE TABLET BY MOUTH EVERY DAY    FAMOTIDINE (PEPCID) 20 MG TABLET    Take 0.5 tablets by mouth every morning    FLUTICASONE (FLONASE) 50 MCG/ACT NASAL SPRAY 2 sprays by Each Nostril route daily    FUROSEMIDE (LASIX) 20 MG TABLET    TAKE 1 TABLET BY MOUTH DAILY    GUAIFENESIN 400 MG TABLET    Take 1,200 mg by mouth 2 times daily     ISOSORBIDE MONONITRATE (IMDUR) 30 MG EXTENDED RELEASE TABLET    Take 1 tablet by mouth daily    METOPROLOL TARTRATE (LOPRESSOR) 25 MG TABLET    Take 0.5 tablets by mouth nightly    MIRTAZAPINE (REMERON) 15 MG TABLET    Take 1 tablet by mouth nightly    MONTELUKAST (SINGULAIR) 10 MG TABLET    TAKE 1 TABLET BY MOUTH DAILY AT NIGHT    MULTIPLE VITAMINS-MINERALS (THERAPEUTIC MULTIVITAMIN-MINERALS) TABLET    Take 1 tablet by mouth daily    MUPIROCIN (BACTROBAN) 2 % OINTMENT    APPLY TO AFFECTED AREA(S) THREE TIMES A DAY    NITROGLYCERIN (NITROSTAT) 0.4 MG SL TABLET    Place 1 tablet under the tongue every 5 minutes as needed for Chest pain    OXYGEN    Inhale 2 L into the lungs continuous    SPIRIVA HANDIHALER 18 MCG INHALATION CAPSULE    Inhale 1 capsule into the lungs daily    TIOTROPIUM (SPIRIVA RESPIMAT) 2.5 MCG/ACT AERS INHALER    Inhale 2 puffs into the lungs daily    TRIAMCINOLONE (KENALOG) 0.1 % OINTMENT    Apply to rash twice daily for 7 days, avoid face, axilla, groin    WARFARIN (COUMADIN) 5 MG TABLET    Take 1 tablet by mouth daily       ALLERGIES     Denosumab and Lisinopril    FAMILY HISTORY       Family History   Problem Relation Age of Onset    Asthma Mother     Emphysema Mother     Alzheimer's Disease Sister     Heart Disease Brother     Heart Disease Sister     No Known Problems Sister     Alzheimer's Disease Sister           SOCIAL HISTORY       Social History     Socioeconomic History    Marital status:       Spouse name: None    Number of children: None    Years of education: None    Highest education level: None   Occupational History    None   Tobacco Use    Smoking status: Never Smoker    Smokeless tobacco: Never Used   Vaping Use    Vaping Use: Never used   Substance and Sexual Activity    Alcohol use: No    Drug use: No    Sexual activity: Never   Other Topics Concern    None   Social History Narrative    None     Social Determinants of Health     Financial Resource Strain: Low Risk     Difficulty of Paying Living Expenses: Not hard at all   Food Insecurity: No Food Insecurity    Worried About Running Out of Food in the Last Year: Never true    Benson of Food in the Last Year: Never true   Transportation Needs: No Transportation Needs    Lack of Transportation (Medical): No    Lack of Transportation (Non-Medical): No   Physical Activity:     Days of Exercise per Week:     Minutes of Exercise per Session:    Stress:     Feeling of Stress :    Social Connections:     Frequency of Communication with Friends and Family:     Frequency of Social Gatherings with Friends and Family:     Attends Hindu Services:     Active Member of Clubs or Organizations:     Attends Club or Organization Meetings:     Marital Status:    Intimate Partner Violence:     Fear of Current or Ex-Partner:     Emotionally Abused:     Physically Abused:     Sexually Abused:        SCREENINGS             PHYSICAL EXAM    (up to 7 for level 4, 8 or more for level 5)     ED Triage Vitals [08/26/21 1225]   BP Temp Temp Source Pulse Resp SpO2 Height Weight   (!) 137/92 98.6 °F (37 °C) Oral 103 15 98 % 5' 2\" (1.575 m) 100 lb (45.4 kg)       Physical Exam  Vitals and nursing note reviewed. HENT:      Head: Atraumatic. Comments: There were several mucosal lesions along with some evidence of bleeding. These appear like superficial ulcerations. Mouth/Throat:      Mouth: Mucous membranes are not dry. Eyes:      General: No scleral icterus. Pupils: Pupils are equal, round, and reactive to light. Neck:      Trachea: No tracheal deviation. Cardiovascular:      Rate and Rhythm: Normal rate and regular rhythm. Heart sounds: Normal heart sounds. No murmur heard.      Pulmonary:      Effort: Pulmonary effort is normal. No respiratory distress. Breath sounds: Normal breath sounds. No stridor. Abdominal:      General: There is no distension. Palpations: Abdomen is soft. Tenderness: There is no abdominal tenderness. There is no guarding. Skin:     Capillary Refill: Capillary refill takes less than 2 seconds. Coloration: Skin is not pale. Findings: No rash. Neurological:      General: No focal deficit present. Mental Status: She is alert and oriented to person, place, and time. Psychiatric:         Behavior: Behavior is cooperative. DIAGNOSTIC RESULTS     EKG: All EKG's areinterpreted by the Emergency Department Physician who either signs or Co-signs this chart in the absence of a cardiologist.    1249: Atrial fibrillation at a rate of 89, no acute ST elevations identified. QTc: 412 MS. RADIOLOGY:  Non-plain film images such as CT, Ultrasound and MRI are read by the radiologist. Plain radiographic images are visualized and preliminarily interpreted bythe emergency physician with the below findings:      XR CHEST PORTABLE   Final Result   1. Hyperinflated lungs with cardiomegaly. No acute pulmonary process   seen at this time.    Signed by Dr Cheng Outhouse:  Cristina Pu - Abnormal; Notable for the following components:       Result Value    Sodium 149 (*)     BUN 57 (*)     CREATININE 1.3 (*)     GFR Non- 39 (*)     GFR  47 (*)     All other components within normal limits   CBC WITH AUTO DIFFERENTIAL - Abnormal; Notable for the following components:    RBC 4.02 (*)     .0 (*)     MCHC 30.0 (*)     Neutrophils % 71.5 (*)     Lymphocytes % 16.2 (*)     Monocytes % 10.5 (*)     All other components within normal limits   APTT - Abnormal; Notable for the following components:    aPTT 109.1 (*)     All other components within normal limits    Narrative:     [facility] ,  Coag results called to and read back by  [facility] RN ER, 08/26/2021 14:28, by  Trent Atkinson - Abnormal; Notable for the following components:    Protime 66.7 (*)     INR 8.59 (*)     All other components within normal limits    Narrative:     Velia Hoskins tel. ,  Coag results called to and read back by  [address], 08/26/2021 14:28, by  Carisa Willams   COVID-19, RAPID   LACTIC ACID, PLASMA       All other labs were within normal range or not returned as of this dictation. EMERGENCY DEPARTMENT COURSE and DIFFERENTIAL DIAGNOSIS/MDM:   Vitals:    Vitals:    08/26/21 1430 08/26/21 1500 08/26/21 1530 08/26/21 1600   BP: (!) 156/65 (!) 151/69 (!) 128/96 (!) 145/94   Pulse: 105 101 100 104   Resp: 16 15 16 16   Temp:       TempSrc:       SpO2: 98% 95% 98% 97%   Weight:       Height:           MDM    Reassessment    Patient is not currently experiencing any acute respiratory distress. Her oxygen saturation is 95% on 2 L nasal cannula. Her other vital signs have remained stable. Heart rates been running 95 to around 105 in atrial fibrillation. Her INR is significantly elevated at 8.5. She is currently experiencing some evidence of mucosal bleeding. We will plan to observe her in the hospital for further evaluation and treatment. CONSULTS:    Case was discussed with Dr. Philippe Tomlinson regarding plans for inpatient admission. PROCEDURES:  Unless otherwise noted below, none     Procedures    FINAL IMPRESSION      1. Supratherapeutic INR    2. Hypernatremia    3. Mucosal bleeding    4.  COVID-19 virus infection          DISPOSITION/PLAN   DISPOSITION Admitted 08/26/2021 04:33:32 PM      (Please note that portions of this note were completed with a voice recognition program.  Efforts were made to edit thedictations but occasionally words are mis-transcribed.)    Odalys Horton MD (electronically signed)  Attending Emergency Physician         Odalys Horton MD  09/09/21 1686

## 2021-08-26 NOTE — H&P
Hospital Medicine  History and Physical    Patient:  Pablo Tay  MRN: [id number]    CHIEF COMPLAINT:  Bleeding from mouth    History Obtained From: daughter, chart    PCP: NONI Banuelos    HISTORY OF PRESENT ILLNESS:  80year old white female brought to the emergency department with complaint of hemoptysis turning to mucosal bleeding in her mouth for the last 3 days after receiving doxycycline and monoclonal antibody infusion on 8-20. On warfarin for AF, checks at home. PCP instructed her to hold tonight's warfarin dose, but daughter was concerned and brought her for evaluation. INR 8.59 with elevated aPTT as well. Patient remains on baseline 2 LPM supplemental oxygen without symptoms of pulmonary infection, fever, or chills. Tested positive for COVID-19 last week but negative today. Patient is unable to converse due to severe deafness and inability to remove N95 mask to allow lip-reading. REVIEW OF SYSTEMS:  14 systems reviewed and negative except as noted above.     Past Medical History:      Diagnosis Date    Acute systolic CHF (congestive heart failure), NYHA class 3 (HCC) 2/24/2017    Allergic rhinitis     Anticoagulant long-term use     coumadin for atrial fib    Anxiety 11/11/2019    Atrial fibrillation (HCC)     Chronic atrial fibrillation (HCC) 9/28/2017    Chronic back pain     Chronic kidney disease     Chronic kidney disease, stage III (moderate) (HCC) 2/17/2017    Chronic systolic CHF (congestive heart failure), NYHA class 3 ([address] 2/17/2017    Current moderate episode of major depressive disorder without prior episode ([address] 11/14/2019    GERD (gastroesophageal reflux disease)     Glaucoma     Hearing loss     Hypertension     Mixed hyperlipidemia 7/2/2020    Osteoarthritis     Osteoporosis     Panlobular emphysema ([address] 11/28/2018    Shingles     Sinus problem     Wears glasses        Past Surgical History:      Procedure Laterality Date    BREAST BIOPSY TABLET BY MOUTH EVERY DAY 3/24/21   NONI Ashley   SPIRIVA HANDIHALER 18 MCG inhalation capsule Inhale 1 capsule into the lungs daily 2/1/21   NONI Ashley   famotidine (PEPCID) 20 MG tablet Take 0.5 tablets by mouth every morning 1/19/21   NONI Ashley   furosemide (LASIX) 20 MG tablet TAKE 1 TABLET BY MOUTH DAILY  Patient taking differently: Take 20 mg by mouth daily as needed (Pt is weighed daily and she is given if weight is climbing)  11/3/20 8/11/21  NONI Ashley   fluticasone (FLONASE) 50 MCG/ACT nasal spray 2 sprays by Each Nostril route daily  Patient taking differently: 2 sprays by Each Nostril route nightly  9/23/20   NONI Pittman - NP   mupirocin (BACTROBAN) 2 % ointment APPLY TO AFFECTED AREA(S) THREE TIMES A DAY 9/11/20   NONI Ashley   Multiple Vitamins-Minerals (THERAPEUTIC MULTIVITAMIN-MINERALS) tablet Take 1 tablet by mouth daily    Historical Provider, MD   diclofenac sodium 1 % GEL Apply 4 g topically 4 times daily  Patient taking differently: Apply 4 g topically 4 times daily as needed for Pain  1/21/20 8/11/21  NONI Ashley   aspirin 81 MG chewable tablet Take 1 tablet by mouth daily  Patient taking differently: Take 81 mg by mouth every morning  12/10/19   Martha Charlton, DO   OXYGEN Inhale 2 L into the lungs continuous    Historical Provider, MD   tiotropium (SPIRIVA RESPIMAT) 2.5 MCG/ACT AERS inhaler Inhale 2 puffs into the lungs daily 4/30/19 8/11/21  NONI Ashley   nitroGLYCERIN (NITROSTAT) 0.4 MG SL tablet Place 1 tablet under the tongue every 5 minutes as needed for Chest pain 12/11/18   NONI Baugh   allopurinol (ZYLOPRIM) 100 MG tablet Take 1 tablet by mouth daily 12/6/18 8/11/21  NONI Ashley   bimatoprost 0.01 % SOLN Place 1 drop into both eyes nightly     Historical Provider, MD       Allergies:  Denosumab and Lisinopril    Social History:   TOBACCO: reports that she has never smoked. She has never used smokeless tobacco.  ETOH:   reports no history of alcohol use. Family History:       Problem Relation Age of Onset    Asthma Mother     Emphysema Mother     Alzheimer's Disease Sister     Heart Disease Brother     Heart Disease Sister     No Known Problems Sister     Alzheimer's Disease Sister            Physical Exam:    Vitals: BP (!) 152/76   Pulse 103   Temp 98.6 °F (37 °C) (Oral)   Resp 16   Ht 5' 2\" (1.575 m)   Wt 100 lb (45.4 kg)   SpO2 96%   BMI 18.29 kg/m²   24HR INTAKE/OUTPUT:  No intake or output data in the 24 hours ending 08/26/21 1737  General appearance: alert and cooperative with exam  HEENT: atraumatic, eyes with clear conjunctiva and normal lids, pupils and irises normal, external ears and nose are normal, mucosal hemorrhage with dried blood around lips. Neck without masses, lympadenopathy, bruit, thyroid normal  Lungs: no increased work of breathing, diminished breath sounds bilaterally  Heart: tachycardic but regular, no murmur  Abdomen: soft, non-tender; bowel sounds normal; no masses,  no organomegaly  Extremities: extremities normal without clubbing, atraumatic, no cyanosis or edema  Neurologic: hard of hearing to the point that conversation is impossible  Skin: no rashes, nodules. CBC:   Recent Labs     08/26/21  1337   WBC 7.2   HGB 12.3        BMP:    Recent Labs     08/26/21  1337   *   K 4.6      CO2 28   BUN 57*   CREATININE 1.3*   GLUCOSE 100     Hepatic:   Recent Labs     08/26/21  1337   AST 22   ALT 8   BILITOT 0.6   ALKPHOS 46     Troponin T: No results for input(s): TROPONINI in the last 72 hours. ABGs: No results for input(s): PH, PCO2, PO2, HCO3, BE, O2SAT in the last 72 hours.   INR:   Recent Labs     08/25/21  0800 08/26/21  1337   INR 8.00 8.59*     Lactic Acid:   Recent Labs     08/26/21  1337   LACTA 1.4     -----------------------------------------------------------------  PA/lat CXR: hyperinflated, no acute process    Assessment and Plan   Principal Problem:    Coagulopathy ([address]  Active Problems:    Chronic systolic CHF (congestive heart failure), NYHA class 3 (HCC)    Chronic kidney disease, stage III (moderate) (HCC)    Atrial fibrillation (HCC)    Weight loss    COVID-19    Hypernatremia    Deafness    Mucosal bleeding  Resolved Problems:    * No resolved hospital problems. *      Admit to med/surg. Believe she will need to be in COVID-19 sotelo despite her negative test today. Vitamin K given in ED. Evaluate INR and aPTT in the morning, treat further as necessary but do not want to overtreat and make her hypercoagulable given COVID-19 status. Follow H&H. Bleeding does not appear life-threatening. Home medications reconciled.     Khalif Cazares,   Admitting Hospitalist

## 2021-08-27 PROBLEM — Z51.5 PALLIATIVE CARE PATIENT: Status: ACTIVE | Noted: 2021-08-27

## 2021-08-27 LAB
ANION GAP SERPL CALCULATED.3IONS-SCNC: 13 MMOL/L (ref 7–19)
APTT: 89.2 SEC (ref 26–36.2)
BUN BLDV-MCNC: 59 MG/DL (ref 8–23)
CALCIUM SERPL-MCNC: 9.8 MG/DL (ref 8.8–10.2)
CHLORIDE BLD-SCNC: 109 MMOL/L (ref 98–111)
CO2: 27 MMOL/L (ref 22–29)
CREAT SERPL-MCNC: 1.2 MG/DL (ref 0.5–0.9)
GFR AFRICAN AMERICAN: 51
GFR NON-AFRICAN AMERICAN: 42
GLUCOSE BLD-MCNC: 88 MG/DL (ref 74–109)
HCT VFR BLD CALC: 39.5 % (ref 37–47)
HEMOGLOBIN: 12.2 G/DL (ref 12–16)
INR BLD: 5.56 (ref 0.88–1.18)
INR BLD: 6.59 (ref 0.88–1.18)
MCH RBC QN AUTO: 31.8 PG (ref 27–31)
MCHC RBC AUTO-ENTMCNC: 30.9 G/DL (ref 33–37)
MCV RBC AUTO: 102.9 FL (ref 81–99)
PDW BLD-RTO: 13.2 % (ref 11.5–14.5)
PLATELET # BLD: 168 K/UL (ref 130–400)
PMV BLD AUTO: 12.4 FL (ref 9.4–12.3)
POTASSIUM REFLEX MAGNESIUM: 4.5 MMOL/L (ref 3.5–5)
PROTHROMBIN TIME: 48.2 SEC (ref 12–14.6)
PROTHROMBIN TIME: 54.7 SEC (ref 12–14.6)
RBC # BLD: 3.84 M/UL (ref 4.2–5.4)
SODIUM BLD-SCNC: 149 MMOL/L (ref 136–145)
WBC # BLD: 7.7 K/UL (ref 4.8–10.8)

## 2021-08-27 PROCEDURE — 85730 THROMBOPLASTIN TIME PARTIAL: CPT

## 2021-08-27 PROCEDURE — 85027 COMPLETE CBC AUTOMATED: CPT

## 2021-08-27 PROCEDURE — 36415 COLL VENOUS BLD VENIPUNCTURE: CPT

## 2021-08-27 PROCEDURE — 2700000000 HC OXYGEN THERAPY PER DAY

## 2021-08-27 PROCEDURE — 1210000000 HC MED SURG R&B

## 2021-08-27 PROCEDURE — 80048 BASIC METABOLIC PNL TOTAL CA: CPT

## 2021-08-27 PROCEDURE — 6360000002 HC RX W HCPCS: Performed by: FAMILY MEDICINE

## 2021-08-27 PROCEDURE — 6370000000 HC RX 637 (ALT 250 FOR IP): Performed by: FAMILY MEDICINE

## 2021-08-27 PROCEDURE — 2580000003 HC RX 258: Performed by: FAMILY MEDICINE

## 2021-08-27 PROCEDURE — 85610 PROTHROMBIN TIME: CPT

## 2021-08-27 RX ORDER — PHYTONADIONE 10 MG/ML
10 INJECTION, EMULSION INTRAMUSCULAR; INTRAVENOUS; SUBCUTANEOUS ONCE
Status: COMPLETED | OUTPATIENT
Start: 2021-08-27 | End: 2021-08-27

## 2021-08-27 RX ORDER — DIGOXIN 125 MCG
125 TABLET ORAL
Status: DISCONTINUED | OUTPATIENT
Start: 2021-08-27 | End: 2021-08-28 | Stop reason: HOSPADM

## 2021-08-27 RX ADMIN — LATANOPROST 1 DROP: 50 SOLUTION OPHTHALMIC at 00:30

## 2021-08-27 RX ADMIN — PHYTONADIONE 10 MG: 10 INJECTION, EMULSION INTRAMUSCULAR; INTRAVENOUS; SUBCUTANEOUS at 09:39

## 2021-08-27 RX ADMIN — GUAIFENESIN 1200 MG: 600 TABLET, EXTENDED RELEASE ORAL at 09:30

## 2021-08-27 RX ADMIN — SODIUM CHLORIDE, PRESERVATIVE FREE 10 ML: 5 INJECTION INTRAVENOUS at 09:30

## 2021-08-27 RX ADMIN — ISOSORBIDE MONONITRATE 30 MG: 30 TABLET, EXTENDED RELEASE ORAL at 09:30

## 2021-08-27 RX ADMIN — DIGOXIN 125 MCG: 125 TABLET ORAL at 12:50

## 2021-08-27 RX ADMIN — ALLOPURINOL 100 MG: 100 TABLET ORAL at 09:30

## 2021-08-27 RX ADMIN — SODIUM CHLORIDE, PRESERVATIVE FREE 10 ML: 5 INJECTION INTRAVENOUS at 20:54

## 2021-08-27 RX ADMIN — TIOTROPIUM BROMIDE INHALATION SPRAY 2 PUFF: 3.12 SPRAY, METERED RESPIRATORY (INHALATION) at 09:29

## 2021-08-27 RX ADMIN — Medication 1 TABLET: at 09:30

## 2021-08-27 RX ADMIN — LATANOPROST 1 DROP: 50 SOLUTION OPHTHALMIC at 20:55

## 2021-08-27 RX ADMIN — ESCITALOPRAM OXALATE 10 MG: 10 TABLET ORAL at 09:29

## 2021-08-27 NOTE — PLAN OF CARE
Nutrition Problem #1: Inadequate oral intake  Intervention: Food and/or Nutrient Delivery: Continue Current Diet, Continue Oral Nutrition Supplement  Nutritional Goals: po intake 50% or greater meals and ONS. wt stable.

## 2021-08-27 NOTE — ED NOTES
Still waiting for room 433 to be cleaned that was assigned at 2736.      Messi Mark RN  08/26/21 7763

## 2021-08-27 NOTE — PROGRESS NOTES
Palliative Care/Spiritual Care: Spoke with pt's daughter on the phone to initiate palliative care. Pt's daughter says pt was diagnosed with Covid several weeks ago but tested negative here at the hospital. Pt does have multiple other diagnosis CHF, A-fib, and CKD. Advance Directives: Pt has a POA for healthcare and has her adult children named as decision makers. Pt is a full code but daughter says she will talk to her siblings to determine if code status needs to be changed. Currently, pt wants CPR and Ventilator. Pain/other symptoms: Daughter says pt never complains of pain. Social/Spiritual: Pt is [place].         Pt/family discussion r/t goals: Pt lives at home and her three adult children took turns helping care for her. Pt ambulated with walker and is on oxygen at home. Pt does need assistance with all daily living skills such as toilet needs, dressing, and showering. Goal is to be determined depending on pt's needs. Provided spiritual care with sustaining presence, nurtured hope, and prayer. Pt's daughter expressed gratitude for spiritual care.     Electronically signed by Adiel Zavaleta on 8/27/2021 at 10:59 AM

## 2021-08-27 NOTE — CONSULTS
Continue Current Diet, Continue Oral Nutrition Supplement  Nutrition Education/Counseling:  Education not appropriate   Coordination of Nutrition Care:  Continue to monitor while inpatient    Goals:  po intake 50% or greater meals and ONS. wt stable.        Nutrition Monitoring and Evaluation:   Food/Nutrient Intake Outcomes:  Diet Advancement/Tolerance, Food and Nutrient Intake, Supplement Intake  Physical Signs/Symptoms Outcomes:  Biochemical Data, Chewing or Swallowing, Skin, Weight     Electronically signed by Pallavi Parisi, MS, RD, LD on 8/27/21 at 10:02 AM CDT    Contact: [phone]

## 2021-08-27 NOTE — CARE COORDINATION
Late documentation to follow, 02 2 L baseline through [email or web address]. CM reached out to Jason Charles, daughter, to complete assessment. Per Jason Charles, patient has been staying with her for the past 2 weeks. She normally states w/her son Sean Tavarez, but several members in the family have had Richard, and Sean Tavarez was not really able to take care of patient. Pt uses a walker at home. Pt uses 2L 02 NC at home this is from [email or web address]. Pt is current w/[facility], and would like resumed. Pt has needed help with personal care. Dtr is available for cooking, cleaning and transportation. Pt obtains medications at [facility]. HC can provided pulse ox if needed to patient. Per PTA, pt on warfarin prior to admission.    Electronically signed by Milly Finch RN on 8/30/2021 at 5:33 PM

## 2021-08-27 NOTE — ACP (ADVANCE CARE PLANNING)
Advance Care Planning     Advance Care Planning Activator (Inpatient)  Conversation Note      Date of ACP Conversation: 8/27/2021     Conversation Conducted with: Patient's daughter Basilio Oliveros    ACP Activator: Landon Seth      Current Designated Health Care Decision Maker:     Primary Decision Maker: Apollo Nomi - Child - [phone]    Primary Decision Maker: Phyllis Parks - Child - [phone]    Primary Decision Maker: Amanda Garcia Child - [phone]      Care Preferences    Ventilation: \"If you were in your present state of health and suddenly became very ill and were unable to breathe on your own, what would your preference be about the use of a ventilator (breathing machine) if it were available to you? \"      Would the patient desire the use of ventilator (breathing machine)?: Yes    \"If your health worsens and it becomes clear that your chance of recovery is unlikely, what would your preference be about the use of a ventilator (breathing machine) if it were available to you? \"     Would the patient desire the use of ventilator (breathing machine)?: Yes      Resuscitation  \"CPR works best to restart the heart when there is a sudden event, like a heart attack, in someone who is otherwise healthy. Unfortunately, CPR does not typically restart the heart for people who have serious health conditions or who are very sick. \"    \"In the event your heart stopped as a result of an underlying serious health condition, would you want attempts to be made to restart your heart (answer \"yes\" for attempt to resuscitate) or would you prefer a natural death (answer \"no\" for do not attempt to resuscitate)? \" Yes       [] Yes   [x] No   Educated Patient / Cris Schuler regarding differences between Advance Directives and portable DNR orders.       Conversation Outcomes:  [x] ACP discussion completed  [x] Existing advance directive reviewed with patient; no changes to patient's previously recorded wishes    Electronically signed by Joan Barbosa on 8/27/2021 at 11:01 AM

## 2021-08-27 NOTE — PROGRESS NOTES
Hospitalist Progress Note  8/27/2021 6:13 PM  Subjective:   Admit Date: 8/26/2021  PCP: Royetta Saint, APRN    Chief Complaint: bleeding from mouth    Subjective: Patient without further hemoptysis or mucosal bleeding. Had some trouble taking medications this morning but was able later in the morning. Afebrile, no cough. History is otherwise unchanged. Cumulative Hospital History:   8-26: Brought to ED with hemoptysis and then mucosal bleeding x3 days after receiving doxycycline and monoclonal antibodies for COVID-19. On warfarin for AF. INR 8.59 with elevated aPTT. Remains on baseline supplemental O2. Admitted to med/surg COVID-19 messina after receiving vitamin K.  8-27: Repeat vitamin K dose due to INR 5.56. No further bleeding. Still asymptomatic from COVID-19. ROS: 14 point review of systems is negative except as specifically addressed above. ADULT DIET; Full Liquid;  Low Sodium (2 gm)  Adult Oral Nutrition Supplement; Standard High Calorie/High Protein Oral Supplement  No intake or output data in the 24 hours ending 08/27/21 1813  Medications:   sodium chloride       Current Facility-Administered Medications   Medication Dose Route Frequency Provider Last Rate Last Admin    digoxin (LANOXIN) tablet 125 mcg  125 mcg Oral Once per day on Mon Wed Fri Ton Maldonadohn, DO   125 mcg at 08/27/21 1250    allopurinol (ZYLOPRIM) tablet 100 mg  100 mg Oral Daily Curtis Messina, DO   100 mg at 08/27/21 0930    atorvastatin (LIPITOR) tablet 10 mg  10 mg Oral Nightly Curtis Messina, DO        latanoprost (XALATAN) 0.005 % ophthalmic solution 1 drop  1 drop Both Eyes Nightly Curtis Messina, DO   1 drop at 08/27/21 0030    donepezil (ARICEPT) tablet 5 mg  5 mg Oral Nightly Curtis Messina, DO   5 mg at 08/26/21 2322    escitalopram (LEXAPRO) tablet 10 mg  10 mg Oral Daily Curtis Messina, DO   10 mg at 08/27/21 1784    isosorbide mononitrate (IMDUR) extended release tablet 30 mg  30 mg Oral Daily Curtis Messina, DO   30 mg at 08/27/21 0930    metoprolol tartrate (LOPRESSOR) tablet 12.5 mg  12.5 mg Oral Nightly Curtis Messina, DO   12.5 mg at 08/26/21 2321    mirtazapine (REMERON) tablet 15 mg  15 mg Oral Nightly Curtis Messina, DO   15 mg at 08/26/21 2322    montelukast (SINGULAIR) tablet 10 mg  10 mg Oral Nightly Curtis Messina, DO   10 mg at 08/26/21 2322    therapeutic multivitamin-minerals 1 tablet  1 tablet Oral Daily Curtis Messina, DO   1 tablet at 08/27/21 0930    sodium chloride flush 0.9 % injection 5-40 mL  5-40 mL IntraVENous 2 times per day [facility], DO   10 mL at 08/27/21 0930    sodium chloride flush 0.9 % injection 5-40 mL  5-40 mL IntraVENous PRN [facility] Messina, DO        0.9 % sodium chloride infusion  25 mL IntraVENous PRN [facility], DO        ondansetron (ZOFRAN-ODT) disintegrating tablet 4 mg  4 mg Oral Q8H PRN [facility], DO        Or    ondansetron [facility]) injection 4 mg  4 mg IntraVENous Q6H PRN [facility], DO        polyethylene glycol (GLYCOLAX) packet 17 g  17 g Oral Daily PRN [facility], DO        acetaminophen (TYLENOL) tablet 650 mg  650 mg Oral Q6H PRN [facility], DO        Or    acetaminophen (TYLENOL) suppository 650 mg  650 mg Rectal Q6H PRN [facility], DO        guaiFENesin [facility]) extended release tablet 1,200 mg  1,200 mg Oral BID Curtis Messina, DO   1,200 mg at 08/27/21 0930    tiotropium (SPIRIVA RESPIMAT) 2.5 MCG/ACT inhaler 2 puff  2 puff Inhalation Daily [facility], DO   2 puff at 08/27/21 1587    warfarin (COUMADIN) daily dosing (placeholder)   Other RX Placeholder [facility], DO            Labs:     Recent Labs     08/26/21  1337 08/27/21  0538   WBC 7.2 7.7   RBC 4.02* 3.84*   HGB 12.3 12.2   HCT 41.0 39.5   .0* 102.9*   MCH 30.6 31.8*   MCHC 30.0* 30.9*    168     Recent Labs     08/26/21  1337 08/27/21  0538   * 149*   K 4.6 4.5   ANIONGAP 10 13    109   CO2 28 27   BUN 57* 59*   CREATININE 1.3* 1.2*   GLUCOSE 100 88   CALCIUM 10.2 9.8     No results for input(s): MG, PHOS in the last 72 hours. Recent Labs     08/26/21  1337   AST 22   ALT 8   BILITOT 0.6   ALKPHOS 55     ABGs:No results for input(s): PH, PO2, PCO2, HCO3, BE, O2SAT in the last 72 hours. Troponin T: No results for input(s): TROPONINI in the last 72 hours. INR:   Recent Labs     08/26/21  1337 08/27/21  0228 08/27/21  0538   INR 8.59* 6.59* 5.56*     Lactic Acid:   Recent Labs     08/26/21  1337   LACTA 1.4       Objective:   Vitals: BP (!) 142/71   Pulse 98   Temp 97.2 °F (36.2 °C) (Temporal)   Resp 16   Ht 5' 2\" (1.575 m)   Wt 99 lb 1 oz (44.9 kg)   SpO2 98%   BMI 18.12 kg/m²   24HR INTAKE/OUTPUT:  No intake or output data in the 24 hours ending 08/27/21 1813  General appearance: alert and cooperative with exam  HEENT: atraumatic, eyes with clear conjunctiva and normal lids, pupils and irises normal, external ears and nose are normal, lips normal  Neck without masses, lympadenopathy, bruit, thyroid normal  Lungs: no increased work of breathing, diminished breath sounds bilaterally  Heart: regular rate and rhythm, S1, S2 normal, no murmur, click, rub or gallop  Abdomen: soft, non-tender; bowel sounds normal; no masses,  no organomegaly  Extremities: extremities normal, atraumatic, no cyanosis or edema  Neurologic: no focal neurologic deficits, normal sensation, alert and oriented, affect and mood appropriate  Skin: no rashes, nodules    Assessment and Plan:   Principal Problem:    Coagulopathy (HCC)  Active Problems:    Chronic systolic CHF (congestive heart failure), NYHA class 3 (HCC)    Chronic kidney disease, stage III (moderate) (HCC)    Atrial fibrillation (HCC)    Weight loss    COVID-19    Hypernatremia    Deafness    Mucosal bleeding    Palliative care patient  Resolved Problems:    * No resolved hospital problems. *      No indication for directed COVID-19 treatment. Repeat vitamin K dose today. Follow daily INR and H&H. No further bleeding today.     Advance Directive: Full Code    DVT prophylaxis: SCDs    Discharge planning: to home      Lis Joshuaing, DO  Rounding Hospitalist

## 2021-08-27 NOTE — PROGRESS NOTES
Pablo Talavera arrived to room # 433. Presented with: coagulopathy  Mental Status: Patient is alert. Vitals:    08/26/21 2224   BP: (!) 152/86   Pulse: 95   Resp: 16   Temp: 98.1 °F (36.7 °C)   SpO2: 99%     Patient safety contract and falls prevention contract reviewed with patient No.  Oriented Patient to room. Call light within reach. Yes.   Needs, issues or concerns expressed at this time: no.      Electronically signed by Petros Ng RN on 8/27/2021 at 2:43 AM

## 2021-08-27 NOTE — CARE COORDINATION
Patient is current with [facility] for Nursing, PT, OT, ST Services. Will follow. Please advise when patient discharges. WILL NEED RESUMPTION OF CARE ORDERS TO RESUME HH SERVICES WHEN PATIENT DISCHARGES. Thank you. [address] [place] [address] [phone]. [phone].     Betzy Miller RN, Home Care Liaison  [phone] P  Electronically signed by Betzy Miller on 8/27/2021 at 8:34 AM

## 2021-08-27 NOTE — ED NOTES
Pt given swabs to wetten mouth and lips. Pt given another warm blanket. Pt's daughter updated that were still waiting on room to be cleaned.       Adam Mchugh RN  08/26/21 8182

## 2021-08-28 VITALS
WEIGHT: 99.06 LBS | TEMPERATURE: 96.9 F | DIASTOLIC BLOOD PRESSURE: 74 MMHG | RESPIRATION RATE: 16 BRPM | OXYGEN SATURATION: 97 % | HEART RATE: 84 BPM | HEIGHT: 62 IN | BODY MASS INDEX: 18.23 KG/M2 | SYSTOLIC BLOOD PRESSURE: 138 MMHG

## 2021-08-28 PROBLEM — R58 MUCOSAL BLEEDING: Status: RESOLVED | Noted: 2021-08-26 | Resolved: 2021-08-28

## 2021-08-28 PROBLEM — D68.9 COAGULOPATHY (HCC): Status: RESOLVED | Noted: 2021-08-26 | Resolved: 2021-08-28

## 2021-08-28 LAB
ANION GAP SERPL CALCULATED.3IONS-SCNC: 13 MMOL/L (ref 7–19)
BUN BLDV-MCNC: 53 MG/DL (ref 8–23)
CALCIUM SERPL-MCNC: 9.8 MG/DL (ref 8.8–10.2)
CHLORIDE BLD-SCNC: 110 MMOL/L (ref 98–111)
CO2: 27 MMOL/L (ref 22–29)
CREAT SERPL-MCNC: 1.1 MG/DL (ref 0.5–0.9)
GFR AFRICAN AMERICAN: 57
GFR NON-AFRICAN AMERICAN: 47
GLUCOSE BLD-MCNC: 112 MG/DL (ref 74–109)
HCT VFR BLD CALC: 41.3 % (ref 37–47)
HEMOGLOBIN: 12.8 G/DL (ref 12–16)
INR BLD: 2.53 (ref 0.88–1.18)
MCH RBC QN AUTO: 31.4 PG (ref 27–31)
MCHC RBC AUTO-ENTMCNC: 31 G/DL (ref 33–37)
MCV RBC AUTO: 101.2 FL (ref 81–99)
PDW BLD-RTO: 13.2 % (ref 11.5–14.5)
PLATELET # BLD: 169 K/UL (ref 130–400)
PMV BLD AUTO: 12.6 FL (ref 9.4–12.3)
POTASSIUM REFLEX MAGNESIUM: 4.6 MMOL/L (ref 3.5–5)
PROTHROMBIN TIME: 26.8 SEC (ref 12–14.6)
RBC # BLD: 4.08 M/UL (ref 4.2–5.4)
SODIUM BLD-SCNC: 150 MMOL/L (ref 136–145)
WBC # BLD: 9.9 K/UL (ref 4.8–10.8)

## 2021-08-28 PROCEDURE — 36415 COLL VENOUS BLD VENIPUNCTURE: CPT

## 2021-08-28 PROCEDURE — 2700000000 HC OXYGEN THERAPY PER DAY

## 2021-08-28 PROCEDURE — 80048 BASIC METABOLIC PNL TOTAL CA: CPT

## 2021-08-28 PROCEDURE — 6370000000 HC RX 637 (ALT 250 FOR IP): Performed by: FAMILY MEDICINE

## 2021-08-28 PROCEDURE — 85027 COMPLETE CBC AUTOMATED: CPT

## 2021-08-28 PROCEDURE — 85610 PROTHROMBIN TIME: CPT

## 2021-08-28 RX ADMIN — TIOTROPIUM BROMIDE INHALATION SPRAY 2 PUFF: 3.12 SPRAY, METERED RESPIRATORY (INHALATION) at 08:15

## 2021-08-28 ASSESSMENT — PAIN SCALES - GENERAL: PAINLEVEL_OUTOF10: 0

## 2021-08-28 NOTE — PLAN OF CARE
Problem: Falls - Risk of:  Goal: Will remain free from falls  Description: Will remain free from falls  Outcome: Ongoing  Goal: Absence of physical injury  Description: Absence of physical injury  Outcome: Ongoing     Problem: Skin Integrity:  Goal: Will show no infection signs and symptoms  Description: Will show no infection signs and symptoms  Outcome: Ongoing  Goal: Absence of new skin breakdown  Description: Absence of new skin breakdown  Outcome: Ongoing     Problem: Nutrition  Goal: Optimal nutrition therapy  Outcome: Ongoing     Problem: Confusion - Acute:  Goal: Absence of continued neurological deterioration signs and symptoms  Description: Absence of continued neurological deterioration signs and symptoms  Outcome: Ongoing  Goal: Mental status will be restored to baseline  Description: Mental status will be restored to baseline  Outcome: Ongoing     Problem: Discharge Planning:  Goal: Ability to perform activities of daily living will improve  Description: Ability to perform activities of daily living will improve  Outcome: Ongoing  Goal: Participates in care planning  Description: Participates in care planning  Outcome: Ongoing     Problem: Injury - Risk of, Physical Injury:  Goal: Will remain free from falls  Description: Will remain free from falls  Outcome: Ongoing  Goal: Absence of physical injury  Description: Absence of physical injury  Outcome: Ongoing     Problem: Mood - Altered:  Goal: Mood stable  Description: Mood stable  Outcome: Ongoing  Goal: Absence of abusive behavior  Description: Absence of abusive behavior  Outcome: Ongoing  Goal: Verbalizations of feeling emotionally comfortable while being cared for will increase  Description: Verbalizations of feeling emotionally comfortable while being cared for will increase  Outcome: Ongoing     Problem: Psychomotor Activity - Altered:  Goal: Absence of psychomotor disturbance signs and symptoms  Description: Absence of psychomotor disturbance

## 2021-08-28 NOTE — DISCHARGE SUMMARY
Sarah Richardson  :  1933  MRN:  [id number]    Admit date:  2021  Discharge date:      Admitting Physician:  Anshu Goodwin DO    Advance Directive: Full Code    Consults: none    Primary Care Physician:  NONI Sanchez    Discharge Diagnoses:  Principal Problem (Resolved):    Coagulopathy [facility])  Active Problems:    Chronic systolic CHF (congestive heart failure), NYHA class 3 ([facility] [address]    Chronic kidney disease, stage III (moderate) (HCC)    Atrial fibrillation ([facility] [address]    Weight loss    COVID-19    Hypernatremia    Deafness    Palliative care patient  Resolved Problems:    Mucosal bleeding      Significant Diagnostic Studies:   XR CHEST PORTABLE    Result Date: 2021  XR CHEST PORTABLE 2021 12:42 PM HISTORY: Cough, covid positive COMPARISON: 3/13/2020 CXR: A single frontal view of the chest is performed. Findings: Prominent dextroscoliotic curvature of the thoracolumbar spine with chronic deformities, perhaps old injury, of the right posterior sixth through eighth ribs. Lungs are hyperinflated. No focal consolidation or edema seen at this time. Cardiomegaly. No pleural effusion or pneumothorax. Thoracic aortic calcification. Arthritic changes of the shoulders. 1. Hyperinflated lungs with cardiomegaly. No acute pulmonary process seen at this time. Signed by Dr Lucho Singleton      Pertinent Labs:   CBC:   Recent Labs     21  1337 21  0538 21  0430   WBC 7.2 7.7 9.9   HGB 12.3 12.2 12.8    168 169     BMP:    Recent Labs     21  1337 21  0538 21  0430   * 149* 150*   K 4.6 4.5 4.6    109 110   CO2    BUN 57* 59* 53*   CREATININE 1.3* 1.2* 1.1*   GLUCOSE 100 88 112*     INR:   Recent Labs     21  0228 21  0538 21  0430   INR 6.59* 5.56* 2.53*     ABGs:No results for input(s): PH, PO2, PCO2, HCO3, BE, O2SAT in the last 72 hours.   Lactic Acid:  Recent Labs     21  1337   LACTA 1.4       Procedures: None alendronate (FOSAMAX) 70 MG tablet  Take 1 tablet by mouth every 7 days             allopurinol (ZYLOPRIM) 100 MG tablet  Take 1 tablet by mouth daily             aspirin 81 MG chewable tablet  Take 1 tablet by mouth daily             atorvastatin (LIPITOR) 10 MG tablet  Take 1 tablet by mouth daily             bimatoprost 0.01 % SOLN  Place 1 drop into both eyes nightly              Dexamethasone 1.5 MG (21) TBPK  Take as directed             diclofenac sodium 1 % GEL  Apply 4 g topically 4 times daily             digoxin (LANOXIN) 125 MCG tablet  Take 1 tablet by mouth every other day Indications: M, W, F             donepezil (ARICEPT) 5 MG tablet  TAKE ONE TABLET BY MOUTH EVERY NIGHT AT BEDTIME             escitalopram (LEXAPRO) 10 MG tablet  TAKE ONE TABLET BY MOUTH EVERY DAY             famotidine (PEPCID) 20 MG tablet  Take 0.5 tablets by mouth every morning             fluticasone (FLONASE) 50 MCG/ACT nasal spray  2 sprays by Each Nostril route daily             furosemide (LASIX) 20 MG tablet  TAKE 1 TABLET BY MOUTH DAILY             guaiFENesin 400 MG tablet  Take 1,200 mg by mouth 2 times daily              isosorbide mononitrate (IMDUR) 30 MG extended release tablet  Take 1 tablet by mouth daily             metoprolol tartrate (LOPRESSOR) 25 MG tablet  Take 0.5 tablets by mouth nightly             mirtazapine (REMERON) 15 MG tablet  Take 1 tablet by mouth nightly             montelukast (SINGULAIR) 10 MG tablet  TAKE 1 TABLET BY MOUTH DAILY AT NIGHT             Multiple Vitamins-Minerals (THERAPEUTIC MULTIVITAMIN-MINERALS) tablet  Take 1 tablet by mouth daily             mupirocin (BACTROBAN) 2 % ointment  APPLY TO AFFECTED AREA(S) THREE TIMES A DAY             nitroGLYCERIN (NITROSTAT) 0.4 MG SL tablet  Place 1 tablet under the tongue every 5 minutes as needed for Chest pain             OXYGEN  Inhale 2 L into the lungs continuous             SPIRIVA HANDIHALER 18 MCG inhalation capsule  Inhale 1 capsule into the lungs daily             tiotropium (SPIRIVA RESPIMAT) 2.5 MCG/ACT AERS inhaler  Inhale 2 puffs into the lungs daily             triamcinolone (KENALOG) 0.1 % ointment  Apply to rash twice daily for 7 days, avoid face, axilla, groin             warfarin (COUMADIN) 5 MG tablet  Take 1 tablet by mouth daily               See instructions on bottle for actual warfarin dose (2.5 mg every day except Wednesday, 5 mg Wednesday). Discharge Instructions: Follow up with NONI Cantor in 3 days. Take medications as directed. Resume activity as tolerated. Diet: ADULT DIET; Full Liquid; Low Sodium (2 gm)  Adult Oral Nutrition Supplement; Standard High Calorie/High Protein Oral Supplement     Disposition: Patient is medically stable and will be discharged Home. Time spent on discharge 20 minutes.     Signed:  Howie Shows, DO

## 2021-08-29 LAB
EKG P AXIS: NORMAL DEGREES
EKG P-R INTERVAL: NORMAL MS
EKG Q-T INTERVAL: 362 MS
EKG QRS DURATION: 126 MS
EKG QTC CALCULATION (BAZETT): 412 MS
EKG T AXIS: 94 DEGREES

## 2021-08-29 PROCEDURE — 93010 ELECTROCARDIOGRAM REPORT: CPT | Performed by: INTERNAL MEDICINE

## 2021-08-30 ENCOUNTER — TELEPHONE (OUTPATIENT)
Dept: PRIMARY CARE CLINIC | Age: 86
End: 2021-08-30

## 2021-08-30 ENCOUNTER — CARE COORDINATION (OUTPATIENT)
Dept: CASE MANAGEMENT | Age: 86
End: 2021-08-30

## 2021-08-30 ENCOUNTER — TELEPHONE (OUTPATIENT)
Dept: CASE MANAGEMENT | Age: 86
End: 2021-08-30

## 2021-08-30 NOTE — TELEPHONE ENCOUNTER
Pt just got out of hospital Saturday. Daughter Khadijah Madrid says she's not swallowing, can't eat. What she does eat comes back up, pt is down to 99lbs. Black stools. Not doing well at all per daughter. She needs to restart home health, needs advice. Next available appt is Friday, daughter thinks she needs to talk to someone before then.

## 2021-08-30 NOTE — TELEPHONE ENCOUNTER
Manju Cole for homecare at [facility] has been notified okay to resume home health care orders and that NONI Russ will follow.   Thank you,   Electronically signed by Gabriella Granados RN on 8/30/2021 at 12:16 PM

## 2021-08-30 NOTE — CARE COORDINATION
Kay[facility] Transitions Initial Follow Up Call    Call within 2 business days of discharge: Yes    Patient: Roxie Chambers Patient : 1933   MRN: [id number]  Reason for Admission:   Discharge Date: 21 RARS: Readmission Risk Score: 23      Last Discharge [facility]       Complaint Diagnosis Description Type Department Provider    21 Hemoptysis; Positive For Covid-19 Supratherapeutic INR . Crystal Jennie . ED to Hosp-Admission (Discharged) (ADMITTED) [facility] ONC Edson Blanc DO; Harinder Hopper MD           Spoke with: N/A    Facility: [place] [id number]    Non-face-to-face services provided:  Reviewed encounter information for continuity of care prior to follow up phone call - chart notes, consults, progress notes, test results, med list, appointments, AVS, other information. Care Transitions 24 Hour Call    Care Transitions Interventions         Follow Up ; Attempted to make contact with Chris Bacon for an initial follow up call post discharge from the hospital without success. Unable to leave a message regarding intent of call and call back information. Will try again my next business day.      Future Appointments   Date Time Provider Ivy Cuenca   2022  1:30 PM Adore Mohr MD [facility] Cardio [place]   2022  1:00 PM NONI Ross [address] PC CHRISTIANO Blue RN

## 2021-08-30 NOTE — TELEPHONE ENCOUNTER
This patient discharged home yesterday from [facility] without resumption of care orders for home health. Patient had SN/PT/OT/ST prior to hospitalization. Will NONI Earl approve ROBIN orders for home health to resume care?     Thank you,   Electronically signed by Gilbert Capone RN on 8/30/2021 at 9:36 AM

## 2021-08-31 ENCOUNTER — APPOINTMENT (OUTPATIENT)
Dept: CT IMAGING | Age: 86
DRG: 308 | End: 2021-08-31
Payer: MEDICARE

## 2021-08-31 ENCOUNTER — TELEPHONE (OUTPATIENT)
Dept: INTERNAL MEDICINE CLINIC | Age: 86
End: 2021-08-31

## 2021-08-31 ENCOUNTER — APPOINTMENT (OUTPATIENT)
Dept: GENERAL RADIOLOGY | Age: 86
DRG: 308 | End: 2021-08-31
Payer: MEDICARE

## 2021-08-31 ENCOUNTER — CARE COORDINATION (OUTPATIENT)
Dept: CASE MANAGEMENT | Age: 86
End: 2021-08-31

## 2021-08-31 ENCOUNTER — HOSPITAL ENCOUNTER (INPATIENT)
Age: 86
LOS: 2 days | Discharge: HOSPICE | DRG: 308 | End: 2021-09-02
Attending: EMERGENCY MEDICINE
Payer: MEDICARE

## 2021-08-31 DIAGNOSIS — E87.0 HYPERNATREMIA: ICD-10-CM

## 2021-08-31 DIAGNOSIS — E87.5 HYPERKALEMIA: ICD-10-CM

## 2021-08-31 DIAGNOSIS — R79.1 SUBTHERAPEUTIC INTERNATIONAL NORMALIZED RATIO (INR): Primary | ICD-10-CM

## 2021-08-31 DIAGNOSIS — U07.1 COVID-19: ICD-10-CM

## 2021-08-31 DIAGNOSIS — N17.9 AKI (ACUTE KIDNEY INJURY) (HCC): ICD-10-CM

## 2021-08-31 PROBLEM — I48.91 ATRIAL FIBRILLATION WITH RVR (HCC): Status: ACTIVE | Noted: 2021-08-31

## 2021-08-31 LAB
ADENOVIRUS BY PCR: NOT DETECTED
ALBUMIN SERPL-MCNC: 3.6 G/DL (ref 3.5–5.2)
ALP BLD-CCNC: 42 U/L (ref 35–104)
ALT SERPL-CCNC: 8 U/L (ref 5–33)
ANION GAP SERPL CALCULATED.3IONS-SCNC: 15 MMOL/L (ref 7–19)
AST SERPL-CCNC: 34 U/L (ref 5–32)
BACTERIA: ABNORMAL /HPF
BILIRUB SERPL-MCNC: 0.7 MG/DL (ref 0.2–1.2)
BILIRUBIN URINE: NEGATIVE
BLOOD, URINE: NEGATIVE
BORDETELLA PARAPERTUSSIS BY PCR: NOT DETECTED
BORDETELLA PERTUSSIS BY PCR: NOT DETECTED
BUN BLDV-MCNC: 75 MG/DL (ref 8–23)
CALCIUM SERPL-MCNC: 10 MG/DL (ref 8.8–10.2)
CHLAMYDOPHILIA PNEUMONIAE BY PCR: NOT DETECTED
CHLORIDE BLD-SCNC: 115 MMOL/L (ref 98–111)
CLARITY: ABNORMAL
CO2: 24 MMOL/L (ref 22–29)
COLOR: YELLOW
CORONAVIRUS 229E BY PCR: NOT DETECTED
CORONAVIRUS HKU1 BY PCR: NOT DETECTED
CORONAVIRUS NL63 BY PCR: NOT DETECTED
CORONAVIRUS OC43 BY PCR: NOT DETECTED
CREAT SERPL-MCNC: 1.6 MG/DL (ref 0.5–0.9)
DIGOXIN LEVEL: 0.6 NG/ML (ref 0.6–1.2)
EPITHELIAL CELLS, UA: ABNORMAL /HPF
GFR AFRICAN AMERICAN: 37
GFR NON-AFRICAN AMERICAN: 30
GLUCOSE BLD-MCNC: 105 MG/DL (ref 74–109)
GLUCOSE URINE: NEGATIVE MG/DL
HCT VFR BLD CALC: 41.4 % (ref 37–47)
HEMOGLOBIN: 12.4 G/DL (ref 12–16)
HUMAN METAPNEUMOVIRUS BY PCR: NOT DETECTED
HUMAN RHINOVIRUS/ENTEROVIRUS BY PCR: NOT DETECTED
INFLUENZA A BY PCR: NOT DETECTED
INFLUENZA B BY PCR: NOT DETECTED
INR BLD: 1.35 (ref 0.88–1.18)
KETONES, URINE: ABNORMAL MG/DL
LEUKOCYTE ESTERASE, URINE: ABNORMAL
MCH RBC QN AUTO: 30.9 PG (ref 27–31)
MCHC RBC AUTO-ENTMCNC: 30 G/DL (ref 33–37)
MCV RBC AUTO: 103.2 FL (ref 81–99)
MYCOPLASMA PNEUMONIAE BY PCR: NOT DETECTED
NITRITE, URINE: NEGATIVE
PARAINFLUENZA VIRUS 1 BY PCR: NOT DETECTED
PARAINFLUENZA VIRUS 2 BY PCR: NOT DETECTED
PARAINFLUENZA VIRUS 3 BY PCR: NOT DETECTED
PARAINFLUENZA VIRUS 4 BY PCR: NOT DETECTED
PDW BLD-RTO: 13.3 % (ref 11.5–14.5)
PH UA: 5 (ref 5–8)
PLATELET # BLD: 221 K/UL (ref 130–400)
PMV BLD AUTO: 12.1 FL (ref 9.4–12.3)
POTASSIUM REFLEX MAGNESIUM: 5.3 MMOL/L (ref 3.5–5)
PRO-BNP: 1170 PG/ML (ref 0–1800)
PROTEIN UA: ABNORMAL MG/DL
PROTHROMBIN TIME: 16.8 SEC (ref 12–14.6)
RBC # BLD: 4.01 M/UL (ref 4.2–5.4)
RBC UA: ABNORMAL /HPF (ref 0–2)
RESPIRATORY SYNCYTIAL VIRUS BY PCR: NOT DETECTED
SARS-COV-2, NAAT: DETECTED
SARS-COV-2, PCR: DETECTED
SODIUM BLD-SCNC: 154 MMOL/L (ref 136–145)
SPECIFIC GRAVITY UA: 1.02 (ref 1–1.03)
TOTAL PROTEIN: 7.9 G/DL (ref 6.6–8.7)
TROPONIN: <0.01 NG/ML (ref 0–0.03)
UROBILINOGEN, URINE: 0.2 E.U./DL
WBC # BLD: 9.8 K/UL (ref 4.8–10.8)
WBC UA: ABNORMAL /HPF (ref 0–5)

## 2021-08-31 PROCEDURE — 6360000002 HC RX W HCPCS: Performed by: EMERGENCY MEDICINE

## 2021-08-31 PROCEDURE — 2500000003 HC RX 250 WO HCPCS: Performed by: EMERGENCY MEDICINE

## 2021-08-31 PROCEDURE — 85610 PROTHROMBIN TIME: CPT

## 2021-08-31 PROCEDURE — 81001 URINALYSIS AUTO W/SCOPE: CPT

## 2021-08-31 PROCEDURE — 0202U NFCT DS 22 TRGT SARS-COV-2: CPT

## 2021-08-31 PROCEDURE — 80162 ASSAY OF DIGOXIN TOTAL: CPT

## 2021-08-31 PROCEDURE — 80053 COMPREHEN METABOLIC PANEL: CPT

## 2021-08-31 PROCEDURE — 83880 ASSAY OF NATRIURETIC PEPTIDE: CPT

## 2021-08-31 PROCEDURE — 84484 ASSAY OF TROPONIN QUANT: CPT

## 2021-08-31 PROCEDURE — 85027 COMPLETE CBC AUTOMATED: CPT

## 2021-08-31 PROCEDURE — 87635 SARS-COV-2 COVID-19 AMP PRB: CPT

## 2021-08-31 PROCEDURE — 36415 COLL VENOUS BLD VENIPUNCTURE: CPT

## 2021-08-31 PROCEDURE — 2580000003 HC RX 258: Performed by: EMERGENCY MEDICINE

## 2021-08-31 PROCEDURE — 71045 X-RAY EXAM CHEST 1 VIEW: CPT

## 2021-08-31 PROCEDURE — 70450 CT HEAD/BRAIN W/O DYE: CPT

## 2021-08-31 PROCEDURE — 99285 EMERGENCY DEPT VISIT HI MDM: CPT

## 2021-08-31 PROCEDURE — 1210000000 HC MED SURG R&B

## 2021-08-31 RX ORDER — 0.9 % SODIUM CHLORIDE 0.9 %
500 INTRAVENOUS SOLUTION INTRAVENOUS ONCE
Status: COMPLETED | OUTPATIENT
Start: 2021-08-31 | End: 2021-08-31

## 2021-08-31 RX ADMIN — DILTIAZEM HYDROCHLORIDE 5 MG/HR: 5 INJECTION INTRAVENOUS at 15:04

## 2021-08-31 RX ADMIN — ENOXAPARIN SODIUM 40 MG: 60 INJECTION SUBCUTANEOUS at 17:23

## 2021-08-31 RX ADMIN — SODIUM CHLORIDE 500 ML: 9 INJECTION, SOLUTION INTRAVENOUS at 15:03

## 2021-08-31 ASSESSMENT — ENCOUNTER SYMPTOMS
DIARRHEA: 1
VOMITING: 0
NAUSEA: 1
WHEEZING: 0
COUGH: 0

## 2021-08-31 NOTE — CARE COORDINATION
[facility] Transitions Initial Follow Up Call    Call within 2 business days of discharge: Yes    Patient: Lynder Leventhal Patient : 1933   MRN: [id number]  Reason for Admission:   Discharge Date: 21 RARS: Readmission Risk Score: 23      Last Discharge [facility]       Complaint Diagnosis Description Type Department Provider    21 Hemoptysis; Positive For Covid-19 Supratherapeutic INR . Gilmer Rich . ED to Hosp-Admission (Discharged) (ADMITTED) [facility] ONC Christ Mauro DO; Alek Juárez MD           Spoke with: Lynder Leventhal daughter Willma Courser: Adi Greer    Non-face-to-face services provided:  Reviewed encounter information for continuity of care prior to follow up phone call - chart notes, consults, progress notes, test results, med list, appointments, AVS, other information. Care Transitions 24 Hour Call    Do you have any ongoing symptoms?: Yes  Patient-reported symptoms: Nausea, Other (Comment: not eating, not able to swallow whole pills)  Interventions for patient-reported symptoms: Notified PCP/Physician  Do you have a copy of your discharge instructions?: Yes  Do you have all of your prescriptions and are they filled?: Yes  Have you been contacted by a [address]?: No  Were you discharged with any Home Care or Post Acute Services: Yes  Post Acute Services: [address] [place] [address] you feel like you have everything you need to keep you well at home?: Yes  Care Transitions Interventions         Follow Up : Spoke briefly today for follow up call after discharge from [address] [place] [address], second attempt. She reports patient is no doing well. DELMA sees note in computer regarding this, however she has not heard back from office. Home Health is coming today for ROBIN. She says patient is weak, not eating much, can't keep down what she is eating. She reports giving her Pepto-bismol to help and crushing meds in applesauce to get them down. She is very worried about her.  She told DELMA she was getting a call, and our call was ended. CTN did place call to PCP office and spoke with Malcolm on nurse line with reported symptoms and that patient needed a HFU. Malcolm states she will forward the message. CTN will follow up at a later time with patient.    Future Appointments   Date Time Provider Ivy Cuenca   1/4/2022  1:30 PM MD ANGEL Barr [facility] Cardio [place]   8/16/2022  1:00 PM NONI Lemus [address] [place]       Kena Rendon, RN

## 2021-08-31 NOTE — ED PROVIDER NOTES
[facility]  eMERGENCY dEPARTMENT eNCOUnter      Pt Name: Su Kaur  MRN: [id number]  [place] 7/2/1933  Date of evaluation: 8/31/2021  Provider: Naina Strong MD    [address] [place] [address]       Chief Complaint   Patient presents with    Tachycardia     Afib RVR (130-160 rate)    Altered Mental Status     Oriented to person, place only         HISTORY OF PRESENT ILLNESS   (Location/Symptom, Timing/Onset,Context/Setting, Quality, Duration, Modifying Factors, Severity)  Note limiting factors. Su Kaur is a 80 y.o. female who presents to the emergency department due to report of altered mental status and A. fib. EMS reports that they were called to patient's home because of altered mental status and patient found to be in A. fib with RVR. Patient can tell me her name and knows she is in the hospital.  She has no complaints otherwise but cannot get much history at all from the patient. We are attempting to contact family for further information. HPI    NursingNotes were reviewed. REVIEW OF SYSTEMS    (2-9 systems for level 4, 10 or more for level 5)     Review of Systems   Unable to perform ROS: Mental status change       A complete review of systems was performed and is negative except as noted above in the HPI.        PAST MEDICAL HISTORY     Past Medical History:   Diagnosis Date    Acute systolic CHF (congestive heart failure), NYHA class 3 (HCC) 02/24/2017    Allergic rhinitis     Anticoagulant long-term use     coumadin for atrial fib    Anxiety 11/11/2019    Atrial fibrillation (HCC)     Chronic atrial fibrillation (HCC) 09/28/2017    Chronic back pain     Chronic kidney disease     Chronic kidney disease, stage III (moderate) (HCC) 02/17/2017    Chronic systolic CHF (congestive heart failure), NYHA class 3 ([address] 02/17/2017    Current moderate episode of major depressive disorder without prior episode ([address] 11/14/2019    GERD (gastroesophageal reflux disease)     Glaucoma     Hearing loss     Hypertension     Mixed hyperlipidemia 07/02/2020    Osteoarthritis     Osteoporosis     Palliative care patient 08/27/2021    Panlobular emphysema ([address] 11/28/2018    Shingles     Sinus problem     Wears glasses          SURGICAL HISTORY       Past Surgical History:   Procedure Laterality Date    BREAST BIOPSY      Left-benign    BREAST BIOPSY  feb 2014    Left    HYSTERECTOMY      OVARY SURGERY      tumor removed     SKIN CANCER EXCISION  01/2020         CURRENT MEDICATIONS       Previous Medications    ACETAMINOPHEN-CODEINE (TYLENOL WITH CODEINE #3 PO)    Take 1 tablet by mouth every 4 hours as needed (knee pain)    ALENDRONATE (FOSAMAX) 70 MG TABLET    Take 1 tablet by mouth every 7 days    ALLOPURINOL (ZYLOPRIM) 100 MG TABLET    Take 1 tablet by mouth daily    ASPIRIN 81 MG CHEWABLE TABLET    Take 1 tablet by mouth daily    ATORVASTATIN (LIPITOR) 10 MG TABLET    Take 1 tablet by mouth daily    BIMATOPROST 0.01 % SOLN    Place 1 drop into both eyes nightly     DEXAMETHASONE 1.5 MG (21) TBPK    Take as directed    DICLOFENAC SODIUM 1 % GEL    Apply 4 g topically 4 times daily    DIGOXIN (LANOXIN) 125 MCG TABLET    Take 1 tablet by mouth every other day Indications: M, W, F    DONEPEZIL (ARICEPT) 5 MG TABLET    TAKE ONE TABLET BY MOUTH EVERY NIGHT AT BEDTIME    ESCITALOPRAM (LEXAPRO) 10 MG TABLET    TAKE ONE TABLET BY MOUTH EVERY DAY    FAMOTIDINE (PEPCID) 20 MG TABLET    Take 0.5 tablets by mouth every morning    FLUTICASONE (FLONASE) 50 MCG/ACT NASAL SPRAY    2 sprays by Each Nostril route daily    FUROSEMIDE (LASIX) 20 MG TABLET    TAKE 1 TABLET BY MOUTH DAILY    GUAIFENESIN 400 MG TABLET    Take 1,200 mg by mouth 2 times daily     ISOSORBIDE MONONITRATE (IMDUR) 30 MG EXTENDED RELEASE TABLET    Take 1 tablet by mouth daily    METOPROLOL TARTRATE (LOPRESSOR) 25 MG TABLET    Take 0.5 tablets by mouth nightly    MIRTAZAPINE (REMERON) 15 MG TABLET    Take 1 tablet by mouth nightly    MONTELUKAST (SINGULAIR) 10 MG TABLET    TAKE 1 TABLET BY MOUTH DAILY AT NIGHT    MULTIPLE VITAMINS-MINERALS (THERAPEUTIC MULTIVITAMIN-MINERALS) TABLET    Take 1 tablet by mouth daily    MUPIROCIN (BACTROBAN) 2 % OINTMENT    APPLY TO AFFECTED AREA(S) THREE TIMES A DAY    NITROGLYCERIN (NITROSTAT) 0.4 MG SL TABLET    Place 1 tablet under the tongue every 5 minutes as needed for Chest pain    OXYGEN    Inhale 2 L into the lungs continuous    SPIRIVA HANDIHALER 18 MCG INHALATION CAPSULE    Inhale 1 capsule into the lungs daily    TIOTROPIUM (SPIRIVA RESPIMAT) 2.5 MCG/ACT AERS INHALER    Inhale 2 puffs into the lungs daily    TRIAMCINOLONE (KENALOG) 0.1 % OINTMENT    Apply to rash twice daily for 7 days, avoid face, axilla, groin    WARFARIN (COUMADIN) 5 MG TABLET    Take 1 tablet by mouth daily       ALLERGIES     Denosumab and Lisinopril    FAMILY HISTORY       Family History   Problem Relation Age of Onset    Asthma Mother     Emphysema Mother     Alzheimer's Disease Sister     Heart Disease Brother     Heart Disease Sister     No Known Problems Sister     Alzheimer's Disease Sister           SOCIAL HISTORY       Social History     Socioeconomic History    Marital status:       Spouse name: None    Number of children: None    Years of education: None    Highest education level: None   Occupational History    None   Tobacco Use    Smoking status: Never Smoker    Smokeless tobacco: Never Used   Vaping Use    Vaping Use: Never used   Substance and Sexual Activity    Alcohol use: No    Drug use: No    Sexual activity: Never   Other Topics Concern    None   Social History Narrative    None     Social Determinants of Health     Financial Resource Strain: Low Risk     Difficulty of Paying Living Expenses: Not hard at all   Food Insecurity: No Food Insecurity    Worried About Running Out of Food in the Last Year: Never true    Benson of Food in the Last Year: Never true Transportation Needs: No Transportation Needs    Lack of Transportation (Medical): No    Lack of Transportation (Non-Medical): No   Physical Activity:     Days of Exercise per Week:     Minutes of Exercise per Session:    Stress:     Feeling of Stress :    Social Connections:     Frequency of Communication with Friends and Family:     Frequency of Social Gatherings with Friends and Family:     Attends Yarsani Services:     Active Member of Clubs or Organizations:     Attends Club or Organization Meetings:     Marital Status:    Intimate Partner Violence:     Fear of Current or Ex-Partner:     Emotionally Abused:     Physically Abused:     Sexually Abused:        SCREENINGS             PHYSICAL EXAM    (up to 7 for level 4, 8 or more for level 5)     ED Triage Vitals   BP Temp Temp src Pulse Resp SpO2 Height Weight   -- -- -- -- -- -- -- --       Physical Exam  Vitals reviewed. Constitutional:       General: She is not in acute distress. Appearance: She is well-developed. She is not toxic-appearing. HENT:      Head: Normocephalic and atraumatic. Mouth/Throat:      Mouth: Mucous membranes are dry. Eyes:      General: No scleral icterus. Pupils: Pupils are equal, round, and reactive to light. Neck:      Vascular: No JVD. Cardiovascular:      Rate and Rhythm: Tachycardia present. Rhythm irregular. Heart sounds: Normal heart sounds. Pulmonary:      Effort: Pulmonary effort is normal. No respiratory distress. Breath sounds: Normal breath sounds. Abdominal:      General: There is no distension. Palpations: Abdomen is soft. Tenderness: There is no abdominal tenderness. There is no guarding or rebound. Musculoskeletal:         General: No swelling, tenderness or deformity. Cervical back: Normal range of motion and neck supple. Skin:     General: Skin is warm and dry. Capillary Refill: Capillary refill takes less than 2 seconds.    Neurological: General: No focal deficit present. Mental Status: She is alert. She is confused. Cranial Nerves: No facial asymmetry. Sensory: Sensation is intact. Motor: Motor function is intact. Comments: Limited exam but moving all 4 extremities equally without any obvious focal deficit   Psychiatric:         Behavior: Behavior normal.         DIAGNOSTIC RESULTS     EKG: All EKG's are interpreted by the Emergency Department Physician who either signs or Co-signs this chart in the absence of a cardiologist.    Atrial fibrillation with heart rate of 117. Abdominis block. Possible incomplete left bundle. Normal QT. Overall, pretty similar appearance to previous EKG from 8/26/2021    RADIOLOGY:   Non-plain film images such as CT, Ultrasound and MRI are read by the radiologist. RacerTimes images are visualized and preliminarily interpreted by the emergency physician with the below findings:    Interpretation per the Radiologist below, if available at the time of this note:    CT Head WO Contrast   Final Result   1. Right maxillary and sphenoid sinusitis changes. 2. Atrophy and small vessel disease. 3. No mass, infarct, or intracranial hemorrhage. Signed by Dr Ana Villegas      XR CHEST PORTABLE   Final Result   1. Chronic lung changes.    Signed by Dr Andrés Reyes:  Labs Reviewed   COVID-19, RAPID - Abnormal; Notable for the following components:       Result Value    SARS-CoV-2, NAAT DETECTED (*)     All other components within normal limits    Narrative:     Betzy Jackson tel. ,  Microbiology results called to and read back by Don Quesada RN, ER,  08/31/2021 15:39, by [facility]   CBC - Abnormal; Notable for the following components:    RBC 4.01 (*)     .2 (*)     MCHC 30.0 (*)     All other components within normal limits   COMPREHENSIVE METABOLIC PANEL W/ REFLEX TO MG FOR LOW K - Abnormal; Notable for the following components:    Sodium 154 (*) Potassium reflex Magnesium 5.3 (*)     Chloride 115 (*)     BUN 75 (*)     CREATININE 1.6 (*)     GFR Non- 30 (*)     GFR  37 (*)     AST 34 (*)     All other components within normal limits   URINE RT REFLEX TO CULTURE - Abnormal; Notable for the following components:    Clarity, UA CLOUDY (*)     Ketones, Urine TRACE (*)     Protein, UA TRACE (*)     Leukocyte Esterase, Urine TRACE (*)     All other components within normal limits   PROTIME-INR - Abnormal; Notable for the following components:    Protime 16.8 (*)     INR 1.35 (*)     All other components within normal limits   MICROSCOPIC URINALYSIS - Abnormal; Notable for the following components:    WBC, UA 3-5 (*)     Bacteria, UA None Seen (*)     All other components within normal limits   BRAIN NATRIURETIC PEPTIDE   TROPONIN   DIGOXIN LEVEL       All other labs were within normal range or not returned as of this dictation. EMERGENCY DEPARTMENT COURSE and DIFFERENTIALDIAGNOSIS/MDM:   Vitals:    Vitals:    08/31/21 1528 08/31/21 1546 08/31/21 1616 08/31/21 1631   BP:  (!) 148/83 137/73 138/88   Pulse: 108 99 96 85   Resp: 24 19 18 21   Temp:       TempSrc:       SpO2: 90%   100%   Weight:       Height:           MDM  I am told the patient was diagnosed with Covid on 8/16/2021. Retested on 8/26/2021 during her recent admission and was Covid negative but tested again today and positive. Patient's daughter is here now. Tells me that patient was recently mated to the hospital because her INR was high and she had some bleeding from her gums. This is all improved but since she has been home she has not been eating or drinking much at all and has been spitting out her medication. Patient denies any abdominal pain. Not complaining of any pain at all. Resting comfortably. Abdomen soft nontender. Has evidence of AKBAR. Increased heart rate is probably at least in part due to this.   Heart rate improving with fluids and Cardizem. Patient's case discussed with hospitalist, after [employer], is agreeable plan of care and admission. INR is subtherapeutic today so Lovenox ordered. Patient and family updated about plan. Patient resting comfortably at this time. CONSULTS:  IP CONSULT TO CARDIOLOGY  IP CONSULT TO NEPHROLOGY  IP CONSULT TO SOCIAL WORK    PROCEDURES:  Unless otherwise notedbelow, none     Procedures    FINAL IMPRESSION     1. Subtherapeutic international normalized ratio (INR)    2. AKBAR (acute kidney injury) ([facility]    3. COVID-19    4. Hypernatremia    5.  Hyperkalemia          DISPOSITION/PLAN   DISPOSITION Admitted 08/31/2021 02:54:44 PM      PATIENT REFERRED TO:  @FUP@    DISCHARGE MEDICATIONS:  New Prescriptions    No medications on file          (Please note that portions of this note were completed with a voice recognition program.  Efforts were made to edit the dictations butoccasionally words are mis-transcribed.)    Alejandro Bennett MD (electronically signed)  AttendingEmergency Physician         Alejandro Bennett MD  08/31/21 Lisa Gimenez MD  08/31/21 [id number]

## 2021-08-31 NOTE — PROGRESS NOTES
Infection Preventionist Note:    Contacted to review case for possible discontinuation of Droplet Plus precautions for COVID-19. Patient presented to the emergency room on 8/31/2021 with Tachycardia, Afib RVR and Altered Mental Status. Noted to be COVID positive per home health on 8/17/2021. She recieved monoclonal antibody infusion therapy on 8/20/2021 with a repeat COVID test negative on 8/26/2021. Today's rapid COVID-19 test is positive. CXR shows chronic lung changes. She is afebrile, without respiratory symptoms. Patient is now 15 days past inital positive COVID test with COVID symptoms resolving. Symptom Based Strategy for discontinuation of Droplet Plus Precautions for the mild to moderate ill COVID-19 patient have been met. Recommendation:    Discontinuation of Droplet Plus precautions. Encourage the patient to wear a face mask when in the health care setting and in presence of others.

## 2021-08-31 NOTE — H&P
[facility]      Hospitalist - History & Physical      PCP: Royetta Saint, APRN    Date of Admission: 8/31/2021    Date of Service: 8/31/2021    Chief Complaint:  AMS, tachycardia    History Of Present Illness: The patient is a 80 y.o. female with a recent diagnosis of COVID, CHF, atrial fibrillation with chronic coumadin therapy, CKD, dementia, and HTN who presented to [facility] ED complaining of AMS and tachycardia. The patient has dementia. Daughter at bedside provides information. She states the patient was ill with diarrhea a few weeks ago. She states she was seen and given antibiotics by her PCP. She was discharged 3 days ago after having a supra therapeutic INR. The daughter states the patient has had increased fatigue, nausea, decreased appetite, dysphagia, and increased oxygen need since discharge. Today the daughter states that home health visited and she had an increased heart rate. ED work-up revealed a. Fib with RVR, Covid positive, Na 154, K+ 5.3, Cr 1.6, Gfr 30. Patient was admitted to hospitalist service for atrial fibrillation with RVR.      Past Medical History:        Diagnosis Date    Acute systolic CHF (congestive heart failure), NYHA class 3 ([facility]) 02/24/2017    Allergic rhinitis     Anticoagulant long-term use     coumadin for atrial fib    Anxiety 11/11/2019    Atrial fibrillation ([facility])     Chronic atrial fibrillation ([facility]) 09/28/2017    Chronic back pain     Chronic kidney disease     Chronic kidney disease, stage III (moderate) ([facility]) 02/17/2017    Chronic systolic CHF (congestive heart failure), NYHA class 3 ([address] 02/17/2017    Current moderate episode of major depressive disorder without prior episode ([address] 11/14/2019    GERD (gastroesophageal reflux disease)     Glaucoma     Hearing loss     Hypertension     Mixed hyperlipidemia 07/02/2020    Osteoarthritis     Osteoporosis     Palliative care patient 08/27/2021    Panlobular emphysema ([address] 11/28/2018    Shingles     Sinus problem     Wears glasses        Past Surgical History:        Procedure Laterality Date    BREAST BIOPSY      Left-benign    BREAST BIOPSY  feb 2014    Left    HYSTERECTOMY      OVARY SURGERY      tumor removed     SKIN CANCER EXCISION  01/2020       Home Medications:  Prior to Admission medications    Medication Sig Start Date End Date Taking? Authorizing Provider   donepezil (ARICEPT) 5 MG tablet TAKE ONE TABLET BY MOUTH EVERY NIGHT AT BEDTIME 8/26/21   NONI Cruz   atorvastatin (LIPITOR) 10 MG tablet Take 1 tablet by mouth daily 8/26/21   NONI Cruz   alendronate (FOSAMAX) 70 MG tablet Take 1 tablet by mouth every 7 days 8/18/21   NONI Cruz   mirtazapine (REMERON) 15 MG tablet Take 1 tablet by mouth nightly 8/18/21   NONI Cruz   Dexamethasone 1.5 MG (21) TBPK Take as directed 8/17/21   NONI Cruz   triamcinolone (KENALOG) 0.1 % ointment Apply to rash twice daily for 7 days, avoid face, axilla, groin 7/14/21   NONI Fam CNP   metoprolol tartrate (LOPRESSOR) 25 MG tablet Take 0.5 tablets by mouth nightly 7/8/21   NONI Bowling   warfarin (COUMADIN) 5 MG tablet Take 1 tablet by mouth daily  Patient taking differently: Take 2.5 mg by mouth See Admin Instructions Take 5mg on Wednesdays and 2.5mg daily on all other days (SuMoTuThFrSa).  6/28/21   NONI Bowling   Acetaminophen-Codeine (TYLENOL WITH CODEINE #3 PO) Take 1 tablet by mouth every 4 hours as needed (knee pain)    Historical Provider, MD   guaiFENesin 400 MG tablet Take 1,200 mg by mouth 2 times daily     Historical Provider, MD   isosorbide mononitrate (IMDUR) 30 MG extended release tablet Take 1 tablet by mouth daily 5/11/21   NONI Gabriel   digoxin (LANOXIN) 125 MCG tablet Take 1 tablet by mouth every other day Indications: M, W, F 3/30/21   NONI Alexander CNP   montelukast (SINGULAIR) 10 MG tablet TAKE 1 TABLET BY MOUTH DAILY AT NIGHT 3/24/21   NONI Elizabeth   escitalopram (LEXAPRO) 10 MG tablet TAKE ONE TABLET BY MOUTH EVERY DAY 3/24/21   NONI Elizabeth   SPIRIVA HANDIHALER 18 MCG inhalation capsule Inhale 1 capsule into the lungs daily 2/1/21   NONI Elizabeth   famotidine (PEPCID) 20 MG tablet Take 0.5 tablets by mouth every morning 1/19/21   NONI Elizabeth   furosemide (LASIX) 20 MG tablet TAKE 1 TABLET BY MOUTH DAILY  Patient taking differently: Take 20 mg by mouth daily as needed (Pt is weighed daily and she is given if weight is climbing)  11/3/20 8/11/21  NONI Elizabeth   fluticasone (FLONASE) 50 MCG/ACT nasal spray 2 sprays by Each Nostril route daily  Patient taking differently: 2 sprays by Each Nostril route nightly  9/23/20   NONI Herbert - NP   mupirocin (BACTROBAN) 2 % ointment APPLY TO AFFECTED AREA(S) THREE TIMES A DAY 9/11/20   NONI Elizabeth   Multiple Vitamins-Minerals (THERAPEUTIC MULTIVITAMIN-MINERALS) tablet Take 1 tablet by mouth daily    Historical Provider, MD   diclofenac sodium 1 % GEL Apply 4 g topically 4 times daily  Patient taking differently: Apply 4 g topically 4 times daily as needed for Pain  1/21/20 8/11/21  NONI Elizabeth   aspirin 81 MG chewable tablet Take 1 tablet by mouth daily  Patient taking differently: Take 81 mg by mouth every morning  12/10/19   Jacob Forrester DO   OXYGEN Inhale 2 L into the lungs continuous    Historical Provider, MD   tiotropium (SPIRIVA RESPIMAT) 2.5 MCG/ACT AERS inhaler Inhale 2 puffs into the lungs daily 4/30/19 8/11/21  NONI Elizabeth   nitroGLYCERIN (NITROSTAT) 0.4 MG SL tablet Place 1 tablet under the tongue every 5 minutes as needed for Chest pain 12/11/18   Willy Libman, APRN   allopurinol (ZYLOPRIM) 100 MG tablet Take 1 tablet by mouth daily 12/6/18 8/11/21  NONI Elizabeth   bimatoprost 0.01 % SOLN Place 1 drop into both eyes nightly     Historical Provider, MD       Allergies:    Denosumab and Lisinopril    Social History:    The patient currently lives with her daughter  Tobacco:   reports that she has never smoked. She has never used smokeless tobacco.  Alcohol:   reports no history of alcohol use. Illicit Drugs: Never    Family History:      Problem Relation Age of Onset   Zannie Cowden Asthma Mother     Emphysema Mother     Alzheimer's Disease Sister     Heart Disease Brother     Heart Disease Sister     No Known Problems Sister     Alzheimer's Disease Sister        Review of Systems:   Review of Systems   Reason unable to perform ROS: per daughter. Constitutional: Positive for activity change, appetite change and fatigue. Negative for chills and fever. Respiratory: Negative for cough and wheezing. Cardiovascular: Positive for palpitations. Negative for chest pain. Gastrointestinal: Positive for diarrhea and nausea. Negative for vomiting. Genitourinary: Negative for difficulty urinating, dysuria and hematuria. Neurological: Positive for weakness (generalized). Psychiatric/Behavioral:        Baseline          14 point review of systems is negative except as specifically addressed above. Physical Examination:  /88   Pulse 85   Temp 98.8 °F (37.1 °C) (Oral)   Resp 21   Ht 5' 3\" (1.6 m)   Wt 99 lb (44.9 kg)   SpO2 100%   BMI 17.54 kg/m²   Physical Exam  Vitals reviewed. Constitutional:       Appearance: She is ill-appearing. HENT:      Head: Normocephalic. Cardiovascular:      Rate and Rhythm: Tachycardia present. Rhythm irregular. Pulses: Normal pulses. Heart sounds: Murmur heard. Pulmonary:      Effort: Pulmonary effort is normal.      Breath sounds: Normal breath sounds. Abdominal:      General: Abdomen is flat. Bowel sounds are normal. There is no distension. Palpations: Abdomen is soft. Tenderness: There is no abdominal tenderness. There is no guarding. Musculoskeletal:         General: Normal range of motion. Cervical back: Normal range of motion and neck supple. Right lower leg: No edema. Left lower leg: No edema. Skin:     General: Skin is warm and dry. Capillary Refill: Capillary refill takes 2 to 3 seconds. Neurological:      Mental Status: She is alert. Mental status is at baseline. She is disoriented. Diagnostic Data:  CBC:  Recent Labs     08/31/21  1357   WBC 9.8   HGB 12.4   HCT 41.4        BMP:  Recent Labs     08/31/21  1357   *   K 5.3*   *   CO2 24   BUN 75*   CREATININE 1.6*   CALCIUM 10.0     Recent Labs     08/31/21  1357   AST 34*   ALT 8   BILITOT 0.7   ALKPHOS 42     Coag Panel:   Recent Labs     08/31/21  1357   INR 1.35*   PROTIME 16.8*     Cardiac Enzymes:   Recent Labs     08/31/21  1357   TROPONINI <0.01     ABGs:No results found for: PHART, PO2ART, KWD5DFH  Urinalysis:  Lab Results   Component Value Date    NITRU Negative 08/31/2021    WBCUA 3-5 08/31/2021    BACTERIA None Seen 08/31/2021    RBCUA 0-1 08/31/2021    BLOODU Negative 08/31/2021    SPECGRAV 1.021 08/31/2021    GLUCOSEU Negative 08/31/2021     A1C: No results for input(s): LABA1C in the last 72 hours. ABG:No results for input(s): PHART, UIL7EXQ, PO2ART, NJX2GXD, BEART, HGBAE, U5XFSLPY, CARBOXHGBART in the last 72 hours. CT Head WO Contrast    Result Date: 8/31/2021  CT HEAD WO CONTRAST 8/31/2021 3:29 PM History: Altered mental status. In order to have a CT radiation dose as low as reasonably achievable Automated Exposure Control was utilized for adjustment of the mA and/or KV according to patient size. DLP in mGycm= 610. Noncontrast head CT compared with November 16, 2019. Age-related atrophy and small vessel disease. Normal ventricle size. No intracranial hemorrhage. No mass effect and no sign of acute infarct. Right maxillary sinus mucosal thickening and right sphenoid sinus fluid.     1. Right maxillary and sphenoid sinusitis changes. 2. Atrophy and small vessel disease. 3. No mass, infarct, or intracranial hemorrhage. Signed by Dr Lexus Hernandez    XR CHEST PORTABLE    Result Date: 8/31/2021  XR CHEST PORTABLE 8/31/2021 3:17 PM History: Altered mental status. One view chest x-ray compared with August 26, 2021. Chronic interstitial lung disease with mild hyperexpansion. Heart size is appropriate. Moderate degenerative joint change at the left shoulder. Old healed right rib fractures. 1. Chronic lung changes.  Signed by Dr Lexus Hernandez      Assessment/Plan:  Active Problems:    Atrial fibrillation with RVR ([facility]   -admit to CCU   -cardizem drip   -consult cardiology   -tele   -ekg with rhythm changes   -continue coumadin therapy    Dysphagia   -SLP   -NPO    Gastroesophageal reflux disease with esophagitis   -noted      Mixed hyperlipidemia   -noted   -continue medications when evaluated by speech      Hypernatremia   -IVF   -monitor BMP   -neuro checks   -nephrology consult      Covid   -PT/OT/SLP   -CCU          Signed:  NONI Chavez - CNP, 8/31/2021 4:46 PM

## 2021-08-31 NOTE — CARE COORDINATION
Patient is current with [address] [address] for Nursing, PT, OT Services. Will follow. Please advise when patient discharges. WILL NEED RESUMPTION OF CARE ORDERS TO RESUME HH SERVICES WHEN PATIENT DISCHARGES. Thank you. [address] [place] [address] [phone]. [phone].     Francy Wilson RN, Home Care Liaison  [phone] P  Electronically signed by Francy Wilson on 8/31/2021 at 4:03 PM

## 2021-08-31 NOTE — TELEPHONE ENCOUNTER
[address] [address] nurse requesting if covid precautions can be lifted for this pt now   Pt was diagnosed with COVID on 08/17/21. She recently discharged from [address] [place] [address] and had a negative COVID test there and she is out of the 10 day window. She is asymptomatic. Ok to release COVID precautions?

## 2021-08-31 NOTE — TELEPHONE ENCOUNTER
[facility] nurse reporting that when she arrived to see pt to [facility] she sent pt back to ER  - pt was very lethargic, not eating or drinking since coming home from hospital

## 2021-08-31 NOTE — ED TRIAGE NOTES
Brought in EMS, family called for lethargy. Patient found in Afib RVR. Alert to person place, not time. Hx of Afib, wears 2 LNC at home.

## 2021-09-01 ENCOUNTER — TELEPHONE (OUTPATIENT)
Dept: INTERNAL MEDICINE CLINIC | Age: 86
End: 2021-09-01

## 2021-09-01 LAB
ANION GAP SERPL CALCULATED.3IONS-SCNC: 12 MMOL/L (ref 7–19)
BASOPHILS ABSOLUTE: 0 K/UL (ref 0–0.2)
BASOPHILS RELATIVE PERCENT: 0.5 % (ref 0–1)
BUN BLDV-MCNC: 70 MG/DL (ref 8–23)
CALCIUM SERPL-MCNC: 9.6 MG/DL (ref 8.8–10.2)
CHLORIDE BLD-SCNC: 121 MMOL/L (ref 98–111)
CO2: 23 MMOL/L (ref 22–29)
CREAT SERPL-MCNC: 1.5 MG/DL (ref 0.5–0.9)
EOSINOPHILS ABSOLUTE: 0.1 K/UL (ref 0–0.6)
EOSINOPHILS RELATIVE PERCENT: 1.2 % (ref 0–5)
GFR AFRICAN AMERICAN: 40
GFR NON-AFRICAN AMERICAN: 33
GLUCOSE BLD-MCNC: 87 MG/DL (ref 74–109)
HCT VFR BLD CALC: 40.4 % (ref 37–47)
HEMOGLOBIN: 11.7 G/DL (ref 12–16)
IMMATURE GRANULOCYTES #: 0 K/UL
LYMPHOCYTES ABSOLUTE: 1.5 K/UL (ref 1.1–4.5)
LYMPHOCYTES RELATIVE PERCENT: 19.4 % (ref 20–40)
MCH RBC QN AUTO: 30.2 PG (ref 27–31)
MCHC RBC AUTO-ENTMCNC: 29 G/DL (ref 33–37)
MCV RBC AUTO: 104.4 FL (ref 81–99)
MONOCYTES ABSOLUTE: 0.7 K/UL (ref 0–0.9)
MONOCYTES RELATIVE PERCENT: 9.1 % (ref 0–10)
NEUTROPHILS ABSOLUTE: 5.2 K/UL (ref 1.5–7.5)
NEUTROPHILS RELATIVE PERCENT: 69.4 % (ref 50–65)
PDW BLD-RTO: 13.3 % (ref 11.5–14.5)
PLATELET # BLD: 194 K/UL (ref 130–400)
PMV BLD AUTO: 12.4 FL (ref 9.4–12.3)
POTASSIUM REFLEX MAGNESIUM: 4.5 MMOL/L (ref 3.5–5)
RBC # BLD: 3.87 M/UL (ref 4.2–5.4)
SODIUM BLD-SCNC: 156 MMOL/L (ref 136–145)
WBC # BLD: 7.5 K/UL (ref 4.8–10.8)

## 2021-09-01 PROCEDURE — 85025 COMPLETE CBC W/AUTO DIFF WBC: CPT

## 2021-09-01 PROCEDURE — 2580000003 HC RX 258: Performed by: NURSE PRACTITIONER

## 2021-09-01 PROCEDURE — 92523 SPEECH SOUND LANG COMPREHEN: CPT

## 2021-09-01 PROCEDURE — 2580000003 HC RX 258: Performed by: INTERNAL MEDICINE

## 2021-09-01 PROCEDURE — 92610 EVALUATE SWALLOWING FUNCTION: CPT

## 2021-09-01 PROCEDURE — 80048 BASIC METABOLIC PNL TOTAL CA: CPT

## 2021-09-01 PROCEDURE — 1210000000 HC MED SURG R&B

## 2021-09-01 PROCEDURE — 2500000003 HC RX 250 WO HCPCS: Performed by: NURSE PRACTITIONER

## 2021-09-01 PROCEDURE — 36415 COLL VENOUS BLD VENIPUNCTURE: CPT

## 2021-09-01 PROCEDURE — 97165 OT EVAL LOW COMPLEX 30 MIN: CPT

## 2021-09-01 PROCEDURE — 99223 1ST HOSP IP/OBS HIGH 75: CPT | Performed by: INTERNAL MEDICINE

## 2021-09-01 RX ORDER — ATORVASTATIN CALCIUM 10 MG/1
10 TABLET, FILM COATED ORAL DAILY
Status: DISCONTINUED | OUTPATIENT
Start: 2021-09-01 | End: 2021-09-02 | Stop reason: HOSPADM

## 2021-09-01 RX ORDER — ONDANSETRON 4 MG/1
4 TABLET, ORALLY DISINTEGRATING ORAL EVERY 8 HOURS PRN
Status: DISCONTINUED | OUTPATIENT
Start: 2021-09-01 | End: 2021-09-02 | Stop reason: HOSPADM

## 2021-09-01 RX ORDER — DEXTROSE MONOHYDRATE 50 MG/ML
INJECTION, SOLUTION INTRAVENOUS CONTINUOUS
Status: DISCONTINUED | OUTPATIENT
Start: 2021-09-01 | End: 2021-09-02 | Stop reason: HOSPADM

## 2021-09-01 RX ORDER — WARFARIN SODIUM 2.5 MG/1
2.5 TABLET ORAL
Status: DISCONTINUED | OUTPATIENT
Start: 2021-09-02 | End: 2021-09-02 | Stop reason: HOSPADM

## 2021-09-01 RX ORDER — POTASSIUM CHLORIDE 7.45 MG/ML
10 INJECTION INTRAVENOUS PRN
Status: DISCONTINUED | OUTPATIENT
Start: 2021-09-01 | End: 2021-09-02 | Stop reason: HOSPADM

## 2021-09-01 RX ORDER — SODIUM CHLORIDE 0.9 % (FLUSH) 0.9 %
10 SYRINGE (ML) INJECTION PRN
Status: DISCONTINUED | OUTPATIENT
Start: 2021-09-01 | End: 2021-09-02 | Stop reason: HOSPADM

## 2021-09-01 RX ORDER — ONDANSETRON 2 MG/ML
4 INJECTION INTRAMUSCULAR; INTRAVENOUS EVERY 6 HOURS PRN
Status: DISCONTINUED | OUTPATIENT
Start: 2021-09-01 | End: 2021-09-02 | Stop reason: HOSPADM

## 2021-09-01 RX ORDER — ACETAMINOPHEN 650 MG/1
650 SUPPOSITORY RECTAL EVERY 6 HOURS PRN
Status: DISCONTINUED | OUTPATIENT
Start: 2021-09-01 | End: 2021-09-02 | Stop reason: HOSPADM

## 2021-09-01 RX ORDER — SODIUM CHLORIDE 0.9 % (FLUSH) 0.9 %
10 SYRINGE (ML) INJECTION EVERY 12 HOURS SCHEDULED
Status: DISCONTINUED | OUTPATIENT
Start: 2021-09-01 | End: 2021-09-02 | Stop reason: HOSPADM

## 2021-09-01 RX ORDER — WARFARIN SODIUM 5 MG/1
5 TABLET ORAL
Status: DISCONTINUED | OUTPATIENT
Start: 2021-09-01 | End: 2021-09-02 | Stop reason: HOSPADM

## 2021-09-01 RX ORDER — ACETAMINOPHEN 325 MG/1
650 TABLET ORAL EVERY 6 HOURS PRN
Status: DISCONTINUED | OUTPATIENT
Start: 2021-09-01 | End: 2021-09-02 | Stop reason: HOSPADM

## 2021-09-01 RX ORDER — ISOSORBIDE MONONITRATE 30 MG/1
30 TABLET, EXTENDED RELEASE ORAL DAILY
Status: DISCONTINUED | OUTPATIENT
Start: 2021-09-01 | End: 2021-09-02 | Stop reason: HOSPADM

## 2021-09-01 RX ORDER — ASPIRIN 81 MG/1
81 TABLET, CHEWABLE ORAL EVERY MORNING
Status: DISCONTINUED | OUTPATIENT
Start: 2021-09-01 | End: 2021-09-02 | Stop reason: HOSPADM

## 2021-09-01 RX ORDER — SODIUM CHLORIDE 9 MG/ML
INJECTION, SOLUTION INTRAVENOUS CONTINUOUS
Status: DISCONTINUED | OUTPATIENT
Start: 2021-09-01 | End: 2021-09-01

## 2021-09-01 RX ORDER — DIGOXIN 125 MCG
125 TABLET ORAL
Status: DISCONTINUED | OUTPATIENT
Start: 2021-09-01 | End: 2021-09-02 | Stop reason: HOSPADM

## 2021-09-01 RX ORDER — SODIUM CHLORIDE 9 MG/ML
25 INJECTION, SOLUTION INTRAVENOUS PRN
Status: DISCONTINUED | OUTPATIENT
Start: 2021-09-01 | End: 2021-09-02 | Stop reason: HOSPADM

## 2021-09-01 RX ORDER — POTASSIUM CHLORIDE 20 MEQ/1
40 TABLET, EXTENDED RELEASE ORAL PRN
Status: DISCONTINUED | OUTPATIENT
Start: 2021-09-01 | End: 2021-09-02 | Stop reason: HOSPADM

## 2021-09-01 RX ORDER — METOPROLOL TARTRATE 5 MG/5ML
5 INJECTION INTRAVENOUS EVERY 6 HOURS
Status: DISCONTINUED | OUTPATIENT
Start: 2021-09-01 | End: 2021-09-02 | Stop reason: HOSPADM

## 2021-09-01 RX ADMIN — METOPROLOL TARTRATE 5 MG: 5 INJECTION INTRAVENOUS at 22:11

## 2021-09-01 RX ADMIN — DEXTROSE MONOHYDRATE: 50 INJECTION, SOLUTION INTRAVENOUS at 14:01

## 2021-09-01 RX ADMIN — SODIUM CHLORIDE: 9 INJECTION, SOLUTION INTRAVENOUS at 06:27

## 2021-09-01 RX ADMIN — METOPROLOL TARTRATE 5 MG: 5 INJECTION INTRAVENOUS at 14:21

## 2021-09-01 ASSESSMENT — ENCOUNTER SYMPTOMS
VOMITING: 0
GASTROINTESTINAL NEGATIVE: 1
EYES NEGATIVE: 1
SHORTNESS OF BREATH: 0
DIARRHEA: 0
RESPIRATORY NEGATIVE: 1
NAUSEA: 0

## 2021-09-01 NOTE — PROGRESS NOTES
The needed equipment has been ordered and will be set up tomorrow am. the dtr will call the pts nurse when the equipment is in place. It will then be ok to dc the pt when medically ready. Hospice will meet the pt at her home for adm to hospice. The pt is going to her home. Address is [address] [place] [address], [id number] [facility].

## 2021-09-01 NOTE — PROGRESS NOTES
Occupational Therapy   Occupational Therapy Initial Assessment  Date: 2021   Patient Name: Thang Pruett  MRN: [id number]     : 1933    Date of Service: 2021    Discharge Recommendations:          Assessment   Assessment: Unsure of pt.'s PLOF but pt. is at highest level given cognitive deficits. Per chart, family is planning on hospice care at home. OT eval only  Treatment Diagnosis: Hyperkalemia, AKBAR  Prognosis: Fair;Guarded  Decision Making: Low Complexity  REQUIRES OT FOLLOW UP: No  Activity Tolerance  Activity Tolerance: Treatment limited secondary to medical complications (free text)           Patient Diagnosis(es): The primary encounter diagnosis was Subtherapeutic international normalized ratio (INR). Diagnoses of AKBAR (acute kidney injury) ([address] COVID-19, Hypernatremia, and Hyperkalemia were also pertinent to this visit. has a past medical history of Acute systolic CHF (congestive heart failure), NYHA class 3 (HCC), Allergic rhinitis, Anticoagulant long-term use, Anxiety, Atrial fibrillation (HCC), Chronic atrial fibrillation (HCC), Chronic back pain, Chronic kidney disease, Chronic kidney disease, stage III (moderate) ([facility]), Chronic systolic CHF (congestive heart failure), NYHA class 3 (HCC), Current moderate episode of major depressive disorder without prior episode ([address] GERD (gastroesophageal reflux disease), Glaucoma, Hearing loss, Hypertension, Mixed hyperlipidemia, Osteoarthritis, Osteoporosis, Palliative care patient, Panlobular emphysema ([address] Shingles, Sinus problem, and Wears glasses. has a past surgical history that includes Hysterectomy; Ovary surgery; Breast biopsy; Breast biopsy (2014); and Skin cancer excision (2020).     Treatment Diagnosis: Hyperkalemia, AKBAR      Restrictions  Restrictions/Precautions  Restrictions/Precautions: Fall Risk    Subjective   General  Chart Reviewed: Yes  Patient assessed for rehabilitation services?: Yes  Additional Pertinent Hx: Dementia, a-fib  Family / Caregiver Present: No  Diagnosis: Hyperkalemia, AKBAR  General Comment  Comments: Pt. appeared confused and had trouble hearing  Patient Currently in Pain: Denies  Vital Signs  Patient Currently in Pain: Denies  Social/Functional History  Social/Functional History  Lives With: Family  Type of Home: House  ADL Assistance: Needs assistance  Ambulation Assistance: Needs assistance  Transfer Assistance: Needs assistance       Objective   Vision: Within Functional Limits  Hearing: Within functional limits    Orientation  Overall Orientation Status: Impaired  Orientation Level: Oriented to person;Disoriented to time;Disoriented to place; Disoriented to situation        ADL  Feeding: Moderate assistance  Grooming: Maximum assistance  UE Bathing: Maximum assistance  LE Bathing: Dependent/Total  UE Dressing: Dependent/Total  LE Dressing: Dependent/Total  Toileting: Dependent/Total        Bed mobility  Supine to Sit: Maximum assistance  Sit to Supine: Maximum assistance  Transfers  Stand Step Transfers: Maximum assistance  Sit to stand: Moderate assistance;Maximum assistance  Transfer Comments:  Mod Max A to stand and sidestep to [address] [place] [address]  Overall Cognitive Status: WFL                 LUE AROM (degrees)  LUE AROM : WFL  RUE AROM (degrees)  RUE AROM : WFL  LUE Strength  Gross LUE Strength: Exceptions to [facility]  L Shoulder Flex: 3+/5  L Shoulder ABduction: 3+/5  L Elbow Flex: 3+/5  L Elbow Ext: 3+/5  RUE Strength  Gross RUE Strength: Exceptions to [facility]  R Shoulder Flex: 3+/5  R Shoulder ABduction: 3+/5  R Elbow Flex: 3+/5  R Elbow Ext: 3+/5                   Plan   Plan  Times per week: OT eval only    G-Code     OutComes Score                                                  AM-PAC Score             Goals  Short term goals  Time Frame for Short term goals: OT eval only  Long term goals  Time Frame for Long term goals : OT eval only       Therapy Time   Individual Concurrent Group Co-treatment   Time In           Time Out           Minutes                   Sid Alicea, OT

## 2021-09-01 NOTE — CONSULTS
Renal Consult Note    Thank you to requesting provider: Dr Carl Bragg, for asking us to see Thang Pruett    Reason for consultation:  AKBAR and hypernatremia    Chief Complaint: Tachycardia    History of Presenting Illness      This is an 80-year-old woman with a history of congestive heart failure, hypertension, and chronic disease stage IIIb. She has atrial fibrillation and is on anticoagulation therapy with Coumadin. Patient has recently tested positive for Covid 19. She had a recent hospital admission with supratherapeutic INR above 8. She is brought back because of tachycardia along with altered mental status. Patient has no respiratory distress and is on room air. She has dementia, severe hearing impairment and dysphagia. Her serum creatinine is 1.2-1.3 at baseline and it has since increased to 1.5 and 1.6. Patient was also having rising serum sodium since August 26. Her most recent serum sodium was 156 on September 1 renal service was consulted to manage her AKBAR. No further history could be obtained except from medical chart and nursing staff.     Past Medical/Surgical History      Active Ambulatory Problems     Diagnosis Date Noted    Acute on chronic congestive heart failure (HCC)     Hyponatremia     Chronic renal failure     Weakness     Hematuria     Chronic systolic CHF (congestive heart failure), NYHA class 3 (HCC) 02/17/2017    Chronic kidney disease, stage III (moderate) ([facility]) 02/17/2017    Atrial fibrillation ([address] 09/28/2017    Decline in verbal memory 11/28/2018    Dyspnea 11/28/2018    Allergic rhinitis 11/28/2018    Edema 11/28/2018    Decreased pedal pulses 11/28/2018    COPD exacerbation ([address] 11/28/2018    Hypoxemia 11/28/2018    Family discord 12/10/2018    Essential hypertension 12/26/2018    Other fatigue 12/26/2018    PVD (peripheral vascular disease) ([address] 01/09/2019    Other chest pain 02/22/2019    Panlobular emphysema ([address] 05/10/2019    Gastroesophageal reflux disease with esophagitis 06/24/2019    Nausea and vomiting 06/24/2019    Renal failure 06/24/2019    Stomatitis 06/24/2019    Failure to thrive in adult 08/01/2019    Weight loss 08/01/2019    Bereavement reaction 08/01/2019    Confusion 08/01/2019    Decreased activities of daily living (ADL) 08/01/2019    Tachycardia with greater than 160 beats per minute 10/20/2019    Accidental medication error 10/20/2019    Chronic anticoagulation 10/20/2019    Acute otitis media 11/11/2019    Anxiety 11/11/2019    Late onset Alzheimer's disease without behavioral disturbance ([address] 11/14/2019    Current moderate episode of major depressive disorder without prior episode ([address] 11/14/2019    Benign essential tremor 11/14/2019    Aortic valve regurgitation 12/09/2019    Hypotension 12/09/2019    Mental confusion 12/09/2019    Weight gain 12/12/2019    Chronic back pain 04/29/2020    Chronic pain of both knees 04/29/2020    Encounter for monitoring Coumadin therapy 04/29/2020    Mixed hyperlipidemia 07/02/2020    Congestive heart failure ([address] 07/02/2020    Hemorrhagic purpura ([address] 04/07/2021    Nodular lesion on surface of skin 06/08/2021    COVID-19 08/18/2021    Hypernatremia 08/26/2021    Deafness 08/26/2021    Palliative care patient 08/27/2021     Resolved Ambulatory Problems     Diagnosis Date Noted    Persistent atrial fibrillation ([address] 65/00/8857    Acute systolic CHF (congestive heart failure), NYHA class 3 ([address] 02/24/2017    Panlobular emphysema ([address] 11/28/2018    Mental confusion 12/07/2019    Elevated troponin 12/09/2019    Dehydration 04/14/2020    Coagulopathy ([address] 08/26/2021    Mucosal bleeding 08/26/2021     Past Medical History:   Diagnosis Date    Anticoagulant long-term use     Chronic atrial fibrillation ([address] 09/28/2017    Chronic kidney disease     GERD (gastroesophageal reflux disease)     Glaucoma     Hearing loss     Hypertension     Osteoarthritis     Osteoporosis     Shingles     Sinus problem     Wears glasses          Review of Systems     Able to obtain at this time (altered mental status, severe deafness)      Medications        Current Facility-Administered Medications:     sodium chloride flush 0.9 % injection 10 mL, 10 mL, IntraVENous, 2 times per day, Lyle Melton MD    sodium chloride flush 0.9 % injection 10 mL, 10 mL, IntraVENous, PRN, Lyle Melton MD    0.9 % sodium chloride infusion, 25 mL, IntraVENous, PRN, Lyle Melton MD    potassium chloride (KLOR-CON M) extended release tablet 40 mEq, 40 mEq, Oral, PRN **OR** potassium bicarb-citric acid (EFFER-K) effervescent tablet 40 mEq, 40 mEq, Oral, PRN **OR** potassium chloride 10 mEq/100 mL IVPB (Peripheral Line), 10 mEq, IntraVENous, PRN, Lyle Melton MD    ondansetron (ZOFRAN-ODT) disintegrating tablet 4 mg, 4 mg, Oral, Q8H PRN **OR** ondansetron (ZOFRAN) injection 4 mg, 4 mg, IntraVENous, Q6H PRN, Lyle Melton MD    magnesium hydroxide (MILK OF MAGNESIA) 400 MG/5ML suspension 30 mL, 30 mL, Oral, Daily PRN, Lyle Melton MD    acetaminophen (TYLENOL) tablet 650 mg, 650 mg, Oral, Q6H PRN **OR** acetaminophen (TYLENOL) suppository 650 mg, 650 mg, Rectal, Q6H PRN, Lyle Melton MD    0.9 % sodium chloride infusion, , IntraVENous, Continuous, Shannon Cleveland, APRN - CNP, Last Rate: 75 mL/hr at 09/01/21 0627, New Bag at 09/01/21 0627    aspirin chewable tablet 81 mg, 81 mg, Oral, QAM, Georganna Lanes, MD    atorvastatin (LIPITOR) tablet 10 mg, 10 mg, Oral, Daily, Georganna Lanes, MD    digoxin [facility]) tablet 125 mcg, 125 mcg, Oral, Once per day on Mon Wed Fri, Georganna Lanes, MD [place] Favorite  [START ON 9/2/2021] warfarin (COUMADIN) tablet 2.5 mg, 2.5 mg, Oral, Once per day on Sun Mon Tue Thu Fri Sat, Georganna Lanes, MD Gloriajean Pandy by provider] isosorbide mononitrate (IMDUR) extended release tablet 30 mg, 30 mg, Oral, Daily, Arden Ramirez MD Gloria    warfarin (COUMADIN) tablet 5 mg, 5 mg, Oral, Once per day on Wed, Inga Carcamo MD    warfarin (COUMADIN) daily dosing (placeholder), , Other, RX Placeholder, Inga Carcamo MD    metoprolol tartrate (LOPRESSOR) tablet 12.5 mg, 12.5 mg, Oral, BID, Inga Carcamo MD    dilTIAZem 125 mg in dextrose 5 % 125 mL infusion, 5-15 mg/hr, IntraVENous, Continuous, Wilma Masters MD, Stopped at 08/31/21 2146  No outpatient medications have been marked as taking for the 8/31/21 encounter [facility] Encounter). Allergies   Denosumab and Lisinopril    Family History       Family History   Problem Relation Age of Onset   Spencer Loser Asthma Mother     Emphysema Mother     Alzheimer's Disease Sister     Heart Disease Brother     Heart Disease Sister     No Known Problems Sister     Alzheimer's Disease Sister      Family history negative for kidney disease. Social History      Social History     Socioeconomic History    Marital status:      Spouse name: None    Number of children: None    Years of education: None    Highest education level: None   Occupational History    None   Tobacco Use    Smoking status: Never Smoker    Smokeless tobacco: Never Used   Vaping Use    Vaping Use: Never used   Substance and Sexual Activity    Alcohol use: No    Drug use: No    Sexual activity: Never   Other Topics Concern    None   Social History Narrative    None     Social Determinants of Health     Financial Resource Strain: Low Risk     Difficulty of Paying Living Expenses: Not hard at all   Food Insecurity: No Food Insecurity    Worried About Running Out of Food in the Last Year: Never true    Benson of Food in the Last Year: Never true   Transportation Needs: No Transportation Needs    Lack of Transportation (Medical): No    Lack of Transportation (Non-Medical):  No   Physical Activity:     Days of Exercise per Week:     Minutes of Exercise per Session:    Stress:     Feeling of Stress :    Social Connections:     Frequency of Communication with Friends and Family:     Frequency of Social Gatherings with Friends and Family:     Attends Yarsani Services:     Active Member of Clubs or Organizations:     Attends Club or Organization Meetings:     Marital Status:    Intimate Partner Violence:     Fear of Current or Ex-Partner:     Emotionally Abused:     Physically Abused:     Sexually Abused:        Physical Exam     Blood pressure (!) 144/83, pulse 101, temperature 97 °F (36.1 °C), temperature source Temporal, resp. rate 18, height 5' 3\" (1.6 m), weight 98 lb 2 oz (44.5 kg), SpO2 100 %. Exam is limited due to positive Covid illness and patient's underlying communication problem. Patient continues to be under strict isolation. The assessment was obtained was assistance from her nurse. General: Awake, in no distress  HEENT: Normocephalic, atraumatic  Chest: Diminished breath sounds at the bases  CV: Irregularly irregular heart sounds  Abdomen: Soft, no distention  Extremities:  No peripheral edema      Data     Recent Labs     08/31/21  1357 09/01/21  0558   WBC 9.8 7.5   HGB 12.4 11.7*   HCT 41.4 40.4   .2* 104.4*    194     Recent Labs     08/31/21  1357 09/01/21  0558   * 156*   K 5.3* 4.5   * 121*   CO2 24 23   GLUCOSE 105 87   BUN 75* 70*   CREATININE 1.6* 1.5*   LABGLOM 30* 33*   GFRAA 37* 40*       Assessment:  1. Acute kidney injury-likely prerenal azotemia versus ATN  2. Chronic kidney disease stage IIIb  3. Hypernatremia  4. Positive Covid test  5. Chronic atrial fibrillation on anticoagulation  6. Dementia  7. Severe hearing impairment        Plan:  · At this time I will change her IV fluid to dextrose water we will continue to monitor the renal function and electrolytes. Patient is frail. Avoid hypotension nephrotoxins.     Thank you for asking us to participate in the management of your patient, please do not hesitate to contact me for any concerns regarding my recommendations as outlined above.    -----------------------------  Electronically signed by Bronson Favre, MD on 9/1/21 at 12:28 PM CDT

## 2021-09-01 NOTE — PROGRESS NOTES
[facility] Hospitalists      Patient:  Sarah Richardson  YOB: 1933  Date of Service: 9/1/2021  MRN: [id number]   Acct: [de-identified]   Primary Care Physician: NONI Sanchez  Advance Directive: [facility]  Admit Date: 8/31/2021       Hospital Day: 1  Portions of this note have been copied forward, however, changed to reflect the most current clinical status of this patient. CHIEF COMPLAINT AMS/tachycardia    SUBJECTIVE:  Patient is pleasantly confused. CUMULATIVE HOSPITAL COURSE:  The patient is a 80 y.o. female with a recent diagnosis of COVID, CHF, atrial fibrillation with chronic coumadin therapy, CKD, dementia, and HTN who [facility] complaining of AMS and tachycardia. The patient has dementia. Daughter/POA at bedside provided information. She stated the patient was ill with diarrhea a few weeks ago. She stated she was seen and given antibiotics by her PCP. She was discharged 3 days ago after having a supra therapeutic INR. The daughter stated the patient has had increased fatigue, nausea, decreased appetite, dysphagia, and increased oxygen need since discharge. Day of admission the daughter stated that home health visited and she had an increased heart rate. ED work-up revealed a. Fib with RVR, Covid positive, Na 154, K+ 5.3, Cr 1.6, Gfr 30. Patient was admitted to hospitalist service for atrial fibrillation with RVR. Patient was placed on cardizem drip at admission. Cardiology was consulted. SLP was consulted and patient was indicated to not be protecting her airway with any texture by mouth. Family (all three children) was contacted and given updates. All children in agreence that they do not want a PEG tube placed and would like her to be comfortable. Hospice consulted. Review of Systems:   Review of Systems   Unable to perform ROS: Dementia       14 point review of systems is negative except as specifically addressed above.       Objective:   VITALS:  /89   Pulse 109 Temp 97.7 °F (36.5 °C) (Temporal)   Resp 17   Ht 5' 3\" (1.6 m)   Wt 98 lb 2 oz (44.5 kg)   SpO2 98%   BMI 17.38 kg/m²   24HR INTAKE/OUTPUT:    Intake/Output Summary (Last 24 hours) at 9/1/2021 1029  Last data filed at 8/31/2021 1704  Gross per 24 hour   Intake 500 ml   Output --   Net 500 ml       Physical Exam  Constitutional:       Appearance: She is ill-appearing. HENT:      Head: Normocephalic. Mouth/Throat:      Mouth: Mucous membranes are dry. Pharynx: Oropharynx is clear. Cardiovascular:      Rate and Rhythm: Tachycardia present. Rhythm irregular. Pulses: Normal pulses. Heart sounds: Normal heart sounds. Pulmonary:      Effort: Pulmonary effort is normal.      Breath sounds: Normal breath sounds. Abdominal:      General: Abdomen is flat. Bowel sounds are normal. There is no distension. Palpations: Abdomen is soft. Tenderness: There is no abdominal tenderness. There is no guarding. Musculoskeletal:         General: No swelling. Cervical back: Normal range of motion and neck supple. Right lower leg: No edema. Left lower leg: No edema. Skin:     General: Skin is warm and dry. Capillary Refill: Capillary refill takes 2 to 3 seconds. Neurological:      Mental Status: She is alert. Mental status is at baseline. She is disoriented.          Medications:      sodium chloride      sodium chloride 75 mL/hr at 09/01/21 8215    dilTIAZem (CARDIZEM) 125 mg in dextrose 5% 125 mL infusion Stopped (08/31/21 2146)      sodium chloride flush  10 mL IntraVENous 2 times per day    aspirin  81 mg Oral QAM    atorvastatin  10 mg Oral Daily    digoxin  125 mcg Oral Once per day on Mon Wed Fri    metoprolol tartrate  12.5 mg Oral Nightly    [START ON 9/2/2021] warfarin  2.5 mg Oral Once per day on Sun Mon Tue Thu Fri Sat    isosorbide mononitrate  30 mg Oral Daily    warfarin  5 mg Oral Once per day on Wed    warfarin (COUMADIN) daily dosing (placeholder)   Other RX Placeholder     sodium chloride flush, sodium chloride, potassium chloride **OR** potassium alternative oral replacement **OR** potassium chloride, ondansetron **OR** ondansetron, magnesium hydroxide, acetaminophen **OR** acetaminophen  Diet NPO     Lab and other Data:     Recent Labs     08/31/21  1357 09/01/21  0558   WBC 9.8 7.5   HGB 12.4 11.7*    194     Recent Labs     08/31/21  1357 09/01/21  0558   * 156*   K 5.3* 4.5   * 121*   CO2 24 23   BUN 75* 70*   CREATININE 1.6* 1.5*   GLUCOSE 105 87     Recent Labs     08/31/21  1357   AST 34*   ALT 8   BILITOT 0.7   ALKPHOS 42     Troponin T:   Recent Labs     08/31/21  1357   TROPONINI <0.01     Pro-BNP: No results for input(s): BNP in the last 72 hours. INR:   Recent Labs     08/31/21  1357   INR 1.35*     UA:  Recent Labs     08/31/21  1522   COLORU YELLOW   PHUR 5.0   WBCUA 3-5*   RBCUA 0-1   BACTERIA None Seen*   CLARITYU CLOUDY*   SPECGRAV 1.021   LEUKOCYTESUR TRACE*   UROBILINOGEN 0.2   BILIRUBINUR Negative   BLOODU Negative   GLUCOSEU Negative     A1C: No results for input(s): LABA1C in the last 72 hours. ABG:No results for input(s): PHART, OHR8NQP, PO2ART, PGJ2WID, BEART, HGBAE, P0FMKVHA, CARBOXHGBART in the last 72 hours. RAD:   CT Head WO Contrast    Result Date: 8/31/2021  1. Right maxillary and sphenoid sinusitis changes. 2. Atrophy and small vessel disease. 3. No mass, infarct, or intracranial hemorrhage. Signed by Dr Armin Bhatt    XR CHEST PORTABLE    Result Date: 8/31/2021  1. Chronic lung changes. Signed by Dr Armin Bhatt    XR CHEST PORTABLE    Result Date: 8/26/2021  1. Hyperinflated lungs with cardiomegaly. No acute pulmonary process seen at this time.  Signed by Dr Teran Pop: SARS-CoV-2, NAAT  COVID-19, Rapid  Collected: 08/31/21 1522   Result status: Final   Resulting lab: [facility]   Reference range: Not Detected   Value: DETECTEDPanic     Comment: Rapid NAAT:   Negative results should be treated as presumptive and,   if inconsistent with clinical signs and symptoms or necessary for   patient management, should be tested with an alternative molecular   assay. Negative results do not preclude SARS-CoV-2 infection and   should not be used as the sole basis for patient management decisions. This test has been authorized by the FDA under an Emergency Use   Authorization (EUA) for use by authorized laboratories.      Fact sheet for Healthcare Providers:   Niharika   Fact sheet for Patients: Niharika     METHODOLOGY: Isothermal Nucleic Acid Amplification          Assessment/Plan    Atrial fibrillation with RVR (HCC)              -cardizem drip              -consulted cardiology, following recommendation, however patient unable to take PO medications at this time.               -tele              -ekg with rhythm changes              -continue coumadin therapy     Dysphagia              -SLP    -failed swallow study, recommended NPO, family aware and do not wish to have PEG/dobhoff at this time              -NPO     Gastroesophageal reflux disease with esophagitis              -noted       Mixed hyperlipidemia              -noted              -continue medications when evaluated by speech       Hypernatremia              -IVF              -monitor BMP              -neuro checks              -nephrology consult      Covid              -PT/OT/SLP              -CCU                  DVT Prophylaxis: coumadin        NONI Tobias - CNP, 9/1/2021 10:29 AM

## 2021-09-01 NOTE — PROGRESS NOTES
Consult received, Palliative Care RN will initiate care for discussions regarding goals of care and provide support. We will follow and assist as needed. Thank you for consulting Palliative Care.

## 2021-09-01 NOTE — ACP (ADVANCE CARE PLANNING)
Advance Care Planning     Advance Care Planning Activator (Inpatient)  Conversation Note      Date of ACP Conversation: 9/1/2021     Conversation Conducted with: Warren Bran. ACP Activator: Anoop Carter RN        Health Care Decision Maker:     Current Designated Health Care Decision Maker:     Primary Decision Maker: Beatrice Victor - Child - [phone]    Primary Decision Maker: John Joe - Child - [phone]    Primary Decision Maker: Erickson Falk Child - [phone]      Care Preferences    Ventilation: \"If you were in your present state of health and suddenly became very ill and were unable to breathe on your own, what would your preference be about the use of a ventilator (breathing machine) if it were available to you? \"      Would the patient desire the use of ventilator (breathing machine)?: No          Resuscitation\    \"In the event your heart stopped as a result of an underlying serious health condition, would you want attempts to be made to restart your heart (answer \"yes\" for attempt to resuscitate) or would you prefer a natural death (answer \"no\" for do not attempt to resuscitate)? \" No          Conversation Outcomes:  [x] ACP discussion completed  [] Existing advance directive reviewed with patient; no changes to patient's previously recorded wishes  [] New Advance Directive completed  [] Portable Do Not Rescitate prepared for Provider review and signature  [] POLST/POST/MOLST/MOST prepared for Provider review and signature    AD on file.     Electronically signed by Anoop Carter RN on 9/1/2021 at 12:39 PM

## 2021-09-01 NOTE — PROGRESS NOTES
Called to speak with the son Amy Leslie. He was tearful and states he has not talked to his sisters. He requested this ch call his sister Melanie Baron in about 27 or 45 min. Will follow. Update. Nadiya Hess in [address] [facility] to call the pts dtr and discuss status. ..

## 2021-09-01 NOTE — PROGRESS NOTES
Speech Language Pathology  Facility/Department: [facility] 4 ONCOLOGY UNIT   CLINICAL BEDSIDE SWALLOW EVALUATION  SPEECH LANGUAGE EVALUATION     NAME: Girma Segovia  : 1933  MRN: [id number]    ADMISSION DATE: 2021  ADMITTING DIAGNOSIS: has Acute on chronic congestive heart failure ([address] Hyponatremia; Chronic renal failure; Weakness; Hematuria; Chronic systolic CHF (congestive heart failure), NYHA class 3 ([address] Chronic kidney disease, stage III (moderate) ([address] Atrial fibrillation ([address] Decline in verbal memory; Dyspnea; Allergic rhinitis; Edema; Decreased pedal pulses; COPD exacerbation ([address] Hypoxemia; Family discord; Essential hypertension; Other fatigue; PVD (peripheral vascular disease) ([address] Other chest pain; Panlobular emphysema ([address] Gastroesophageal reflux disease with esophagitis; Nausea and vomiting; Renal failure; Stomatitis; Failure to thrive in adult; Weight loss; Bereavement reaction; Confusion; Decreased activities of daily living (ADL); Tachycardia with greater than 160 beats per minute; Accidental medication error; Chronic anticoagulation; Acute otitis media; Anxiety; Late onset Alzheimer's disease without behavioral disturbance ([address] Current moderate episode of major depressive disorder without prior episode ([address] Benign essential tremor; Aortic valve regurgitation; Hypotension; Mental confusion; Weight gain; Chronic back pain; Chronic pain of both knees; Encounter for monitoring Coumadin therapy; Mixed hyperlipidemia; Congestive heart failure ([address] Hemorrhagic purpura ([address] Nodular lesion on surface of skin; COVID-19; Hypernatremia;  Deafness; Palliative care patient; and Atrial fibrillation with RVR ([address] on their problem list.    Date of Eval: 2021  Evaluating Therapist: ERIN Osborne    Reason for Referral  Girma Segovia was referred for a bedside swallow evaluation to assess the efficiency of her swallow function, identify signs and symptoms of aspiration and make recommendations regarding safe dietary consistencies, effective compensatory strategies, and safe eating environment. Impression  Assessed patient's swallowing function. Patient exhibited decreased oral prep, lengthy oral holding, and absent swallows with no laryngeal elevation noted and just laryngeal rocking observed. Patient exhibited degree of airway detection with delayed coughs noted following probable penetration/aspiration of PO trials (1/2 teaspoon trials thin H2O and ice chip trials all presented by SLP). At this time, would recommend NPO status. Daily oral care, via staff, to decrease bacteria from the oral cavity. If patient receives mouth care prior to intake, okay for ice chips and swabs thin H2O for comfort. Will continue to follow. Treatment Plan  Requires SLP Intervention: Yes     Recommended Diet and Intervention  Diet Solids Recommendation: NPO  Liquid Consistency Recommendation: NPO  Therapeutic Interventions: Patient/Family education;Oral Care; Therapeutic PO trials with SLP     Treatment/Goals  Timeframe for Short-term Goals: 1x/day for 3 days   Goal 1: Patient NPO. Goal 2: Patient staff will follow swallow safety recommendations. Goal 3: Patient will receive daily oral care, via staff, to decrease bacteria from the oral cavity. Goal 4: Re-assessment of swallow function for potential PO intake. Goal 5: Monitor [id number] functioning. General  Chart Reviewed: Yes  Behavior/Cognition: Eyes open  O2 Device: None (Room air)  Communication Observation: (Assessed speech/language. Patient was 0% confrontation naming and structured responsive speech at independent level and with provision of max cues/prompts. Patient was 0% automatic speech tasks independently and with provision of max cues/prompts. Patient was 0% repeating single vowels and single consonants at independent level and with provision of max cues/prompts.  Patient was 0% imitating mouth postures for speech production independently and with provision of max cues/prompts. Patient was 0% following simple 1 step auditory directions, without tactile cues, at independent level and with provision of models. Patient was 0% answering simple yes/no questions regarding immediate environment and current state of being independently and with provision of mod cues/prompts.)  Dentition: Edentulous   Patient Positioning: Upright in bed  Consistencies Administered: Thin - spoon; Ice chips    Assessed patient's swallowing function with the following observations noted:     Oral Phase   Mastication: Ice chips (Patient exhibited no attempt at oral prep of ice chip trials presented by SLP.)  Suspected Premature Bolus Loss: Thin - spoon; Ice chips (Patient exhibited min, slow oral transit of 1/2 teaspoon trials thin H2O and ice chip trials all presented by SLP.)  Oral Phase - Comment: Patient exhibited lengthy oral holding with all consistencies prior to attempt at oral transit. Pharyngeal Phase  Laryngeal Elevation: (Patient exhibited absent swallows with no laryngeal elevation noted and just laryngeal rocking observed.)  Delayed Cough: Thin - spoon; Ice chips   Pharyngeal Phase - Comment: As previously mentioned, patient exhibited absent swallows with no laryngeal elevation noted and just laryngeal rocking observed. Patient exhibited degree of airway detection with delayed coughs noted following probable penetration/aspiration of PO trials (1/2 teaspoon trials thin H2O and ice chip trials all presented by SLP). At this time, would recommend NPO status. Daily oral care, via staff, to decrease bacteria from the oral cavity. If patient receives mouth care prior to intake, okay for ice chips and swabs thin H2O for comfort. Will continue to follow.     Electronically signed by ERIN Zaidi on 9/1/2021 at 1:02 PM

## 2021-09-01 NOTE — CONSULTS
[id number] [facility]  Cardiology Consult      Requesting MD:  Steven Orona MD   Admit Status:  Inpatient [101]       History obtained from:   [] Patient  [] Other (specify):     Patient:  Andre Kaplan  [id number]     Chief Complaint:   Chief Complaint   Patient presents with    Tachycardia     Afib RVR (130-160 rate)    Altered Mental Status     Oriented to person, place only         HPI: Ms. Rudy Roberts is a 80 y.o. female with a history of atrial fibrillation and dementia admitted 8/31/2021 recent diagnosis Covid CHF also presented complaints of altered mental status and tachycardia. Had diarrhea a few weeks ago. Given antibiotics by her primary care physician discharge 3 days ago after she had a supratherapeutic INR. She is had increased fatigue nausea decreased appetite dysphagia increased oxygen requirement since discharge. Home health visited on the day of admission she had increased heart rate recently dose of metoprolol decreased from 25 to 12.5 mg daily. Sodium 154 potassium 5.3 creatinine 1.6 GFR 30 admitted for further assessment cardiology consulted. Review of Systems:  Review of Systems   Constitutional: Negative. Negative for chills, fever and unexpected weight change. HENT: Negative. Eyes: Negative. Respiratory: Negative. Negative for shortness of breath. Cardiovascular: Negative. Negative for chest pain. Gastrointestinal: Negative. Negative for diarrhea, nausea and vomiting. Endocrine: Negative. Genitourinary: Negative. Musculoskeletal: Negative. Skin: Negative. Neurological: Negative. All other systems reviewed and are negative.       Cardiac Specific Data:  Specialty Problems        Cardiology Problems    Acute on chronic congestive heart failure (HCC)        Other chest pain        Chronic systolic CHF (congestive heart failure), NYHA class 3 (HCC)        Atrial fibrillation (HCC)        Essential hypertension        PVD (peripheral vascular disease) ([facility]        Aortic valve regurgitation        Hypotension        Congestive heart failure (HCC)        Mixed hyperlipidemia        Atrial fibrillation with RVR ([facility] [address] [facility]              Past Medical History:  Past Medical History:   Diagnosis Date    Acute systolic CHF (congestive heart failure), NYHA class 3 ([facility]) 02/24/2017    Allergic rhinitis     Anticoagulant long-term use     coumadin for atrial fib    Anxiety 11/11/2019    Atrial fibrillation ([facility])     Chronic atrial fibrillation ([facility]) 09/28/2017    Chronic back pain     Chronic kidney disease     Chronic kidney disease, stage III (moderate) ([facility]) 02/17/2017    Chronic systolic CHF (congestive heart failure), NYHA class 3 ([facility] [address] 02/17/2017    Current moderate episode of major depressive disorder without prior episode ([facility] [address] 11/14/2019    GERD (gastroesophageal reflux disease)     Glaucoma     Hearing loss     Hypertension     Mixed hyperlipidemia 07/02/2020    Osteoarthritis     Osteoporosis     Palliative care patient 08/27/2021    Panlobular emphysema ([facility] [address] 11/28/2018    Shingles     Sinus problem     Wears glasses         Past Surgical History:  Past Surgical History:   Procedure Laterality Date    BREAST BIOPSY      Left-benign    BREAST BIOPSY  feb 2014    Left    HYSTERECTOMY      OVARY SURGERY      tumor removed     SKIN CANCER EXCISION  01/2020       Past Family History:  Family History   Problem Relation Age of Onset    Asthma Mother     Emphysema Mother     Alzheimer's Disease Sister     Heart Disease Brother     Heart Disease Sister     No Known Problems Sister     Alzheimer's Disease Sister        Past Social History:  Social History     Socioeconomic History    Marital status:       Spouse name: Not on file    Number of children: Not on file    Years of education: Not on file    Highest education level: Not on file   Occupational History    Not on file   Tobacco Use    Smoking status: Never Smoker    Smokeless tobacco: Never Used   Vaping Use    Vaping Use: Never used   Substance and Sexual Activity    Alcohol use: No    Drug use: No    Sexual activity: Never   Other Topics Concern    Not on file   Social History Narrative    Not on file     Social Determinants of Health     Financial Resource Strain: Low Risk     Difficulty of Paying Living Expenses: Not hard at all   Food Insecurity: No Food Insecurity    Worried About Running Out of Food in the Last Year: Never true    Benson of Food in the Last Year: Never true   Transportation Needs: No Transportation Needs    Lack of Transportation (Medical): No    Lack of Transportation (Non-Medical): No   Physical Activity:     Days of Exercise per Week:     Minutes of Exercise per Session:    Stress:     Feeling of Stress :    Social Connections:     Frequency of Communication with Friends and Family:     Frequency of Social Gatherings with Friends and Family:     Attends [employer] Services:     Active Member of Clubs or Organizations:     Attends Club or Organization Meetings:     Marital Status:    Intimate Partner Violence:     Fear of Current or Ex-Partner:     Emotionally Abused:     Physically Abused:     Sexually Abused: Allergies: Allergies   Allergen Reactions    Denosumab Other (See Comments)     after had shot got blood in urine--not sure if associated    Lisinopril Other (See Comments)     cough         Home Meds:  Prior to Admission medications    Medication Sig Start Date End Date Taking?  Authorizing Provider   donepezil (ARICEPT) 5 MG tablet TAKE ONE TABLET BY MOUTH EVERY NIGHT AT BEDTIME 8/26/21   NONI Lorenzo   atorvastatin (LIPITOR) 10 MG tablet Take 1 tablet by mouth daily 8/26/21   NONI Lorenzo   alendronate (FOSAMAX) 70 MG tablet Take 1 tablet by mouth every 7 days 8/18/21   NONI Lorenzo   mirtazapine (REMERON) 15 MG tablet Take 1 tablet by mouth nightly 8/18/21 [facility]   Dexamethasone 1.5 MG (21) TBPK Take as directed 8/17/21   [facility]   triamcinolone (KENALOG) 0.1 % ointment Apply to rash twice daily for 7 days, avoid face, axilla, groin 7/14/21   NONI Del Cid CNP   metoprolol tartrate (LOPRESSOR) 25 MG tablet Take 0.5 tablets by mouth nightly 7/8/21   NONI Guevara   warfarin (COUMADIN) 5 MG tablet Take 1 tablet by mouth daily  Patient taking differently: Take 2.5 mg by mouth See Admin Instructions Take 5mg on Wednesdays and 2.5mg daily on all other days ([id number]).  6/28/21   NONI Guevara   Acetaminophen-Codeine (TYLENOL WITH CODEINE #3 PO) Take 1 tablet by mouth every 4 hours as needed (knee pain)    Historical Provider, MD   guaiFENesin 400 MG tablet Take 1,200 mg by mouth 2 times daily     Historical Provider, MD   isosorbide mononitrate (IMDUR) 30 MG extended release tablet Take 1 tablet by mouth daily 5/11/21   NONI Capps   digoxin (LANOXIN) 125 MCG tablet Take 1 tablet by mouth every other day Indications: M, W, F 3/30/21   Dorthula BoyNONI CNP   montelukast (SINGULAIR) 10 MG tablet TAKE 1 TABLET BY MOUTH DAILY AT NIGHT 3/24/21   [facility]   escitalopram (LEXAPRO) 10 MG tablet TAKE ONE TABLET BY MOUTH EVERY DAY 3/24/21   [facility]   SPIRIVA HANDIHALER 18 MCG inhalation capsule Inhale 1 capsule into the lungs daily 2/1/21   [facility]   famotidine (PEPCID) 20 MG tablet Take 0.5 tablets by mouth every morning 1/19/21   [facility]   furosemide (LASIX) 20 MG tablet TAKE 1 TABLET BY MOUTH DAILY  Patient taking differently: Take 20 mg by mouth daily as needed (Pt is weighed daily and she is given if weight is climbing)  11/3/20 8/11/21  DominiqueNONI Briggs   fluticasone (FLONASE) 50 MCG/ACT nasal spray 2 sprays by Each Nostril route daily  Patient taking differently: 2 sprays by Each Nostril route nightly  9/23/20 NONI Ziegler - NP   mupirocin [facility]) 2 % ointment APPLY TO AFFECTED AREA(S) THREE TIMES A DAY 9/11/20   NONI Blanchard   Multiple Vitamins-Minerals (THERAPEUTIC MULTIVITAMIN-MINERALS) tablet Take 1 tablet by mouth daily    Historical Provider, MD   diclofenac sodium 1 % GEL Apply 4 g topically 4 times daily  Patient taking differently: Apply 4 g topically 4 times daily as needed for Pain  1/21/20 8/11/21  NONI Blanchard   aspirin 81 MG chewable tablet Take 1 tablet by mouth daily  Patient taking differently: Take 81 mg by mouth every morning  12/10/19   Evens Files, DO   OXYGEN Inhale 2 L into the lungs continuous    Historical Provider, MD   tiotropium (SPIRIVA RESPIMAT) 2.5 MCG/ACT AERS inhaler Inhale 2 puffs into the lungs daily 4/30/19 8/11/21  NONI Blanchard   nitroGLYCERIN (NITROSTAT) 0.4 MG SL tablet Place 1 tablet under the tongue every 5 minutes as needed for Chest pain 12/11/18   NONI Shah   allopurinol (ZYLOPRIM) 100 MG tablet Take 1 tablet by mouth daily 12/6/18 8/11/21  NONI Blanchard   bimatoprost 0.01 % SOLN Place 1 drop into both eyes nightly     Historical Provider, MD       Current Meds:   sodium chloride flush  10 mL IntraVENous 2 times per day    aspirin  81 mg Oral QAM    atorvastatin  10 mg Oral Daily    digoxin  125 mcg Oral Once per day on Mon Wed Fri    [START ON 9/2/2021] warfarin  2.5 mg Oral Once per day on Sun Mon Tue Thu Fri Sat    [Held by provider] isosorbide mononitrate  30 mg Oral Daily    warfarin  5 mg Oral Once per day on Wed    warfarin (COUMADIN) daily dosing (placeholder)   Other RX Placeholder    metoprolol tartrate  12.5 mg Oral BID       Current Infused Meds:   sodium chloride      dextrose      dilTIAZem (CARDIZEM) 125 mg in dextrose 5% 125 mL infusion Stopped (08/31/21 4968)       Physical Exam:  Vitals:    09/01/21 1132   BP: (!) 144/83   Pulse: 101   Resp: 18   Temp: 97 °F (36.1 and dry. Neurological:      General: No focal deficit present. Mental Status: She is alert and oriented to person, place, and time. Mental status is at baseline. Cranial Nerves: No cranial nerve deficit. Sensory: No sensory deficit. Motor: No weakness. Coordination: Coordination normal.   Psychiatric:         Mood and Affect: Mood normal.         Behavior: Behavior normal.         Thought Content: Thought content normal.         Judgment: Judgment normal.         Labs:  Recent Labs     08/31/21  1357 09/01/21  0558   WBC 9.8 7.5   HGB 12.4 11.7*    194       Recent Labs     08/31/21  1357 09/01/21  0558   * 156*   K 5.3* 4.5   * 121*   CO2 24 23   BUN 75* 70*   CREATININE 1.6* 1.5*   LABGLOM 30* 33*   CALCIUM 10.0 9.6       CK, CKMB, Troponin: @LABRCNT (CKTOTAL:3, CKMB:3, TROPONINI:3)@    Last 3 BNP:  No results for input(s): BNP in the last 72 hours. IMAGING:  CT Head WO Contrast    Result Date: 8/31/2021  CT HEAD WO CONTRAST 8/31/2021 3:29 PM History: Altered mental status. In order to have a CT radiation dose as low as reasonably achievable Automated Exposure Control was utilized for adjustment of the mA and/or KV according to patient size. DLP in mGycm= 610. Noncontrast head CT compared with November 16, 2019. Age-related atrophy and small vessel disease. Normal ventricle size. No intracranial hemorrhage. No mass effect and no sign of acute infarct. Right maxillary sinus mucosal thickening and right sphenoid sinus fluid. 1. Right maxillary and sphenoid sinusitis changes. 2. Atrophy and small vessel disease. 3. No mass, infarct, or intracranial hemorrhage. Signed by Dr Rajinder Rhodes    XR CHEST PORTABLE    Result Date: 8/31/2021  XR CHEST PORTABLE 8/31/2021 3:17 PM History: Altered mental status. One view chest x-ray compared with August 26, 2021. Chronic interstitial lung disease with mild hyperexpansion. Heart size is appropriate.  Moderate degenerative joint change at the left shoulder. Old healed right rib fractures. 1. Chronic lung changes. Signed by Dr Doris Frederick    XR CHEST PORTABLE    Result Date: 8/26/2021  XR CHEST PORTABLE 8/26/2021 12:42 PM HISTORY: Cough, covid positive COMPARISON: 3/13/2020 CXR: A single frontal view of the chest is performed. Findings: Prominent dextroscoliotic curvature of the thoracolumbar spine with chronic deformities, perhaps old injury, of the right posterior sixth through eighth ribs. Lungs are hyperinflated. No focal consolidation or edema seen at this time. Cardiomegaly. No pleural effusion or pneumothorax. Thoracic aortic calcification. Arthritic changes of the shoulders. 1. Hyperinflated lungs with cardiomegaly. No acute pulmonary process seen at this time. Signed by Dr Suzanna Saunders      Assessment:  1. Atrial fibrillation of undetermined duration possibly chronic  2. Covid-19 status positive  3. Gastroesophageal reflux disease  4. Hyponatremia  5. Hyperlipidemia  6. Acute on chronic congestive heart failure systolic  7. Chronic kidney disease  8. Hematuria  9. Chronic kidney disease stage III  10. History of atrial fibrillation  11. Dementia  12. Hypoxemia  13. Hypertension  14. COPD panlobular  15. Weight loss  16. Failure to thrive  17. Depression  18. Benign essential tremor  19. CT of the head yesterday right maxillary and sphenoid sinusitis atrophy and small vessel disease no acute findings  20. Echo 80/9/2479 normal LV systolic function EF 68% biatrial enlargement 2+ AR mild MR trace TR      Recommendations:  1. Continue current therapy monitor heart rate and rhythm we will follow with you further comments to follow  2.  Increase metoprolol 12.5 mg p.o. twice daily

## 2021-09-01 NOTE — TELEPHONE ENCOUNTER
[facility] has a referral for this pt now from [facility]

## 2021-09-01 NOTE — ED NOTES
RN spoke with MD hernández, asked about the note that the infection prevention RN put in. MD states that since the patients Bio[employer] result was positive the patient still has active virus in her system and needs to go to the [facility] floor. Leyla Menon RN called report to 4th floor at this time.       Elle Rojo RN  09/01/21 0020

## 2021-09-01 NOTE — PROGRESS NOTES
Palliative care asked to call family and discuss hospice care at home vs inpt. Pt is being treated at this time for hypernatremia and dehydration. Explained to dtr, Anny Hendrix per HMD these are treatable. Pt does not require O2 at present time, no pain complaints. VSS. Explained to dtr that HMD and Palliative care will watch pt closely for any changes in condition and will address accordingly. However, pt would qualify for HP at home. Dtr is willing to have pt admitted to [facility] after hospitalization. Michelle Calderon will follow up tomorrow with family for equipment needs.   Electronically signed by Anoop Carter RN on 9/1/2021 at 1:10 PM

## 2021-09-01 NOTE — CONSULTS
Palliative care attempted to call dtrs unable to reach. Spoke with pt son, Kristy Walter. He is tearful and expresses concern for his mother. This nurse provided listening presence and emotional support. Son talks with me about his mother, telling me he and his spouse have been staying with pt and caring for her since she has become too weak to care for herself. Reviewed APRN note with him to include HP consul. He tells me he and his siblings have talked and agree they want her to be comfortable. This nurse reassured son the nurse were taking very good care of her. Explained how hospice would work for her on [facility]. He voiced understanding. Told him to expect call from [facility]  about admission and paperwork. Lidia Kelli is aware and will contact family. Son also tells me pt has very good spiritual support. Her  has been contacted on her behalf. Will complete SBAR on pt for hospice care.   Electronically signed by Radhames Hopper RN on 9/1/2021 at 12:37 PM

## 2021-09-02 VITALS
DIASTOLIC BLOOD PRESSURE: 103 MMHG | HEART RATE: 97 BPM | OXYGEN SATURATION: 98 % | TEMPERATURE: 95.4 F | RESPIRATION RATE: 20 BRPM | WEIGHT: 98.13 LBS | SYSTOLIC BLOOD PRESSURE: 150 MMHG | BODY MASS INDEX: 17.39 KG/M2 | HEIGHT: 63 IN

## 2021-09-02 LAB
ANION GAP SERPL CALCULATED.3IONS-SCNC: 9 MMOL/L (ref 7–19)
ANION GAP SERPL CALCULATED.3IONS-SCNC: 9 MMOL/L (ref 7–19)
BASOPHILS ABSOLUTE: 0.1 K/UL (ref 0–0.2)
BASOPHILS RELATIVE PERCENT: 0.6 % (ref 0–1)
BUN BLDV-MCNC: 54 MG/DL (ref 8–23)
BUN BLDV-MCNC: 57 MG/DL (ref 8–23)
CALCIUM SERPL-MCNC: 9.2 MG/DL (ref 8.8–10.2)
CALCIUM SERPL-MCNC: 9.3 MG/DL (ref 8.8–10.2)
CHLORIDE BLD-SCNC: 117 MMOL/L (ref 98–111)
CHLORIDE BLD-SCNC: 117 MMOL/L (ref 98–111)
CO2: 25 MMOL/L (ref 22–29)
CO2: 27 MMOL/L (ref 22–29)
CREAT SERPL-MCNC: 1.3 MG/DL (ref 0.5–0.9)
CREAT SERPL-MCNC: 1.3 MG/DL (ref 0.5–0.9)
EOSINOPHILS ABSOLUTE: 0.3 K/UL (ref 0–0.6)
EOSINOPHILS RELATIVE PERCENT: 3.4 % (ref 0–5)
GFR AFRICAN AMERICAN: 47
GFR AFRICAN AMERICAN: 47
GFR NON-AFRICAN AMERICAN: 39
GFR NON-AFRICAN AMERICAN: 39
GLUCOSE BLD-MCNC: 105 MG/DL (ref 74–109)
GLUCOSE BLD-MCNC: 109 MG/DL (ref 74–109)
HCT VFR BLD CALC: 39.4 % (ref 37–47)
HEMOGLOBIN: 11.8 G/DL (ref 12–16)
IMMATURE GRANULOCYTES #: 0 K/UL
LYMPHOCYTES ABSOLUTE: 1.4 K/UL (ref 1.1–4.5)
LYMPHOCYTES RELATIVE PERCENT: 18.7 % (ref 20–40)
MCH RBC QN AUTO: 30.4 PG (ref 27–31)
MCHC RBC AUTO-ENTMCNC: 29.9 G/DL (ref 33–37)
MCV RBC AUTO: 101.5 FL (ref 81–99)
MONOCYTES ABSOLUTE: 0.8 K/UL (ref 0–0.9)
MONOCYTES RELATIVE PERCENT: 10 % (ref 0–10)
NEUTROPHILS ABSOLUTE: 5.2 K/UL (ref 1.5–7.5)
NEUTROPHILS RELATIVE PERCENT: 66.9 % (ref 50–65)
PDW BLD-RTO: 13.4 % (ref 11.5–14.5)
PLATELET # BLD: 195 K/UL (ref 130–400)
PMV BLD AUTO: 12.1 FL (ref 9.4–12.3)
POTASSIUM REFLEX MAGNESIUM: 3.9 MMOL/L (ref 3.5–5)
POTASSIUM REFLEX MAGNESIUM: 4 MMOL/L (ref 3.5–5)
RBC # BLD: 3.88 M/UL (ref 4.2–5.4)
SODIUM BLD-SCNC: 151 MMOL/L (ref 136–145)
SODIUM BLD-SCNC: 153 MMOL/L (ref 136–145)
WBC # BLD: 7.7 K/UL (ref 4.8–10.8)

## 2021-09-02 PROCEDURE — 36415 COLL VENOUS BLD VENIPUNCTURE: CPT

## 2021-09-02 PROCEDURE — 2580000003 HC RX 258: Performed by: INTERNAL MEDICINE

## 2021-09-02 PROCEDURE — 2500000003 HC RX 250 WO HCPCS: Performed by: NURSE PRACTITIONER

## 2021-09-02 PROCEDURE — 80048 BASIC METABOLIC PNL TOTAL CA: CPT

## 2021-09-02 PROCEDURE — 85025 COMPLETE CBC W/AUTO DIFF WBC: CPT

## 2021-09-02 RX ADMIN — METOPROLOL TARTRATE 5 MG: 5 INJECTION INTRAVENOUS at 03:40

## 2021-09-02 RX ADMIN — DEXTROSE MONOHYDRATE: 50 INJECTION, SOLUTION INTRAVENOUS at 09:16

## 2021-09-02 RX ADMIN — METOPROLOL TARTRATE 5 MG: 5 INJECTION INTRAVENOUS at 09:13

## 2021-09-02 NOTE — DISCHARGE SUMMARY
Vinnie Harris  :  1933  MRN:  [id number]    Admit date:  2021  Discharge date:  21      Discharging Physician:  Dr. Eula Medina Directive: DNR-CC    Consults: IP CONSULT TO CARDIOLOGY  IP CONSULT TO NEPHROLOGY  IP CONSULT TO SOCIAL WORK  PALLIATIVE CARE EVAL  IP CONSULT TO PALLIATIVE CARE  IP CONSULT TO [address] [place] [address]     Primary Care Physician:  NONI Martino    Discharge Diagnoses: Active Problems:    Subtherapeutic international normalized ratio (INR)    Gastroesophageal reflux disease with esophagitis    Mixed hyperlipidemia    Hypernatremia    Atrial fibrillation with RVR ([facility] [address]  Resolved Problems:    * No resolved hospital problems. *      Portions of this note have been copied forward, however, changed to reflect the most current clinical status of this patient. Hospital Course: The patient is a 80 y. o. female with a recent diagnosis of COVID, CHF, atrial fibrillation with chronic coumadin therapy, CKD, dementia, and HTN who presented to [facility] ED complaining of AMS and tachycardia. The patient has dementia. Daughter/POA at bedside provided information. She stated the patient was ill with diarrhea a few weeks ago. She stated she was seen and given antibiotics by her PCP. Gabrielle Greco was discharged 3 days ago after having a supra therapeutic INR. The daughter stated the patient has had increased fatigue, nausea, decreased appetite, dysphagia, and increased oxygen need since discharge. Day of admission the daughter stated that home health visited and she had an increased heart rate. ED work-up revealed a. Fib with RVR, Covid positive, Na 154, K+ 5.3, Cr 1.6, Gfr 30. Patient was admitted to hospitalist service for atrial fibrillation with RVR. Patient was placed on cardizem drip at admission. Cardiology was consulted. SLP was consulted and patient was indicated to not be protecting her airway with any texture by mouth and NPO status was recommend.  Family (all three children) was contacted and given updates and alll children in agreence that they do not want a PEG tube placed and would like her to be comfortable. Hospice consulted. Patient will discharge home with hospice care. Significant Diagnostic Studies:   CT Head WO Contrast    Result Date: 8/31/2021  CT HEAD WO CONTRAST 8/31/2021 3:29 PM History: Altered mental status. In order to have a CT radiation dose as low as reasonably achievable Automated Exposure Control was utilized for adjustment of the mA and/or KV according to patient size. DLP in mGycm= 610. Noncontrast head CT compared with November 16, 2019. Age-related atrophy and small vessel disease. Normal ventricle size. No intracranial hemorrhage. No mass effect and no sign of acute infarct. Right maxillary sinus mucosal thickening and right sphenoid sinus fluid. 1. Right maxillary and sphenoid sinusitis changes. 2. Atrophy and small vessel disease. 3. No mass, infarct, or intracranial hemorrhage. Signed by Dr Pires Cancer    XR CHEST PORTABLE    Result Date: 8/31/2021  XR CHEST PORTABLE 8/31/2021 3:17 PM History: Altered mental status. One view chest x-ray compared with August 26, 2021. Chronic interstitial lung disease with mild hyperexpansion. Heart size is appropriate. Moderate degenerative joint change at the left shoulder. Old healed right rib fractures. 1. Chronic lung changes.  Signed by Dr Pires Cancer      Pertinent Labs:   CBC:   Recent Labs     08/31/21  1357 09/01/21  0558 09/02/21  0617   WBC 9.8 7.5 7.7   HGB 12.4 11.7* 11.8*    194 195     BMP:    Recent Labs     09/01/21  0558 09/02/21  0031 09/02/21  0617   * 151* 153*   K 4.5 3.9 4.0   * 117* 117*   CO2 23 25 27   BUN 70* 57* 54*   CREATININE 1.5* 1.3* 1.3*   GLUCOSE 87 105 109     INR:   Recent Labs     08/31/21  1357   INR 1.35*       Physical Exam:  Vital Signs: BP (!) 150/103   Pulse 97   Temp 95.4 °F (35.2 °C) (Temporal)   Resp 20   Ht 5' 3\" (1.6 m)   Wt 98 lb 2 oz (44.5 kg)   SpO2 98%   BMI 17.38 kg/m²   Physical Exam  Constitutional:       Appearance: She is ill-appearing. HENT:      Head: Normocephalic. Mouth/Throat:      Mouth: Mucous membranes are dry. Pharynx: Oropharynx is clear. Cardiovascular:      Rate and Rhythm: Tachycardia present. Rhythm irregular. Pulses: Normal pulses. Heart sounds: Normal heart sounds. Pulmonary:      Effort: Pulmonary effort is normal.      Breath sounds: Normal breath sounds. Abdominal:      General: Abdomen is flat. Bowel sounds are normal. There is no distension. Palpations: Abdomen is soft. Tenderness: There is no abdominal tenderness. There is no guarding. Musculoskeletal:         General: No swelling. Cervical back: Normal range of motion and neck supple. Right lower leg: No edema. Left lower leg: No edema. Skin:     General: Skin is warm and dry. Capillary Refill: Capillary refill takes 2 to 3 seconds. Neurological:      Mental Status: She is alert. Mental status is at baseline. She is disoriented. Discharge Medications:       Oletta Forte I \"Aminah\"   Home Medication Instructions [facility]:[id number]    Printed on:09/02/21 1226   Medication Information                      OXYGEN  Inhale 2 L into the lungs continuous                 Discharge Instructions:   Hospice will meet patient at home for admission. Diet: Diet NPO     Disposition: Patient is Stableand will be discharged to Home with hospice care. Time spent on discharge 39 minutes spent in assessing patient, reviewing medications, discussion with nursing, confirming safe discharge plan and preparation of discharge summary.     Signed:  NONI Tobias CNP, 9/2/2021 12:26 PM

## 2021-09-02 NOTE — PROGRESS NOTES
The equipment is set up in the home. It is ok to dc the pt when medically ready. Hospice will meet the pt at her home for adm to hospice.

## 2021-09-02 NOTE — PROGRESS NOTES
Nephrology ([facility]) Progress Note    Patient:  Pablo Tay  YOB: 1933  Date of Service: 9/2/2021  MRN: [id number]   Acct: [de-identified]   Primary Care Physician: NONI Banuelos  Advance Directive: [facility]  Admit Date: 8/31/2021       Hospital Day: 2  Referring Provider: Nancy Inman MD    Patient Seen, Chart, Consults notes, Labs, Radiology studies reviewed. Subjective: This is an 80-year-old woman with a history of congestive heart failure, hypertension, and chronic disease stage IIIb. She has atrial fibrillation and is on anticoagulation therapy with Coumadin. Patient has recently tested positive for Covid 19. She had a recent hospital admission with supratherapeutic INR above 8. She is brought back because of tachycardia along with altered mental status. Patient has no respiratory distress and is on room air. She has dementia, severe hearing impairment and dysphagia. Her serum creatinine is 1.2-1.3 at baseline and it has since increased to 1.5 and 1.6. Patient was also having rising serum sodium since August 26. Her most recent serum sodium was 156 on September 1 renal service was consulted to manage her AKBAR. No further history could be obtained except from medical chart and nursing staff. Patient continues to do poorly today. She has severe dysphagia. Family is interested now in hospice.     Allergies:  Denosumab and Lisinopril    Medicines:  Current Facility-Administered Medications   Medication Dose Route Frequency Provider Last Rate Last Admin    sodium chloride flush 0.9 % injection 10 mL  10 mL IntraVENous 2 times per day Carlene Lawrence MD        sodium chloride flush 0.9 % injection 10 mL  10 mL IntraVENous PRN Carlene Lawrence MD        0.9 % sodium chloride infusion  25 mL IntraVENous PRN Carlene Lawrence MD        potassium chloride (KLOR-CON M) extended release tablet 40 mEq  40 mEq Oral PRN Carlene Lawrence MD        Or  potassium bicarb-citric acid (EFFER-K) effervescent tablet 40 mEq  40 mEq Oral PRN Dnak Bruce MD        Or    potassium chloride 10 mEq/100 mL IVPB (Peripheral Line)  10 mEq IntraVENous PRN Dank Bruce MD        ondansetron (ZOFRAN-ODT) disintegrating tablet 4 mg  4 mg Oral Q8H PRN Dank Bruce MD        Or    ondansetron [facility]) injection 4 mg  4 mg IntraVENous Q6H PRN aDnk Bruce MD        magnesium hydroxide (MILK OF MAGNESIA) 400 MG/5ML suspension 30 mL  30 mL Oral Daily PRN Dank Bruce MD        acetaminophen (TYLENOL) tablet 650 mg  650 mg Oral Q6H PRN Dank Bruce MD        Or    acetaminophen (TYLENOL) suppository 650 mg  650 mg Rectal Q6H PRN Dank Bruce MD        aspirin chewable tablet 81 mg  81 mg Oral QAM Orion Castro MD        atorvastatin (LIPITOR) tablet 10 mg  10 mg Oral Daily Orion Castro MD        digoxin [facility]) tablet 125 mcg  125 mcg Oral Once per day on Mon Wed Fri Orion Castro MD        warfarin (COUMADIN) tablet 2.5 mg  2.5 mg Oral Once per day on Sun Mon Tue Thu Fri Sat Orion Castro MD       [facility] by provider] isosorbide mononitrate (IMDUR) extended release tablet 30 mg  30 mg Oral Daily Orion Castro MD        warfarin (COUMADIN) tablet 5 mg  5 mg Oral Once per day on Thalia Selby MD        warfarin (COUMADIN) daily dosing (placeholder)   Other RX Italo Diez MD        [Held by provider] metoprolol tartrate (LOPRESSOR) tablet 12.5 mg  12.5 mg Oral BID Orion Castro MD        dextrose 5 % solution   IntraVENous Continuous Sallye Dandy,  mL/hr at 09/02/21 0916 New Bag at 09/02/21 0916    metoprolol (LOPRESSOR) injection 5 mg  5 mg IntraVENous Q6H NONI Vazquez - CNP   5 mg at 09/02/21 0913    dilTIAZem 125 mg in dextrose 5 % 125 mL infusion  5-15 mg/hr IntraVENous Continuous Sia Daniel MD   Stopped at 08/31/21 2146       Past Medical History:  Past Medical History:   Diagnosis Date    Acute systolic CHF (congestive heart failure), NYHA class 3 ([facility]) 02/24/2017    Allergic rhinitis     Anticoagulant long-term use     coumadin for atrial fib    Anxiety 11/11/2019    Atrial fibrillation ([facility])     Chronic atrial fibrillation ([facility]) 09/28/2017    Chronic back pain     Chronic kidney disease     Chronic kidney disease, stage III (moderate) ([facility]) 02/17/2017    Chronic systolic CHF (congestive heart failure), NYHA class 3 ([address] 02/17/2017    Current moderate episode of major depressive disorder without prior episode ([address] 11/14/2019    GERD (gastroesophageal reflux disease)     Glaucoma     Hearing loss     Hypertension     Mixed hyperlipidemia 07/02/2020    Osteoarthritis     Osteoporosis     Palliative care patient 08/27/2021    Panlobular emphysema ([address] 11/28/2018    Shingles     Sinus problem     Wears glasses        Past Surgical History:  Past Surgical History:   Procedure Laterality Date    BREAST BIOPSY      Left-benign    BREAST BIOPSY  feb 2014    Left    HYSTERECTOMY      OVARY SURGERY      tumor removed     SKIN CANCER EXCISION  01/2020       Family History  Family History   Problem Relation Age of Onset    Asthma Mother     Emphysema Mother     Alzheimer's Disease Sister     Heart Disease Brother     Heart Disease Sister     No Known Problems Sister     Alzheimer's Disease Sister        Social History  Social History     Socioeconomic History    Marital status:       Spouse name: Not on file    Number of children: Not on file    Years of education: Not on file    Highest education level: Not on file   Occupational History    Not on file   Tobacco Use    Smoking status: Never Smoker    Smokeless tobacco: Never Used   Vaping Use    Vaping Use: Never used   Substance and Sexual Activity    Alcohol use: No    Drug use: No    Sexual activity: Never   Other Topics Concern    Not on file   Social History Narrative    Not on file     Social Determinants of Health     Financial Resource Strain: Low Risk     Difficulty of Paying Living Expenses: Not hard at all   Food Insecurity: No Food Insecurity    Worried About Running Out of Food in the Last Year: Never true    [address] in the Last Year: Never true   Transportation Needs: No Transportation Needs    Lack of Transportation (Medical): No    Lack of Transportation (Non-Medical): No   Physical Activity:     Days of Exercise per Week:     Minutes of Exercise per Session:    Stress:     Feeling of Stress :    Social Connections:     Frequency of Communication with Friends and Family:     Frequency of Social Gatherings with Friends and Family:     Attends [employer] Services:     Active Member of Clubs or Organizations:     Attends Club or Organization Meetings:     Marital Status:    Intimate Partner Violence:     Fear of Current or Ex-Partner:     Emotionally Abused:     Physically Abused:     Sexually Abused:          Review of Systems:  Unable to obtain at this time       Objective:  Blood pressure 129/89, pulse 90, temperature 95.5 °F (35.3 °C), temperature source Temporal, resp. rate 18, height 5' 3\" (1.6 m), weight 98 lb 2 oz (44.5 kg), SpO2 95 %. Intake/Output Summary (Last 24 hours) at 9/2/2021 1125  Last data filed at 9/1/2021 1200  Gross per 24 hour   Intake 0 ml   Output --   Net 0 ml     Exam is limited due to positive. Covid illness and patient's underlying communication problem. Patient continues to be under strict isolation. The assessment was obtained with assistance from her nurse.   General: Awake, in no distress  HEENT: Normocephalic, atraumatic  Chest: Diminished breath sounds at the bases  CV: Irregularly irregular heart sounds  Abdomen: Soft, no distention  Extremities:  No peripheral edema.       Labs:  BMP:   Recent Labs     09/01/21  0558 09/02/21  0031 09/02/21  0617   NA 156* 151* 153*   K 4.5 3.9 4.0   * 117* 117*   CO2 23 25 27   BUN 70* 57* 54*   CREATININE 1.5* 1.3* 1.3*   CALCIUM 9.6 9.2 9.3     CBC:   Recent Labs     08/31/21  1357 09/01/21  0558 09/02/21  0617   WBC 9.8 7.5 7.7   HGB 12.4 11.7* 11.8*   HCT 41.4 40.4 39.4   .2* 104.4* 101.5*    194 195     LIVER PROFILE:   Recent Labs     08/31/21 1357   AST 34*   ALT 8   BILITOT 0.7   ALKPHOS 42     PT/INR:   Recent Labs     08/31/21 1357   PROTIME 16.8*   INR 1.35*     APTT: No results for input(s): APTT in the last 72 hours. BNP:  No results for input(s): BNP in the last 72 hours. Ionized Calcium:No results for input(s): IONCA in the last 72 hours. Magnesium:No results for input(s): MG in the last 72 hours. Phosphorus:No results for input(s): PHOS in the last 72 hours. HgbA1C: No results for input(s): LABA1C in the last 72 hours. Hepatic:   Recent Labs     08/31/21 1357   ALKPHOS 42   ALT 8   AST 34*   PROT 7.9   BILITOT 0.7   LABALBU 3.6     Lactic Acid: No results for input(s): LACTA in the last 72 hours. Troponin: No results for input(s): CKTOTAL, CKMB, TROPONINT in the last 72 hours. ABGs: No results for input(s): PH, PCO2, PO2, HCO3, O2SAT in the last 72 hours. CRP:  No results for input(s): CRP in the last 72 hours. Sed Rate:  No results for input(s): SEDRATE in the last 72 hours. Cultures:   No results for input(s): CULTURE in the last 72 hours. Radiology reports as per the Radiologist  Radiology: CT Head WO Contrast    Result Date: 8/31/2021  CT HEAD WO CONTRAST 8/31/2021 3:29 PM History: Altered mental status. In order to have a CT radiation dose as low as reasonably achievable Automated Exposure Control was utilized for adjustment of the mA and/or KV according to patient size. DLP in mGycm= 610. Noncontrast head CT compared with November 16, 2019. Age-related atrophy and small vessel disease. Normal ventricle size. No intracranial hemorrhage.  No mass effect and no sign of acute infarct. Right maxillary sinus mucosal thickening and right sphenoid sinus fluid. 1. Right maxillary and sphenoid sinusitis changes. 2. Atrophy and small vessel disease. 3. No mass, infarct, or intracranial hemorrhage. Signed by Dr Alessio Stallworth    XR CHEST PORTABLE    Result Date: 8/31/2021  XR CHEST PORTABLE 8/31/2021 3:17 PM History: Altered mental status. One view chest x-ray compared with August 26, 2021. Chronic interstitial lung disease with mild hyperexpansion. Heart size is appropriate. Moderate degenerative joint change at the left shoulder. Old healed right rib fractures. 1. Chronic lung changes. Signed by Dr Home Mccann   1. Acute kidney injury-likely prerenal azotemia versus ATN  2. Chronic kidney disease stage IIIb  3. Hypernatremia  4. Positive Covid test  5. Chronic atrial fibrillation on anticoagulation  6. Dementia  7. Severe hearing impairment    Plan: At this time I will increase rate of dextrose fluid. Follow-up labs. Patient status may be changing to hospice and comfort care.

## 2021-09-02 NOTE — PROGRESS NOTES
Physical Therapy   Name: Tena Botello  MRN:  [id number]  Date of service:  9/2/2021  Spoke with pt's RN, Praveena Shearer, who states pt is going home with hospice today. Will d/c PT at this time.   Electronically signed by Trini Broderick PT on 9/2/2021 at 1:31 PM

## 2021-09-03 ENCOUNTER — TELEPHONE (OUTPATIENT)
Dept: INTERNAL MEDICINE | Age: 86
End: 2021-09-03

## 2021-09-03 NOTE — TELEPHONE ENCOUNTER
[facility] Transitions Initial Follow Up Call    Outreach made within 2 business days of discharge: Yes    Patient: Santi Storm   Patient : 1933  MRN: [id number]   Reason for Admission: Admitted 21 for    Tachycardia       Afib RVR (130-160 rate)    Altered Mental Status       Oriented to person, place only     Discharge Date: 21    Discharge Diagnoses: Active Problems:    Subtherapeutic international normalized ratio (INR)    Gastroesophageal reflux disease with esophagitis    Mixed hyperlipidemia    Hypernatremia    Atrial fibrillation with RVR ([facility] [address]     Spoke with: I spoke with patient's granddaughter. She went home with hospice. They brought a hospital bed for her. There was a problem with her medicated list that needed clarification so she did not get her care package last night. Hospice is supposed to be out today for that. She is not eating. She is resting comfortably right now. She denies any needs for her grandmother. TCM follow up not needed as this time as patient will be followed by Hospice provider at this point.     Discharge department/facility: [address] [place]

## 2021-09-09 ASSESSMENT — ENCOUNTER SYMPTOMS
ABDOMINAL PAIN: 0
TROUBLE SWALLOWING: 0
VOMITING: 0
COUGH: 1

## 2024-02-09 NOTE — PROGRESS NOTES
displays a negative Romberg sign. Skin: Skin is warm and intact. Capillary refill takes less than 2 seconds. Psychiatric: She has a normal mood and affect. Her speech is normal and behavior is normal. Judgment and thought content normal. Cognition and memory are normal.   Nursing note and vitals reviewed. BP (!) 156/94   Pulse 81   Temp 98.6 °F (37 °C)   Resp 18   Ht 5' 4\" (1.626 m)   Wt 114 lb (51.7 kg)   SpO2 96%   BMI 19.57 kg/m²     Assessment:       Diagnosis Orders   1. Cough  XR CHEST STANDARD (2 VW)    fexofenadine (ALLEGRA) 180 MG tablet   2. Encounter for medical examination to establish care  [address] Choudhary MD       Plan:      Orders Placed This Encounter   Procedures    XR CHEST STANDARD (2 VW)     Standing Status:   Future     Number of Occurrences:   1     Standing Expiration Date:   11/12/2019     Order Specific Question:   Reason for exam:     Answer:   lois Klein MD     Referral Priority:   Routine     Referral Type:   Eval and Treat     Referral Reason:   Specialty Services Required     Referred to Provider:   Jocelyn Martinez MD     Requested Specialty:   Family Medicine     Number of Visits Requested:   1       No Follow-up on file.     Orders Placed This Encounter   Procedures    XR CHEST STANDARD (2 VW)     Standing Status:   Future     Number of Occurrences:   1     Standing Expiration Date:   11/12/2019     Order Specific Question:   Reason for exam:     Answer:   lois Klein MD     Referral Priority:   Routine     Referral Type:   Eval and Treat     Referral Reason:   Specialty Services Required     Referred to Provider:   Jocelyn Martinez MD     Requested Specialty:   Family Medicine     Number of Visits Requested:   1     Orders Placed This Encounter   Medications    fexofenadine (ALLEGRA) 180 MG tablet     Sig: Take 1 tablet by mouth daily     Dispense:  30 tablet Alert and oriented to person, place and time/Awake

## 2025-05-16 NOTE — PROGRESS NOTES
Emerson Diaz is a 80 y.o. female evaluated via telephone on 7/21/2020. Consent:  She and/or health care decision maker is aware that that she may receive a bill for this telephone service, depending on her insurance coverage, and has provided verbal consent to proceed: Yes      Documentation:    Daughter Jenise Francis reports that patient has had dizziness. She states that when patient lays down at night she complains of dizziness. She states that it did not occur last night but did occur the 2 nights prior. She states that she has also been having symptoms of runny nose and decreased hearing. Jenise Francis believes that patient's allergies are playing a huge role in her allergic rhinitis symptoms and possibly her dizziness. Review of Systems   Constitutional: Negative for fatigue. HENT: Positive for ear pain, rhinorrhea and sinus pain. Eyes: Negative. Respiratory: Negative. Cardiovascular: Negative. Hypotension   Gastrointestinal: Negative. Endocrine: Negative. Genitourinary: Negative. Musculoskeletal: Positive for back pain (chronic due to scoliosis). Skin: Negative. Allergic/Immunologic: Negative. Neurological: Positive for dizziness. Negative for weakness. Psychiatric/Behavioral: Negative. I communicated with the patient and/or health care decision maker about begin prednisone after labs. Details of this discussion including any medical advice provided:       I affirm this is a Patient Initiated Episode with an Established Patient who has not had a related appointment within my department in the past 7 days or scheduled within the next 24 hours.     Total Time: minutes: 21-30 minutes    Note: not billable if this call serves to triage the patient into an appointment for the relevant concern      [address] to the emergency declaration under the [facility]5 waiver authority and the Coronavirus Preparedness and Response Supplemental Appropriations Act, this Virtual  Visit was conducted, with patient's consent, to reduce the patient's risk of exposure to COVID-19 and provide continuity of care for an established patient. Services were provided through a video synchronous discussion virtually to substitute for in-person clinic visit. [Regular Cycle Intervals] : periods have been regular [No] : Patient does not have concerns regarding sex [FreeTextEntry1] : 5/11/25